# Patient Record
Sex: MALE | Race: WHITE | Employment: OTHER
[De-identification: names, ages, dates, MRNs, and addresses within clinical notes are randomized per-mention and may not be internally consistent; named-entity substitution may affect disease eponyms.]

---

## 2017-06-05 DIAGNOSIS — K21.9 GASTROESOPHAGEAL REFLUX DISEASE WITHOUT ESOPHAGITIS: ICD-10-CM

## 2017-06-05 NOTE — TELEPHONE ENCOUNTER
Omeprazole      Last Written Prescription Date: 06/07/16  Last Fill Quantity: 90,  # refills: 3   Last Office Visit with [facility], [facility] or [facility] prescribing provider: 06/07/16

## 2017-06-06 NOTE — TELEPHONE ENCOUNTER
Prescription approved per [facility] Refill Protocol.    Claudine PADILLA RN

## 2017-07-06 DIAGNOSIS — K21.9 GASTROESOPHAGEAL REFLUX DISEASE WITHOUT ESOPHAGITIS: ICD-10-CM

## 2017-07-06 NOTE — TELEPHONE ENCOUNTER
Omeprazole     Last Written Prescription Date: 06/06/17  Last Fill Quantity: 30,  # refills: 0   Last Office Visit with G, [facility] or [facility] prescribing provider: 06/07/16

## 2017-07-07 NOTE — TELEPHONE ENCOUNTER
Patient has enough medication until his appointment.  Appointment scheduled on the 7/11/17.    Claudine PADILLA RN

## 2017-07-08 ENCOUNTER — HEALTH MAINTENANCE LETTER (OUTPATIENT)
Age: 82
End: 2017-07-08

## 2017-07-11 ENCOUNTER — OFFICE VISIT (OUTPATIENT)
Dept: FAMILY MEDICINE | Facility: CLINIC | Age: 82
End: 2017-07-11
Payer: COMMERCIAL

## 2017-07-11 ENCOUNTER — TELEPHONE (OUTPATIENT)
Dept: FAMILY MEDICINE | Facility: CLINIC | Age: 82
End: 2017-07-11

## 2017-07-11 VITALS
SYSTOLIC BLOOD PRESSURE: 139 MMHG | DIASTOLIC BLOOD PRESSURE: 66 MMHG | BODY MASS INDEX: 27.31 KG/M2 | OXYGEN SATURATION: 96 % | TEMPERATURE: 98.4 F | HEART RATE: 60 BPM | HEIGHT: 67 IN | WEIGHT: 174 LBS

## 2017-07-11 DIAGNOSIS — G40.909 SEIZURE DISORDER (H): ICD-10-CM

## 2017-07-11 DIAGNOSIS — M75.101 ROTATOR CUFF SYNDROME, RIGHT: Primary | ICD-10-CM

## 2017-07-11 DIAGNOSIS — J45.909 UNCOMPLICATED ASTHMA, UNSPECIFIED ASTHMA SEVERITY: ICD-10-CM

## 2017-07-11 DIAGNOSIS — K21.9 GASTROESOPHAGEAL REFLUX DISEASE WITHOUT ESOPHAGITIS: ICD-10-CM

## 2017-07-11 PROCEDURE — 99214 OFFICE O/P EST MOD 30 MIN: CPT | Performed by: FAMILY MEDICINE

## 2017-07-11 RX ORDER — FLUTICASONE PROPIONATE 110 UG/1
2 AEROSOL, METERED RESPIRATORY (INHALATION) 2 TIMES DAILY
Qty: 1 INHALER | Refills: 12 | Status: SHIPPED | OUTPATIENT
Start: 2017-07-11 | End: 2021-01-25

## 2017-07-11 RX ORDER — ALBUTEROL SULFATE 90 UG/1
2 AEROSOL, METERED RESPIRATORY (INHALATION) EVERY 4 HOURS PRN
Qty: 1 INHALER | Refills: 11 | Status: SHIPPED | OUTPATIENT
Start: 2017-07-11 | End: 2020-03-18

## 2017-07-11 RX ORDER — GABAPENTIN 600 MG/1
TABLET ORAL
Qty: 225 TABLET | Refills: 3 | Status: SHIPPED | OUTPATIENT
Start: 2017-07-11 | End: 2018-10-01

## 2017-07-11 ASSESSMENT — PAIN SCALES - GENERAL: PAINLEVEL: MILD PAIN (2)

## 2017-07-11 NOTE — PROGRESS NOTES
SUBJECTIVE:                                                    Henrry Fonseca is a 82 year old male who presents to clinic today for the following health issues:      Concern -   Chief Complaint   Patient presents with     Asthma     med check and refill     Refill Request     omeprazole, Neurontin     Shoulder right     pain and pain goes down arm. had an MRI done 11/2010 on right shoulder and left.             Problem list and histories reviewed & adjusted, as indicated.  Additional history:         Reviewed and updated as needed this visit by clinical staff  Tobacco       Reviewed and updated as needed this visit by Provider      Further history obtained, clarified or corrected by physician:  He needs refills of his medications. His asthma is managed fairly well. He needs his omeprazole or he gets reflux. He has right shoulder pain and has known rotator cuff syndrome and is wondering if more can be done about that. He does not take any medication for it.    OBJECTIVE:  LUNGS: clear to auscultation, normal breath sounds  CV: RRR without murmur  ABD: BS+, soft, nontender, no masses, no hepatosplenomegaly  EXTREMITIES: without joint tenderness, swelling or erythema.  No muscle tenderness or abnormality.  SKIN: No rashes or abnormalities  NEURO:non focal exam    ASSESSMENT:     Gastroesophageal reflux disease without esophagitis  Seizure disorder (H)  Uncomplicated asthma, unspecified asthma severity  Rotator cuff syndrome, right    PLAN:  Refill medications as listed  I told him he can try ibuprofen but he needs to take care of that he doesn't get further reflux and if he does he should stop taking it. If it works well on an intermittent basis he continues that for his shoulder. If it does not work well then he will recheck here for consideration of cortisone injection and following that possibly orthopedic evaluation.    Orders Placed This Encounter     Asthma Action Plan (AAP)     omeprazole (PRILOSEC) 20 MG CR  capsule     gabapentin (NEURONTIN) 600 MG tablet     fluticasone (FLOVENT HFA) 110 MCG/ACT Inhaler     albuterol (PROAIR HFA/PROVENTIL HFA/VENTOLIN HFA) 108 (90 BASE) MCG/ACT Inhaler

## 2017-07-11 NOTE — TELEPHONE ENCOUNTER
Per fax received from [employer] - Flovent is not covered by patient's Insurance Company  Dr. Orourke - Please choose:  1.  Change medication that is not covered to a different medication and send new prescription to patient's pharmacy?  2.  Patient will need to pay for the non-covered medication out-of-pocket?   3.  Try for Prior Authorization with Insurance Company to get medication covered?        P.A. Phone #:  No listed       P.A.  ID#:  [id number]

## 2017-07-11 NOTE — MR AVS SNAPSHOT
After Visit Summary   7/11/2017    Henrry Fonseca    MRN: [id number]           Patient Information     Date Of Birth          1935        Visit Information        Provider Department      7/11/2017 2:00 PM Keenan Orourke MD [facility]        Today's Diagnoses     Rotator cuff syndrome, right    -  1    Gastroesophageal reflux disease without esophagitis        Seizure disorder (H)        Uncomplicated asthma, unspecified asthma severity          Care Instructions          Thank you for choosing [facility].  You may be receiving a survey in the mail from Frannie Perez regarding your visit today.  Please take a few minutes to complete and return the survey to let us know how we are doing.      Our Clinic hours are:  Mondays    7:20 am - 7 pm  Tues -  Fri  7:20 am - 5 pm    Clinic Phone: [phone]    The clinic lab opens at 7:30 am Mon - Fri and appointments are required.    [facility]  Ph. [phone]  Monday-Thursday 8 am - 7pm  Tues/Wed/Fri 8 am - 5:30 pm                 Follow-ups after your visit        Who to contact     If you have questions or need follow up information about today's clinic visit or your schedule please contact [facility] directly at [phone].  Normal or non-critical lab and imaging results will be communicated to you by [employer], letter or phone within 4 business days after the clinic has received the results. If you do not hear from us within 7 days, please contact the clinic through [employer] or phone. If you have a critical or abnormal lab result, we will notify you by phone as soon as possible.  Submit refill requests through [employer] or call your pharmacy and they will forward the refill request to us. Please allow 3 business days for your refill to be completed.          Additional Information About Your Visit        [employer] Information     [employer] lets you send messages to your doctor, view your test  "results, renew your prescriptions, schedule appointments and more. To sign up, go to [email or web address] . Click on \"Log in\" on the left side of the screen, which will take you to the Welcome page. Then click on \"Sign up Now\" on the right side of the page.     You will be asked to enter the access code listed below, as well as some personal information. Please follow the directions to create your username and password.     Your access code is: [email or web address]  Expires: 10/9/2017  2:09 PM     Your access code will  in 90 days. If you need help or a new code, please call your [facility] clinic or [phone].        Care EveryWhere ID     This is your Care EveryWhere ID. This could be used by other organizations to access your [facility] medical records  [id number]        Your Vitals Were     Pulse Temperature Height Pulse Oximetry BMI (Body Mass Index)       60 98.4  F (36.9  C) (Tympanic) 5' 6.5\" (1.689 m) 96% 27.66 kg/m2        Blood Pressure from Last 3 Encounters:   17 139/66   16 139/89   03/09/15 162/78    Weight from Last 3 Encounters:   17 174 lb (78.9 kg)   16 174 lb (78.9 kg)   03/09/15 182 lb (82.6 kg)              We Performed the Following     Asthma Action Plan (AAP)          Today's Medication Changes          These changes are accurate as of: 17  2:16 PM.  If you have any questions, ask your nurse or doctor.               These medicines have changed or have updated prescriptions.        Dose/Directions    omeprazole 20 MG CR capsule   Commonly known as:  priLOSEC   This may have changed:  See the new instructions.   Used for:  Gastroesophageal reflux disease without esophagitis   Changed by:  Keenan Orourke MD        TAKE ONE CAPSULE BY MOUTH ONCE DAILY   Quantity:  90 capsule   Refills:  3            Where to get your medicines      These medications were sent to [facility] [address] [place] [address] - [address]  [facility] [address]    "  Phone:  [phone]     albuterol 108 (90 BASE) MCG/ACT Inhaler    fluticasone 110 MCG/ACT Inhaler    gabapentin 600 MG tablet    omeprazole 20 MG CR capsule                Primary Care Provider Office Phone # Fax #    Keenan Orourke [phone]       [facility] [address] [address] [facility] [address]        Equal Access to Services     MY [facility] Hospital[facility]OVANA : Hadii aad ku hadasho Soomaali, waaxda luqadaha, qaybta kaalmada adeegyada, waxay idiin hayaan adeeg khkim laTinoaan ah. So [facility] [phone].    ATENCIÓN: Si habla español, tiene a warren disposición servicios gratuitos de asistencia lingüística. Llame al [phone].    We comply with applicable federal civil rights laws and [place] laws. We do not discriminate on the basis of race, color, national origin, age, disability sex, sexual orientation or gender identity.            Thank you!     Thank you for choosing [facility]  for your care. Our goal is always to provide you with excellent care. Hearing back from our patients is one way we can continue to improve our services. Please take a few minutes to complete the written survey that you may receive in the mail after your visit with us. Thank you!             Your Updated Medication List - Protect others around you: Learn how to safely use, store and throw away your medicines at www.disposemymeds.org.          This list is accurate as of: 7/11/17  2:16 PM.  Always use your most recent med list.                   Brand Name Dispense Instructions for use Diagnosis    albuterol 108 (90 BASE) MCG/ACT Inhaler    PROAIR HFA/PROVENTIL HFA/VENTOLIN HFA    1 Inhaler    Inhale 2 puffs into the lungs every 4 hours as needed for shortness of breath / dyspnea    Uncomplicated asthma, unspecified asthma severity       aspirin 325 MG EC tablet      1 TABLET EVERY Day as needed        fluticasone 110 MCG/ACT Inhaler    FLOVENT HFA    1 Inhaler    Inhale 2 puffs into the lungs 2 times  daily As needed    Uncomplicated asthma, unspecified asthma severity       gabapentin 600 MG tablet    NEURONTIN    225 tablet    Take  by mouth. 1 tablet in the AM and PM and 1/2 tablet at noon.    Seizure disorder (H)       omeprazole 20 MG CR capsule    priLOSEC    90 capsule    TAKE ONE CAPSULE BY MOUTH ONCE DAILY    Gastroesophageal reflux disease without esophagitis

## 2017-07-11 NOTE — TELEPHONE ENCOUNTER
My preferred strategy for dealing with this situation is this:  1. Call this pharmacy and ask them to tell us which medication in this category is covered. We will change to that most likely  2. If this pharmacy will not provide that information then thank them and tell them we will recommend the patient change pharmacies.  3. Call the patient and explain that this pharmacy will not provide the necessary information so we recommend a change to the [facility] pharmacy which has cooperated in these situations in the past. Or the patient can research this independently by contacting the insurance company.    Sharad

## 2017-07-11 NOTE — NURSING NOTE
"Chief Complaint   Patient presents with     Asthma     med check and refill     Refill Request     omeprazole, Neurontin     Shoulder right     pain and pain goes down arm. had an MRI done 11/2010 on right shoulder and left.       Initial /66 (BP Location: Right arm, Cuff Size: Adult Regular)  Pulse 60  Temp 98.4  F (36.9  C) (Tympanic)  Ht 5' 6.5\" (1.689 m)  Wt 174 lb (78.9 kg)  SpO2 96%  BMI 27.66 kg/m2 Estimated body mass index is 27.66 kg/(m^2) as calculated from the following:    Height as of this encounter: 5' 6.5\" (1.689 m).    Weight as of this encounter: 174 lb (78.9 kg).  Medication Reconciliation: complete    "

## 2017-07-11 NOTE — PATIENT INSTRUCTIONS
Thank you for choosing [facility].  You may be receiving a survey in the mail from [facility] regarding your visit today.  Please take a few minutes to complete and return the survey to let us know how we are doing.      Our Clinic hours are:  Mondays    7:20 am - 7 pm  Tues -  Fri  7:20 am - 5 pm    Clinic Phone: [phone]    The clinic lab opens at 7:30 am Mon - Fri and appointments are required.    [facility]. [phone]  Monday-Thursday 8 am - 7pm  Tues/Wed/Fri 8 am - 5:30 pm

## 2017-07-12 ASSESSMENT — ASTHMA QUESTIONNAIRES: ACT_TOTALSCORE: 25

## 2017-10-23 ENCOUNTER — ALLIED HEALTH/NURSE VISIT (OUTPATIENT)
Dept: FAMILY MEDICINE | Facility: CLINIC | Age: 82
End: 2017-10-23
Payer: COMMERCIAL

## 2017-10-23 DIAGNOSIS — Z23 NEED FOR PROPHYLACTIC VACCINATION AND INOCULATION AGAINST INFLUENZA: Primary | ICD-10-CM

## 2017-10-23 PROCEDURE — 99207 ZZC NO CHARGE NURSE ONLY: CPT

## 2017-10-23 PROCEDURE — G0008 ADMIN INFLUENZA VIRUS VAC: HCPCS

## 2017-10-23 PROCEDURE — 90662 IIV NO PRSV INCREASED AG IM: CPT

## 2017-10-23 NOTE — PROGRESS NOTES

## 2017-10-23 NOTE — MR AVS SNAPSHOT
"              After Visit Summary   10/23/2017    Henrry Fonseca    MRN: [id number]           Patient Information     Date Of Birth          1935        Visit Information        Provider Department      10/23/2017 10:30 AM [facility]        Today's Diagnoses     Need for prophylactic vaccination and inoculation against influenza    -  1       Follow-ups after your visit        Who to contact     If you have questions or need follow up information about today's clinic visit or your schedule please contact [facility] directly at [phone].  Normal or non-critical lab and imaging results will be communicated to you by [employer], letter or phone within 4 business days after the clinic has received the results. If you do not hear from us within 7 days, please contact the clinic through [employer] or phone. If you have a critical or abnormal lab result, we will notify you by phone as soon as possible.  Submit refill requests through [employer] or call your pharmacy and they will forward the refill request to us. Please allow 3 business days for your refill to be completed.          Additional Information About Your Visit        MyChart Information     [employer] lets you send messages to your doctor, view your test results, renew your prescriptions, schedule appointments and more. To sign up, go to [email or web address] . Click on \"Log in\" on the left side of the screen, which will take you to the Welcome page. Then click on \"Sign up Now\" on the right side of the page.     You will be asked to enter the access code listed below, as well as some personal information. Please follow the directions to create your username and password.     Your access code is: [email or web address]  Expires: 2018 11:34 AM     Your access code will  in 90 days. If you need help or a new code, please call your [facility] or [phone].        Care EveryWhere ID     This is your Care " EveryWhere ID. This could be used by other organizations to access your [facility] medical records  [id number]         Blood Pressure from Last 3 Encounters:   07/11/17 139/66   06/07/16 139/89   03/09/15 162/78    Weight from Last 3 Encounters:   07/11/17 174 lb (78.9 kg)   06/07/16 174 lb (78.9 kg)   03/09/15 182 lb (82.6 kg)              We Performed the Following     ADMIN INFLUENZA (For MEDICARE Patients ONLY) []     FLU VACCINE, INCREASED ANTIGEN, PRESV FREE, AGE 65+ [69447]        Primary Care Provider Office Phone # Fax #    Keenan Orourke [phone] [address] [facility] [address]        Equal Access to Services     MY MORAES : Hadii shaquille serrano hadasho Soomaali, waaxda luqadaha, qaybta kaalmada adeegyada, esteban glover . So [facility] [phone].    ATENCIÓN: Si habla español, tiene a warren disposición servicios gratuitos de asistencia lingüística. Llame al [phone].    We comply with applicable federal civil rights laws and [place] laws. We do not discriminate on the basis of race, color, national origin, age, disability, sex, sexual orientation, or gender identity.            Thank you!     Thank you for choosing [facility]  for your care. Our goal is always to provide you with excellent care. Hearing back from our patients is one way we can continue to improve our services. Please take a few minutes to complete the written survey that you may receive in the mail after your visit with us. Thank you!             Your Updated Medication List - Protect others around you: Learn how to safely use, store and throw away your medicines at www.disposemymeds.org.          This list is accurate as of: 10/23/17 11:34 AM.  Always use your most recent med list.                   Brand Name Dispense Instructions for use Diagnosis    albuterol 108 (90 BASE) MCG/ACT Inhaler    PROAIR HFA/PROVENTIL HFA/VENTOLIN HFA    1 Inhaler    Inhale 2  puffs into the lungs every 4 hours as needed for shortness of breath / dyspnea    Uncomplicated asthma, unspecified asthma severity       aspirin 325 MG EC tablet      1 TABLET EVERY Day as needed        fluticasone 110 MCG/ACT Inhaler    FLOVENT HFA    1 Inhaler    Inhale 2 puffs into the lungs 2 times daily As needed    Uncomplicated asthma, unspecified asthma severity       gabapentin 600 MG tablet    NEURONTIN    225 tablet    Take  by mouth. 1 tablet in the AM and PM and 1/2 tablet at noon.    Seizure disorder (H)       omeprazole 20 MG CR capsule    priLOSEC    90 capsule    TAKE ONE CAPSULE BY MOUTH ONCE DAILY    Gastroesophageal reflux disease without esophagitis

## 2018-07-01 DIAGNOSIS — K21.9 GASTROESOPHAGEAL REFLUX DISEASE WITHOUT ESOPHAGITIS: ICD-10-CM

## 2018-07-03 NOTE — TELEPHONE ENCOUNTER
Prescription approved per [facility] Refill Protocol.  [email or web address]

## 2018-10-01 ENCOUNTER — OFFICE VISIT (OUTPATIENT)
Dept: FAMILY MEDICINE | Facility: CLINIC | Age: 83
End: 2018-10-01
Payer: COMMERCIAL

## 2018-10-01 VITALS
HEART RATE: 59 BPM | OXYGEN SATURATION: 98 % | TEMPERATURE: 98.3 F | WEIGHT: 167 LBS | RESPIRATION RATE: 18 BRPM | DIASTOLIC BLOOD PRESSURE: 60 MMHG | SYSTOLIC BLOOD PRESSURE: 146 MMHG | BODY MASS INDEX: 27.82 KG/M2 | HEIGHT: 65 IN

## 2018-10-01 DIAGNOSIS — R97.20 ELEVATED PSA: ICD-10-CM

## 2018-10-01 DIAGNOSIS — G40.909 SEIZURE DISORDER (H): ICD-10-CM

## 2018-10-01 DIAGNOSIS — R35.1 NOCTURIA: ICD-10-CM

## 2018-10-01 DIAGNOSIS — K21.9 GASTROESOPHAGEAL REFLUX DISEASE WITHOUT ESOPHAGITIS: ICD-10-CM

## 2018-10-01 DIAGNOSIS — Z23 NEED FOR PROPHYLACTIC VACCINATION AND INOCULATION AGAINST INFLUENZA: Primary | ICD-10-CM

## 2018-10-01 LAB
ANION GAP SERPL CALCULATED.3IONS-SCNC: 6 MMOL/L (ref 3–14)
BUN SERPL-MCNC: 20 MG/DL (ref 7–30)
CALCIUM SERPL-MCNC: 8.2 MG/DL (ref 8.5–10.1)
CHLORIDE SERPL-SCNC: 106 MMOL/L (ref 94–109)
CO2 SERPL-SCNC: 26 MMOL/L (ref 20–32)
CREAT SERPL-MCNC: 1.18 MG/DL (ref 0.66–1.25)
GFR SERPL CREATININE-BSD FRML MDRD: 59 ML/MIN/1.7M2
GLUCOSE SERPL-MCNC: 97 MG/DL (ref 70–99)
POTASSIUM SERPL-SCNC: 4.4 MMOL/L (ref 3.4–5.3)
PSA SERPL-ACNC: 5.24 UG/L (ref 0–4)
SODIUM SERPL-SCNC: 138 MMOL/L (ref 133–144)

## 2018-10-01 PROCEDURE — 90662 IIV NO PRSV INCREASED AG IM: CPT | Performed by: FAMILY MEDICINE

## 2018-10-01 PROCEDURE — 84153 ASSAY OF PSA TOTAL: CPT | Performed by: FAMILY MEDICINE

## 2018-10-01 PROCEDURE — 99213 OFFICE O/P EST LOW 20 MIN: CPT | Mod: 25 | Performed by: FAMILY MEDICINE

## 2018-10-01 PROCEDURE — G0008 ADMIN INFLUENZA VIRUS VAC: HCPCS | Performed by: FAMILY MEDICINE

## 2018-10-01 PROCEDURE — 36415 COLL VENOUS BLD VENIPUNCTURE: CPT | Performed by: FAMILY MEDICINE

## 2018-10-01 PROCEDURE — 80048 BASIC METABOLIC PNL TOTAL CA: CPT | Performed by: FAMILY MEDICINE

## 2018-10-01 RX ORDER — GABAPENTIN 600 MG/1
TABLET ORAL
Qty: 225 TABLET | Refills: 3 | Status: SHIPPED | OUTPATIENT
Start: 2018-10-01 | End: 2019-10-15

## 2018-10-01 NOTE — LETTER
My Asthma Action Plan  Name: Henrry Fonseca   YOB: 1935  Date: 10/1/2018   My doctor: Keenan Orourke MD   My clinic: [facility]        My Control Medicine: Fluticasone propionate (Flovent) -   mcg daily  My Rescue Medicine: Albuterol (Proair/Ventolin/Proventil) inhaler 2 puffs as needed   My Oral Steroid Medicine:  My Asthma Severity: mild persistent  Avoid your asthma triggers: smoke, upper respiratory infections, animal dander, mold, humidity and cold air               GREEN ZONE   Good Control    I feel good    No cough or wheeze    Can work, sleep and play without asthma symptoms       Take your asthma control medicine every day.     1. If exercise triggers your asthma, take your rescue medication    15 minutes before exercise or sports, and    During exercise if you have asthma symptoms  2. Spacer to use with inhaler: If you have a spacer, make sure to use it with your inhaler             YELLOW ZONE Getting Worse  I have ANY of these:    I do not feel good    Cough or wheeze    Chest feels tight    Wake up at night   1. Keep taking your Green Zone medications  2. Start taking your rescue medicine:    every 20 minutes for up to 1 hour. Then every 4 hours for 24-48 hours.  3. If you stay in the Yellow Zone for more than 12-24 hours, contact your doctor.  4. If you do not return to the Green Zone in 12-24 hours or you get worse, start taking your oral steroid medicine if prescribed by your provider.           RED ZONE Medical Alert - Get Help  I have ANY of these:    I feel awful    Medicine is not helping    Breathing getting harder    Trouble walking or talking    Nose opens wide to breathe       1. Take your rescue medicine NOW  2. If your provider has prescribed an oral steroid medicine, start taking it NOW  3. Call your doctor NOW  4. If you are still in the Red Zone after 20 minutes and you have not reached your doctor:    Take your rescue medicine again and    Call  911 or go to the emergency room right away    See your regular doctor within 2 weeks of an Emergency Room or Urgent Care visit for follow-up treatment.          Annual Reminders:  Meet with Asthma Educator,  Flu Shot in the Fall, consider Pneumonia Vaccination for patients with asthma (aged 19 and older).    Pharmacy: [facility] - [place], [place] - [address]                      Asthma Triggers  How To Control Things That Make Your Asthma Worse    Triggers are things that make your asthma worse.  Look at the list below to help you find your triggers and what you can do about them.  You can help prevent asthma flare-ups by staying away from your triggers.      Trigger                                                          What you can do   Cigarette Smoke  Tobacco smoke can make asthma worse. Do not allow smoking in your home, car or around you.  Be sure no one smokes at a child s day care or school.  If you smoke, ask your health care provider for ways to help you quit.  Ask family members to quit too.  Ask your health care provider for a referral to Quit Plan to help you quit smoking, or call 4-234-022-PLAN.     Colds, Flu, Bronchitis  These are common triggers of asthma. Wash your hands often.  Don t touch your eyes, nose or mouth.  Get a flu shot every year.     Dust Mites  These are tiny bugs that live in cloth or carpet. They are too small to see. Wash sheets and blankets in hot water every week.   Encase pillows and mattress in dust mite proof covers.  Avoid having carpet if you can. If you have carpet, vacuum weekly.   Use a dust mask and HEPA vacuum.   Pollen and Outdoor Mold  Some people are allergic to trees, grass, or weed pollen, or molds. Try to keep your windows closed.  Limit time out doors when pollen count is high.   Ask you health care provider about taking medicine during allergy season.     Animal Dander  Some people are allergic to skin flakes, urine or saliva from pets with  fur or feathers. Keep pets with fur or feathers out of your home.    If you can t keep the pet outdoors, then keep the pet out of your bedroom.  Keep the bedroom door closed.  Keep pets off cloth furniture and away from stuffed toys.     Mice, Rats, and Cockroaches  Some people are allergic to the waste from these pests.   Cover food and garbage.  Clean up spills and food crumbs.  Store grease in the refrigerator.   Keep food out of the bedroom.   Indoor Mold  This can be a trigger if your home has high moisture. Fix leaking faucets, pipes, or other sources of water.   Clean moldy surfaces.  Dehumidify basement if it is damp and smelly.   Smoke, Strong Odors, and Sprays  These can reduce air quality. Stay away from strong odors and sprays, such as perfume, powder, hair spray, paints, smoke incense, paint, cleaning products, candles and new carpet.   Exercise or Sports  Some people with asthma have this trigger. Be active!  Ask your doctor about taking medicine before sports or exercise to prevent symptoms.    Warm up for 5-10 minutes before and after sports or exercise.     Other Triggers of Asthma  Cold air:  Cover your nose and mouth with a scarf.  Sometimes laughing or crying can be a trigger.  Some medicines and food can trigger asthma.

## 2018-10-01 NOTE — PATIENT INSTRUCTIONS
Thank you for choosing [facility].  You may be receiving a survey in the mail from [facility] regarding your visit today.  Please take a few minutes to complete and return the survey to let us know how we are doing.      Our Clinic hours are:  Mondays    7:20 am - 7 pm  Tues - Fri  7:20 am - 5 pm    Clinic Phone: [phone]    The clinic lab opens at 7:30 am Mon - Fri and appointments are required.    [facility]. [phone]  Monday  8 am - 7pm  Tues - Fri 8 am - 5:30 pm

## 2018-10-01 NOTE — PROGRESS NOTES
SUBJECTIVE:   Henrry Fonseca is a 83 year old male who presents to clinic today for the following health issues:      Asthma Follow-Up    Was ACT completed today?    Yes    ACT Total Scores 10/1/2018   ACT TOTAL SCORE -   ASTHMA ER VISITS -   ASTHMA HOSPITALIZATIONS -   ACT TOTAL SCORE (Goal Greater than or Equal to 20) 24   In the past 12 months, how many times did you visit the emergency room for your asthma without being admitted to the hospital? 0   In the past 12 months, how many times were you hospitalized overnight because of your asthma? 0       Recent asthma triggers that patient is dealing with: None        Amount of exercise or physical activity: 4-5 days/week for an average of 30-45 minutes    Problems taking medications regularly: No    Medication side effects: none    Diet: regular (no restrictions)      Genitourinary - Male  Onset: 2 weeks     Description:   Dysuria (painful urination): no   Hematuria (blood in urine): no   Frequency: YES-   Are you urinating at night : YES-   Hesitancy (delay in urine): no   Retention (unable to empty): no   Decrease in urinary flow: no   Incontinence: no     Progression of Symptoms:  same    Accompanying Signs & Symptoms:  Fever: no   Back/Flank pain: YES  Urethral discharge: no   Testicle lumps/masses/pain: no   Nausea and/or vomiting: no   Abdominal pain: no     History:   History of frequent UTI's: no   History of kidney stones: no   History of hernias: no   Personal or Family history of Prostate problems: no  Sexually active: no     Precipitating factors:   none    Alleviating factors:  none      Medication Followup of  Gabapentin and Prilosec     Taking Medication as prescribed: yes    Side Effects:  None    Medication Helping Symptoms:  yes       Problem list and histories reviewed & adjusted, as indicated.  Additional history: as documented        Reviewed and updated as needed this visit by clinical staff  Tobacco  Allergies  Meds  Med Hx  Surg Hx   "Fam Hx  Soc Hx      Reviewed and updated as needed this visit by Provider       Further history obtained, clarified or corrected by physician:  He comes in with no real complaints other than recently he has been urinating more at night which is bothersome to him.  He has a past history of elevated PSA.  He is otherwise feeling well.    OBJECTIVE:  /60  Pulse 59  Temp 98.3  F (36.8  C)  Resp 18  Ht 5' 5\" (1.651 m)  Wt 167 lb (75.8 kg)  SpO2 98%  BMI 27.79 kg/m2  LUNGS: clear to auscultation, normal breath sounds  CV: RRR without murmur  ABD: BS+, soft, nontender, no masses, no hepatosplenomegaly  EXTREMITIES: without joint tenderness, swelling or erythema.  No muscle tenderness or abnormality.  SKIN: No rashes or abnormalities  NEURO:non focal exam    ASSESSMENT:     Gastroesophageal reflux disease without esophagitis  Seizure disorder (H)  Need for prophylactic vaccination and inoculation against influenza  Nocturia  Elevated PSA    PLAN:  Orders Placed This Encounter     Asthma Action Plan (AAP)     FLU VACCINE, INCREASED ANTIGEN, PRESV FREE, AGE 65+ [83212]     ADMIN INFLUENZA (For MEDICARE Patients ONLY) []     Prostate spec antigen screen     Basic metabolic panel     omeprazole (PRILOSEC) 20 MG CR capsule     gabapentin (NEURONTIN) 600 MG tablet           Injectable Influenza Immunization Documentation    1.  Is the person to be vaccinated sick today?   No    2. Does the person to be vaccinated have an allergy to a component   of the vaccine?   No  Egg Allergy Algorithm Link    3. Has the person to be vaccinated ever had a serious reaction   to influenza vaccine in the past?   No    4. Has the person to be vaccinated ever had Guillain-Barré syndrome?   No    Form completed by Galilea Underwood CMA           "

## 2018-10-01 NOTE — MR AVS SNAPSHOT
After Visit Summary   10/1/2018    Henrry Fonseca    MRN: [id number]           Patient Information     Date Of Birth          1935        Visit Information        Provider Department      10/1/2018 2:20 PM Keenan Orourke MD [facility]        Today's Diagnoses     Need for prophylactic vaccination and inoculation against influenza    -  1    Gastroesophageal reflux disease without esophagitis        Seizure disorder (H)        Nocturia        Elevated PSA          Care Instructions          Thank you for choosing [facility].  You may be receiving a survey in the mail from [facility] regarding your visit today.  Please take a few minutes to complete and return the survey to let us know how we are doing.      Our Clinic hours are:  Mondays    7:20 am - 7 pm  Tues -  Fri  7:20 am - 5 pm    Clinic Phone: [phone]    The clinic lab opens at 7:30 am Mon - Fri and appointments are required.    [facility]  Ph. [phone]  Monday  8 am - 7pm  Tues - Fri 8 am - 5:30 pm                 Follow-ups after your visit        Who to contact     If you have questions or need follow up information about today's clinic visit or your schedule please contact [facility] directly at [phone].  Normal or non-critical lab and imaging results will be communicated to you by MyChart, letter or phone within 4 business days after the clinic has received the results. If you do not hear from us within 7 days, please contact the clinic through MyChart or phone. If you have a critical or abnormal lab result, we will notify you by phone as soon as possible.  Submit refill requests through [employer] or call your pharmacy and they will forward the refill request to us. Please allow 3 business days for your refill to be completed.          Additional Information About Your Visit        Care EveryWhere ID     This is your Care EveryWhere ID. This could be used by  "other organizations to access your [place] medical records  [id number]        Your Vitals Were     Pulse Temperature Respirations Height Pulse Oximetry BMI (Body Mass Index)    59 98.3  F (36.8  C) 18 5' 5\" (1.651 m) 98% 27.79 kg/m2       Blood Pressure from Last 3 Encounters:   10/01/18 146/60   07/11/17 139/66   06/07/16 139/89    Weight from Last 3 Encounters:   10/01/18 167 lb (75.8 kg)   07/11/17 174 lb (78.9 kg)   06/07/16 174 lb (78.9 kg)              We Performed the Following     ADMIN INFLUENZA (For MEDICARE Patients ONLY) []     Asthma Action Plan (AAP)     Basic metabolic panel     FLU VACCINE, INCREASED ANTIGEN, PRESV FREE, AGE 65+ [55428]     Prostate spec antigen screen          Where to get your medicines      These medications were sent to [facility] [address] [place] [address] [address]   [address] [facility] [address]     Phone:  [phone]     gabapentin 600 MG tablet    omeprazole 20 MG CR capsule          Primary Care Provider Office Phone # Fax #    Keenan Orourke [phone] [address] [facility] [address]        Equal Access to Services     MY MORAES AH: Hadii shaquille serrano hadasho Soomaali, waaxda luqadaha, qaybta kaalmada adeegyada, waxlucy valladaresin haymariam glover . So [facility] [phone].    ATENCIÓN: Si habla español, tiene a warren disposición servicios gratuitos de asistencia lingüística. Llame al [phone].    We comply with applicable federal civil rights laws and [place] laws. We do not discriminate on the basis of race, color, national origin, age, disability, sex, sexual orientation, or gender identity.            Thank you!     Thank you for choosing [facility]  for your care. Our goal is always to provide you with excellent care. Hearing back from our patients is one way we can continue to improve our services. Please take a few minutes to complete the written survey that you may receive in the mail " after your visit with us. Thank you!             Your Updated Medication List - Protect others around you: Learn how to safely use, store and throw away your medicines at www.disposemymeds.org.          This list is accurate as of 10/1/18  2:41 PM.  Always use your most recent med list.                   Brand Name Dispense Instructions for use Diagnosis    albuterol 108 (90 Base) MCG/ACT inhaler    PROAIR HFA/PROVENTIL HFA/VENTOLIN HFA    1 Inhaler    Inhale 2 puffs into the lungs every 4 hours as needed for shortness of breath / dyspnea    Uncomplicated asthma, unspecified asthma severity       aspirin 325 MG EC tablet      1 TABLET EVERY Day as needed        fluticasone 110 MCG/ACT Inhaler    FLOVENT HFA    1 Inhaler    Inhale 2 puffs into the lungs 2 times daily As needed    Uncomplicated asthma, unspecified asthma severity       gabapentin 600 MG tablet    NEURONTIN    225 tablet    Take  by mouth. 1 tablet in the AM and PM and 1/2 tablet at noon.    Seizure disorder (H)       omeprazole 20 MG CR capsule    priLOSEC    90 capsule    Take 1 capsule (20 mg) by mouth daily Appointment DUE July 2018 further refills.    Gastroesophageal reflux disease without esophagitis

## 2018-10-02 ASSESSMENT — ASTHMA QUESTIONNAIRES: ACT_TOTALSCORE: 24

## 2018-11-06 ENCOUNTER — OFFICE VISIT (OUTPATIENT)
Dept: UROLOGY | Facility: CLINIC | Age: 83
End: 2018-11-06
Payer: COMMERCIAL

## 2018-11-06 VITALS
SYSTOLIC BLOOD PRESSURE: 157 MMHG | BODY MASS INDEX: 27.82 KG/M2 | HEART RATE: 57 BPM | WEIGHT: 167 LBS | TEMPERATURE: 97.5 F | DIASTOLIC BLOOD PRESSURE: 61 MMHG | HEIGHT: 65 IN

## 2018-11-06 DIAGNOSIS — N30.00 ACUTE CYSTITIS WITHOUT HEMATURIA: ICD-10-CM

## 2018-11-06 DIAGNOSIS — R97.20 ELEVATED PROSTATE SPECIFIC ANTIGEN (PSA): Primary | ICD-10-CM

## 2018-11-06 LAB
ALBUMIN UR-MCNC: NEGATIVE MG/DL
APPEARANCE UR: CLEAR
BILIRUB UR QL STRIP: NEGATIVE
COLOR UR AUTO: YELLOW
GLUCOSE UR STRIP-MCNC: NEGATIVE MG/DL
HGB UR QL STRIP: NEGATIVE
KETONES UR STRIP-MCNC: NEGATIVE MG/DL
LEUKOCYTE ESTERASE UR QL STRIP: NEGATIVE
NITRATE UR QL: NEGATIVE
PH UR STRIP: 6.5 PH (ref 5–7)
RBC #/AREA URNS AUTO: NORMAL /HPF
SOURCE: NORMAL
SP GR UR STRIP: <=1.005 (ref 1–1.03)
UROBILINOGEN UR STRIP-ACNC: 0.2 EU/DL (ref 0.2–1)
WBC #/AREA URNS AUTO: NORMAL /HPF

## 2018-11-06 PROCEDURE — 99203 OFFICE O/P NEW LOW 30 MIN: CPT | Mod: 25 | Performed by: UROLOGY

## 2018-11-06 PROCEDURE — 81001 URINALYSIS AUTO W/SCOPE: CPT | Performed by: UROLOGY

## 2018-11-06 PROCEDURE — 51798 US URINE CAPACITY MEASURE: CPT | Performed by: UROLOGY

## 2018-11-06 PROCEDURE — 87086 URINE CULTURE/COLONY COUNT: CPT | Performed by: UROLOGY

## 2018-11-06 NOTE — NURSING NOTE
"Initial /61 (BP Location: Right arm, Patient Position: Sitting, Cuff Size: Adult Regular)  Pulse 57  Temp 97.5  F (36.4  C) (Tympanic)  Ht 1.651 m (5' 5\")  Wt 75.8 kg (167 lb)  BMI 27.79 kg/m2 Estimated body mass index is 27.79 kg/(m^2) as calculated from the following:    Height as of this encounter: 1.651 m (5' 5\").    Weight as of this encounter: 75.8 kg (167 lb). .    Soniya Peres MA    "

## 2018-11-06 NOTE — NURSING NOTE
Active order to obtain bladder scan? Yes:    Name of ordering provider:  Dr. Navarro  Bladder scan preformed post void Yes: .  Bladder scan reveled 0ML.  Provider notified?  Yes:     Soniya Peres MA

## 2018-11-06 NOTE — PROGRESS NOTES
Appointment source: New Patient  Patient name: Henrry Fonseca  Urology Staff: Inocente Navarro MD    Subjective: This is a 83 year old year old male noted to have an elevated PSA.    Objective:  PSA has fluctuated over recent years:    Component      Latest Ref Rng & Units 5/16/2001 9/13/2005 3/20/2008 11/4/2008   PSA Free          0.7   Prostate Specific Antigen          2.3   PSAPCT          30   PSA      0 - 4 ug/L 2.19 2.74 4.01 (H)      Component      Latest Ref Rng & Units 10/7/2010 4/3/2012 10/1/2018   PSA Free            Prostate Specific Antigen            PSAPCT            PSA      0 - 4 ug/L 2.90 3.06 5.24 (H)     No voiding symptoms of concern.    Prostate benign to palpation.    Assessment:  Mildly elevated PSA in an 83 year old man.    Plan:  Repeat PSA in 6 months. Not likely to become concerned until PSA exceeds 10 ng/ml    Total time 30 minutes, counseling 20 minutes discussing PSA

## 2018-11-06 NOTE — MR AVS SNAPSHOT
"              After Visit Summary   11/6/2018    Henrry Fonseca    MRN: [id number]           Patient Information     Date Of Birth          1935        Visit Information        Provider Department      11/6/2018 10:30 AM ZIYAD Navarro MD [facility]        Today's Diagnoses     Elevated prostate specific antigen (PSA)    -  1    Acute cystitis without hematuria          Care Instructions    Per Dr. Navarro's instructions          Follow-ups after your visit        Who to contact     If you have questions or need follow up information about today's clinic visit or your schedule please contact [facility] directly at [phone].  Normal or non-critical lab and imaging results will be communicated to you by MyChart, letter or phone within 4 business days after the clinic has received the results. If you do not hear from us within 7 days, please contact the clinic through MyChart or phone. If you have a critical or abnormal lab result, we will notify you by phone as soon as possible.  Submit refill requests through [employer] or call your pharmacy and they will forward the refill request to us. Please allow 3 business days for your refill to be completed.          Additional Information About Your Visit        Care EveryWhere ID     This is your Care EveryWhere ID. This could be used by other organizations to access your [place] medical records  [id number]        Your Vitals Were     Pulse Temperature Height BMI (Body Mass Index)          57 97.5  F (36.4  C) (Tympanic) 1.651 m (5' 5\") 27.79 kg/m2         Blood Pressure from Last 3 Encounters:   11/06/18 157/61   10/01/18 146/60   07/11/17 139/66    Weight from Last 3 Encounters:   11/06/18 75.8 kg (167 lb)   10/01/18 75.8 kg (167 lb)   07/11/17 78.9 kg (174 lb)              We Performed the Following     MEASURE POST-VOID RESIDUAL URINE/BLADDER CAPACITY, US NON-IMAGING     UA with Microscopic     Urine Culture Aerobic Bacterial        " Primary Care Provider Office Phone # Fax #    Keenan Orourke [phone] [address] [address] [facility] [address]        Equal Access to Services     CORYSYD ALEISHA : Hadii aad ku hadsaskiao Solibraali, waaxda luqadaha, qaybta kaalmada adekaren, esteban velasquezneris hu. So [facility] [phone].    ATENCIÓN: Si habla español, tiene a warren disposición servicios gratuitos de asistencia lingüística. Llame al [phone].    We comply with applicable federal civil rights laws and [place] laws. We do not discriminate on the basis of race, color, national origin, age, disability, sex, sexual orientation, or gender identity.            Thank you!     Thank you for choosing [facility]  for your care. Our goal is always to provide you with excellent care. Hearing back from our patients is one way we can continue to improve our services. Please take a few minutes to complete the written survey that you may receive in the mail after your visit with us. Thank you!             Your Updated Medication List - Protect others around you: Learn how to safely use, store and throw away your medicines at www.disposemymeds.org.          This list is accurate as of 11/6/18  1:17 PM.  Always use your most recent med list.                   Brand Name Dispense Instructions for use Diagnosis    albuterol 108 (90 Base) MCG/ACT inhaler    PROAIR HFA/PROVENTIL HFA/VENTOLIN HFA    1 Inhaler    Inhale 2 puffs into the lungs every 4 hours as needed for shortness of breath / dyspnea    Uncomplicated asthma, unspecified asthma severity       aspirin 325 MG EC tablet      1 TABLET EVERY Day as needed        fluticasone 110 MCG/ACT Inhaler    FLOVENT HFA    1 Inhaler    Inhale 2 puffs into the lungs 2 times daily As needed    Uncomplicated asthma, unspecified asthma severity       gabapentin 600 MG tablet    NEURONTIN    225 tablet    Take  by mouth. 1 tablet in the AM and PM and 1/2 tablet at noon.     Seizure disorder (H)       omeprazole 20 MG CR capsule    priLOSEC    90 capsule    Take 1 capsule (20 mg) by mouth daily Appointment DUE July 2018 further refills.    Gastroesophageal reflux disease without esophagitis

## 2018-11-07 LAB
BACTERIA SPEC CULT: NO GROWTH
Lab: NORMAL
SPECIMEN SOURCE: NORMAL

## 2019-09-23 ENCOUNTER — TELEPHONE (OUTPATIENT)
Dept: FAMILY MEDICINE | Facility: CLINIC | Age: 84
End: 2019-09-23

## 2019-09-23 DIAGNOSIS — K21.9 GASTROESOPHAGEAL REFLUX DISEASE WITHOUT ESOPHAGITIS: ICD-10-CM

## 2019-09-23 NOTE — TELEPHONE ENCOUNTER
Reason for Call:  Medication or medication refill:    Do you use a [facility]?  Name of the pharmacy and phone number for the current request:    [facility] [place]    Name of the medication requested: Omeprazole - Pt calling for refill to get him through until his upcoming appt - No need to call patient back, unless there are questions or problems.      Omeprazole  Last Written Prescription Date:  10/1/18  Last Fill Quantity: 90,  # refills: 3   Last office visit: 10/1/2018 with prescribing provider:     Future Office Visit:      Other request:     Can we leave a detailed message on this number? YES    Phone number patient can be reached at: Home number on file [phone] (home)    Best Time: any    Call taken on 9/23/2019 at 8:37 AM by Jaelyn Ortiz

## 2019-10-15 ENCOUNTER — OFFICE VISIT (OUTPATIENT)
Dept: FAMILY MEDICINE | Facility: CLINIC | Age: 84
End: 2019-10-15
Payer: COMMERCIAL

## 2019-10-15 VITALS
BODY MASS INDEX: 28.16 KG/M2 | OXYGEN SATURATION: 96 % | WEIGHT: 169 LBS | DIASTOLIC BLOOD PRESSURE: 58 MMHG | RESPIRATION RATE: 16 BRPM | TEMPERATURE: 96.5 F | HEART RATE: 80 BPM | SYSTOLIC BLOOD PRESSURE: 133 MMHG | HEIGHT: 65 IN

## 2019-10-15 DIAGNOSIS — K21.9 GASTROESOPHAGEAL REFLUX DISEASE WITHOUT ESOPHAGITIS: ICD-10-CM

## 2019-10-15 DIAGNOSIS — R01.1 HEART MURMUR: ICD-10-CM

## 2019-10-15 DIAGNOSIS — G40.909 SEIZURE DISORDER (H): ICD-10-CM

## 2019-10-15 DIAGNOSIS — Z12.5 ENCOUNTER FOR SCREENING FOR MALIGNANT NEOPLASM OF PROSTATE: ICD-10-CM

## 2019-10-15 DIAGNOSIS — R97.20 ELEVATED PSA: ICD-10-CM

## 2019-10-15 DIAGNOSIS — Z00.00 ENCOUNTER FOR MEDICARE ANNUAL WELLNESS EXAM: Primary | ICD-10-CM

## 2019-10-15 LAB
ANION GAP SERPL CALCULATED.3IONS-SCNC: 7 MMOL/L (ref 3–14)
BUN SERPL-MCNC: 23 MG/DL (ref 7–30)
CALCIUM SERPL-MCNC: 8.6 MG/DL (ref 8.5–10.1)
CHLORIDE SERPL-SCNC: 106 MMOL/L (ref 94–109)
CO2 SERPL-SCNC: 25 MMOL/L (ref 20–32)
CREAT SERPL-MCNC: 1.14 MG/DL (ref 0.66–1.25)
GFR SERPL CREATININE-BSD FRML MDRD: 58 ML/MIN/{1.73_M2}
GLUCOSE SERPL-MCNC: 124 MG/DL (ref 70–99)
POTASSIUM SERPL-SCNC: 4.3 MMOL/L (ref 3.4–5.3)
SODIUM SERPL-SCNC: 138 MMOL/L (ref 133–144)

## 2019-10-15 PROCEDURE — 99207 C PAF COMPLETED  NO CHARGE: CPT | Mod: 25 | Performed by: FAMILY MEDICINE

## 2019-10-15 PROCEDURE — 36415 COLL VENOUS BLD VENIPUNCTURE: CPT | Performed by: FAMILY MEDICINE

## 2019-10-15 PROCEDURE — 90662 IIV NO PRSV INCREASED AG IM: CPT | Performed by: FAMILY MEDICINE

## 2019-10-15 PROCEDURE — 80048 BASIC METABOLIC PNL TOTAL CA: CPT | Performed by: FAMILY MEDICINE

## 2019-10-15 PROCEDURE — G0008 ADMIN INFLUENZA VIRUS VAC: HCPCS | Performed by: FAMILY MEDICINE

## 2019-10-15 PROCEDURE — G0103 PSA SCREENING: HCPCS | Performed by: FAMILY MEDICINE

## 2019-10-15 PROCEDURE — 99397 PER PM REEVAL EST PAT 65+ YR: CPT | Mod: 25 | Performed by: FAMILY MEDICINE

## 2019-10-15 RX ORDER — GABAPENTIN 600 MG/1
600 TABLET ORAL 2 TIMES DAILY
Qty: 180 TABLET | Refills: 3 | Status: SHIPPED | OUTPATIENT
Start: 2019-10-15 | End: 2020-12-07

## 2019-10-15 ASSESSMENT — ASTHMA QUESTIONNAIRES
QUESTION_2 LAST FOUR WEEKS HOW OFTEN HAVE YOU HAD SHORTNESS OF BREATH: ONCE OR TWICE A WEEK
QUESTION_4 LAST FOUR WEEKS HOW OFTEN HAVE YOU USED YOUR RESCUE INHALER OR NEBULIZER MEDICATION (SUCH AS ALBUTEROL): NOT AT ALL
ACT_TOTALSCORE: 23
QUESTION_3 LAST FOUR WEEKS HOW OFTEN DID YOUR ASTHMA SYMPTOMS (WHEEZING, COUGHING, SHORTNESS OF BREATH, CHEST TIGHTNESS OR PAIN) WAKE YOU UP AT NIGHT OR EARLIER THAN USUAL IN THE MORNING: NOT AT ALL
QUESTION_5 LAST FOUR WEEKS HOW WOULD YOU RATE YOUR ASTHMA CONTROL: WELL CONTROLLED
QUESTION_1 LAST FOUR WEEKS HOW MUCH OF THE TIME DID YOUR ASTHMA KEEP YOU FROM GETTING AS MUCH DONE AT WORK, SCHOOL OR AT HOME: NONE OF THE TIME
ACUTE_EXACERBATION_TODAY: NO

## 2019-10-15 ASSESSMENT — MIFFLIN-ST. JEOR: SCORE: 1383.46

## 2019-10-15 NOTE — PROGRESS NOTES
"  SUBJECTIVE:   Henrry LEON White is a 84 year old male who presents for Preventive Visit.      Are you in the first 12 months of your Medicare Part B coverage?  No    Physical Health:    In general, how would you rate your overall physical health? excellent    Outside of work, how many days during the week do you exercise? 6-7 days/week    Outside of work, approximately how many minutes a day do you exercise?45-60 minutes    If you drink alcohol do you typically have >3 drinks per day or >7 drinks per week? No    Do you usually eat at least 4 servings of fruit and vegetables a day, include whole grains & fiber and avoid regularly eating high fat or \"junk\" foods? Yes    Do you have any problems taking medications regularly?  No    Do you have any side effects from medications? none    Needs assistance for the following daily activities: no assistance needed    Which of the following safety concerns are present in your home?  none identified     Hearing impairment: No    In the past 6 months, have you been bothered by leaking of urine? no    Mental Health:    In general, how would you rate your overall mental or emotional health? good  PHQ-2 Score:      Do you feel safe in your environment? Yes    Do you have a Health Care Directive? No: Advance care planning was reviewed with patient; patient declined at this time.    Additional concerns to address?  No    Fall risk:  Fallen 2 or more times in the past year?: No  Any fall with injury in the past year?: No    Cognitive Screenin) Repeat 3 items (Leader, Season, Table)    2) Clock draw: NORMAL  3) 3 item recall: Recalls 3 objects  Results: 3 items recalled: COGNITIVE IMPAIRMENT LESS LIKELY    Mini-CogTM Copyright STANISLAV Mccullough. Licensed by the author for use in [facility]; reprinted with permission (paolo@.[facility]). All rights reserved.      Do you have sleep apnea, excessive snoring or daytime drowsiness?: no        Hypertension Follow-up      Do you check " "your blood pressure regularly outside of the clinic? Yes     Are you following a low salt diet? No    Are your blood pressures ever more than 140 on the top number (systolic) OR more   than 90 on the bottom number (diastolic), for example 140/90? No    Reviewed and updated as needed this visit by clinical staff         Reviewed and updated as needed this visit by Provider        Social History     Tobacco Use     Smoking status: Never Smoker     Smokeless tobacco: Never Used   Substance Use Topics     Alcohol use: Yes     Comment: rarely                           Current providers sharing in care for this patient include:   Patient Care Team:  Keenan Orourke MD as PCP - General (Family Practice)  Keenan Orourke MD as Assigned PCP    The following health maintenance items are reviewed in Epic and correct as of today:  Health Maintenance   Topic Date Due     ZOSTER IMMUNIZATION (2 of 3) 01/29/2013     MEDICARE ANNUAL WELLNESS VISIT  04/03/2013     ADVANCE CARE PLANNING  03/29/2016     PHQ-2  01/01/2019     ASTHMA CONTROL TEST  04/01/2019     INFLUENZA VACCINE (1) 09/01/2019     FALL RISK ASSESSMENT  10/01/2019     ASTHMA ACTION PLAN  10/01/2019     DTAP/TDAP/TD IMMUNIZATION (4 - Td) 12/04/2022     PNEUMOCOCCAL IMMUNIZATION 65+ LOW/MEDIUM RISK  Completed     IPV IMMUNIZATION  Aged Out     MENINGITIS IMMUNIZATION  Aged Out     Labs reviewed in EPIC      ROS:  Constitutional, HEENT, cardiovascular, pulmonary, GI, , musculoskeletal, neuro, skin, endocrine and psych systems are negative, except as otherwise noted.    OBJECTIVE:   There were no vitals taken for this visit. Estimated body mass index is 27.79 kg/m  as calculated from the following:    Height as of 11/6/18: 1.651 m (5' 5\").    Weight as of 11/6/18: 75.8 kg (167 lb).  EXAM:   GENERAL: healthy, alert and no distress  EYES: Eyes grossly normal to inspection, PERRL and conjunctivae and sclerae normal  HENT: ear canals and TM's normal, nose and mouth without " "ulcers or lesions  NECK: no adenopathy, no asymmetry, masses, or scars and thyroid normal to palpation  RESP: lungs clear to auscultation - no rales, rhonchi or wheezes  CV: regular rates and rhythm, normal S1 S2, no S3 or S4, grade three/6 systolic murmur heard best over the  left sternal border, peripheral pulses strong and no peripheral edema  ABDOMEN: soft, nontender, no hepatosplenomegaly, no masses and bowel sounds normal  MS: no gross musculoskeletal defects noted, no edema  SKIN: no suspicious lesions or rashes  NEURO: Normal strength and tone, mentation intact and speech normal  PSYCH: mentation appears normal, affect normal/bright    Diagnostic Test Results:  Labs reviewed in Epic    ASSESSMENT / PLAN:   Henrry was seen today for physical.    Diagnoses and all orders for this visit:    Encounter for Medicare annual wellness exam  -     HC FLU VACCINE, INCREASED ANTIGEN, PRESV FREE [92833]  -     Basic metabolic panel    Elevated PSA  -     PSA, screen    Encounter for screening for malignant neoplasm of prostate   -     PSA, screen    Gastroesophageal reflux disease without esophagitis  -     omeprazole (PRILOSEC) 20 MG DR capsule; Take 1 capsule (20 mg) by mouth daily    Seizure disorder (H)  -     gabapentin (NEURONTIN) 600 MG tablet; Take 1 tablet (600 mg) by mouth 2 times daily Take  by mouth. 1 tablet in the AM and PM and 1/2 tablet at noon.    Heart murmur  -     Echocardiogram Complete; Future        End of Life Planning:  Patient currently has an advanced directive:     COUNSELING:  Reviewed preventive health counseling, as reflected in patient instructions       Regular exercise       Healthy diet/nutrition    Estimated body mass index is 27.79 kg/m  as calculated from the following:    Height as of 11/6/18: 1.651 m (5' 5\").    Weight as of 11/6/18: 75.8 kg (167 lb).         reports that he has never smoked. He has never used smokeless tobacco.      Appropriate preventive services were discussed " with this patient, including applicable screening as appropriate for cardiovascular disease, diabetes, osteopenia/osteoporosis, and glaucoma.  As appropriate for age/gender, discussed screening for colorectal cancer, prostate cancer, breast cancer, and cervical cancer. Checklist reviewing preventive services available has been given to the patient.    Reviewed patients plan of care and provided an AVS. The Basic Care Plan (routine screening as documented in Health Maintenance) for Henrry meets the Care Plan requirement. This Care Plan has been established and reviewed with the Patient.    He has a significant cardiac murmur which seems to be different than previous and so he will have echocardiogram  He needs follow-up PSA for an elevated reading from last year  Recommend routine healthcare maintenance.    Counseling Resources:  ATP IV Guidelines  Pooled Cohorts Equation Calculator  Breast Cancer Risk Calculator  FRAX Risk Assessment  ICSI Preventive Guidelines  Dietary Guidelines for Americans, 2010  USDA's MyPlate  ASA Prophylaxis  Lung CA Screening    Keenan Orourke MD  [facility]

## 2019-10-15 NOTE — LETTER
[facility]  [address]  [facility] [address]  Phone: [phone]      10/16/2019     Henrry Fonseca  [address] [place] [address]      Dear Henrry:    Thank you for allowing me to participate in your care. Your recent test results were reviewed and listed below.  tests are acceptable     Your results are provided below for your review  Results for orders placed or performed in visit on 10/15/19   PSA, screen   Result Value Ref Range    PSA 4.01 (H) 0 - 4 ug/L   Basic metabolic panel   Result Value Ref Range    Sodium 138 133 - 144 mmol/L    Potassium 4.3 3.4 - 5.3 mmol/L    Chloride 106 94 - 109 mmol/L    Carbon Dioxide 25 20 - 32 mmol/L    Anion Gap 7 3 - 14 mmol/L    Glucose 124 (H) 70 - 99 mg/dL    Urea Nitrogen 23 7 - 30 mg/dL    Creatinine 1.14 0.66 - 1.25 mg/dL    GFR Estimate 58 (L) >60 mL/min/[1.73_m2]    GFR Estimate If Black 68 >60 mL/min/[1.73_m2]    Calcium 8.6 8.5 - 10.1 mg/dL      Thank you for choosing [facility]. As a result, please continue with the treatment plan discussed in the office. Return as discussed or sooner if symptoms worsen or fail to improve.     If you have any further questions or concerns, please do not hesitate to contact us.      Sincerely,        Dr. Keenan Orourke

## 2019-10-16 LAB — PSA SERPL-ACNC: 4.01 UG/L (ref 0–4)

## 2019-10-16 ASSESSMENT — ASTHMA QUESTIONNAIRES: ACT_TOTALSCORE: 23

## 2019-10-21 ENCOUNTER — HOSPITAL ENCOUNTER (OUTPATIENT)
Dept: CARDIOLOGY | Facility: CLINIC | Age: 84
Discharge: HOME | End: 2019-10-21
Attending: FAMILY MEDICINE | Admitting: FAMILY MEDICINE
Payer: COMMERCIAL

## 2019-10-21 ENCOUNTER — TELEPHONE (OUTPATIENT)
Dept: FAMILY MEDICINE | Facility: CLINIC | Age: 84
End: 2019-10-21

## 2019-10-21 DIAGNOSIS — I35.0 AORTIC VALVE STENOSIS, ETIOLOGY OF CARDIAC VALVE DISEASE UNSPECIFIED: Primary | ICD-10-CM

## 2019-10-21 DIAGNOSIS — R01.1 HEART MURMUR: ICD-10-CM

## 2019-10-21 PROCEDURE — 93306 TTE W/DOPPLER COMPLETE: CPT | Mod: 26 | Performed by: INTERNAL MEDICINE

## 2019-10-21 PROCEDURE — 93306 TTE W/DOPPLER COMPLETE: CPT

## 2019-10-21 NOTE — TELEPHONE ENCOUNTER
----- Message from Florentino Mullins MD sent at 10/21/2019  3:04 PM CDT -----  Regarding: abnormal echo  Dr. Orourke,    Echo on this patient shows severe aortic stenosis with normal EF.  The valve appears severe enough to consider replacement, likely TAVR.  I would recommend a cardiology referral and we can get him to the TAVR clinic.    Thanks,  Florentino Mullins MD  Cardiology - [facility]  Pager: [phone]  Text Page  October 21, 2019

## 2019-10-22 ENCOUNTER — CARE COORDINATION (OUTPATIENT)
Dept: CARDIOLOGY | Facility: CLINIC | Age: 84
End: 2019-10-22

## 2019-10-22 NOTE — PROGRESS NOTES
Received an in box message to have Mr. Fonseca come see us in the TAVR clinic. Called the patient and spoke to him about aortic stenosis and asked him if anyone had talked to him regarding his valve. Patient stated that he really doesn't have any interest in pursuing anything at this time and he feels like he enjoys his day to day life and at 85 he feels like things are good. He said that if things change he will let his Dr know.Jennifer Garcia RN

## 2020-03-18 ENCOUNTER — TELEPHONE (OUTPATIENT)
Dept: FAMILY MEDICINE | Facility: CLINIC | Age: 85
End: 2020-03-18

## 2020-03-18 DIAGNOSIS — J45.909 ASTHMA: Primary | ICD-10-CM

## 2020-03-18 RX ORDER — ALBUTEROL SULFATE 90 UG/1
2 AEROSOL, METERED RESPIRATORY (INHALATION) EVERY 4 HOURS PRN
Qty: 1 INHALER | Refills: 11 | Status: SHIPPED | OUTPATIENT
Start: 2020-03-18 | End: 2021-02-25

## 2020-03-18 NOTE — TELEPHONE ENCOUNTER
Albuterol refill.  Last seen 10/15/19.  Pt has diagnosis of asthma.  Pt states he has been good but SOA with stairs.  Refill given.  KPavelRN

## 2020-06-29 ENCOUNTER — TRANSFERRED RECORDS (OUTPATIENT)
Dept: HEALTH INFORMATION MANAGEMENT | Facility: CLINIC | Age: 85
End: 2020-06-29

## 2020-07-16 ENCOUNTER — PATIENT OUTREACH (OUTPATIENT)
Dept: CARE COORDINATION | Facility: CLINIC | Age: 85
End: 2020-07-16

## 2020-07-16 DIAGNOSIS — I63.9 CEREBROVASCULAR ACCIDENT (H): Primary | ICD-10-CM

## 2020-07-16 NOTE — PROGRESS NOTES
Patient indicated he is at [facility] and suffered from a stroke.  CHW informed patient we would try to reach back out to him after he is home.      Reason for Referral: Care Transition: Inpatient to outpatient  Additional pertinent details:     Kareen CAI Community Health Worker  [facility] Care Coordination  [place]  Phone: [phone]

## 2020-07-16 NOTE — LETTER
[facility]  [facility]  [address]  [facility] [address]  Phone: [phone]    July 16, 2020    Henr[place] oFnseca  [place] [address]      Dear Henrry,    I am a clinic care coordinator who works with Keenan Orourke MD at [facility]. I wanted to introduce myself and provide you with my contact information for you to be able to call me with any questions or concerns. Below is a description of clinic care coordination and how I can further assist you.      The clinic care coordination team is made up of a registered nurse,  and community health worker who understand the health care system. The goal of clinic care coordination is to help you manage your health and improve access to the health care system in the most efficient manner. The team can assist you in meeting your health care goals by providing education, coordinating services, strengthening the communication among your providers and supporting you with any resource needs.    Please feel free to contact me at [phone] with any questions or concerns. We are focused on providing you with the highest-quality healthcare experience possible and that all starts with you.     Sincerely,     Juma Aparicio RN  Clinic Care Coordinator

## 2020-07-16 NOTE — PROGRESS NOTES
Clinic Care Coordination Contact  Care Coordination Transition Communication    Referral Source: [facility]    Clinical Data: Patient was hospitalized at [facility]  from 6-29 to 7-15 with diagnosis of Asthma, unspecified asthma severity, unspecified whether complicated, unspecified whether persistent (Primary Dx);   Uncontrolled hypertension;   Gastroesophageal reflux disease without esophagitis;   Hemorrhagic stroke (HC).     Transition to Facility:             [facility]      Plan: RN/SW Care Coordinator will await notification from facility staff informing RN/SW Care Coordinator of patient's discharge plans/needs. RN/SW Care Coordinator will review chart and outreach to facility staff every 4 weeks and as needed.     Juma Aparicio RN  Primary Care Clinic RN Care Coordinator  [facility]   [phone]

## 2020-07-16 NOTE — LETTER
Health Care Home - Access Care Plan    About Me:    Patient Name:  Henrry Fonseca    YOB: 1935  Age:                      85 year old   [place] MRN:     [id number] Telephone Information:   Home Phone [phone]   Mobile Not on file.       Address[place]  [address]   [place] [address] Email address:  No e-mail address on record      Emergency Contact(s)   Name Relationship Lgl Grd Work Phone Home Phone Mobile Phone   1. TREVER VALENTIN Spouse   [phone]    2. DECLINE, PER P* Declined                 Health Maintenance:      My Access Plan  Medical Emergency 911   Questions or concerns during clinic hours Primary Clinic Line, I will call the clinic directly: [facility] - [phone]   24 Hour Appointment Line [phone] or  [phone]) (toll free)   24 Hour Nurse Line [phone] (toll free)   Questions or concerns outside clinic hours 24 Hour Appointment Line, I will call the after-hours on-call line:   {Clinics - Healthcare Home:753313}   Preferred Urgent Care [facility], [phone]   Preferred Hospital [facility]  [phone]   Preferred Pharmacy [facility] - [place], [place] - [address]      Behavioral Health Crisis Line The National Suicide Prevention Lifeline at 1-570.845.5811 or 430                     My Care Team Members  Patient Care Team       Relationship Specialty Notifications Start End    Keenan Orourke MD PCP - General Family Practice  7/22/14     Phone: [phone] Fax: [phone]         [address] [facility] [address]    Keenan Orourke MD Assigned PCP   3/13/16     Phone: [phone] Fax: [phone] [address] [facility] [address]    Shady Aparicio, RN Clinic Care Coordinator Primary Care - CC  7/16/20     Phone: [phone] Fax: [phone]         [address] [place] [address]           My Medical and Care Information  Problem List    Patient Active Problem List   Diagnosis     Generalized osteoarthrosis, unspecified site     Mild intermittent asthma     Asbestosis (H)     Essential hypertension, benign     Syncope and collapse     Back pain     Heel pain     Seizure disorder (H)     Rotator cuff syndrome     Elevated PSA     CARDIOVASCULAR SCREENING; LDL GOAL LESS THAN 160     Advanced directives, counseling/discussion     Senile nuclear sclerosis     Health Care Home     Esophageal reflux     Eczema      Current Medications and Allergies:  See printed Medication Report

## 2020-07-31 ENCOUNTER — DOCUMENTATION ONLY (OUTPATIENT)
Dept: CARDIOLOGY | Facility: CLINIC | Age: 85
End: 2020-07-31

## 2020-07-31 NOTE — PROGRESS NOTES
Called to reach out to Henrry as we had received a note from Dr. Guo that he wanted Henrry to establish cardiac care with us for his aortic stenosis. I spoke with Henrry and at this time his feels like he has a lot on his plate with recovering from the stroke and he really isn't interested in seeing anyone right now. I told that I will try to follow along in his chart and perhaps in a couple months we can connect. He was agreeable to that plan.Jennifer Garcia RN

## 2020-08-17 ENCOUNTER — PATIENT OUTREACH (OUTPATIENT)
Dept: CARE COORDINATION | Facility: CLINIC | Age: 85
End: 2020-08-17

## 2020-08-17 NOTE — PROGRESS NOTES
Clinic Care Coordination Contact  Care Team Conversations    Patient still in TCU. Care Coordination will continue to monitor for discharge.       Juma Aparicio RN  Primary Care Clinic RN Care Coordinator  [facility]   [phone]

## 2020-08-28 ENCOUNTER — DOCUMENTATION ONLY (OUTPATIENT)
Dept: LAB | Facility: CLINIC | Age: 85
End: 2020-08-28

## 2020-08-28 NOTE — PROGRESS NOTES
I have made several attempts to call Henrry to make sure he is aware that his primary has set him up to come to TAVR clinic to us on the 10th of September. I have left him messages with my number, but I have not heard back from him. I will continue to try to reach out to him. I am not sure where I should send a new patient packet since he is still at [facility] at this time.Jennifer Garcia RN

## 2020-09-09 ENCOUNTER — TELEPHONE (OUTPATIENT)
Dept: CARDIOLOGY | Facility: CLINIC | Age: 85
End: 2020-09-09

## 2020-09-10 ENCOUNTER — TELEPHONE (OUTPATIENT)
Dept: CARDIOLOGY | Facility: CLINIC | Age: 85
End: 2020-09-10

## 2020-09-10 NOTE — TELEPHONE ENCOUNTER
Called and spoke with Henrry and he is going to cancel his TAVR apt. He feels like he has enough going on with recovering from his stroke. He feels that he has had the aortic stenosis for a while and he feels that he is dealing with it just fine and does not want to go through with the visit. I told him to call if anything changes.Jennifer Garcia RN

## 2020-09-11 ENCOUNTER — PATIENT OUTREACH (OUTPATIENT)
Dept: CARE COORDINATION | Facility: CLINIC | Age: 85
End: 2020-09-11

## 2020-09-11 NOTE — PROGRESS NOTES
Clinic Care Coordination Contact  Care Team Conversations    Patient still in TCU.  Care Coordination will continue to follow for discharge.     Juma Aparicio RN  Primary Care Clinic RN Care Coordinator  [facility]   [phone]

## 2020-10-07 ENCOUNTER — PATIENT OUTREACH (OUTPATIENT)
Dept: CARE COORDINATION | Facility: CLINIC | Age: 85
End: 2020-10-07

## 2020-10-07 NOTE — PROGRESS NOTES
Clinic Care Coordination Contact  Care Team Conversations    Patient discharged from TCU today. Clinic Care Coordination will call to follow up tomorrow.     Juma Aparicio RN  Primary Care Clinic RN Care Coordinator  [facility]   [phone]

## 2020-10-08 ENCOUNTER — MEDICAL CORRESPONDENCE (OUTPATIENT)
Dept: HEALTH INFORMATION MANAGEMENT | Facility: CLINIC | Age: 85
End: 2020-10-08

## 2020-10-08 ENCOUNTER — PATIENT OUTREACH (OUTPATIENT)
Dept: NURSING | Facility: CLINIC | Age: 85
End: 2020-10-08
Payer: COMMERCIAL

## 2020-10-08 NOTE — PROGRESS NOTES
Clinic Care Coordination Contact  Care Team Conversations    Patient was on his way home at time of call. Patient expressed no concerns with going home. Patient would like a follow up call next week to check then.     Plan:  1) Patient will continue to follow treatment plan as directed and follow up with PCP with concerns ongoing.   2) Care Coordination to remain available for future needs. Follow up planned for next week unless otherwise notified.     Juma Aparicio RN  Clinic Care Coordinator  [phone]

## 2020-10-10 DIAGNOSIS — I10 ESSENTIAL HYPERTENSION, BENIGN: Primary | ICD-10-CM

## 2020-10-10 RX ORDER — METOPROLOL TARTRATE 25 MG/1
12.5 TABLET, FILM COATED ORAL 2 TIMES DAILY
COMMUNITY
Start: 2020-10-07 | End: 2020-10-10

## 2020-10-10 RX ORDER — METOPROLOL TARTRATE 25 MG/1
12.5 TABLET, FILM COATED ORAL 2 TIMES DAILY
Qty: 30 TABLET | Refills: 0 | Status: SHIPPED | OUTPATIENT
Start: 2020-10-10 | End: 2020-11-30

## 2020-10-15 ENCOUNTER — PATIENT OUTREACH (OUTPATIENT)
Dept: NURSING | Facility: CLINIC | Age: 85
End: 2020-10-15
Payer: COMMERCIAL

## 2020-10-15 ASSESSMENT — ACTIVITIES OF DAILY LIVING (ADL): DEPENDENT_IADLS:: INDEPENDENT

## 2020-10-15 NOTE — LETTER
[place] CARE COORDINATION  [facility]  [address]  [facility] [address]  Phone: [phone]    October 15, 2020    Hen[place]onsec[place] [address]      Dear Henrry,    I am a clinic care coordinator who works with Keenan Oorurke MD at [facility]. I wanted to thank you for spending the time to talk with me.  Below is a description of clinic care coordination and how I can further assist you.      The clinic care coordination team is made up of a registered nurse,  and community health worker who understand the health care system. The goal of clinic care coordination is to help you manage your health and improve access to the health care system in the most efficient manner. The team can assist you in meeting your health care goals by providing education, coordinating services, strengthening the communication among your providers and supporting you with any resource needs.    Please feel free to contact me at [phone] with any questions or concerns. We are focused on providing you with the highest-quality healthcare experience possible and that all starts with you.     Sincerely,     Juma Aparicio RN  Clinic Care Coordinator

## 2020-10-15 NOTE — LETTER
Health Care Home - Access Care Plan    About Me:    Patient Name:  Henrry Fonseca    YOB: 1935  Age:                      85 year old   [place] MRN:     [id number] Telephone Information:   Home Phone [phone]   Mobile Not on file.       Address:  [place] [address] Email address:  No e-mail address on record      Emergency Contact(s)   Name Relationship Lgl Grd Work Phone Home Phone Mobile Phone   1. TREVER VALENTIN Spouse   [phone]              Health Maintenance: Routine Health maintenance Reviewed: Due/Overdue ***    My Access Plan  Medical Emergency 911   Questions or concerns during clinic hours Primary Clinic Line, I will call the clinic directly: [facility] - [phone]   24 Hour Appointment Line [phone] or  [phone] [place] [phone]) (toll free)   24 Hour Nurse Line [phone] (toll free)   Questions or concerns outside clinic hours 24 Hour Appointment Line, I will call the after-hours on-call line:   {Clinics - Healthcare Home:733509}   Preferred Urgent Care [facility], [phone]   Preferred Hospital [facility]  [phone]   Preferred Pharmacy [facility] - [place], [place] - [address] Behavioral Health Crisis Line The National Suicide Prevention Lifeline at 1-201.424.7913 or 911                     My Care Team Members  Patient Care Team       Relationship Specialty Notifications Start End    Keenan Orourke MD PCP - General Family Practice  7/22/14     Phone: [phone] Fax: [phone] [address] [facility] [address]    Keenan Orourke MD Assigned PCP   3/13/16     Phone: [phone] Fax: [phone] [address] [facility] [address]           My Medical and Care Information  Problem List   Patient Active Problem List   Diagnosis     Generalized osteoarthrosis, unspecified site     Mild intermittent asthma     Asbestosis (H)      Essential hypertension, benign     Syncope and collapse     Back pain     Heel pain     Seizure disorder (H)     Rotator cuff syndrome     Elevated PSA     CARDIOVASCULAR SCREENING; LDL GOAL LESS THAN 160     Advanced directives, counseling/discussion     Senile nuclear sclerosis     Health Care Home     Esophageal reflux     Eczema      Current Medications and Allergies:  See printed Medication Report

## 2020-10-15 NOTE — PROGRESS NOTES
Clinic Care Coordination Contact    Clinic Care Coordination Contact  OUTREACH    Referral Information:  Referral Source: IP Report    Primary Diagnosis: Respiratory Disorders - other    Chief Complaint   Patient presents with     Clinic Care Coordination - Follow-up     Care Team        [place] Utilization: Patient identified for Care Coordination follow up due to:  Hospitalization:   NO  Emergency Department utilization risk:  NO  Fall risk:   NO  Pain:  NO  Medication concerns:  NO  Social Determinants of Health risks:   NO    Clinic Utilization  Difficulty keeping appointments:: No  Compliance Concerns: No  No-Show Concerns: No  No PCP office visit in Past Year: No  Utilization    Last refreshed: 10/15/2020  7:40 AM: Hospital Admissions 0           Last refreshed: 10/15/2020  7:40 AM: ED Visits 0           Last refreshed: 10/15/2020  7:40 AM: No Show Count (past year) 0              Current as of: 10/15/2020  7:40 AM              Clinical Concerns:  Current Medical Concerns:  Follow up call to Patient recently discharged from TCU. Patient reports he is able to get ADL/iADL needs met at home. No concerns reported with home safety.  No other concerns reported at this time.    Current Behavioral Concerns: none    Education Provided to patient: Encouraged follow up appointment with PCP.     Pain  Pain (GOAL):: No  Health Maintenance Reviewed: Due/Overdue   Health Maintenance Due   Topic Date Due     ZOSTER IMMUNIZATION (2 of 3) 01/29/2013     ADVANCE CARE PLANNING  03/29/2016     ASTHMA ACTION PLAN  10/01/2019     PHQ-2  01/01/2020     ASTHMA CONTROL TEST  04/15/2020     INFLUENZA VACCINE (1) 09/01/2020     MEDICARE ANNUAL WELLNESS VISIT  10/15/2020     FALL RISK ASSESSMENT  10/15/2020       Medication Management:  Medication reconciliation status: Medications reviewed and reconciled.  Continue medications without change.       Functional Status:  Dependent ADLs:: Independent  Dependent IADLs::  Independent  Mobility Status: Independent    Living Situation:  Current living arrangement:: I live in a private home with family  Type of residence:: Private home - no stairs    Lifestyle & Psychosocial Needs:        Diet:: Regular  Inadequate nutrition (GOAL):: No  Tube Feeding: No  Inadequate activity/exercise (GOAL):: No  Significant changes in sleep pattern (GOAL): No  Transportation means:: Regular car     Shinto or spiritual beliefs that impact treatment:: No  Mental health DX:: No  Mental health management concern (GOAL):: No  Informal Support system:: Family, Friends   Socioeconomic History     Marital status:      Spouse name: Not on file     Number of children: Not on file     Years of education: Not on file     Highest education level: Not on file     Tobacco Use     Smoking status: Never Smoker     Smokeless tobacco: Never Used   Substance and Sexual Activity     Alcohol use: Yes     Comment: rarely     Drug use: No     Sexual activity: Yes     Partners: Female               Resources and Interventions:  Current Resources:      Community Resources: None  Supplies used at home:: None  Equipment Currently Used at Home: none   )   )         Referrals Placed: None          Future Appointments              In 5 days Keenan Orourke MD [facility]          Plan: 1) Patient will continue to follow treatment plan as directed and follow up with PCP with concerns ongoing.   2) Care Coordination to remain available for future needs.      Juma Aapricio RN  Clinic Care Coordinator  [phone]

## 2020-10-19 ENCOUNTER — MEDICAL CORRESPONDENCE (OUTPATIENT)
Dept: HEALTH INFORMATION MANAGEMENT | Facility: CLINIC | Age: 85
End: 2020-10-19

## 2020-10-20 ENCOUNTER — OFFICE VISIT (OUTPATIENT)
Dept: FAMILY MEDICINE | Facility: CLINIC | Age: 85
End: 2020-10-20
Payer: COMMERCIAL

## 2020-10-20 VITALS
RESPIRATION RATE: 16 BRPM | OXYGEN SATURATION: 98 % | HEART RATE: 62 BPM | TEMPERATURE: 97.6 F | DIASTOLIC BLOOD PRESSURE: 70 MMHG | SYSTOLIC BLOOD PRESSURE: 130 MMHG

## 2020-10-20 DIAGNOSIS — I35.0 AORTIC VALVE STENOSIS, ETIOLOGY OF CARDIAC VALVE DISEASE UNSPECIFIED: ICD-10-CM

## 2020-10-20 DIAGNOSIS — I61.9 HEMORRHAGIC STROKE (H): Primary | ICD-10-CM

## 2020-10-20 PROCEDURE — 99214 OFFICE O/P EST MOD 30 MIN: CPT | Performed by: FAMILY MEDICINE

## 2020-10-20 RX ORDER — AMLODIPINE BESYLATE 5 MG/1
5 TABLET ORAL DAILY
COMMUNITY
Start: 2020-10-07 | End: 2020-10-27

## 2020-10-20 RX ORDER — ATORVASTATIN CALCIUM 20 MG/1
20 TABLET, FILM COATED ORAL AT BEDTIME
COMMUNITY
Start: 2020-10-07 | End: 2020-10-27

## 2020-10-20 RX ORDER — CALCIUM CARBONATE 500 MG/1
1 TABLET, CHEWABLE ORAL DAILY PRN
COMMUNITY
End: 2022-01-27

## 2020-10-20 RX ORDER — SENNOSIDES 8.6 MG
650 CAPSULE ORAL EVERY 8 HOURS PRN
COMMUNITY
End: 2021-01-25

## 2020-10-20 ASSESSMENT — ASTHMA QUESTIONNAIRES
QUESTION_4 LAST FOUR WEEKS HOW OFTEN HAVE YOU USED YOUR RESCUE INHALER OR NEBULIZER MEDICATION (SUCH AS ALBUTEROL): NOT AT ALL
ACT_TOTALSCORE: 25
QUESTION_2 LAST FOUR WEEKS HOW OFTEN HAVE YOU HAD SHORTNESS OF BREATH: NOT AT ALL
QUESTION_3 LAST FOUR WEEKS HOW OFTEN DID YOUR ASTHMA SYMPTOMS (WHEEZING, COUGHING, SHORTNESS OF BREATH, CHEST TIGHTNESS OR PAIN) WAKE YOU UP AT NIGHT OR EARLIER THAN USUAL IN THE MORNING: NOT AT ALL
QUESTION_1 LAST FOUR WEEKS HOW MUCH OF THE TIME DID YOUR ASTHMA KEEP YOU FROM GETTING AS MUCH DONE AT WORK, SCHOOL OR AT HOME: NONE OF THE TIME
QUESTION_5 LAST FOUR WEEKS HOW WOULD YOU RATE YOUR ASTHMA CONTROL: COMPLETELY CONTROLLED

## 2020-10-20 ASSESSMENT — PAIN SCALES - GENERAL: PAINLEVEL: NO PAIN (0)

## 2020-10-20 NOTE — PROGRESS NOTES
Subjective     Henrry LEON White is a 85 year old male who presents to clinic today for the following health issues:    HPI           Hospital Follow-up Visit:    Hospital/Nursing Home/IP Rehab Facility: [facility]  Date of Admission: June 2020  Date of Discharge: 10/8/20  Reason(s) for Admission: Stroke      Was your hospitalization related to COVID-19?   Problems taking medications regularly:    Medication changes since discharge:   Problems adhering to non-medication therapy:      Summary of hospitalization:  See outside records, reviewed and scanned  Diagnostic Tests/Treatments reviewed.  Follow up needed: none  Other Healthcare Providers Involved in Patient s Care:         None  Update since discharge: improved. Post Discharge Medication Reconciliation: .  Plan of care communicated with               Review of Systems         Objective    /70   Pulse 62   Temp 97.6  F (36.4  C) (Tympanic)   Resp 16   SpO2 98%   There is no height or weight on file to calculate BMI.  Physical Exam               Further history obtained, clarified or corrected by physician:    He has been home for almost 2 weeks after discharge from rehab hospital.  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  He is doing well and progressing slowly.  His wife is his primary caretaker at home.  He is able to walk with assistance for balance.  He has little to no use of the left upper extremity.  He has a orthotic on the left foot to prevent foot drop.  He does have some issues with left facial droop and a little bit of voice and speech problems.  He does not have any swallowing problems.  He is sleeping well.  He denies pain.    OBJECTIVE:  /70   Pulse 62   Temp 97.6  F (36.4  C) (Tympanic)   Resp 16   SpO2 98%   LUNGS: clear to auscultation, normal breath sounds  CV: RRR without murmur  ABD: BS+, soft, nontender, no masses, no hepatosplenomegaly  EXTREMITIES: without joint  tenderness, swelling or erythema.  No muscle tenderness or abnormality.  SKIN: No rashes or abnormalities  NEURO: Left hemiparesis    ASSESSMENT:     Hemorrhagic stroke (H)  Aortic valve stenosis, etiology of cardiac valve disease unspecified    PLAN:    Prior to the stroke he was considering work-up and treatment for aortic stenosis.  He is wondering if he needs to recheck at the [facility] regarding that and I recommended that he follow-up in the next couple months for reevaluation.    Recheck here in 3 months.    Orders Placed This Encounter     amLODIPine (NORVASC) 5 MG tablet     atorvastatin (LIPITOR) 20 MG tablet     calcium carbonate (TUMS) 500 MG chewable tablet     acetaminophen (TYLENOL) 650 MG CR tablet

## 2020-10-20 NOTE — PATIENT INSTRUCTIONS
Our Clinic hours are:  Mondays    7:20 am - 7 pm  Tues -  Fri  7:20 am - 5 pm    Clinic Phone: [phone]    The clinic lab opens at 7:30 am Mon - Fri and appointments are required.    [facility]. [phone]  Monday  8 am - 7pm  Tues - Fri 8 am - 5:30 pm

## 2020-10-21 ASSESSMENT — ASTHMA QUESTIONNAIRES: ACT_TOTALSCORE: 25

## 2020-10-27 DIAGNOSIS — I10 ESSENTIAL HYPERTENSION, BENIGN: Primary | ICD-10-CM

## 2020-10-27 NOTE — TELEPHONE ENCOUNTER
"Requested Prescriptions   Pending Prescriptions Disp Refills     atorvastatin (LIPITOR) 20 MG tablet       Sig: Take 1 tablet (20 mg) by mouth At Bedtime       Statins Protocol Failed - 10/27/2020  2:12 PM        Failed - LDL on file in past 12 months     No lab results found.          Passed - No abnormal creatine kinase in past 12 months     No lab results found.             Passed - Recent (12 mo) or future (30 days) visit within the authorizing provider's specialty     Patient has had an office visit with the authorizing provider or a provider within the authorizing providers department within the previous 12 mos or has a future within next 30 days. See \"Patient Info\" tab in inbasket, or \"Choose Columns\" in Meds & Orders section of the refill encounter.              Passed - Medication is active on med list        Passed - Patient is age 18 or older           amLODIPine (NORVASC) 5 MG tablet       Sig: Take 1 tablet (5 mg) by mouth daily       Calcium Channel Blockers Protocol  Failed - 10/27/2020  2:12 PM        Failed - Normal serum creatinine on file in past 12 months     Recent Labs   Lab Test 10/15/19  1425   CR 1.14       Ok to refill medication if creatinine is low          Passed - Blood pressure under 140/90 in past 12 months     BP Readings from Last 3 Encounters:   10/20/20 130/70   10/15/19 133/58   11/06/18 157/61                 Passed - Recent (12 mo) or future (30 days) visit within the authorizing provider's specialty     Patient has had an office visit with the authorizing provider or a provider within the authorizing providers department within the previous 12 mos or has a future within next 30 days. See \"Patient Info\" tab in inbasket, or \"Choose Columns\" in Meds & Orders section of the refill encounter.              Passed - Medication is active on med list        Passed - Patient is age 18 or older             "

## 2020-10-28 NOTE — TELEPHONE ENCOUNTER
Routing refill request to provider for review/approval because:  Labs not current:  JENNY CHAUDHARI RN BSN

## 2020-10-30 RX ORDER — AMLODIPINE BESYLATE 5 MG/1
5 TABLET ORAL DAILY
Qty: 90 TABLET | Refills: 3 | Status: SHIPPED | OUTPATIENT
Start: 2020-10-30 | End: 2022-11-08

## 2020-10-30 RX ORDER — ATORVASTATIN CALCIUM 20 MG/1
20 TABLET, FILM COATED ORAL AT BEDTIME
Qty: 90 TABLET | Refills: 3 | Status: SHIPPED | OUTPATIENT
Start: 2020-10-30 | End: 2021-12-13

## 2020-11-06 ENCOUNTER — MEDICAL CORRESPONDENCE (OUTPATIENT)
Dept: HEALTH INFORMATION MANAGEMENT | Facility: CLINIC | Age: 85
End: 2020-11-06

## 2020-11-19 ENCOUNTER — TELEPHONE (OUTPATIENT)
Dept: CARDIOLOGY | Facility: CLINIC | Age: 85
End: 2020-11-19

## 2020-11-19 DIAGNOSIS — I35.0 AORTIC VALVE STENOSIS, ETIOLOGY OF CARDIAC VALVE DISEASE UNSPECIFIED: Primary | ICD-10-CM

## 2020-11-30 DIAGNOSIS — K21.9 GASTROESOPHAGEAL REFLUX DISEASE WITHOUT ESOPHAGITIS: ICD-10-CM

## 2020-11-30 DIAGNOSIS — I10 ESSENTIAL HYPERTENSION, BENIGN: ICD-10-CM

## 2020-11-30 RX ORDER — METOPROLOL TARTRATE 25 MG/1
TABLET, FILM COATED ORAL
Qty: 90 TABLET | Refills: 1 | Status: SHIPPED | OUTPATIENT
Start: 2020-11-30 | End: 2021-02-18

## 2020-11-30 NOTE — TELEPHONE ENCOUNTER
"Requested Prescriptions   Pending Prescriptions Disp Refills     omeprazole (PRILOSEC) 20 MG DR capsule [Pharmacy Med Name: OMEPRAZOLE 20MG CPDR] 90 capsule 3     Sig: TAKE ONE CAPSULE BY MOUTH ONCE DAILY       PPI Protocol Passed - 11/30/2020  9:31 AM        Passed - Not on Clopidogrel (unless Pantoprazole ordered)        Passed - No diagnosis of osteoporosis on record        Passed - Recent (12 mo) or future (30 days) visit within the authorizing provider's specialty     Patient has had an office visit with the authorizing provider or a provider within the authorizing providers department within the previous 12 mos or has a future within next 30 days. See \"Patient Info\" tab in inbasket, or \"Choose Columns\" in Meds & Orders section of the refill encounter.              Passed - Medication is active on med list        Passed - Patient is age 18 or older             "

## 2020-12-02 ENCOUNTER — HOSPITAL ENCOUNTER (OUTPATIENT)
Dept: CARDIOLOGY | Facility: CLINIC | Age: 85
Discharge: HOME | End: 2020-12-02
Attending: INTERNAL MEDICINE | Admitting: INTERNAL MEDICINE
Payer: COMMERCIAL

## 2020-12-02 DIAGNOSIS — I35.0 AORTIC VALVE STENOSIS, ETIOLOGY OF CARDIAC VALVE DISEASE UNSPECIFIED: ICD-10-CM

## 2020-12-02 PROCEDURE — 93306 TTE W/DOPPLER COMPLETE: CPT | Mod: 26 | Performed by: INTERNAL MEDICINE

## 2020-12-02 PROCEDURE — 93306 TTE W/DOPPLER COMPLETE: CPT

## 2020-12-03 NOTE — RESULT ENCOUNTER NOTE
Severe aortic stenosis with mean gradient of 39 mmHg and HALEIGH of 1.1 cm2; mildly dilated aortic root at 4.0 cm; EF 60-65%; no significant changes from previous per reader. Follow up with Dr Colbert on 12/7/20

## 2020-12-04 ENCOUNTER — MEDICAL CORRESPONDENCE (OUTPATIENT)
Dept: HEALTH INFORMATION MANAGEMENT | Facility: CLINIC | Age: 85
End: 2020-12-04

## 2020-12-07 ENCOUNTER — TELEPHONE (OUTPATIENT)
Dept: FAMILY MEDICINE | Facility: CLINIC | Age: 85
End: 2020-12-07

## 2020-12-07 ENCOUNTER — VIRTUAL VISIT (OUTPATIENT)
Dept: CARDIOLOGY | Facility: CLINIC | Age: 85
End: 2020-12-07
Payer: COMMERCIAL

## 2020-12-07 DIAGNOSIS — I35.0 AORTIC VALVE STENOSIS, ETIOLOGY OF CARDIAC VALVE DISEASE UNSPECIFIED: Primary | ICD-10-CM

## 2020-12-07 DIAGNOSIS — G40.909 SEIZURE DISORDER (H): ICD-10-CM

## 2020-12-07 PROCEDURE — 99215 OFFICE O/P EST HI 40 MIN: CPT | Mod: 95 | Performed by: INTERNAL MEDICINE

## 2020-12-07 RX ORDER — GABAPENTIN 600 MG/1
600 TABLET ORAL 2 TIMES DAILY
Qty: 180 TABLET | Refills: 3 | Status: SHIPPED | OUTPATIENT
Start: 2020-12-07 | End: 2022-01-18

## 2020-12-07 RX ORDER — GABAPENTIN 600 MG/1
600 TABLET ORAL 2 TIMES DAILY
Qty: 180 TABLET | Refills: 3 | Status: SHIPPED | OUTPATIENT
Start: 2020-12-07 | End: 2020-12-07

## 2020-12-07 NOTE — PROGRESS NOTES
"Henrry Fonseca is a 85 year old male who is being evaluated via a billable telephone visit.      The patient has been notified of following:     \"This telephone visit will be conducted via a call between you and your physician/provider. We have found that certain health care needs can be provided without the need for a physical exam.  This service lets us provide the care you need with a short phone conversation.  If a prescription is necessary we can send it directly to your pharmacy.  If lab work is needed we can place an order for that and you can then stop by our lab to have the test done at a later time.    Telephone visits are billed at different rates depending on your insurance coverage. During this emergency period, for some insurers they may be billed the same as an in-person visit.  Please reach out to your insurance provider with any questions.    If during the course of the call the physician/provider feels a telephone visit is not appropriate, you will not be charged for this service.\"    Patient has given verbal consent for Telephone visit?  Yes    What phone number would you like to be contacted at? [phone]    How would you like to obtain your AVS? Mail a copy    Phone call duration: 14 minutes    Peggy Colbert MD              Cardiology Consultation       Assessment & Plan      1.  Severe aortic stenosis, as noted below  2.  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  Seizure disorder  5.  Hyperlipidemia  6.  Hypertension    Recommendations    1.  Severe aortic stenosis: Patient was in the process of getting this worked up however he unfortunately had a CVA as noted below.  He has significant deficit as a result and has symptoms of shortness of breath which are also confounded by his lung disease.  We will send him a TAVR packet and have a further discussion with " patient whether this would be appropriate to pursue given his comorbidities.  We also discussed that TAVR may help with his shortness of breath however given his comorbidities may not extend his life span.  This would be a discussion we would like to have had a face-to-face manner.  In addition of this the TAVR directed CT will determine whether a transfemoral approach would be feasible and this will certainly impact in our decision making regarding recommendation whether to pursue this or just conservative management at present.    2.  Difficult situation giving his comorbidities, we will send him a TAVR packet by mail.  We will be in touch with him and a CT can be ordered if he wishes to at least have a screening study to assess the feasibility.  Given his comorbidities I feel that a face-to-face discussion or at a minimum video would be appropriate if we are to make a final decision regarding our recommendations from his aortic stenosis standpoint.    Thank you kindly for consult we will be in touch with him shortly      Peggy Colbert MD        HPI:    Patient is an 85-year-old male with a notable history of severe aortic stenosis, hypertension, asbestosis with reactive airway disease, seizure disorder who presented to [facility] in [place] via EMS in June 2020 for sudden onset left-sided weakness and dysarthria using the bathroom.  He was significantly hypertensive on presentation and due to his neurologic exam code stroke was subsequently called.  Blood pressure was significantly elevated on exam.  CT imaging demonstrated an acute intraparenchymal hemorrhage in the right thalamus consistent with hypertensive hemorrhage with small vessel volume loss and extension.  Midline shift was also noted.  He was directed to the ICU for further care.  Due to his presentation additional comorbidities conservative therapy was followed.  Patient had an extensive hospital stay and ultimately underwent transfer  and physical therapy at a local facility near his home in the [facility].  Patient per review of chart was in the process of getting evaluated for his severe aortic stenosis however he had a stroke in the interval timeframe.  Symptom wise patient does not report of any chest pain or syncope however he has had chronic shortness of breath which appears to be worsened on patient's assessment.  He states that minimal activities seem to provoke his symptoms.  However in the background he does have as best dose exposure and restrictive lung disease as a result.  Given his findings an updated echocardiogram was performed which is as noted below.  A telephone visit is performed as patient is quite fearful of exposure to Covid.    From a functional standpoint patient unfortunately has been left with significant left-sided weakness and mainly is nonambulatory.  He does sit in a wheelchair or with assistance can use a walker to ambulate.  Social support is provided by his wife who is also a caregiver for him as well.  Cardiology service is consulted to make recommendations given his findings.            Echo:    1. Severe bicuspid aortic valve stenosis with trace regurgitation. Peak  transaortic velocity 4.1 m/s, mean gradient 39 mmHg, valve area 1.1 cmÂ .  Normal stroke-volume.  2. Mildly dilated aortic root (4.0 cm). Normal ascending aorta dimension.  3. Mild concentric left ventricular hypertrophy with normal systolic function.  Estimated LVEF 60-65%.  4. Normal right ventricular size and systolic function.     Compared to the study dated 10/21/2019, no significant changes.            Primary Care Physician   Keenan Orourke      Patient Active Problem List   Diagnosis     Generalized osteoarthrosis, unspecified site     Mild intermittent asthma     Asbestosis (H)     Essential hypertension, benign     Syncope and collapse     Back pain     Heel pain     Seizure disorder (H)     Rotator cuff syndrome     Elevated PSA      CARDIOVASCULAR SCREENING; LDL GOAL LESS THAN 160     Advanced directives, counseling/discussion     Senile nuclear sclerosis     Health Care Home     Esophageal reflux     Eczema     Hemorrhagic stroke (H)     Aortic valve stenosis, etiology of cardiac valve disease unspecified       Past Medical History   I have reviewed this patient's medical history and updated it with pertinent information if needed.   Past Medical History:   Diagnosis Date     Asbestosis(501)     ct chest8/04     Cardiomegaly     echo8/04  stress echo 2000 normal     Closed fracture of unspecified part of humerus      Diverticulosis of colon (without mention of hemorrhage)      Esophageal reflux      Other specified anemias      Other specified iron deficiency anemias        Past Surgical History   I have reviewed this patient's surgical history and updated it with pertinent information if needed.  Past Surgical History:   Procedure Laterality Date     COLONOSCOPY  8/18/04     Repeat in 10 years.     PHACOEMULSIFICATION WITH STANDARD INTRAOCULAR LENS IMPLANT  4/7/2011    Procedure:PHACOEMULSIFICATION WITH STANDARD INTRAOCULAR LENS IMPLANT; Surgeon:MJ CHOI; Location:[facility]     PHACOEMULSIFICATION WITH STANDARD INTRAOCULAR LENS IMPLANT  5/9/2011    Procedure:PHACOEMULSIFICATION WITH STANDARD INTRAOCULAR LENS IMPLANT; Surgeon:MJ CHOI; Location:[facility]     SURGICAL HISTORY OF -       vasectomy     SURGICAL HISTORY OF -       appy     Current Outpatient Medications   Medication Instructions     acetaminophen (TYLENOL) 650 mg, Oral, EVERY 8 HOURS PRN     albuterol (PROAIR HFA/PROVENTIL HFA/VENTOLIN HFA) 108 (90 Base) MCG/ACT inhaler 2 puffs, Inhalation, EVERY 4 HOURS PRN     amLODIPine (NORVASC) 5 mg, Oral, DAILY     ASPIRIN 325 MG OR TBEC 1 TABLET EVERY Day as needed     atorvastatin (LIPITOR) 20 mg, Oral, AT BEDTIME     calcium carbonate (TUMS) 500 MG chewable tablet 1 chew tab, Oral, DAILY PRN     fluticasone (FLOVENT HFA) 110  MCG/ACT Inhaler 2 puffs, Inhalation, 2 TIMES DAILY, As needed     gabapentin (NEURONTIN) 600 mg, Oral, 2 TIMES DAILY, Take  by mouth. 1 tablet in the AM and PM and 1/2 tablet at noon.     metoprolol tartrate (LOPRESSOR) 25 MG tablet TAKE ONE-HALF TABLET BY MOUTH TWICE A DAY     omeprazole (PRILOSEC) 20 MG DR capsule TAKE ONE CAPSULE BY MOUTH ONCE DAILY         Social History    reports that he has never smoked. He has never used smokeless tobacco. He reports current alcohol use. He reports that he does not use drugs.    Family History   Family History   Problem Relation Age of Onset     Cancer Father         lung     Cancer Mother         pancreatic     Alcohol/Drug Son      Alcohol/Drug Son      Cancer Sister         1/2 sister lung cancer       Review of Systems   The comprehensive 10 point Review of Systems is negative other than noted in the HPI or here.     Physical Exam   Vital Signs with Ranges     Wt Readings from Last 4 Encounters:   10/15/19 76.7 kg (169 lb)   11/06/18 75.8 kg (167 lb)   10/01/18 75.8 kg (167 lb)   07/11/17 78.9 kg (174 lb)

## 2020-12-07 NOTE — TELEPHONE ENCOUNTER
Gabapentin Rx still has 2 sets of directions.  Please remove 1 set of dose directions and resend Rx to pharmacy.

## 2020-12-07 NOTE — PROGRESS NOTES
Called to reach back our to Henrry after his visit with Dr. Colbert today. Henrry has had a long road to recovery after his stroke, but he is finally home and ready to move ahead with his TAVR workup. I placed orders for CT TAVR and he is aware that the schedulers will be reaching out to get that set up. He still has the packet that I sent him this summer regarding TAVR. We will follow up with him once he has his CT. I left him my number incase any questions should arise.Jennifer Garcia RN

## 2020-12-07 NOTE — LETTER
"12/7/2020    Keenan Orourke MD  [id number]  [facility] [address]    RE: Henrry Fonseca       Dear Colleague,    I had the pleasure of seeing Henrry Fonseca in the [facility].    Henrry Fonseca is a 85 year old male who is being evaluated via a billable telephone visit.      The patient has been notified of following:     \"This telephone visit will be conducted via a call between you and your physician/provider. We have found that certain health care needs can be provided without the need for a physical exam.  This service lets us provide the care you need with a short phone conversation.  If a prescription is necessary we can send it directly to your pharmacy.  If lab work is needed we can place an order for that and you can then stop by our lab to have the test done at a later time.    Telephone visits are billed at different rates depending on your insurance coverage. During this emergency period, for some insurers they may be billed the same as an in-person visit.  Please reach out to your insurance provider with any questions.    If during the course of the call the physician/provider feels a telephone visit is not appropriate, you will not be charged for this service.\"    Patient has given verbal consent for Telephone visit?  Yes    What phone number would you like to be contacted at? [phone]    How would you like to obtain your AVS? Mail a copy    Phone call duration: 14 minutes    Peggy Colbert MD              Cardiology Consultation       Assessment & Plan      1.  Severe aortic stenosis, as noted below  2.  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  Seizure disorder  5.  Hyperlipidemia  6.  Hypertension    Recommendations    1.  Severe aortic stenosis: Patient was in the process of getting this worked up however he unfortunately had a CVA " as noted below.  He has significant deficit as a result and has symptoms of shortness of breath which are also confounded by his lung disease.  We will send him a TAVR packet and have a further discussion with patient whether this would be appropriate to pursue given his comorbidities.  We also discussed that TAVR may help with his shortness of breath however given his comorbidities may not extend his life span.  This would be a discussion we would like to have had a face-to-face manner.  In addition of this the TAVR directed CT will determine whether a transfemoral approach would be feasible and this will certainly impact in our decision making regarding recommendation whether to pursue this or just conservative management at present.    2.  Difficult situation giving his comorbidities, we will send him a TAVR packet by mail.  We will be in touch with him and a CT can be ordered if he wishes to at least have a screening study to assess the feasibility.  Given his comorbidities I feel that a face-to-face discussion or at a minimum video would be appropriate if we are to make a final decision regarding our recommendations from his aortic stenosis standpoint.    Thank you kindly for consult we will be in touch with him shortly      Peggy Colbert MD        HPI:    Patient is an 85-year-old male with a notable history of severe aortic stenosis, hypertension, asbestosis with reactive airway disease, seizure disorder who presented to [facility] in [place] via EMS in June 2020 for sudden onset left-sided weakness and dysarthria using the bathroom.  He was significantly hypertensive on presentation and due to his neurologic exam code stroke was subsequently called.  Blood pressure was significantly elevated on exam.  CT imaging demonstrated an acute intraparenchymal hemorrhage in the right thalamus consistent with hypertensive hemorrhage with small vessel volume loss and extension.  Midline shift was also  noted.  He was directed to the ICU for further care.  Due to his presentation additional comorbidities conservative therapy was followed.  Patient had an extensive hospital stay and ultimately underwent transfer and physical therapy at a local facility near his home in the [facility].  Patient per review of chart was in the process of getting evaluated for his severe aortic stenosis however he had a stroke in the interval timeframe.  Symptom wise patient does not report of any chest pain or syncope however he has had chronic shortness of breath which appears to be worsened on patient's assessment.  He states that minimal activities seem to provoke his symptoms.  However in the background he does have as best dose exposure and restrictive lung disease as a result.  Given his findings an updated echocardiogram was performed which is as noted below.  A telephone visit is performed as patient is quite fearful of exposure to Covid.    From a functional standpoint patient unfortunately has been left with significant left-sided weakness and mainly is nonambulatory.  He does sit in a wheelchair or with assistance can use a walker to ambulate.  Social support is provided by his wife who is also a caregiver for him as well.  Cardiology service is consulted to make recommendations given his findings.            Echo:    1. Severe bicuspid aortic valve stenosis with trace regurgitation. Peak  transaortic velocity 4.1 m/s, mean gradient 39 mmHg, valve area 1.1 cmÂ .  Normal stroke-volume.  2. Mildly dilated aortic root (4.0 cm). Normal ascending aorta dimension.  3. Mild concentric left ventricular hypertrophy with normal systolic function.  Estimated LVEF 60-65%.  4. Normal right ventricular size and systolic function.     Compared to the study dated 10/21/2019, no significant changes.            Primary Care Physician   Keenan Orourke      Patient Active Problem List   Diagnosis     Generalized osteoarthrosis, unspecified  site     Mild intermittent asthma     Asbestosis (H)     Essential hypertension, benign     Syncope and collapse     Back pain     Heel pain     Seizure disorder (H)     Rotator cuff syndrome     Elevated PSA     CARDIOVASCULAR SCREENING; LDL GOAL LESS THAN 160     Advanced directives, counseling/discussion     Senile nuclear sclerosis     Health Care Home     Esophageal reflux     Eczema     Hemorrhagic stroke (H)     Aortic valve stenosis, etiology of cardiac valve disease unspecified       Past Medical History   I have reviewed this patient's medical history and updated it with pertinent information if needed.   Past Medical History:   Diagnosis Date     Asbestosis(501)     ct chest8/04     Cardiomegaly     echo8/04  stress echo 2000 normal     Closed fracture of unspecified part of humerus      Diverticulosis of colon (without mention of hemorrhage)      Esophageal reflux      Other specified anemias      Other specified iron deficiency anemias        Past Surgical History   I have reviewed this patient's surgical history and updated it with pertinent information if needed.  Past Surgical History:   Procedure Laterality Date     COLONOSCOPY  8/18/04     Repeat in 10 years.     PHACOEMULSIFICATION WITH STANDARD INTRAOCULAR LENS IMPLANT  4/7/2011    Procedure:PHACOEMULSIFICATION WITH STANDARD INTRAOCULAR LENS IMPLANT; Surgeon:MJ CHOI; Location:[facility]     PHACOEMULSIFICATION WITH STANDARD INTRAOCULAR LENS IMPLANT  5/9/2011    Procedure:PHACOEMULSIFICATION WITH STANDARD INTRAOCULAR LENS IMPLANT; Surgeon:MJ CHOI; Location:[facility]     SURGICAL HISTORY OF -       vasectomy     SURGICAL HISTORY OF -       appy     Current Outpatient Medications   Medication Instructions     acetaminophen (TYLENOL) 650 mg, Oral, EVERY 8 HOURS PRN     albuterol (PROAIR HFA/PROVENTIL HFA/VENTOLIN HFA) 108 (90 Base) MCG/ACT inhaler 2 puffs, Inhalation, EVERY 4 HOURS PRN     amLODIPine (NORVASC) 5 mg, Oral, DAILY      ASPIRIN 325 MG OR TBEC 1 TABLET EVERY Day as needed     atorvastatin (LIPITOR) 20 mg, Oral, AT BEDTIME     calcium carbonate (TUMS) 500 MG chewable tablet 1 chew tab, Oral, DAILY PRN     fluticasone (FLOVENT HFA) 110 MCG/ACT Inhaler 2 puffs, Inhalation, 2 TIMES DAILY, As needed     gabapentin (NEURONTIN) 600 mg, Oral, 2 TIMES DAILY, Take  by mouth. 1 tablet in the AM and PM and 1/2 tablet at noon.     metoprolol tartrate (LOPRESSOR) 25 MG tablet TAKE ONE-HALF TABLET BY MOUTH TWICE A DAY     omeprazole (PRILOSEC) 20 MG DR capsule TAKE ONE CAPSULE BY MOUTH ONCE DAILY         Social History    reports that he has never smoked. He has never used smokeless tobacco. He reports current alcohol use. He reports that he does not use drugs.    Family History   Family History   Problem Relation Age of Onset     Cancer Father         lung     Cancer Mother         pancreatic     Alcohol/Drug Son      Alcohol/Drug Son      Cancer Sister         1/2 sister lung cancer       Review of Systems   The comprehensive 10 point Review of Systems is negative other than noted in the HPI or here.     Physical Exam   Vital Signs with Ranges     Wt Readings from Last 4 Encounters:   10/15/19 76.7 kg (169 lb)   11/06/18 75.8 kg (167 lb)   10/01/18 75.8 kg (167 lb)   07/11/17 78.9 kg (174 lb)       Thank you for allowing me to participate in the care of your patient.    Sincerely,     Peggy Colbert MD     [facility]

## 2020-12-07 NOTE — TELEPHONE ENCOUNTER
Requested Prescriptions   Pending Prescriptions Disp Refills     gabapentin (NEURONTIN) 600 MG tablet 180 tablet 3     Sig: Take 1 tablet (600 mg) by mouth 2 times daily Take  by mouth. 1 tablet in the AM and PM and 1/2 tablet at noon.       There is no refill protocol information for this order

## 2020-12-07 NOTE — TELEPHONE ENCOUNTER
Routing refill request to provider for review/approval because:  Drug not on the [facility] refill protocol     Janet Strickland RN

## 2020-12-07 NOTE — TELEPHONE ENCOUNTER
Reason for Call:  Other prescription Issue    Detailed comments: Per fax from [facility].  There are 2 sets of directions for Gabapentin Rx.  Please resend Rx to pharmacy with correct sig.      Phone Number Pharmacy can be reached at: Other phone number:  [phone]    Best Time: any    Can we leave a detailed message on this number? YES    Call taken on 12/7/2020 at 2:15 PM by Jaelyn Latif

## 2020-12-17 ENCOUNTER — HOSPITAL ENCOUNTER (OUTPATIENT)
Dept: CARDIOLOGY | Facility: CLINIC | Age: 85
Discharge: HOME | End: 2020-12-17
Attending: INTERNAL MEDICINE | Admitting: INTERNAL MEDICINE
Payer: COMMERCIAL

## 2020-12-17 DIAGNOSIS — I35.0 AORTIC VALVE STENOSIS, ETIOLOGY OF CARDIAC VALVE DISEASE UNSPECIFIED: ICD-10-CM

## 2020-12-17 LAB
CREAT BLD-MCNC: 1.1 MG/DL (ref 0.66–1.25)
GFR SERPL CREATININE-BSD FRML MDRD: 64 ML/MIN/{1.73_M2}

## 2020-12-17 PROCEDURE — 250N000011 HC RX IP 250 OP 636: Performed by: INTERNAL MEDICINE

## 2020-12-17 PROCEDURE — 71275 CT ANGIOGRAPHY CHEST: CPT | Mod: 26 | Performed by: INTERNAL MEDICINE

## 2020-12-17 PROCEDURE — 71275 CT ANGIOGRAPHY CHEST: CPT

## 2020-12-17 PROCEDURE — 82565 ASSAY OF CREATININE: CPT

## 2020-12-17 PROCEDURE — 74174 CTA ABD&PLVS W/CONTRAST: CPT | Mod: 26 | Performed by: INTERNAL MEDICINE

## 2020-12-17 PROCEDURE — 258N000003 HC RX IP 258 OP 636: Performed by: INTERNAL MEDICINE

## 2020-12-17 RX ORDER — ACYCLOVIR 200 MG/1
0-1 CAPSULE ORAL
Status: DISCONTINUED | OUTPATIENT
Start: 2020-12-17 | End: 2020-12-18 | Stop reason: HOSPADM

## 2020-12-17 RX ORDER — ONDANSETRON 2 MG/ML
4 INJECTION INTRAMUSCULAR; INTRAVENOUS
Status: DISCONTINUED | OUTPATIENT
Start: 2020-12-17 | End: 2020-12-18 | Stop reason: HOSPADM

## 2020-12-17 RX ORDER — DIPHENHYDRAMINE HYDROCHLORIDE 50 MG/ML
25-50 INJECTION INTRAMUSCULAR; INTRAVENOUS
Status: DISCONTINUED | OUTPATIENT
Start: 2020-12-17 | End: 2020-12-18 | Stop reason: HOSPADM

## 2020-12-17 RX ORDER — IOPAMIDOL 755 MG/ML
500 INJECTION, SOLUTION INTRAVASCULAR ONCE
Status: COMPLETED | OUTPATIENT
Start: 2020-12-17 | End: 2020-12-17

## 2020-12-17 RX ORDER — DIPHENHYDRAMINE HCL 25 MG
25 CAPSULE ORAL
Status: DISCONTINUED | OUTPATIENT
Start: 2020-12-17 | End: 2020-12-18 | Stop reason: HOSPADM

## 2020-12-17 RX ORDER — METHYLPREDNISOLONE SODIUM SUCCINATE 125 MG/2ML
125 INJECTION, POWDER, LYOPHILIZED, FOR SOLUTION INTRAMUSCULAR; INTRAVENOUS
Status: DISCONTINUED | OUTPATIENT
Start: 2020-12-17 | End: 2020-12-18 | Stop reason: HOSPADM

## 2020-12-17 RX ADMIN — SODIUM CHLORIDE 100 ML: 9 INJECTION, SOLUTION INTRAVENOUS at 14:03

## 2020-12-17 RX ADMIN — IOPAMIDOL 115 ML: 755 INJECTION, SOLUTION INTRAVENOUS at 14:03

## 2020-12-18 DIAGNOSIS — I35.0 AORTIC VALVE STENOSIS, ETIOLOGY OF CARDIAC VALVE DISEASE UNSPECIFIED: ICD-10-CM

## 2020-12-18 DIAGNOSIS — R09.89 CHEST SOUNDS ABNORMAL ON PERCUSSION OR AUSCULTATION: Primary | ICD-10-CM

## 2020-12-29 DIAGNOSIS — Z11.59 ENCOUNTER FOR SCREENING FOR OTHER VIRAL DISEASES: ICD-10-CM

## 2021-01-07 VITALS — DIASTOLIC BLOOD PRESSURE: 70 MMHG | SYSTOLIC BLOOD PRESSURE: 120 MMHG

## 2021-01-07 NOTE — PROGRESS NOTES
"Henrry LEON White is a 85 year old male who is being evaluated via a billable telephone visit.      What phone number would you like to be contacted at? [phone]  How would you like to obtain your AVS? Mail a copy    CC:  Pre-op evaluation prior to planned angiogram for TAVR work up    VITALS:  117/47    BRIEF HPI:  Henrry is an 85 year old yo M who sees Dr. Colbert for h/o:    1. Severe AoS - Undergoing TAVR w/u   2. 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 Started on atorvastatin, amlodipine and metoprolol tartrate. Uses a walker to ambulate.  3. 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 Dyslipidemia, HTN  5. 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 Colbert had a telephone visit with Henrry 12/2020 at which time they discussed his c/o SOB, confounded by underlying lung dz, in setting of severe AoS.  They discussed possible TAVR, noting it may improve SOB but was unlikely to prolong life expectancy.     CT TAVR 12/17/2020 showed AV calcium score 3629, with minimal calcification of ascending, arch and descending aorta. He's now been set up for diagnostic angiogram.     INTERVAL HISTORY:  Overall no changes noted. Still feels breathing is \"rough\" but no significant decline since he spoke with Dr. Colbert 12/2020.    No c/o CP, edema, orthopnea or PND.      DIAGNOSTICS:  Echocardiogram 12/2020 - EF 60-65%.Mild LVH.  Severe AoS with mean gradient 39 mmHg, HALEIGH 1.1 cm2. Mildly dilated root 4.0 cm  CT TAVR - full details with measurements noted below    REVIEW OF SYSTEMS:  CVA deficits as above    PHYSICAL EXAM:  117/47    Physical Exam  Deferred, telephone    PLAN:  1. Angiogram for TAVR work up      ASSESSMENT/PLAN:    1. Severe AoS    Echo as above; c/o BECKFORD which is compounded by his known lung dz     PLAN:    Angiogram set for 1/13/2021.  Risks, benefits and indications of heart " "catheterization were discussed with the patient, including but not limited to: peripheral vessel injury (described radial and femoral approaches), heart attack, stroke, death, dye nephropathy, dye allergy or need for emergent bypass surgery.  We discussed the risk of conscious sedation and the need for a  following the procedure, and that he'd likely be discharged home the same day. The patient voiced understanding and agrees to proceed. A consent form will be signed by the procedural physician.  o COVID testing 1/11/2021. Reminded to quarantine between testing and procedure - states he \"never goes anywhere so it won't be hard\"  o Arrival 0800 for carotid Dopplers, then will be sent to Pt Registration 0900 for 1100 procedure on 1/13/2021 (will confirm with Cristina in TAVR team)  o No meds need to be held prior to procedure. May take all meds wth small sip of water. Avoid supplements/vitamins  o Cr 12/17/2020 wnl 1.1      Hgb checked 9/2020 low but stable 11.4 g/dL. He had a hemorrhagic stroke in 6/2020 and antiplatelet therapy was not addressed. Will defer to primary team      Noted he might be a good candidate for L radial access given his L arm was most affected by stroke so he relies heavily on R arm.    2. HTN    Had hemorrhagic stroke 6/2020 (R thalamic bleed), treated at [facility]. He had developed new onset L sided weakness and slurred speech. Mgmt of HTN rec'd and started on metoprolol tartrate and amlodipine    Severe AoS limits mgmt somewhat    BPs at home have been fine, in 110s     PLAN:    Continue metoprolol tartrate and amlodipine     CURRENT MEDICATIONS:  Current Outpatient Medications   Medication Sig Dispense Refill     acetaminophen (TYLENOL) 650 MG CR tablet Take 650 mg by mouth every 8 hours as needed for mild pain or fever       albuterol (PROAIR HFA/PROVENTIL HFA/VENTOLIN HFA) 108 (90 Base) MCG/ACT inhaler Inhale 2 puffs into the lungs every 4 hours as needed for shortness of breath / " dyspnea 1 Inhaler 11     amLODIPine (NORVASC) 5 MG tablet Take 1 tablet (5 mg) by mouth daily 90 tablet 3     ASPIRIN 325 MG OR TBEC 1 TABLET EVERY Day as needed  0     atorvastatin (LIPITOR) 20 MG tablet Take 1 tablet (20 mg) by mouth At Bedtime 90 tablet 3     calcium carbonate (TUMS) 500 MG chewable tablet Take 1 chew tab by mouth daily as needed for heartburn       fluticasone (FLOVENT HFA) 110 MCG/ACT Inhaler Inhale 2 puffs into the lungs 2 times daily As needed 1 Inhaler 12     gabapentin (NEURONTIN) 600 MG tablet Take 1 tablet (600 mg) by mouth 2 times daily 180 tablet 3     metoprolol tartrate (LOPRESSOR) 25 MG tablet TAKE ONE-HALF TABLET BY MOUTH TWICE A DAY 90 tablet 1     omeprazole (PRILOSEC) 20 MG DR capsule TAKE ONE CAPSULE BY MOUTH ONCE DAILY 90 capsule 1         ORDERS PLACED:  No orders of the defined types were placed in this encounter.    No orders of the defined types were placed in this encounter.    There are no discontinued medications.      Encounter Diagnoses   Name Primary?     Aortic valve stenosis, etiology of cardiac valve disease unspecified Yes     Essential hypertension, benign      Hemorrhagic stroke (H)          ALLERGIES     Allergies   Allergen Reactions     Keppra Fatigue     Crying very depressed       PAST MEDICAL HISTORY:  Past Medical History:   Diagnosis Date     Asbestosis(501)     ct chest8/04     Cardiomegaly     echo8/04  stress echo 2000 normal     Closed fracture of unspecified part of humerus      Diverticulosis of colon (without mention of hemorrhage)      Esophageal reflux      Other specified anemias      Other specified iron deficiency anemias        PAST SURGICAL HISTORY:  Past Surgical History:   Procedure Laterality Date     COLONOSCOPY  8/18/04     Repeat in 10 years.     PHACOEMULSIFICATION WITH STANDARD INTRAOCULAR LENS IMPLANT  4/7/2011    Procedure:PHACOEMULSIFICATION WITH STANDARD INTRAOCULAR LENS IMPLANT; Surgeon:MJ CHOI; Location:[place]      PHACOEMULSIFICATION WITH STANDARD INTRAOCULAR LENS IMPLANT  5/9/2011    Procedure:PHACOEMULSIFICATION WITH STANDARD INTRAOCULAR LENS IMPLANT; Surgeon:MJ CHOI; Location:[place]     SURGICAL HISTORY OF -       vasectomy     SURGICAL HISTORY OF -       appy       FAMILY HISTORY:  Family History   Problem Relation Age of Onset     Cancer Father         lung     Cancer Mother         pancreatic     Alcohol/Drug Son      Alcohol/Drug Son      Cancer Sister         1/2 sister lung cancer       SOCIAL HISTORY:  Social History     Socioeconomic History     Marital status:      Spouse name: None     Number of children: None     Years of education: None     Highest education level: None   Occupational History     None   Social Needs     Financial resource strain: None     Food insecurity     Worry: None     Inability: None     Transportation needs     Medical: None     Non-medical: None   Tobacco Use     Smoking status: Never Smoker     Smokeless tobacco: Never Used   Substance and Sexual Activity     Alcohol use: Yes     Comment: rarely     Drug use: No     Sexual activity: Yes     Partners: Female   Lifestyle     Physical activity     Days per week: None     Minutes per session: None     Stress: None   Relationships     Social connections     Talks on phone: None     Gets together: None     Attends Mandaen service: None     Active member of club or organization: None     Attends meetings of clubs or organizations: None     Relationship status: None     Intimate partner violence     Fear of current or ex partner: None     Emotionally abused: None     Physically abused: None     Forced sexual activity: None   Other Topics Concern     Parent/sibling w/ CABG, MI or angioplasty before 65F 55M? No   Social History Narrative     None     FULL TAVR CT 12/17/2020    IMPRESSION:  1.  No acute aortic pathology like dissection, intramural hematoma or contained rupture noted.  2.  Please review detailed radiology  report, to follow separately, for incidental non-cardiovascular findings.     FINDINGS:     1.  Tricuspid aortic valve. Aortic valve calcium score is 3629. The ascending aorta is minimal calcified, aortic arch is  minimal calcified, the descending thoracic aorta is mild calcified.   2.  The arch vessel branching pattern is normal.   3.  The suprarenal abdominal aorta is minimal calcified; infrarenal abdominal aorta is mild calcified.   4.  There is no acute aortic pathology, such as dissection, intramural hematoma, or contained rupture.      MEASUREMENTS:   Representative dimensions of the thoracoabdominal aorta are as follows:     1. AORTIC ANNULUS MEASUREMENTS:     *  The average aortic annulus diameter is 28.1 mm,   *  Aortic annulus area is 6.21 cm2  *  Aortic annulus perimeter is 90.1 mm  *  The 3 cusp coaxial angle for aortic valve is (Wallisian 16 , CAU 32) and the AP coaxial angle is (Wallisian 0 , CAU 49 ), these angles will vary depending upon the patient's body position  *  Left main - Annulus distance: 17.5 mm, RCA - Annulus distance: 15.6 mm     2. LVOT MEASUREMENTS:     *  The average LVOT diameter is 28.1 mm  *  LVOT area is 6.21 cm2  *  LVOT perimeter is 90 mm  *  LVOT calcification trivial mm     3. AORTA MEASUREMENTS     1.  The aortic root at the sinuses of Valsalva (L/R/N) 36.5 mm x  37.5 mm 36.9 mm  2.  The sinotubular junction:  33 mm x 29 mm  3.  Sinotubular junction height: 22.2 mm x 28.8 mm  4.  Proximal ascending aorta: 35.4 mm x 34.5 mm  5.  Distal abdominal aorta proximal to the bifurcation: 18.9 mm x 16.2 mm     4. ILIOFEMORAL MEASUREMENTS:     RIGHT:  1.  Right common iliac artery: 10.5 mm x 11.2 mm   2.  Right external iliac artery: 6.21 mm x 7.99 mm   3.  Right common femoral artery: 8.12 mm x 8.57 mm   4.  Tortuosity: mild   5.  Calcification: mild      LEFT:  1.  Left common iliac artery: 10.7 mm x  11.3 mm  2.  Left external iliac artery: 7.68 mm x 8.37 mm  3.  Left common femoral artery: 7.50  mm x 8.54 mm  4.  Tortuosity: mild   5.  Calcification: mild      7. Aortic Root Angle 72.1 degrees   8. Innominate Artery :  15.3 mm X 13.6mm  9. Left Carotid Artery:  7.83mm X 7.39 mm       I have reviewed the note as documented above.  This accurately captures the substance of my conversation with the patient.     Phone call contact time  Call Started at 0947  Call Ended at 0959  Duration 12 minutes    RIMA CamaraAS

## 2021-01-08 ENCOUNTER — VIRTUAL VISIT (OUTPATIENT)
Dept: CARDIOLOGY | Facility: CLINIC | Age: 86
End: 2021-01-08
Payer: COMMERCIAL

## 2021-01-08 ENCOUNTER — TELEPHONE (OUTPATIENT)
Dept: CARDIOLOGY | Facility: CLINIC | Age: 86
End: 2021-01-08

## 2021-01-08 DIAGNOSIS — I35.0 AORTIC VALVE STENOSIS, ETIOLOGY OF CARDIAC VALVE DISEASE UNSPECIFIED: Primary | ICD-10-CM

## 2021-01-08 DIAGNOSIS — I61.9 HEMORRHAGIC STROKE (H): ICD-10-CM

## 2021-01-08 DIAGNOSIS — I10 ESSENTIAL HYPERTENSION, BENIGN: ICD-10-CM

## 2021-01-08 PROCEDURE — 99214 OFFICE O/P EST MOD 30 MIN: CPT | Mod: 95 | Performed by: PHYSICIAN ASSISTANT

## 2021-01-08 RX ORDER — POTASSIUM CHLORIDE 1500 MG/1
20 TABLET, EXTENDED RELEASE ORAL
Status: CANCELLED | OUTPATIENT
Start: 2021-01-08

## 2021-01-08 RX ORDER — SODIUM CHLORIDE 9 MG/ML
INJECTION, SOLUTION INTRAVENOUS CONTINUOUS
Status: CANCELLED | OUTPATIENT
Start: 2021-01-08

## 2021-01-08 RX ORDER — LIDOCAINE 40 MG/G
CREAM TOPICAL
Status: CANCELLED | OUTPATIENT
Start: 2021-01-08

## 2021-01-08 NOTE — PATIENT INSTRUCTIONS
"Henrry - it was nice to speak with you today!  At your visit today we reviewed your upcoming testing for evaluation of the best way to fix your aortic valve, which is tight.  We reviewed that your shortness of breath is likely also due to your significant lung disease, so even if Dr. Colbert opts to proceed with fixing the valve, it may not make your breathing \"normal.\"     Medication Changes:    No medication changes today    Recommendations:    I asked Cristina to call you to make sure you your procedure was set up for  and not Friday! If she has not called you to confirm this, please call us! [phone] and ask for Dr. Colbert's TAVR nurse Cristina CHAHAL testing 2021. Remember to quarantine between testing and procedure    o On 2021, arrival 0800 to the clinic area for carotid Dopplers, then will be sent to Pt Registration in the hospital area @ 900 for 1100 procedure  o You can take all your medications the morning of the procedure with a small sip of water. Skip any vitamins/supplements  o Plan is to send you home same day (WITH A ) 2021.    Follow-up:    See us for cardiology follow up based on findings of angiogram and conversations with Dr. Colbert's team but CALL Cardiology nurses Ani & Brittney @ [phone] for any issues, questions or concerns in the interim.      To schedule a future appointment, we kindly ask that you call cardiology scheduling at [phone] three months prior to requested visit date.    Important Phone Numbers for [facility]):    [place] [facility] Brittney & Ani: [phone]  Cardiology [phone]  [place] [facility] [phone]  [place] [facility] [phone]  [place] [facility]: [phone]        "

## 2021-01-08 NOTE — TELEPHONE ENCOUNTER
Spoke with Henrry in regards to their up coming procedure scheduled for 1/13. [facility] 1000.He does have a carotid US pre angio. He will remain NPO after midnight the night before the procedure and will take morning meds with small sip of water.  He knows that the Covid team will be in touch with the details about setting up their testing per procedure. I also informed them of the change in visitor policy and no one is allowed to come with them. I asked that they wear a mask to the hospital and if they do not have one, one will be given to them at the front door. They are also aware that they  will be screened for Covid upon arrival at the hospital. He/she will be given conscious sedation for the procedure and therefore will need to have a . No further questions at this time.Jennifer Garcia RN

## 2021-01-08 NOTE — LETTER
"1/8/2021    Keenan Orourke MD  [id number]  [facility] [address]    RE: Henrry Fonseca       Dear Colleague,    I had the pleasure of seeing Henrry Fonseca in the [facility].    Henrry Fonseca is a 85 year old male who is being evaluated via a billable telephone visit.      What phone number would you like to be contacted at? [phone]  How would you like to obtain your AVS? Mail a copy    CC:  Pre-op evaluation prior to planned angiogram for TAVR work up    VITALS:  117/47    BRIEF HPI:  Henrry is an 85 year old yo M who sees Dr. Colbert for h/o:    1. Severe AoS - Undergoing TAVR w/u   2. 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 Started on atorvastatin, amlodipine and metoprolol tartrate. Uses a walker to ambulate.  3. 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 Dyslipidemia, HTN  5. 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 Colbert had a telephone visit with Henrry 12/2020 at which time they discussed his c/o SOB, confounded by underlying lung dz, in setting of severe AoS.  They discussed possible TAVR, noting it may improve SOB but was unlikely to prolong life expectancy.     CT TAVR 12/17/2020 showed AV calcium score 3629, with minimal calcification of ascending, arch and descending aorta. He's now been set up for diagnostic angiogram.     INTERVAL HISTORY:  Overall no changes noted. Still feels breathing is \"rough\" but no significant decline since he spoke with Dr. Colbert 12/2020.    No c/o CP, edema, orthopnea or PND.      DIAGNOSTICS:  Echocardiogram 12/2020 - EF 60-65%.Mild LVH.  Severe AoS with mean gradient 39 mmHg, HALEIGH 1.1 cm2. Mildly dilated root 4.0 cm  CT TAVR - full details with measurements noted below    REVIEW OF SYSTEMS:  CVA deficits as above    PHYSICAL EXAM:  117/47    Physical Exam  Deferred, telephone    PLAN:  1. Angiogram " "for TAVR work up      ASSESSMENT/PLAN:    1. Severe AoS    Echo as above; c/o BECKFORD which is compounded by his known lung dz     PLAN:    Angiogram set for 1/13/2021.  Risks, benefits and indications of heart catheterization were discussed with the patient, including but not limited to: peripheral vessel injury (described radial and femoral approaches), heart attack, stroke, death, dye nephropathy, dye allergy or need for emergent bypass surgery.  We discussed the risk of conscious sedation and the need for a  following the procedure, and that he'd likely be discharged home the same day. The patient voiced understanding and agrees to proceed. A consent form will be signed by the procedural physician.  o COVID testing 1/11/2021. Reminded to quarantine between testing and procedure - states he \"never goes anywhere so it won't be hard\"  o Arrival 0800 for carotid Dopplers, then will be sent to Pt Registration 0900 for 1100 procedure on 1/13/2021 (will confirm with Cristina in TAVR team)  o No meds need to be held prior to procedure. May take all meds wth small sip of water. Avoid supplements/vitamins  o Cr 12/17/2020 wnl 1.1      Hgb checked 9/2020 low but stable 11.4 g/dL. He had a hemorrhagic stroke in 6/2020 and antiplatelet therapy was not addressed. Will defer to primary team      Noted he might be a good candidate for L radial access given his L arm was most affected by stroke so he relies heavily on R arm.    2. HTN    Had hemorrhagic stroke 6/2020 (R thalamic bleed), treated at [facility]. He had developed new onset L sided weakness and slurred speech. Mgmt of HTN rec'd and started on metoprolol tartrate and amlodipine    Severe AoS limits mgmt somewhat    BPs at home have been fine, in 110s     PLAN:    Continue metoprolol tartrate and amlodipine     CURRENT MEDICATIONS:  Current Outpatient Medications   Medication Sig Dispense Refill     acetaminophen (TYLENOL) 650 MG CR tablet Take 650 mg by mouth every " 8 hours as needed for mild pain or fever       albuterol (PROAIR HFA/PROVENTIL HFA/VENTOLIN HFA) 108 (90 Base) MCG/ACT inhaler Inhale 2 puffs into the lungs every 4 hours as needed for shortness of breath / dyspnea 1 Inhaler 11     amLODIPine (NORVASC) 5 MG tablet Take 1 tablet (5 mg) by mouth daily 90 tablet 3     ASPIRIN 325 MG OR TBEC 1 TABLET EVERY Day as needed  0     atorvastatin (LIPITOR) 20 MG tablet Take 1 tablet (20 mg) by mouth At Bedtime 90 tablet 3     calcium carbonate (TUMS) 500 MG chewable tablet Take 1 chew tab by mouth daily as needed for heartburn       fluticasone (FLOVENT HFA) 110 MCG/ACT Inhaler Inhale 2 puffs into the lungs 2 times daily As needed 1 Inhaler 12     gabapentin (NEURONTIN) 600 MG tablet Take 1 tablet (600 mg) by mouth 2 times daily 180 tablet 3     metoprolol tartrate (LOPRESSOR) 25 MG tablet TAKE ONE-HALF TABLET BY MOUTH TWICE A DAY 90 tablet 1     omeprazole (PRILOSEC) 20 MG DR capsule TAKE ONE CAPSULE BY MOUTH ONCE DAILY 90 capsule 1         ORDERS PLACED:  No orders of the defined types were placed in this encounter.    No orders of the defined types were placed in this encounter.    There are no discontinued medications.      Encounter Diagnoses   Name Primary?     Aortic valve stenosis, etiology of cardiac valve disease unspecified Yes     Essential hypertension, benign      Hemorrhagic stroke (H)          ALLERGIES     Allergies   Allergen Reactions     Keppra Fatigue     Crying very depressed       PAST MEDICAL HISTORY:  Past Medical History:   Diagnosis Date     Asbestosis(501)     ct chest8/04     Cardiomegaly     echo8/04  stress echo 2000 normal     Closed fracture of unspecified part of humerus      Diverticulosis of colon (without mention of hemorrhage)      Esophageal reflux      Other specified anemias      Other specified iron deficiency anemias        PAST SURGICAL HISTORY:  Past Surgical History:   Procedure Laterality Date     COLONOSCOPY  8/18/04     Repeat  in 10 years.     PHACOEMULSIFICATION WITH STANDARD INTRAOCULAR LENS IMPLANT  4/7/2011    Procedure:PHACOEMULSIFICATION WITH STANDARD INTRAOCULAR LENS IMPLANT; Surgeon:MJ CHOI; Location:[facility]     PHACOEMULSIFICATION WITH STANDARD INTRAOCULAR LENS IMPLANT  5/9/2011    Procedure:PHACOEMULSIFICATION WITH STANDARD INTRAOCULAR LENS IMPLANT; Surgeon:MJ CHOI; Location:[place]     SURGICAL HISTORY OF -       vasectomy     SURGICAL HISTORY OF -       appy       FAMILY HISTORY:  Family History   Problem Relation Age of Onset     Cancer Father         lung     Cancer Mother         pancreatic     Alcohol/Drug Son      Alcohol/Drug Son      Cancer Sister         1/2 sister lung cancer       SOCIAL HISTORY:  Social History     Socioeconomic History     Marital status:      Spouse name: None     Number of children: None     Years of education: None     Highest education level: None   Occupational History     None   Social Needs     Financial resource strain: None     Food insecurity     Worry: None     Inability: None     Transportation needs     Medical: None     Non-medical: None   Tobacco Use     Smoking status: Never Smoker     Smokeless tobacco: Never Used   Substance and Sexual Activity     Alcohol use: Yes     Comment: rarely     Drug use: No     Sexual activity: Yes     Partners: Female   Lifestyle     Physical activity     Days per week: None     Minutes per session: None     Stress: None   Relationships     Social connections     Talks on phone: None     Gets together: None     Attends Congregation service: None     Active member of club or organization: None     Attends meetings of clubs or organizations: None     Relationship status: None     Intimate partner violence     Fear of current or ex partner: None     Emotionally abused: None     Physically abused: None     Forced sexual activity: None   Other Topics Concern     Parent/sibling w/ CABG, MI or angioplasty before 65F 55M? No   Social  History Narrative     None     FULL TAVR CT 12/17/2020    IMPRESSION:  1.  No acute aortic pathology like dissection, intramural hematoma or contained rupture noted.  2.  Please review detailed radiology report, to follow separately, for incidental non-cardiovascular findings.     FINDINGS:     1.  Tricuspid aortic valve. Aortic valve calcium score is 3629. The ascending aorta is minimal calcified, aortic arch is  minimal calcified, the descending thoracic aorta is mild calcified.   2.  The arch vessel branching pattern is normal.   3.  The suprarenal abdominal aorta is minimal calcified; infrarenal abdominal aorta is mild calcified.   4.  There is no acute aortic pathology, such as dissection, intramural hematoma, or contained rupture.      MEASUREMENTS:   Representative dimensions of the thoracoabdominal aorta are as follows:     1. AORTIC ANNULUS MEASUREMENTS:     *  The average aortic annulus diameter is 28.1 mm,   *  Aortic annulus area is 6.21 cm2  *  Aortic annulus perimeter is 90.1 mm  *  The 3 cusp coaxial angle for aortic valve is (BRANDI 16 , CAU 32) and the AP coaxial angle is (BRANDI 0 , CAU 49 ), these angles will vary depending upon the patient's body position  *  Left main - Annulus distance: 17.5 mm, RCA - Annulus distance: 15.6 mm     2. LVOT MEASUREMENTS:     *  The average LVOT diameter is 28.1 mm  *  LVOT area is 6.21 cm2  *  LVOT perimeter is 90 mm  *  LVOT calcification trivial mm     3. AORTA MEASUREMENTS     1.  The aortic root at the sinuses of Valsalva (L/R/N) 36.5 mm x  37.5 mm 36.9 mm  2.  The sinotubular junction:  33 mm x 29 mm  3.  Sinotubular junction height: 22.2 mm x 28.8 mm  4.  Proximal ascending aorta: 35.4 mm x 34.5 mm  5.  Distal abdominal aorta proximal to the bifurcation: 18.9 mm x 16.2 mm     4. ILIOFEMORAL MEASUREMENTS:     RIGHT:  1.  Right common iliac artery: 10.5 mm x 11.2 mm   2.  Right external iliac artery: 6.21 mm x 7.99 mm   3.  Right common femoral artery: 8.12 mm x  8.57 mm   4.  Tortuosity: mild   5.  Calcification: mild      LEFT:  1.  Left common iliac artery: 10.7 mm x  11.3 mm  2.  Left external iliac artery: 7.68 mm x 8.37 mm  3.  Left common femoral artery: 7.50 mm x 8.54 mm  4.  Tortuosity: mild   5.  Calcification: mild      7. Aortic Root Angle 72.1 degrees   8. Innominate Artery :  15.3 mm X 13.6mm  9. Left Carotid Artery:  7.83mm X 7.39 mm       I have reviewed the note as documented above.  This accurately captures the substance of my conversation with the patient.     Phone call contact time  Call Started at 0947  Call Ended at 0959  Duration 12 minutes        Thank you for allowing me to participate in the care of your patient.    Sincerely,     Amaya Arenas PA-C     [facility]

## 2021-01-11 DIAGNOSIS — Z11.59 ENCOUNTER FOR SCREENING FOR OTHER VIRAL DISEASES: ICD-10-CM

## 2021-01-11 LAB
SARS-COV-2 RNA RESP QL NAA+PROBE: NORMAL
SPECIMEN SOURCE: NORMAL

## 2021-01-11 PROCEDURE — U0003 INFECTIOUS AGENT DETECTION BY NUCLEIC ACID (DNA OR RNA); SEVERE ACUTE RESPIRATORY SYNDROME CORONAVIRUS 2 (SARS-COV-2) (CORONAVIRUS DISEASE [COVID-19]), AMPLIFIED PROBE TECHNIQUE, MAKING USE OF HIGH THROUGHPUT TECHNOLOGIES AS DESCRIBED BY CMS-2020-01-R: HCPCS | Performed by: INTERNAL MEDICINE

## 2021-01-11 PROCEDURE — U0005 INFEC AGEN DETEC AMPLI PROBE: HCPCS | Performed by: INTERNAL MEDICINE

## 2021-01-12 LAB
LABORATORY COMMENT REPORT: NORMAL
SARS-COV-2 RNA RESP QL NAA+PROBE: NEGATIVE
SPECIMEN SOURCE: NORMAL

## 2021-01-13 ENCOUNTER — HOSPITAL ENCOUNTER (OUTPATIENT)
Facility: CLINIC | Age: 86
Discharge: HOME | End: 2021-01-13
Admitting: INTERNAL MEDICINE
Payer: COMMERCIAL

## 2021-01-13 ENCOUNTER — HOSPITAL ENCOUNTER (OUTPATIENT)
Dept: ULTRASOUND IMAGING | Facility: CLINIC | Age: 86
End: 2021-01-13
Attending: INTERNAL MEDICINE
Payer: COMMERCIAL

## 2021-01-13 VITALS
HEIGHT: 66 IN | DIASTOLIC BLOOD PRESSURE: 87 MMHG | WEIGHT: 170 LBS | HEART RATE: 66 BPM | RESPIRATION RATE: 16 BRPM | OXYGEN SATURATION: 95 % | TEMPERATURE: 98.1 F | BODY MASS INDEX: 27.32 KG/M2 | SYSTOLIC BLOOD PRESSURE: 174 MMHG

## 2021-01-13 DIAGNOSIS — R09.89 CHEST SOUNDS ABNORMAL ON PERCUSSION OR AUSCULTATION: ICD-10-CM

## 2021-01-13 DIAGNOSIS — I35.0 AORTIC VALVE STENOSIS, ETIOLOGY OF CARDIAC VALVE DISEASE UNSPECIFIED: ICD-10-CM

## 2021-01-13 PROBLEM — Z98.890 STATUS POST CORONARY ANGIOGRAM: Status: ACTIVE | Noted: 2021-01-13

## 2021-01-13 LAB
ANION GAP SERPL CALCULATED.3IONS-SCNC: 6 MMOL/L (ref 3–14)
APTT PPP: 34 SEC (ref 22–37)
BUN SERPL-MCNC: 20 MG/DL (ref 7–30)
CALCIUM SERPL-MCNC: 8.7 MG/DL (ref 8.5–10.1)
CHLORIDE SERPL-SCNC: 109 MMOL/L (ref 94–109)
CO2 SERPL-SCNC: 26 MMOL/L (ref 20–32)
CREAT SERPL-MCNC: 0.96 MG/DL (ref 0.66–1.25)
ERYTHROCYTE [DISTWIDTH] IN BLOOD BY AUTOMATED COUNT: 13.5 % (ref 10–15)
GFR SERPL CREATININE-BSD FRML MDRD: 71 ML/MIN/{1.73_M2}
GLUCOSE SERPL-MCNC: 93 MG/DL (ref 70–99)
HCT VFR BLD AUTO: 34.9 % (ref 40–53)
HGB BLD-MCNC: 11.7 G/DL (ref 13.3–17.7)
INR PPP: 1.06 (ref 0.86–1.14)
MCH RBC QN AUTO: 29.1 PG (ref 26.5–33)
MCHC RBC AUTO-ENTMCNC: 33.5 G/DL (ref 31.5–36.5)
MCV RBC AUTO: 87 FL (ref 78–100)
PLATELET # BLD AUTO: 239 10E9/L (ref 150–450)
POTASSIUM SERPL-SCNC: 3.9 MMOL/L (ref 3.4–5.3)
RBC # BLD AUTO: 4.02 10E12/L (ref 4.4–5.9)
SODIUM SERPL-SCNC: 141 MMOL/L (ref 133–144)
WBC # BLD AUTO: 4.8 10E9/L (ref 4–11)

## 2021-01-13 PROCEDURE — C1894 INTRO/SHEATH, NON-LASER: HCPCS | Performed by: INTERNAL MEDICINE

## 2021-01-13 PROCEDURE — 272N000001 HC OR GENERAL SUPPLY STERILE: Performed by: INTERNAL MEDICINE

## 2021-01-13 PROCEDURE — 93456 R HRT CORONARY ARTERY ANGIO: CPT | Performed by: INTERNAL MEDICINE

## 2021-01-13 PROCEDURE — 82803 BLOOD GASES ANY COMBINATION: CPT

## 2021-01-13 PROCEDURE — 80048 BASIC METABOLIC PNL TOTAL CA: CPT | Performed by: INTERNAL MEDICINE

## 2021-01-13 PROCEDURE — 85610 PROTHROMBIN TIME: CPT | Performed by: INTERNAL MEDICINE

## 2021-01-13 PROCEDURE — 999N000054 HC STATISTIC EKG NON-CHARGEABLE

## 2021-01-13 PROCEDURE — 93010 ELECTROCARDIOGRAM REPORT: CPT | Performed by: INTERNAL MEDICINE

## 2021-01-13 PROCEDURE — 258N000003 HC RX IP 258 OP 636: Performed by: INTERNAL MEDICINE

## 2021-01-13 PROCEDURE — 99152 MOD SED SAME PHYS/QHP 5/>YRS: CPT | Performed by: INTERNAL MEDICINE

## 2021-01-13 PROCEDURE — 999N000071 HC STATISTIC HEART CATH LAB OR EP LAB

## 2021-01-13 PROCEDURE — 93880 EXTRACRANIAL BILAT STUDY: CPT | Mod: 26 | Performed by: INTERNAL MEDICINE

## 2021-01-13 PROCEDURE — C1769 GUIDE WIRE: HCPCS | Performed by: INTERNAL MEDICINE

## 2021-01-13 PROCEDURE — 250N000011 HC RX IP 250 OP 636: Performed by: INTERNAL MEDICINE

## 2021-01-13 PROCEDURE — 999N000184 HC STATISTIC TELEMETRY

## 2021-01-13 PROCEDURE — 93457 R HRT ART/GRFT ANGIO: CPT | Performed by: INTERNAL MEDICINE

## 2021-01-13 PROCEDURE — 93005 ELECTROCARDIOGRAM TRACING: CPT

## 2021-01-13 PROCEDURE — 99153 MOD SED SAME PHYS/QHP EA: CPT | Performed by: INTERNAL MEDICINE

## 2021-01-13 PROCEDURE — 250N000009 HC RX 250: Performed by: INTERNAL MEDICINE

## 2021-01-13 PROCEDURE — 250N000013 HC RX MED GY IP 250 OP 250 PS 637: Performed by: INTERNAL MEDICINE

## 2021-01-13 PROCEDURE — 36415 COLL VENOUS BLD VENIPUNCTURE: CPT

## 2021-01-13 PROCEDURE — 85730 THROMBOPLASTIN TIME PARTIAL: CPT | Performed by: INTERNAL MEDICINE

## 2021-01-13 PROCEDURE — 93880 EXTRACRANIAL BILAT STUDY: CPT

## 2021-01-13 PROCEDURE — 85027 COMPLETE CBC AUTOMATED: CPT | Performed by: INTERNAL MEDICINE

## 2021-01-13 RX ORDER — NALOXONE HYDROCHLORIDE 0.4 MG/ML
0.2 INJECTION, SOLUTION INTRAMUSCULAR; INTRAVENOUS; SUBCUTANEOUS
Status: DISCONTINUED | OUTPATIENT
Start: 2021-01-13 | End: 2021-01-13 | Stop reason: HOSPADM

## 2021-01-13 RX ORDER — LIDOCAINE 40 MG/G
CREAM TOPICAL
Status: DISCONTINUED | OUTPATIENT
Start: 2021-01-13 | End: 2021-01-13 | Stop reason: HOSPADM

## 2021-01-13 RX ORDER — SODIUM CHLORIDE 9 MG/ML
INJECTION, SOLUTION INTRAVENOUS CONTINUOUS
Status: DISCONTINUED | OUTPATIENT
Start: 2021-01-13 | End: 2021-01-13 | Stop reason: HOSPADM

## 2021-01-13 RX ORDER — IOPAMIDOL 755 MG/ML
INJECTION, SOLUTION INTRAVASCULAR
Status: DISCONTINUED | OUTPATIENT
Start: 2021-01-13 | End: 2021-01-13 | Stop reason: HOSPADM

## 2021-01-13 RX ORDER — ARGATROBAN 1 MG/ML
350 INJECTION, SOLUTION INTRAVENOUS
Status: DISCONTINUED | OUTPATIENT
Start: 2021-01-13 | End: 2021-01-13 | Stop reason: HOSPADM

## 2021-01-13 RX ORDER — FLUMAZENIL 0.1 MG/ML
0.2 INJECTION, SOLUTION INTRAVENOUS
Status: DISCONTINUED | OUTPATIENT
Start: 2021-01-13 | End: 2021-01-13 | Stop reason: HOSPADM

## 2021-01-13 RX ORDER — POTASSIUM CHLORIDE 1500 MG/1
20 TABLET, EXTENDED RELEASE ORAL
Status: COMPLETED | OUTPATIENT
Start: 2021-01-13 | End: 2021-01-13

## 2021-01-13 RX ORDER — HEPARIN SODIUM 10000 [USP'U]/100ML
100-1000 INJECTION, SOLUTION INTRAVENOUS CONTINUOUS PRN
Status: DISCONTINUED | OUTPATIENT
Start: 2021-01-13 | End: 2021-01-13 | Stop reason: HOSPADM

## 2021-01-13 RX ORDER — EPTIFIBATIDE 2 MG/ML
2 INJECTION, SOLUTION INTRAVENOUS CONTINUOUS PRN
Status: DISCONTINUED | OUTPATIENT
Start: 2021-01-13 | End: 2021-01-13 | Stop reason: HOSPADM

## 2021-01-13 RX ORDER — ATROPINE SULFATE 0.1 MG/ML
0.5 INJECTION INTRAVENOUS
Status: DISCONTINUED | OUTPATIENT
Start: 2021-01-13 | End: 2021-01-13 | Stop reason: HOSPADM

## 2021-01-13 RX ORDER — ARGATROBAN 1 MG/ML
150 INJECTION, SOLUTION INTRAVENOUS
Status: DISCONTINUED | OUTPATIENT
Start: 2021-01-13 | End: 2021-01-13 | Stop reason: HOSPADM

## 2021-01-13 RX ORDER — ACETAMINOPHEN 325 MG/1
650 TABLET ORAL EVERY 4 HOURS PRN
Status: DISCONTINUED | OUTPATIENT
Start: 2021-01-13 | End: 2021-01-13 | Stop reason: HOSPADM

## 2021-01-13 RX ORDER — DOPAMINE HYDROCHLORIDE 160 MG/100ML
2-20 INJECTION, SOLUTION INTRAVENOUS CONTINUOUS PRN
Status: DISCONTINUED | OUTPATIENT
Start: 2021-01-13 | End: 2021-01-13 | Stop reason: HOSPADM

## 2021-01-13 RX ORDER — NALOXONE HYDROCHLORIDE 0.4 MG/ML
0.4 INJECTION, SOLUTION INTRAMUSCULAR; INTRAVENOUS; SUBCUTANEOUS
Status: DISCONTINUED | OUTPATIENT
Start: 2021-01-13 | End: 2021-01-13 | Stop reason: HOSPADM

## 2021-01-13 RX ORDER — FENTANYL CITRATE 50 UG/ML
25-50 INJECTION, SOLUTION INTRAMUSCULAR; INTRAVENOUS
Status: ACTIVE | OUTPATIENT
Start: 2021-01-13 | End: 2021-01-13

## 2021-01-13 RX ORDER — FENTANYL CITRATE 50 UG/ML
INJECTION, SOLUTION INTRAMUSCULAR; INTRAVENOUS
Status: DISCONTINUED | OUTPATIENT
Start: 2021-01-13 | End: 2021-01-13 | Stop reason: HOSPADM

## 2021-01-13 RX ORDER — EPTIFIBATIDE 2 MG/ML
180 INJECTION, SOLUTION INTRAVENOUS EVERY 10 MIN PRN
Status: DISCONTINUED | OUTPATIENT
Start: 2021-01-13 | End: 2021-01-13 | Stop reason: HOSPADM

## 2021-01-13 RX ORDER — DOBUTAMINE HYDROCHLORIDE 200 MG/100ML
2-20 INJECTION INTRAVENOUS CONTINUOUS PRN
Status: DISCONTINUED | OUTPATIENT
Start: 2021-01-13 | End: 2021-01-13 | Stop reason: HOSPADM

## 2021-01-13 RX ADMIN — SODIUM CHLORIDE: 9 INJECTION, SOLUTION INTRAVENOUS at 09:28

## 2021-01-13 RX ADMIN — ASPIRIN 325 MG ORAL TABLET 325 MG: 325 PILL ORAL at 09:53

## 2021-01-13 RX ADMIN — POTASSIUM CHLORIDE 20 MEQ: 1500 TABLET, EXTENDED RELEASE ORAL at 10:15

## 2021-01-13 ASSESSMENT — MIFFLIN-ST. JEOR: SCORE: 1398.86

## 2021-01-13 NOTE — DISCHARGE INSTRUCTIONS
Cardiac Angiogram Discharge Instructions - Femoral    After you go home:      Have an adult stay with you until tomorrow.    Drink extra fluids for 2 days.    You may resume your normal diet.    No smoking       For 24 hours - due to the sedation you received:    Relax and take it easy.    Do NOT make any important or legal decisions.    Do NOT drive or operate machines at home or at work.    Do NOT drink alcohol.    Care of Groin Puncture Site:      For the first 24 hrs - check the puncture site every 1-2 hours while awake.    For 2 days, when you cough, sneeze, laugh or move your bowels, hold your hand over the puncture site and press firmly.    Remove the bandaid after 24 hours. If there is minor oozing, apply another bandaid and remove it after 12 hours.    It is normal to have a small bruise or pea size lump at the site.    You may shower tomorrow. Do NOT take a bath, or use a hot tub or pool for at least 3 days. Do NOT scrub the site. Do not use lotion or powder near the puncture site.    Activity:            For 2 days:    No stooping or squatting    Do NOT do any heavy activity such as exercise, lifting, or straining.     No housework, yard work or any activity that make you sweat    Do NOT lift more than 10 pounds    Bleeding:      If you start bleeding from the site in your groin, lie down flat and press firmly on/above the site for 10 minutes.     Once bleeding stops, lay flat for 2 hours.     Call [facility] as soon as you can.       Call 911 right away if you have heavy bleeding or bleeding that does not stop.      Medicines:      If you are taking an antiplatelet medication such as Plavix, Brilinta or Effient, do not stop taking it until you talk to your cardiologist.        Take your medications, including blood thinners, unless your provider tells you not to.        If you have stopped any medicines, check with your provider about when to restart them.    Follow Up Appointments:      Follow up with  [facility] Nurse Practitioner at [facility] of patient preference in 7-10 days.    Call the clinic if:      You have increased pain or a large or growing hard lump around the site.    The site is red, swollen, hot or tender.    Blood or fluid is draining from the site.    You have chills or a fever greater than 101 F (38 C).    Your leg feels numb, cool or changes color.    You have hives, a rash or unusual itching.    New pain in the back or belly that you cannot control with Tylenol.    Any questions or concerns.          [facility] [place]:    [phone] [facility] (7 days a week)

## 2021-01-13 NOTE — PROGRESS NOTES
Care Suites Post Procedure Note    Patient Information  Name: Henrry Fonseca  Age: 85 year old    Post Procedure  Time patient returned to Care Suites: 1240  Concerns/abnormal assessment: (R) groin site without hematoma or drainage present.  If abnormal assessment, provider notified: N/A  Plan/Other: 2 hour bedrest per orders then discharge if stable.    Kathya Braden RN

## 2021-01-13 NOTE — PROGRESS NOTES
Care Suites Post Procedure Note    Patient Information  Name: Henrry LEON White  Age: 85 year old    Post Procedure  Time patient returned to Care Suites: 1245  Concerns/abnormal assessment: NA  If abnormal assessment, provider notified: N/A  Plan/Other: Continue post procedure plan of care.  Report received from Jacqueline to continue monitoring pt.  Right groin site tegaderm CDI.  VS stable, denies any pain or discomfort.  Voiding per urinal. Pt refused to eat at this time.  Anticipate 2 hours bedrest until 1445  Anticipate discharge later this afternoon.  Discharge instruction is reviewed with pt, wife.  All questions are answered.    Pt has a small hematoma, manual pressure applied, soften now.  Delayed bedrest time until 1600  Anticipate discharge after 1630 if stable.  SBP up to 160-170's pt dues to take his PTA BP meds at home.  Encourage pt to take his meds as soon as he gets home.  Pt verbalized his understanding about instruction.  Pt is ready to be discharged by 1630    Miranda Lau RN     Care Suites Discharge Nursing Note    Patient Information  Name: Henrry oFnseca  Age: 85 year old    Discharge Education:  Discharge instructions reviewed: Yes  Additional education/resources provided: NA  Patient/patient representative verbalizes understanding: Yes  Patient discharging on new medications: No  Medication education completed: N/A    Discharge Plans:   Discharge location: home  Discharge ride contacted: Yes  Approximate discharge time: 1630    Discharge Criteria:  Discharge criteria met and vital signs stable: Yes    Patient Belongs:  Patient belongings returned to patient: Yes    Miranda Lau RN

## 2021-01-13 NOTE — PROGRESS NOTES
PATIENT/VISITOR WELLNESS SCREENING    Step 1 Patient Screening    1. In the last month, have you been in contact with someone who was confirmed or suspected to have Coronavirus/COVID-19? No    2. Do you have the following symptoms?  Fever/Chills? No   Cough? No   Shortness of breath? No   New loss of taste or smell? No  Sore throat? No  Muscle or body aches? No  Headaches? No  Fatigue? No  Vomiting or diarrhea? No    Step 2 Visitor Screening    1. Name of Visitor (1 visitor per patient): NA       Step 3 Refer to logic grid below for actions    NO SYMPTOM(S)    ACTIONS:  1. Standard rooming process  2. Provider to assess per normal protocol  3. Implement precautions as needed and per guidelines     POSITIVE SYMPTOM(S)  If positive for ANY of the following symptoms: fever, cough, shortness of breath, rash    ACTION:  1. Continue to have the patient wear a mask   2. Room patient as soon as possible  3. Don appropriate PPE when entering room  4. Provider evaluation

## 2021-01-13 NOTE — CONSULTS
Consult Date:  01/13/2021      REFERRING CARDIOLOGIST:  Que Colbert MD      REASON FOR CONSULTATION:  Evaluation for severe symptomatic aortic stenosis.      HISTORY OF PRESENT ILLNESS:  Mr. Fonseca is a very pleasant 85-year-old gentleman with a prior history of stroke and progressively worsening aortic stenosis, now to the severe range.  He was recently seen by Dr. Que Colbert, virtually, for consideration for possible TAVR.  Preoperative TAVR workup was initiated.  He underwent the outpatient coronary angiogram today as part of the workup.  I was asked to evaluate the patient from a surgical standpoint as well.      PAST MEDICAL HISTORY:  Includes aortic stenosis, asbestosis, diverticulosis of the colon, gastroesophageal reflux disease, prior stroke.      PAST SURGICAL HISTORY:  Includes a vasectomy, appendectomy, cataract surgery.      ALLERGIES:  KEPPRA.      CURRENT OUTPATIENT MEDICATIONS:  Reviewed in Epic chart.      FAMILY HISTORY:  Noncontributory.      SOCIAL HISTORY:  He has never smoked.  No excessive alcohol use history.      REVIEW OF SYSTEMS:  As per HPI.  All other 10-point review of systems are completed and were otherwise negative unless stated above.      PHYSICAL EXAMINATION:   VITAL SIGNS:  All vital signs are stable.   GENERAL:  He appears well, in no acute distress.   HEENT:  Within normal limits.   NECK:  Supple, no lymphadenopathy.   CARDIOVASCULAR:  Regular rate and rhythm, normal S1, S2.  Grade 3/6 systolic murmur at the right upper sternal border.   LUNGS:  Clear bilaterally.   ABDOMEN:  Soft, nontender, nondistended.   EXTREMITIES:  Negative for edema, cyanosis or clubbing.   NEUROLOGIC:  A and O x 3 with no focal deficits.      LABORATORY DATA:  Coronary angiogram performed today demonstrates no significant coronary artery disease.  Most recent transthoracic echo from December 12/02/2020 demonstrated severe bicuspid aortic valve stenosis with a mean gradient of 39 mmHg, mildly dilated  aortic root measuring 4 cm, preserved LV systolic function and normal size, normal mitral and tricuspid valves.  TAVR protocol CT scan was also done on 2020 that demonstrated iliofemoral anatomy that appears to be conducive for a transfemoral percutaneous approach.      IMPRESSION AND PLAN:  Mr. Fonseca is an 85-year-old gentleman with multiple medical comorbidities with severe aortic stenosis.  His LV function is preserved.  He has no significant coronary artery disease.  I believe the patient is an appropriate TAVR candidate over surgical AVR given his advanced age and medical comorbidities.  Pros and cons of both types were discussed.  I also explained to him that we will discuss his case at our upcoming multidisciplinary conference for consensus opinion and hopefully proceed with a TAVR in the not too distant future.      It was a pleasure to meet Mr. Fonseca today and thank you very much for this referral.         SHAYAN CASTANEDA MD             D: 2021   T: 2021   MT: CECE      Name:     RYAN FONSECA   MRN:      [id number]        Account:       [id number]   :      1935           Consult Date:  2021      Document: [id number]       cc: Peggy Colbert MD

## 2021-01-13 NOTE — PROGRESS NOTES
Care Suites Admission Nursing Note    Patient Information  Name: Henrry LEON White  Age: 85 year old  Reason for admission: Left and right heart cath   Care Suites arrival time: 0837    Visitor Information  Name: None    Patient Admission/Assessment   Pre-procedure assessment complete: Yes  If abnormal assessment/labs, provider notified: N/A  NPO: Yes  Medications held per instructions/orders: N/A  Consent: to be obtained  Patient oriented to room: Yes  Education/questions answered: Yes  Plan/other: Proceed as planned    Discharge Planning  Discharge name/phone number: Ayah (wife) [phone]  Ayah's son Santana will pick pt up: [phone]  Overnight post sedation caregiver: yes  Discharge location: home    Miranda Lau RN

## 2021-01-14 LAB
CO2 BLD-SCNC: 24 MMOL/L (ref 21–28)
CO2 BLDCOV-SCNC: 26 MMOL/L (ref 21–28)
PCO2 BLD: 43 MM HG (ref 35–45)
PCO2 BLDV: 47 MM HG (ref 40–50)
PH BLD: 7.36 PH (ref 7.35–7.45)
PH BLDV: 7.34 PH (ref 7.32–7.43)
PO2 BLD: 69 MM HG (ref 80–105)
PO2 BLDV: 36 MM HG (ref 25–47)
SAO2 % BLDA FROM PO2: 93 % (ref 92–100)
SAO2 % BLDV FROM PO2: 65 %

## 2021-01-18 DIAGNOSIS — I35.0 AORTIC VALVE STENOSIS, ETIOLOGY OF CARDIAC VALVE DISEASE UNSPECIFIED: Primary | ICD-10-CM

## 2021-01-18 LAB — INTERPRETATION ECG - MUSE: NORMAL

## 2021-01-20 DIAGNOSIS — I35.0 AORTIC VALVE STENOSIS, ETIOLOGY OF CARDIAC VALVE DISEASE UNSPECIFIED: Primary | ICD-10-CM

## 2021-01-21 DIAGNOSIS — I61.9 HEMORRHAGIC STROKE (H): Primary | ICD-10-CM

## 2021-01-21 NOTE — PROGRESS NOTES
Henrry is a 85 year old who is being evaluated via a billable video visit.      How would you like to obtain your AVS? MyChart  If the video visit is dropped, the invitation should be resent by: Text to cell phone: [phone]  Will anyone else be joining your video visit? No      Video Start Time: 8:29 AM    Cardiology Clinic (TAVR)      Assessment & Plan      1.  Severe aortic stenosis, as noted below  2.  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  Seizure disorder  5.  Hyperlipidemia  6.  Hypertension    Recommendations    1.  Severe aortic stenosis: Patient was in the process of getting this worked up however he unfortunately had a CVA as noted below.  He has significant deficit as a result and has symptoms of shortness of breath which are also confounded by his lung disease.  We reviewed his TAVR directed CT as well as recommendation from neurology that a follow-up head CT should be performed.  This will be arranged accordingly.  Recommendation is that provided there is no significant change above his baseline regarding his prior hemorrhagic bleed that he can safely proceed with heparin for the transfemoral TAVR.  We will follow-up on this study which will be ordered today.    2.  Discussed risk benefits and potential complications of TAVR procedure and the expected benefit and recovery.  After our discussion patient is willing to proceed.  We will tentatively schedule him for next week with [facility] herb valve implantation given his aortic valve complex and anatomy.    Thank you kindly for consult    Peggy Colbert MD        HPI:    Patient is an 85-year-old male with a notable history of severe aortic stenosis, hypertension, asbestosis with reactive airway disease, seizure disorder who presented to [facility] in [place] via EMS in June 2020 for sudden onset left-sided  weakness and dysarthria using the bathroom.  He was significantly hypertensive on presentation and due to his neurologic exam code stroke was subsequently called.  Blood pressure was significantly elevated on exam.  CT imaging demonstrated an acute intraparenchymal hemorrhage in the right thalamus consistent with hypertensive hemorrhage with small vessel volume loss and extension.  Midline shift was also noted.  He was directed to the ICU for further care.  Due to his presentation additional comorbidities conservative therapy was followed.  Patient had an extensive hospital stay and ultimately underwent transfer and physical therapy at a local facility near his home in the [facility].  Patient per review of chart was in the process of getting evaluated for his severe aortic stenosis however he had a stroke in the interval timeframe.  Symptom wise patient does not report of any chest pain or syncope however he has had chronic shortness of breath which appears to be worsened on patient's assessment.  He states that minimal activities seem to provoke his symptoms.  However in the background he does have as best dose exposure and restrictive lung disease as a result.  Given his findings an updated echocardiogram was performed which is as noted below.      From a functional standpoint patient unfortunately has been left with significant left-sided weakness and mainly is nonambulatory.  He does sit in a wheelchair or with assistance can use a walker to ambulate.  Social support is provided by his wife who is also a caregiver for him as well.      Patient underwent diagnostic testing in preparation for TAVR.  His CT is as noted below and he underwent coronary angiography.  It appears that the iliofemoral arteries are suitable for transfemoral approach.  There is some angulation in the aorta at 72 degrees.  Given these findings and the size we will upload to [facility] for confirmation.  We discussed with patient and he is  willing to proceed.  Given his prior hemorrhagic CVA we talked with neurology and they recommended getting a follow-up CT scan of his head for resolution and stability of his previously described bleed.  Provided this is the case that it is stable and no significant changes above his baseline they are okay with him getting heparin for case.      Coronary Angiogram  1. Mild coronary artery disease  2. Normal filling pressures    TAVR CT    IMPRESSION:  1.  No acute aortic pathology like dissection, intramural hematoma or  contained rupture noted.  2.  Please review detailed radiology report, to follow separately, for  incidental non-cardiovascular findings.     FINDINGS:     1.  Tricuspid aortic valve. Aortic valve calcium score is 3629. The  ascending aorta is minimal calcified, aortic arch is  minimal  calcified, the descending thoracic aorta is mild calcified.   2.  The arch vessel branching pattern is normal.   3.  The suprarenal abdominal aorta is minimal calcified; infrarenal  abdominal aorta is mild calcified.   4.  There is no acute aortic pathology, such as dissection, intramural  hematoma, or contained rupture.      MEASUREMENTS:   Representative dimensions of the thoracoabdominal aorta are as  follows:     1. AORTIC ANNULUS MEASUREMENTS:     *  The average aortic annulus diameter is 28.1 mm,   *  Aortic annulus area is 6.21 cm2  *  Aortic annulus perimeter is 90.1 mm  *  The 3 cusp coaxial angle for aortic valve is (Palestinian 16 , CAU 32) and  the AP coaxial angle is (Palestinian 0 , CAU 49 ), these  angles will vary  depending upon the patient's body position  *  Left main - Annulus distance: 17.5 mm, RCA - Annulus distance: 15.6  mm     2. LVOT MEASUREMENTS:     *  The average LVOT diameter is 28.1 mm  *  LVOT area is 6.21 cm2  *  LVOT perimeter is 90 mm  *  LVOT calcification trivial mm     3. AORTA MEASUREMENTS     1.  The aortic root at the sinuses of Valsalva (L/R/N) 36.5 mm x  37.5  mm 36.9 mm  2.  The  sinotubular junction:  33 mm x 29 mm  3.  Sinotubular junction height: 22.2 mm x 28.8 mm  4.  Proximal ascending aorta: 35.4 mm x 34.5 mm  5.  Distal abdominal aorta proximal to the bifurcation: 18.9 mm x 16.2  mm     4. ILIOFEMORAL MEASUREMENTS:     RIGHT:  1.  Right common iliac artery: 10.5 mm x 11.2 mm   2.  Right external iliac artery: 6.21 mm x 7.99 mm   3.  Right common femoral artery: 8.12 mm x 8.57 mm   4.  Tortuosity: mild   5.  Calcification: mild      LEFT:  1.  Left common iliac artery: 10.7 mm x  11.3 mm  2.  Left external iliac artery: 7.68 mm x 8.37 mm  3.  Left common femoral artery: 7.50 mm x 8.54 mm  4.  Tortuosity: mild   5.  Calcification: mild      7. Aortic Root Angle 72.1 degrees     8. Innominate Artery :  15.3 mm X 13.6mm     9. Left Carotid Artery:  7.83mm X 7.39 mm       Head CT 2020  INDICATION: Left leg weakness.  COMPARISON: CT brain 06/22/2020 at 2237 hours.  TECHNIQUE: Routine without IV contrast. Multiplanar reformats. Dose reduction  techniques were used.    FINDINGS:  INTRACRANIAL CONTENTS: There is redemonstration of a right thalamic hemorrhage  with medial rupture into the right lateral ventricle. This measures 3.1 x 2.2 cm  transverse x 3.2 cm cephalocaudad. Minimal surrounding vasogenic edema. No  significant change in volume. Increased amounts of layering hemorrhage noted in  the occipital horns. No CT evidence of acute infarct. Mild presumed chronic  small vessel ischemic changes. Mild generalized volume loss. No hydrocephalus.  Skull base vascular calcifications. Basal cisterns intact. Brainstem and  cerebellum unremarkable. 2 mm right to left midline shift. No change.    VISUALIZED ORBITS/SINUSES/MASTOIDS: Prior bilateral cataract surgery. Visualized  portions of the orbits are otherwise unremarkable. Moderate mucosal thickening  scattered about the paranasal sinuses. No middle ear or mastoid effusion.    BONES/SOFT TISSUES: No acute abnormality.    IMPRESSION:  1.  Stable  size of right thalamic parenchymal hematoma with intraventricular  rupture. Likely hypertensive etiology. Negative for hydrocephalus. Stable,  minimal leftward subfalcial shift.        Echo:    1. Severe bicuspid aortic valve stenosis with trace regurgitation. Peak  transaortic velocity 4.1 m/s, mean gradient 39 mmHg, valve area 1.1 cmÂ .  Normal stroke-volume.  2. Mildly dilated aortic root (4.0 cm). Normal ascending aorta dimension.  3. Mild concentric left ventricular hypertrophy with normal systolic function.  Estimated LVEF 60-65%.  4. Normal right ventricular size and systolic function.     Compared to the study dated 10/21/2019, no significant changes.      Primary Care Physician   Keenan Orourke      Patient Active Problem List   Diagnosis     Generalized osteoarthrosis, unspecified site     Mild intermittent asthma     Asbestosis (H)     Essential hypertension, benign     Syncope and collapse     Back pain     Heel pain     Seizure disorder (H)     Rotator cuff syndrome     Elevated PSA     CARDIOVASCULAR SCREENING; LDL GOAL LESS THAN 160     Advanced directives, counseling/discussion     Senile nuclear sclerosis     Health Care Home     Esophageal reflux     Eczema     Hemorrhagic stroke (H)     Aortic valve stenosis, etiology of cardiac valve disease unspecified       Past Medical History   I have reviewed this patient's medical history and updated it with pertinent information if needed.   Past Medical History:   Diagnosis Date     Asbestosis(501)     ct chest8/04     Cardiomegaly     echo8/04  stress echo 2000 normal     Closed fracture of unspecified part of humerus      Diverticulosis of colon (without mention of hemorrhage)      Esophageal reflux      Other specified anemias      Other specified iron deficiency anemias        Past Surgical History   I have reviewed this patient's surgical history and updated it with pertinent information if needed.  Past Surgical History:   Procedure Laterality Date      COLONOSCOPY  8/18/04     Repeat in 10 years.     CV HEART CATHETERIZATION WITH POSSIBLE INTERVENTION N/A 1/13/2021    Procedure: Heart Catheterization with Possible Intervention;  Surgeon: Dmitriy Frost MD;  Location:  HEART CARDIAC CATH LAB     CV RIGHT HEART CATH MEASUREMENTS RECORDED N/A 1/13/2021    Procedure: Right Heart Cath;  Surgeon: Dmitriy Frost MD;  Location:  HEART CARDIAC CATH LAB     PHACOEMULSIFICATION WITH STANDARD INTRAOCULAR LENS IMPLANT  4/7/2011    Procedure:PHACOEMULSIFICATION WITH STANDARD INTRAOCULAR LENS IMPLANT; Surgeon:MJ CHOI; Location:[facility]     PHACOEMULSIFICATION WITH STANDARD INTRAOCULAR LENS IMPLANT  5/9/2011    Procedure:PHACOEMULSIFICATION WITH STANDARD INTRAOCULAR LENS IMPLANT; Surgeon:MJ CHOI; Location:[facility] OR     SURGICAL HISTORY OF -       vasectomy     SURGICAL HISTORY OF -       appy     Current Outpatient Medications   Medication Instructions     acetaminophen (TYLENOL) 650 mg, Oral, EVERY 8 HOURS PRN     albuterol (PROAIR HFA/PROVENTIL HFA/VENTOLIN HFA) 108 (90 Base) MCG/ACT inhaler 2 puffs, Inhalation, EVERY 4 HOURS PRN     amLODIPine (NORVASC) 5 mg, Oral, DAILY     ASPIRIN 325 MG OR TBEC 1 TABLET EVERY Day as needed     atorvastatin (LIPITOR) 20 mg, Oral, AT BEDTIME     calcium carbonate (TUMS) 500 MG chewable tablet 1 chew tab, Oral, DAILY PRN     fluticasone (FLOVENT HFA) 110 MCG/ACT Inhaler 2 puffs, Inhalation, 2 TIMES DAILY, As needed     gabapentin (NEURONTIN) 600 mg, Oral, 2 TIMES DAILY, Take  by mouth. 1 tablet in the AM and PM and 1/2 tablet at noon.     metoprolol tartrate (LOPRESSOR) 25 MG tablet TAKE ONE-HALF TABLET BY MOUTH TWICE A DAY     omeprazole (PRILOSEC) 20 MG DR capsule TAKE ONE CAPSULE BY MOUTH ONCE DAILY         Social History    reports that he has never smoked. He has never used smokeless tobacco. He reports current alcohol use. He reports that he does not use drugs.    Family History   Family History   Problem  Relation Age of Onset     Cancer Father         lung     Cancer Mother         pancreatic     Alcohol/Drug Son      Alcohol/Drug Son      Cancer Sister         1/2 sister lung cancer       Review of Systems   The comprehensive 10 point Review of Systems is negative other than noted in the HPI or here.     Physical Exam   Vital Signs with Ranges     Wt Readings from Last 4 Encounters:   01/13/21 77.1 kg (170 lb)   10/15/19 76.7 kg (169 lb)   11/06/18 75.8 kg (167 lb)   10/01/18 75.8 kg (167 lb)       GENERAL: Healthy, alert and no distress  EYES: Eyes grossly normal to inspection.  No discharge or erythema, or obvious scleral/conjunctival abnormalities.  RESP: No audible wheeze, cough, or visible cyanosis.  No visible retractions or increased work of breathing.    SKIN: Visible skin clear. No significant rash, abnormal pigmentation or lesions.  NEURO: Cranial nerves grossly intact.  Mentation and speech appropriate for age.  PSYCH: Mentation appears normal, affect normal/bright, judgement and insight intact, normal speech and appearance well-groomed.       Video-Visit Details    Type of service:  Video Visit    Video End Time:8:46 AM    Originating Location (pt. Location): Home    Distant Location (provider location):  [facility]     Platform used for Video Visit: FonJax

## 2021-01-21 NOTE — PROGRESS NOTES
Spoke with Neurology and they would like Henrry to have a repeat head CT without contrast to check on the status of his head bleed from June. If the CT shows no further bleed they are ok with us preceding with TAVR and heparin in procedure. They are also ok with antiplatelet post TAVR for 6 months.

## 2021-01-22 ENCOUNTER — TELEPHONE (OUTPATIENT)
Dept: CARDIOLOGY | Facility: CLINIC | Age: 86
End: 2021-01-22

## 2021-01-22 ENCOUNTER — TELEPHONE (OUTPATIENT)
Dept: FAMILY MEDICINE | Facility: CLINIC | Age: 86
End: 2021-01-22

## 2021-01-22 ENCOUNTER — TELEPHONE (OUTPATIENT)
Dept: NEUROSURGERY | Facility: CLINIC | Age: 86
End: 2021-01-22

## 2021-01-22 ENCOUNTER — HOSPITAL ENCOUNTER (OUTPATIENT)
Dept: CT IMAGING | Facility: CLINIC | Age: 86
Discharge: HOME | End: 2021-01-22
Attending: INTERNAL MEDICINE | Admitting: INTERNAL MEDICINE
Payer: COMMERCIAL

## 2021-01-22 ENCOUNTER — VIRTUAL VISIT (OUTPATIENT)
Dept: CARDIOLOGY | Facility: CLINIC | Age: 86
End: 2021-01-22
Payer: COMMERCIAL

## 2021-01-22 DIAGNOSIS — I61.9 HEMORRHAGIC STROKE (H): Primary | ICD-10-CM

## 2021-01-22 DIAGNOSIS — R01.1 HEART MURMUR: Primary | ICD-10-CM

## 2021-01-22 DIAGNOSIS — I61.9 HEMORRHAGIC STROKE (H): ICD-10-CM

## 2021-01-22 LAB — RADIOLOGIST FLAGS: ABNORMAL

## 2021-01-22 PROCEDURE — 99214 OFFICE O/P EST MOD 30 MIN: CPT | Mod: 95 | Performed by: INTERNAL MEDICINE

## 2021-01-22 PROCEDURE — 70450 CT HEAD/BRAIN W/O DYE: CPT

## 2021-01-22 NOTE — TELEPHONE ENCOUNTER
Received Henrry's CT results and no bleed noted but there are some age related changes noted. I placed a call to Dr. Katz the neurologist that saw him in the hospital to see if can look at the results and let us know if we are safe to proceed with TAVR, heparin and post antiplatelets. The results have been faxed to his office for review.    [facility]y from [facility] and they have received the faxed results and are going to request the images be pushed to them. I will await to hear Dr. Katz's thoughts on moving ahead.Jennifer Garcia, ARI

## 2021-01-22 NOTE — TELEPHONE ENCOUNTER
[facility] Call Center    Phone Message    May a detailed message be left on voicemail: yes     Reason for Call: Appointment Intake    Referring Provider Name: Keenan Orourke MD in [facility]  Diagnosis and/or Symptoms: Hemorrhagic stroke     Susan calling from [facility] - states she sent over to Neurology [facility] and said to go to stroke Neurology or Neurosurgery. Patient is needing to be seen prior to TAVR surgery on Tuesday 1/26/21.    Please advise and call Susan back at your earliest convenience to discuss further     Call at either number and ask for Susan   - [phone]  - [phone]    Writer routed to both Neurology and Neurosurgery to see if either can get in on Monday 1/25/21.    Action Taken: Other: [facility] [place]     Travel Screening: Not Applicable

## 2021-01-22 NOTE — TELEPHONE ENCOUNTER
requesting pt be seen by Neurology prior to his TAVR surgery.  Spoke with pt and surgery is scheduled for 1/26/21.  Very short notice.  Is this possible?  Advise.  Lizy

## 2021-01-22 NOTE — TELEPHONE ENCOUNTER
Spoke with Neurosurgery scheduling concerning appt for pt.   will call back with appt date/time.  See  and  note below.    [email or web address]

## 2021-01-22 NOTE — TELEPHONE ENCOUNTER
Chantel Quiros MD [facility] [place]   Caller: Unspecified (Today,  7:00 AM)             Given that he had a bleed, I think this patient would be best pre-oped by either Stroke Neuro or Neurosurgery. Virtual visit with me would not be appropriate.     Thanks,   Dr. Payton

## 2021-01-22 NOTE — TELEPHONE ENCOUNTER
Got a call back Chery CHAPMAN that works with Dr. Chris and his associates. After they reviewed the CT results, they stated that the changes we see are residual from his bleed and that there are no signs of a current bleed. The Dr's feel that if he has not had any further extension of his stroke in terms of symptoms, then they are ok if we move forward with TAVR. They approved the use of heparin during the TAVR and antiplatelets post valve placement. This information was passed to Dr Colbert and we will move ahead with TAVR and only use ASA post valve placement.Jennifer Garcia RN

## 2021-01-22 NOTE — TELEPHONE ENCOUNTER
Writer returned call to ARI Davis.   Sent message to Julio Cesar Gabriel RNCC for   Stroke/Neurovascular to contact   Susan regarding patient TAVR survey.       Janet Esparza LPN  Neurosurgery

## 2021-01-22 NOTE — TELEPHONE ENCOUNTER
Called Henrry to let him know that we got the results of his CT and that I have sent it over to Dr. Katz's office to take a look at it. I let him know that there is a chance that we have to cancel the TAVR on Tuesday depending on what neuro has to say. Henrry understands and will go ahead with his covid test on Saturday in hopes of being able to have the TAVR on Tuesday. I let him know that I will call him Monday with the final plan.Jennifer Garcia RN

## 2021-01-22 NOTE — TELEPHONE ENCOUNTER
Will send to Dr Payton to see if a Virtual appointment on Monday with Dr Payton would be appropriate for this patient. Dr Payton, how do you advise? Lina JO Rn

## 2021-01-22 NOTE — TELEPHONE ENCOUNTER
Spoke with Neurosugery staff and she will pass this request on to Julio Cesar CHAPMAN, Neuro Stroke Coordinator.  Waiting call back.  KPavelRneris

## 2021-01-23 ENCOUNTER — OFFICE VISIT (OUTPATIENT)
Dept: LAB | Facility: CLINIC | Age: 86
End: 2021-01-23
Payer: COMMERCIAL

## 2021-01-23 DIAGNOSIS — I35.0 AORTIC VALVE STENOSIS, ETIOLOGY OF CARDIAC VALVE DISEASE UNSPECIFIED: ICD-10-CM

## 2021-01-23 LAB
SARS-COV-2 RNA RESP QL NAA+PROBE: NORMAL
SPECIMEN SOURCE: NORMAL

## 2021-01-23 PROCEDURE — U0005 INFEC AGEN DETEC AMPLI PROBE: HCPCS | Performed by: INTERNAL MEDICINE

## 2021-01-23 PROCEDURE — U0003 INFECTIOUS AGENT DETECTION BY NUCLEIC ACID (DNA OR RNA); SEVERE ACUTE RESPIRATORY SYNDROME CORONAVIRUS 2 (SARS-COV-2) (CORONAVIRUS DISEASE [COVID-19]), AMPLIFIED PROBE TECHNIQUE, MAKING USE OF HIGH THROUGHPUT TECHNOLOGIES AS DESCRIBED BY CMS-2020-01-R: HCPCS | Performed by: INTERNAL MEDICINE

## 2021-01-25 ENCOUNTER — ANESTHESIA EVENT (OUTPATIENT)
Dept: CARDIOLOGY | Facility: CLINIC | Age: 86
DRG: 267 | End: 2021-01-25
Payer: MEDICARE

## 2021-01-25 ENCOUNTER — TELEPHONE (OUTPATIENT)
Dept: CARDIOLOGY | Facility: CLINIC | Age: 86
End: 2021-01-25

## 2021-01-25 DIAGNOSIS — I35.0 AORTIC VALVE STENOSIS, ETIOLOGY OF CARDIAC VALVE DISEASE UNSPECIFIED: Primary | ICD-10-CM

## 2021-01-25 RX ORDER — ASPIRIN 81 MG/1
81 TABLET ORAL DAILY
Status: CANCELLED | OUTPATIENT
Start: 2021-01-25

## 2021-01-25 RX ORDER — SODIUM CHLORIDE 9 MG/ML
INJECTION, SOLUTION INTRAVENOUS CONTINUOUS
Status: CANCELLED | OUTPATIENT
Start: 2021-01-25

## 2021-01-25 RX ORDER — LIDOCAINE 40 MG/G
CREAM TOPICAL
Status: CANCELLED | OUTPATIENT
Start: 2021-01-25

## 2021-01-25 ASSESSMENT — ENCOUNTER SYMPTOMS: SEIZURES: 1

## 2021-01-25 NOTE — TELEPHONE ENCOUNTER
Called Henrry and let him know that Friday we got the ok to move ahead with the TAVR for Tuesday. There is no further bleed and the CT and the changes noted are residual from his bleed in June. I told him the he will need to be here at 0730 to check in at the [place] desk and her will have a procedural time of 1030. We will draw labs the day of as he lives in [place] and has a long drive. He had an appointment with Dr Colbert on Friday and he went over the risk and benefits of TAVR. I also spoke with Henrry in regards to a bail out plan. At this time he does wish to be a full bailout. I did let him know that Dr. Castro will also talk to him the day of TAVR to go over his wishes again.  I went over his NPO order and that he can take his meds in the am with a small sip of water. He also knows that he can have one visitor with him during his stay. He states his wife will be joining him. He had his covid test this weekend which was negative so he is good to go. He has number and will call if he has any questions before the procedure.Jennifer Garcia RN

## 2021-01-25 NOTE — PROGRESS NOTES
PTA medications updated by Medication Scribe prior to surgery via phone call with patient      -LAST DOSES ENTERED BY NURSE-    Comments:    Medication history sources: Patient, Surescripts and H&P  Medication history source reliability: Moderate  Adherence assessment: N/A Not Observed    Significant changes made to the medication list:  Patient reports no longer taking the following meds (med scribe removed from PTA med list): Tylenol 650mg, Flovent Hfa      Additional medication history information:   Pt bringing own Albuterol inhaler from home    Prior to Admission medications    Medication Sig Last Dose Taking? Auth Provider   albuterol (PROAIR HFA/PROVENTIL HFA/VENTOLIN HFA) 108 (90 Base) MCG/ACT inhaler Inhale 2 puffs into the lungs every 4 hours as needed for shortness of breath / dyspnea  at PRN Yes Keenan Orourke MD   amLODIPine (NORVASC) 5 MG tablet Take 1 tablet (5 mg) by mouth daily  at AM Yes Keenan Orourke MD   atorvastatin (LIPITOR) 20 MG tablet Take 1 tablet (20 mg) by mouth At Bedtime  at PM Yes Keenan Orourke MD   calcium carbonate (TUMS) 500 MG chewable tablet Take 1 chew tab by mouth daily as needed for heartburn MORE THAN 1 WEEK at PRN Yes Reported, Patient   gabapentin (NEURONTIN) 600 MG tablet Take 1 tablet (600 mg) by mouth 2 times daily  Yes Keenan Orourke MD   metoprolol tartrate (LOPRESSOR) 25 MG tablet TAKE ONE-HALF TABLET BY MOUTH TWICE A DAY  Patient taking differently: Take 12.5 mg by mouth 2 times daily   Yes Griffin Mendoza MD   omeprazole (PRILOSEC) 20 MG DR capsule TAKE ONE CAPSULE BY MOUTH ONCE DAILY  Patient taking differently: Take 20 mg by mouth every morning   at AM Yes Keenan Orourke MD

## 2021-01-26 ENCOUNTER — ANESTHESIA (OUTPATIENT)
Dept: CARDIOLOGY | Facility: CLINIC | Age: 86
DRG: 267 | End: 2021-01-26
Payer: MEDICARE

## 2021-01-26 ENCOUNTER — HOSPITAL ENCOUNTER (INPATIENT)
Facility: CLINIC | Age: 86
LOS: 1 days | Discharge: HOME HEALTH | DRG: 267 | End: 2021-01-27
Attending: INTERNAL MEDICINE | Admitting: INTERNAL MEDICINE
Payer: MEDICARE

## 2021-01-26 ENCOUNTER — HOSPITAL ENCOUNTER (INPATIENT)
Dept: CARDIOLOGY | Facility: CLINIC | Age: 86
Setting detail: SURGERY ADMIT
DRG: 267 | End: 2021-01-26
Attending: INTERNAL MEDICINE | Admitting: INTERNAL MEDICINE
Payer: MEDICARE

## 2021-01-26 DIAGNOSIS — I35.0 AORTIC VALVE STENOSIS, ETIOLOGY OF CARDIAC VALVE DISEASE UNSPECIFIED: Primary | ICD-10-CM

## 2021-01-26 DIAGNOSIS — I35.0 AORTIC VALVE STENOSIS, ETIOLOGY OF CARDIAC VALVE DISEASE UNSPECIFIED: ICD-10-CM

## 2021-01-26 LAB
ABO + RH BLD: NORMAL
ABO + RH BLD: NORMAL
ANION GAP SERPL CALCULATED.3IONS-SCNC: 5 MMOL/L (ref 3–14)
BLD GP AB SCN SERPL QL: NORMAL
BLD PROD TYP BPU: NORMAL
BLD UNIT ID BPU: 0
BLD UNIT ID BPU: 0
BLOOD BANK CMNT PATIENT-IMP: NORMAL
BLOOD PRODUCT CODE: NORMAL
BLOOD PRODUCT CODE: NORMAL
BPU ID: NORMAL
BPU ID: NORMAL
BUN SERPL-MCNC: 19 MG/DL (ref 7–30)
CALCIUM SERPL-MCNC: 8.7 MG/DL (ref 8.5–10.1)
CHLORIDE SERPL-SCNC: 109 MMOL/L (ref 94–109)
CO2 SERPL-SCNC: 25 MMOL/L (ref 20–32)
CREAT SERPL-MCNC: 0.89 MG/DL (ref 0.66–1.25)
ERYTHROCYTE [DISTWIDTH] IN BLOOD BY AUTOMATED COUNT: 13.4 % (ref 10–15)
GFR SERPL CREATININE-BSD FRML MDRD: 77 ML/MIN/{1.73_M2}
GLUCOSE SERPL-MCNC: 102 MG/DL (ref 70–99)
HCT VFR BLD AUTO: 35.4 % (ref 40–53)
HGB BLD-MCNC: 11.7 G/DL (ref 13.3–17.7)
INR PPP: 0.97 (ref 0.86–1.14)
KCT BLD-ACNC: 119 SEC (ref 75–150)
KCT BLD-ACNC: 392 SEC (ref 75–150)
MCH RBC QN AUTO: 28.7 PG (ref 26.5–33)
MCHC RBC AUTO-ENTMCNC: 33.1 G/DL (ref 31.5–36.5)
MCV RBC AUTO: 87 FL (ref 78–100)
NUM BPU REQUESTED: 2
PLATELET # BLD AUTO: 227 10E9/L (ref 150–450)
POTASSIUM SERPL-SCNC: 4.1 MMOL/L (ref 3.4–5.3)
RBC # BLD AUTO: 4.07 10E12/L (ref 4.4–5.9)
SODIUM SERPL-SCNC: 139 MMOL/L (ref 133–144)
SPECIMEN EXP DATE BLD: NORMAL
TRANSFUSION STATUS PATIENT QL: NORMAL
WBC # BLD AUTO: 5.4 10E9/L (ref 4–11)

## 2021-01-26 PROCEDURE — 258N000003 HC RX IP 258 OP 636: Performed by: NURSE ANESTHETIST, CERTIFIED REGISTERED

## 2021-01-26 PROCEDURE — 36415 COLL VENOUS BLD VENIPUNCTURE: CPT | Performed by: INTERNAL MEDICINE

## 2021-01-26 PROCEDURE — 370N000017 HC ANESTHESIA TECHNICAL FEE, PER MIN: Performed by: INTERNAL MEDICINE

## 2021-01-26 PROCEDURE — 33361 REPLACE AORTIC VALVE PERQ: CPT | Mod: 62 | Performed by: INTERNAL MEDICINE

## 2021-01-26 PROCEDURE — 258N000003 HC RX IP 258 OP 636: Performed by: INTERNAL MEDICINE

## 2021-01-26 PROCEDURE — 250N000011 HC RX IP 250 OP 636: Performed by: INTERNAL MEDICINE

## 2021-01-26 PROCEDURE — 85347 COAGULATION TIME ACTIVATED: CPT

## 2021-01-26 PROCEDURE — 85610 PROTHROMBIN TIME: CPT | Performed by: INTERNAL MEDICINE

## 2021-01-26 PROCEDURE — 93005 ELECTROCARDIOGRAM TRACING: CPT

## 2021-01-26 PROCEDURE — 93308 TTE F-UP OR LMTD: CPT | Mod: 26 | Performed by: INTERNAL MEDICINE

## 2021-01-26 PROCEDURE — C1769 GUIDE WIRE: HCPCS | Performed by: INTERNAL MEDICINE

## 2021-01-26 PROCEDURE — 86923 COMPATIBILITY TEST ELECTRIC: CPT | Performed by: INTERNAL MEDICINE

## 2021-01-26 PROCEDURE — 33361 REPLACE AORTIC VALVE PERQ: CPT | Mod: Q0 | Performed by: INTERNAL MEDICINE

## 2021-01-26 PROCEDURE — 93325 DOPPLER ECHO COLOR FLOW MAPG: CPT

## 2021-01-26 PROCEDURE — 85027 COMPLETE CBC AUTOMATED: CPT | Performed by: INTERNAL MEDICINE

## 2021-01-26 PROCEDURE — C1760 CLOSURE DEV, VASC: HCPCS | Performed by: INTERNAL MEDICINE

## 2021-01-26 PROCEDURE — P9016 RBC LEUKOCYTES REDUCED: HCPCS | Performed by: INTERNAL MEDICINE

## 2021-01-26 PROCEDURE — 93325 DOPPLER ECHO COLOR FLOW MAPG: CPT | Mod: 26 | Performed by: INTERNAL MEDICINE

## 2021-01-26 PROCEDURE — 86901 BLOOD TYPING SEROLOGIC RH(D): CPT | Performed by: INTERNAL MEDICINE

## 2021-01-26 PROCEDURE — 99223 1ST HOSP IP/OBS HIGH 75: CPT | Mod: AI | Performed by: HOSPITALIST

## 2021-01-26 PROCEDURE — 93010 ELECTROCARDIOGRAM REPORT: CPT | Performed by: INTERNAL MEDICINE

## 2021-01-26 PROCEDURE — 250N000011 HC RX IP 250 OP 636: Performed by: NURSE ANESTHETIST, CERTIFIED REGISTERED

## 2021-01-26 PROCEDURE — 250N000009 HC RX 250: Performed by: NURSE ANESTHETIST, CERTIFIED REGISTERED

## 2021-01-26 PROCEDURE — 86850 RBC ANTIBODY SCREEN: CPT | Performed by: INTERNAL MEDICINE

## 2021-01-26 PROCEDURE — 210N000001 HC R&B IMCU HEART CARE

## 2021-01-26 PROCEDURE — 93321 DOPPLER ECHO F-UP/LMTD STD: CPT | Mod: 26 | Performed by: INTERNAL MEDICINE

## 2021-01-26 PROCEDURE — 250N000009 HC RX 250: Performed by: INTERNAL MEDICINE

## 2021-01-26 PROCEDURE — 250N000011 HC RX IP 250 OP 636: Performed by: STUDENT IN AN ORGANIZED HEALTH CARE EDUCATION/TRAINING PROGRAM

## 2021-01-26 PROCEDURE — 02RF38Z REPLACEMENT OF AORTIC VALVE WITH ZOOPLASTIC TISSUE, PERCUTANEOUS APPROACH: ICD-10-PCS | Performed by: INTERNAL MEDICINE

## 2021-01-26 PROCEDURE — 86900 BLOOD TYPING SEROLOGIC ABO: CPT | Performed by: INTERNAL MEDICINE

## 2021-01-26 PROCEDURE — 272N000001 HC OR GENERAL SUPPLY STERILE: Performed by: INTERNAL MEDICINE

## 2021-01-26 PROCEDURE — 250N000013 HC RX MED GY IP 250 OP 250 PS 637: Performed by: PHYSICIAN ASSISTANT

## 2021-01-26 PROCEDURE — 999N001017 HC STATISTIC GLUCOSE BY METER IP

## 2021-01-26 PROCEDURE — C1894 INTRO/SHEATH, NON-LASER: HCPCS | Performed by: INTERNAL MEDICINE

## 2021-01-26 PROCEDURE — 33361 REPLACE AORTIC VALVE PERQ: CPT | Mod: 62 | Performed by: THORACIC SURGERY (CARDIOTHORACIC VASCULAR SURGERY)

## 2021-01-26 PROCEDURE — 80048 BASIC METABOLIC PNL TOTAL CA: CPT | Performed by: INTERNAL MEDICINE

## 2021-01-26 PROCEDURE — C1730 CATH, EP, 19 OR FEW ELECT: HCPCS | Performed by: INTERNAL MEDICINE

## 2021-01-26 PROCEDURE — 250N000013 HC RX MED GY IP 250 OP 250 PS 637: Performed by: INTERNAL MEDICINE

## 2021-01-26 DEVICE — VALVE HEART SAPIEN 3 29MM 9600TFX29A: Type: IMPLANTABLE DEVICE | Status: FUNCTIONAL

## 2021-01-26 RX ORDER — MEPERIDINE HYDROCHLORIDE 25 MG/ML
12.5 INJECTION INTRAMUSCULAR; INTRAVENOUS; SUBCUTANEOUS EVERY 5 MIN PRN
Status: DISCONTINUED | OUTPATIENT
Start: 2021-01-26 | End: 2021-01-26

## 2021-01-26 RX ORDER — LIDOCAINE 40 MG/G
CREAM TOPICAL
Status: DISCONTINUED | OUTPATIENT
Start: 2021-01-26 | End: 2021-01-26 | Stop reason: HOSPADM

## 2021-01-26 RX ORDER — NALOXONE HYDROCHLORIDE 0.4 MG/ML
0.2 INJECTION, SOLUTION INTRAMUSCULAR; INTRAVENOUS; SUBCUTANEOUS
Status: ACTIVE | OUTPATIENT
Start: 2021-01-26 | End: 2021-01-27

## 2021-01-26 RX ORDER — LABETALOL 20 MG/4 ML (5 MG/ML) INTRAVENOUS SYRINGE
10
Status: DISCONTINUED | OUTPATIENT
Start: 2021-01-26 | End: 2021-01-27 | Stop reason: HOSPADM

## 2021-01-26 RX ORDER — HYDRALAZINE HYDROCHLORIDE 20 MG/ML
10 INJECTION INTRAMUSCULAR; INTRAVENOUS
Status: DISCONTINUED | OUTPATIENT
Start: 2021-01-26 | End: 2021-01-27 | Stop reason: HOSPADM

## 2021-01-26 RX ORDER — AMLODIPINE BESYLATE 5 MG/1
5 TABLET ORAL DAILY
Status: DISCONTINUED | OUTPATIENT
Start: 2021-01-27 | End: 2021-01-27 | Stop reason: HOSPADM

## 2021-01-26 RX ORDER — PROTAMINE SULFATE 10 MG/ML
INJECTION, SOLUTION INTRAVENOUS PRN
Status: DISCONTINUED | OUTPATIENT
Start: 2021-01-26 | End: 2021-01-26

## 2021-01-26 RX ORDER — SODIUM CHLORIDE, SODIUM LACTATE, POTASSIUM CHLORIDE, CALCIUM CHLORIDE 600; 310; 30; 20 MG/100ML; MG/100ML; MG/100ML; MG/100ML
INJECTION, SOLUTION INTRAVENOUS CONTINUOUS
Status: DISCONTINUED | OUTPATIENT
Start: 2021-01-26 | End: 2021-01-27 | Stop reason: HOSPADM

## 2021-01-26 RX ORDER — ASPIRIN 81 MG/1
81 TABLET ORAL DAILY
Status: DISCONTINUED | OUTPATIENT
Start: 2021-01-26 | End: 2021-01-26 | Stop reason: HOSPADM

## 2021-01-26 RX ORDER — SODIUM CHLORIDE 9 MG/ML
INJECTION, SOLUTION INTRAVENOUS CONTINUOUS
Status: DISCONTINUED | OUTPATIENT
Start: 2021-01-26 | End: 2021-01-26 | Stop reason: HOSPADM

## 2021-01-26 RX ORDER — CEFAZOLIN SODIUM 2 G/100ML
2 INJECTION, SOLUTION INTRAVENOUS
Status: COMPLETED | OUTPATIENT
Start: 2021-01-26 | End: 2021-01-26

## 2021-01-26 RX ORDER — SODIUM CHLORIDE, SODIUM LACTATE, POTASSIUM CHLORIDE, CALCIUM CHLORIDE 600; 310; 30; 20 MG/100ML; MG/100ML; MG/100ML; MG/100ML
INJECTION, SOLUTION INTRAVENOUS CONTINUOUS PRN
Status: DISCONTINUED | OUTPATIENT
Start: 2021-01-26 | End: 2021-01-26

## 2021-01-26 RX ORDER — NALOXONE HYDROCHLORIDE 0.4 MG/ML
0.4 INJECTION, SOLUTION INTRAMUSCULAR; INTRAVENOUS; SUBCUTANEOUS
Status: ACTIVE | OUTPATIENT
Start: 2021-01-26 | End: 2021-01-27

## 2021-01-26 RX ORDER — HYDROMORPHONE HYDROCHLORIDE 1 MG/ML
.3-.5 INJECTION, SOLUTION INTRAMUSCULAR; INTRAVENOUS; SUBCUTANEOUS EVERY 5 MIN PRN
Status: DISCONTINUED | OUTPATIENT
Start: 2021-01-26 | End: 2021-01-27 | Stop reason: HOSPADM

## 2021-01-26 RX ORDER — HEPARIN SODIUM 1000 [USP'U]/ML
INJECTION, SOLUTION INTRAVENOUS; SUBCUTANEOUS PRN
Status: DISCONTINUED | OUTPATIENT
Start: 2021-01-26 | End: 2021-01-26

## 2021-01-26 RX ORDER — GABAPENTIN 600 MG/1
600 TABLET ORAL 2 TIMES DAILY
Status: DISCONTINUED | OUTPATIENT
Start: 2021-01-26 | End: 2021-01-27 | Stop reason: HOSPADM

## 2021-01-26 RX ORDER — ALBUTEROL SULFATE 90 UG/1
2 AEROSOL, METERED RESPIRATORY (INHALATION) EVERY 4 HOURS PRN
Status: DISCONTINUED | OUTPATIENT
Start: 2021-01-26 | End: 2021-01-26

## 2021-01-26 RX ORDER — ACETAMINOPHEN 325 MG/1
325-650 TABLET ORAL EVERY 4 HOURS PRN
Status: DISCONTINUED | OUTPATIENT
Start: 2021-01-26 | End: 2021-01-27 | Stop reason: HOSPADM

## 2021-01-26 RX ORDER — ONDANSETRON 2 MG/ML
4 INJECTION INTRAMUSCULAR; INTRAVENOUS EVERY 30 MIN PRN
Status: DISCONTINUED | OUTPATIENT
Start: 2021-01-26 | End: 2021-01-27 | Stop reason: HOSPADM

## 2021-01-26 RX ORDER — EPHEDRINE SULFATE 50 MG/ML
INJECTION, SOLUTION INTRAMUSCULAR; INTRAVENOUS; SUBCUTANEOUS PRN
Status: DISCONTINUED | OUTPATIENT
Start: 2021-01-26 | End: 2021-01-26

## 2021-01-26 RX ORDER — ATORVASTATIN CALCIUM 20 MG/1
20 TABLET, FILM COATED ORAL AT BEDTIME
Status: DISCONTINUED | OUTPATIENT
Start: 2021-01-26 | End: 2021-01-27 | Stop reason: HOSPADM

## 2021-01-26 RX ORDER — NITROGLYCERIN 0.4 MG/1
0.4 TABLET SUBLINGUAL EVERY 5 MIN PRN
Status: DISCONTINUED | OUTPATIENT
Start: 2021-01-26 | End: 2021-01-27 | Stop reason: HOSPADM

## 2021-01-26 RX ORDER — CALCIUM CARBONATE 500 MG/1
500 TABLET, CHEWABLE ORAL DAILY PRN
Status: DISCONTINUED | OUTPATIENT
Start: 2021-01-26 | End: 2021-01-27 | Stop reason: HOSPADM

## 2021-01-26 RX ORDER — ALBUTEROL SULFATE 90 UG/1
2 AEROSOL, METERED RESPIRATORY (INHALATION) EVERY 4 HOURS PRN
Status: DISCONTINUED | OUTPATIENT
Start: 2021-01-26 | End: 2021-01-27 | Stop reason: HOSPADM

## 2021-01-26 RX ORDER — ASPIRIN 81 MG/1
81 TABLET ORAL DAILY
Status: DISCONTINUED | OUTPATIENT
Start: 2021-01-27 | End: 2021-01-27 | Stop reason: HOSPADM

## 2021-01-26 RX ORDER — SODIUM CHLORIDE 9 MG/ML
INJECTION, SOLUTION INTRAVENOUS CONTINUOUS
Status: ACTIVE | OUTPATIENT
Start: 2021-01-26 | End: 2021-01-26

## 2021-01-26 RX ORDER — ONDANSETRON 4 MG/1
4 TABLET, ORALLY DISINTEGRATING ORAL EVERY 30 MIN PRN
Status: DISCONTINUED | OUTPATIENT
Start: 2021-01-26 | End: 2021-01-27 | Stop reason: HOSPADM

## 2021-01-26 RX ORDER — FENTANYL CITRATE 50 UG/ML
25-50 INJECTION, SOLUTION INTRAMUSCULAR; INTRAVENOUS
Status: DISCONTINUED | OUTPATIENT
Start: 2021-01-26 | End: 2021-01-27 | Stop reason: HOSPADM

## 2021-01-26 RX ADMIN — PROTAMINE SULFATE 100 MG: 10 INJECTION, SOLUTION INTRAVENOUS at 11:41

## 2021-01-26 RX ADMIN — ATORVASTATIN CALCIUM 20 MG: 20 TABLET, FILM COATED ORAL at 21:05

## 2021-01-26 RX ADMIN — GABAPENTIN 600 MG: 600 TABLET, FILM COATED ORAL at 21:05

## 2021-01-26 RX ADMIN — SODIUM CHLORIDE: 9 INJECTION, SOLUTION INTRAVENOUS at 08:52

## 2021-01-26 RX ADMIN — PHENYLEPHRINE HYDROCHLORIDE 0.25 MCG/KG/MIN: 10 INJECTION INTRAVENOUS at 11:19

## 2021-01-26 RX ADMIN — Medication 5 MG: at 11:20

## 2021-01-26 RX ADMIN — ASPIRIN 81 MG: 81 TABLET, DELAYED RELEASE ORAL at 08:31

## 2021-01-26 RX ADMIN — DEXMEDETOMIDINE HYDROCHLORIDE 0.7 MCG/KG/HR: 100 INJECTION, SOLUTION INTRAVENOUS at 10:35

## 2021-01-26 RX ADMIN — SODIUM CHLORIDE, SODIUM LACTATE, POTASSIUM CHLORIDE, CALCIUM CHLORIDE: 600; 310; 30; 20 INJECTION, SOLUTION INTRAVENOUS at 10:45

## 2021-01-26 RX ADMIN — HYDRALAZINE HYDROCHLORIDE 10 MG: 20 INJECTION INTRAMUSCULAR; INTRAVENOUS at 17:39

## 2021-01-26 RX ADMIN — HEPARIN SODIUM 12000 UNITS: 1000 INJECTION INTRAVENOUS; SUBCUTANEOUS at 11:14

## 2021-01-26 RX ADMIN — CEFAZOLIN SODIUM 2 G: 2 INJECTION, SOLUTION INTRAVENOUS at 10:34

## 2021-01-26 RX ADMIN — HYDRALAZINE HYDROCHLORIDE 10 MG: 20 INJECTION INTRAMUSCULAR; INTRAVENOUS at 17:08

## 2021-01-26 ASSESSMENT — MIFFLIN-ST. JEOR: SCORE: 1398.86

## 2021-01-26 NOTE — PLAN OF CARE
Pt here with TAVR. A&Ox4. Neuros - baseline garbled speech, L hemiparesis, & mild L facial droop. R radial & bilateral groin sites CDI, CMS intact, no hematoma or bruit. VSS/HTN>140 - PRN IV hydralazine given x2. Tele SB, 1st deg AVB. Heart healthy diet. Bedrest this shift, discontinued at 1700. Denies pain. Pt scoring green on the Aggression Stop Light Tool. Plan for cardiac rehab tomorrow. Discharge home pending.

## 2021-01-26 NOTE — OR NURSING
1310hr - [facility] updated on Pts stable status.  Okay for Pt to transfer out of PACU per [facility].

## 2021-01-26 NOTE — ANESTHESIA POSTPROCEDURE EVALUATION
Patient: Henrry Fonseca    Procedure(s):  Transcatheter Aortic Valve Replacement    Diagnosis:Severe symptomatic aortic stenosis  Diagnosis Additional Information: No value filed.    Anesthesia Type:  MAC    Note:  Disposition: Inpatient   Postop Pain Control: Uneventful            Sign Out: Well controlled pain   PONV: No   Neuro/Psych: Uneventful            Sign Out: Acceptable/Baseline neuro status   Airway/Respiratory: Uneventful            Sign Out: Acceptable/Baseline resp. status   CV/Hemodynamics: Uneventful            Sign Out: Acceptable CV status   Other NRE: NONE   DID A NON-ROUTINE EVENT OCCUR? No         Last vitals:  Vitals:    01/26/21 1430 01/26/21 1500 01/26/21 1600   BP: 134/77 135/73 (!) 144/78   Pulse: 53 (!) 49 (!) 46   Resp: 12 13 11   Temp:   36.3  C (97.3  F)   SpO2: 97% 96% 98%       Electronically Signed By: Zach Esparza DO  January 26, 2021  4:39 PM

## 2021-01-26 NOTE — ANESTHESIA PREPROCEDURE EVALUATION
Anesthesia Pre-Procedure Evaluation    Patient: Henrry Fonseca   MRN: [id number] : 1935        Preoperative Diagnosis: valuvlar disease   Procedure : Procedure(s):  Transcatheter Aortic Valve Replacement     Past Medical History:   Diagnosis Date     Asbestosis(501)     ct chest     Cardiomegaly     echo  stress echo  normal     Closed fracture of unspecified part of humerus      Diverticulosis of colon (without mention of hemorrhage)      Esophageal reflux      Other specified anemias      Other specified iron deficiency anemias       Past Surgical History:   Procedure Laterality Date     COLONOSCOPY  04     Repeat in 10 years.     CV HEART CATHETERIZATION WITH POSSIBLE INTERVENTION N/A 2021    Procedure: Heart Catheterization with Possible Intervention;  Surgeon: Dmitriy Frost MD;  Location:  HEART CARDIAC CATH LAB     CV RIGHT HEART CATH MEASUREMENTS RECORDED N/A 2021    Procedure: Right Heart Cath;  Surgeon: Dmitriy Frost MD;  Location:  HEART CARDIAC CATH LAB     PHACOEMULSIFICATION WITH STANDARD INTRAOCULAR LENS IMPLANT  2011    Procedure:PHACOEMULSIFICATION WITH STANDARD INTRAOCULAR LENS IMPLANT; Surgeon:MJ CHOI; Location:[facility] OR     PHACOEMULSIFICATION WITH STANDARD INTRAOCULAR LENS IMPLANT  2011    Procedure:PHACOEMULSIFICATION WITH STANDARD INTRAOCULAR LENS IMPLANT; Surgeon:MJ CHOI; Location:[facility] OR     SURGICAL HISTORY OF -       vasectomy     SURGICAL HISTORY OF -       appy      Allergies   Allergen Reactions     Keppra Fatigue     Crying very depressed      Social History     Tobacco Use     Smoking status: Never Smoker     Smokeless tobacco: Never Used   Substance Use Topics     Alcohol use: Yes     Comment: rarely      Wt Readings from Last 1 Encounters:   21 77.1 kg (170 lb)        Anesthesia Evaluation   Pt has had prior anesthetic.         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     Cardiovascular:     (+) Dyslipidemia hypertension-----valvular problems/murmurs type: AS     METS/Exercise Tolerance:     Hematologic:       Musculoskeletal:   (+) arthritis,     GI/Hepatic:     (+) GERD,     Renal/Genitourinary:     (+) BPH,     Endo:       Psychiatric/Substance Use:       Infectious Disease:       Malignancy:       Other:      (+) , H/O Chronic Pain,other significant disability Wheelchair bound,          Physical Exam    Airway             Respiratory Devices and Support         Dental           Cardiovascular           (+) murmur       Pulmonary                   OUTSIDE LABS:  CBC:   Lab Results   Component Value Date    WBC 4.8 01/13/2021    WBC 5.6 08/28/2014    HGB 11.7 (L) 01/13/2021    HGB 9.9 (L) 08/28/2014    HCT 34.9 (L) 01/13/2021    HCT 30.0 (L) 08/28/2014     01/13/2021     08/28/2014     BMP:   Lab Results   Component Value Date     01/13/2021     10/15/2019    POTASSIUM 3.9 01/13/2021    POTASSIUM 4.3 10/15/2019    CHLORIDE 109 01/13/2021    CHLORIDE 106 10/15/2019    CO2 26 01/13/2021    CO2 25 10/15/2019    BUN 20 01/13/2021    BUN 23 10/15/2019    CR 0.96 01/13/2021    CR 1.14 10/15/2019    GLC 93 01/13/2021     (H) 10/15/2019     COAGS:   Lab Results   Component Value Date    PTT 34 01/13/2021    INR 1.06 01/13/2021     POC: No results found for: BGM, HCG, HCGS  HEPATIC:   Lab Results   Component Value Date    ALBUMIN 4.6 (H) 11/09/2007    PROTTOTAL 7.8 11/09/2007    ALT 12 11/09/2007    AST 31 11/09/2007    ALKPHOS 68 11/09/2007    BILITOTAL 0.2 11/09/2007     OTHER:   Lab Results   Component Value Date    PH 7.36 01/13/2021    A1C 5.9 04/27/2010    SUNDAY 8.7 01/13/2021    TSH 1.85 10/07/2010    CRP <2.9 08/28/2014    SED 35 (H) 08/28/2014       Anesthesia Plan     History & Physical Review      ASA Status:  4. NPO Status:  NPO  Appropriate. .  Plan for MAC Reason for MAC:  Chronic cardiopulmonary disease (G9) and Deep or markedly invasive procedure (G8).         Drips/Meds.  Drips/Meds: Dexmedetomidine gtt.       Consents  Anesthesia Plan(s) and associated risks, benefits, and realistic alternatives discussed.    Questions answered and patient/representative(s) expressed understanding.    Discussed with:  Patient.           Use of blood products discussed: Yes.   Discussed with: Patient.  Consented to blood products.      Postoperative Care  Postoperative pain management: Multi-modal analgesia.           Zach Esparza DO

## 2021-01-26 NOTE — ANESTHESIA CARE TRANSFER NOTE
Patient: Henrry Fonseca    Procedure(s):  Transcatheter Aortic Valve Replacement    Diagnosis: Severe symptomatic aortic stenosis  Diagnosis Additional Information: No value filed.    Anesthesia Type:   MAC     Note:    Oropharynx: oropharynx clear of all foreign objects  Level of Consciousness: awake  Oxygen Supplementation: face mask  Level of Supplemental Oxygen: 4L  Independent Airway: airway patency satisfactory and stable  Dentition: dentition unchanged  Vital Signs Stable: post-procedure vital signs reviewed and stable  Report to RN Given: handoff report given  Patient transferred to: PACU    Handoff Report: Identifed the Patient, Identified the Reponsible Provider, Reviewed the pertinent medical history, Discussed the surgical course, Reviewed Intra-OP anesthesia mangement and issues during anesthesia, Set expectations for post-procedure period and Allowed opportunity for questions and acknowledgement of understanding      Vitals: (Last set prior to Anesthesia Care Transfer)  CRNA VITALS  1/26/2021 1133 - 1/26/2021 1210      1/26/2021             Resp Rate (set):  10        Electronically Signed By: PIPO Olivares CRNA  January 26, 2021  12:10 PM

## 2021-01-26 NOTE — OR NURSING
1215hr - MD Esparza rounding on Pt, Pt is stable; VS & groin puncture sites remain stable. Close monitoring continues.  1230hr - MD Colbert & CV Fellow rounded on pt; pt sleeping on/off; rouses easily to voice and was interactive w/MD.  VS & bilat groin sites remain stable.

## 2021-01-26 NOTE — Clinical Note
Pt with baseline deficit of weakness to left arm and left leg.   No ability to use left arm.  Weak grasp of fingers.  Pt uses walker to ambulate and also uses a wheel chair at times.

## 2021-01-26 NOTE — Clinical Note
Potential access sites were evaluated for patency using ultrasound.   The left femoral artery was selected. Access was obtained under with Sonosite and Fluoroscopic guidance using a micropuncture 21 guage needle with direct visualization of needle entry.

## 2021-01-26 NOTE — OP NOTE
Procedure Date: 01/26/2021      PREOPERATIVE DIAGNOSES:   1.  Symptomatic severe aortic valve stenosis.   2.  Hypertension.   3.  Hyperlipidemia.   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   5.  Seizure disorder.   6.  Recent hemorrhagic cerebrovascular accident with residual deficit.       POSTOPERATIVE DIAGNOSES:  1.  Symptomatic severe aortic valve stenosis.   2.  Hypertension.   3.  Hyperlipidemia.   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   5.  Seizure disorder.   6.  Recent hemorrhagic cerebrovascular accident with residual deficit.        OPERATION:  Right transfemoral implantation of a 29 mm Goldstein Vamshi 3 bioprosthesis.      INTERVENTIONAL CARDIOLOGIST:  Que Colbert MD, Reji Reilly MD      STRUCTURAL HEART FELLOW:  Chris Malloy MD       CARDIAC SURGEON:  Benton Castro MD     ANESTHESIA:  Monitored anesthesia care with local.        INDICATIONS FOR PROCEDURE:  The patient is an 85-year-old gentleman with known aortic stenosis that has been followed.  He was initially being evaluated for TAVR last year; however, he presented with hypertensive emergency and cerebrovascular accident which was managed last summer.  This delayed his intervention, somewhat; however, he has re-presented as he is somewhat recovered, although he does have residual deficits and otherwise still meets criteria.  Given his age and comorbid conditions and recent stroke, he was deemed to be at increased risk for surgical valve replacement and has decided to proceed with TAVR.      DESCRIPTION OF PROCEDURE:  The patient was brought to the Cath Lab and placed supine on the table.  Appropriate monitoring lines were placed and monitored anesthesia care was delivered.  The patient was then sterilely prepped and draped in the usual fashion and timeout was performed to confirm patient identity, procedure to be performed and administration of preoperative antibiotics.      Combination of ultrasound and fluoroscopy was  utilized to obtain bilateral common femoral arterial access and left common femoral venous access.  This was exchanged for sheath access.  Temporary pacemaker was advanced in the right ventricle.  The pigtail catheter was advanced from left-sided arterial access into the aortic root.  The large bore sheath was then placed on the right over a stiff wire.  The patient was systemically heparinized.      Right radial access was obtained and exchanged for a sheath.  The sentinel device was advanced from this position with deployment of the proximal filter in the innominate artery and the distal filter in the left common carotid.      The native valve was then crossed with an AL1 catheter and a straight wire.  This was exchanged for a J wire followed by a pigtail followed by a Safari.  The valve delivery system was advanced over the wire into position within the aortic root.  Under rapid pacing, the valve was deployed with excellent fluoroscopic result.  Upon cessation of pacing, the patient's native rhythm and hemodynamics recovered promptly.      The valve system was recaptured and withdrawn.  Root aortography and echocardiography confirmed excellent valve position without any perivalvular insufficiency.      The sentinel device was recaptured and removed.  Protamine was administered to reverse the patient's heparinization.  The left-sided pigtail was withdrawn to the level of the iliac bifurcation.  The temporary pacemaker was removed.  The large bore sheath was removed over an access wire and the existing Perclose were tightened.  There appeared to be hemostasis and the wire was removed.  The Perclose were fully deployed and cut and manual pressure maintained at that site.      Completion iliofemoral angiography revealed no extravasation or stenosis.  The left-sided arterial access was managed with Angio-Seal.  The left-sided venous access was managed with manual pressure.  A TR band was placed at the right radial  access site.      At the end of the procedure, all counts were correct.  The patient was taken to recovery in stable condition.         WILDA SILVA MD             D: 2021   T: 2021   MT: LANE      Name:     RYAN CHU   MRN:      [id number]        Account:        [id number]   :      1935           Procedure Date: 2021      Document: [id number]

## 2021-01-26 NOTE — Clinical Note
Potential access sites were evaluated for patency using ultrasound.   The left femoral vein was selected. Access was obtained under with Sonosite and Fluoroscopic guidance using a micropuncture 21 guage needle with direct visualization of needle entry.

## 2021-01-26 NOTE — PROGRESS NOTES
CVTS Staff Note    85 year old male here today for TAVR.    Seen by Dr Eller on 1/13 after his heart cath, who felt TAVR should be the preferred approach.    His history is notable for CVA last summer with residual hemiparesis. He also has reported restrictive lung disease. His ABG from 1/13 reveals room air hypoxia with pO2 in the 60s. His pre op frailty assessment was positive on 4/5 measures.    I had a lengthy discussion with the patient regarding the very low incidence of need for surgical bailout, and the attendant mortality of at least 60-70% in the setting of emergency surgery. I explained the morbidity among survivors to include, but not be limited to, bleeding, prolonged recovery, dialysis, stroke, organ failure, prolonged ventilation, need for long term airway and/or enteral access, and loss of independent living situation.    He expressed understanding of our discussion, and states that he DOES NOT desire emergency open heart surgery in the event of procedural complication.    I did discuss potential percutaneous intervention if needed, or pericardial drainage, both of which I think are reasonable, and he agrees.    All of his questions were answered. Will proceed with TAVR today.      Benton Castro MD

## 2021-01-26 NOTE — H&P
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     Asymptomatic COVID-19 screen: Preop swab Negative 1/23/21    Severe aortic stenosis status post TAVR 1/26/2021  Known severe aortic stenosis and was undergoing TAVR work-up when he suffered a hemorrhagic stroke last summer therefore TAVR placed on hold.   No acute postoperative complications s/p TAVR. EKG SB with 1' AVB, TWI lead III, aVF biphasic new since prior EKG. Last ECHO without significant changes, LVEF 60-65%, and severe bicuspid aortic valve stenosis with trace regurgitation. Mild dilated root 4cm. Mild cLVH.  -Admit to heart center  -Telemetry  -Cardiology consultation  -Repeat EKGs per cardiology  -TTE on POD #1  -Aspirin as monotherapy given history of hemorrhagic stroke    Mild coronary artery disease  Hypertension  Preoperative coronary angiogram 1/13/21 showed mild coronary artery disease. Following with cardiology.   -Continue PTA amlodipine 5mg/d, metoprolol tartrate 12.5mg BID with hold parameters  -Continue PTA atorvastatin 20mg Q HS    History of hemorrhagic stroke with left-sided hemiparesis 6/2020  At that time presented with sudden onset of left-sided weakness and dysarthria, significantly hypertensive.  CT demonstrated acute intraparenchymal hemorrhage on the right thalamus consistent with hypertensive hemorrhage with small vessel volume loss and extension.  Midline shift.  With plan for TAVR neurology was contacted who recommended to preoperative head CT which was completed on/22/21.  This is reviewed by neurology  who felt the patient could proceed with TAVR with heparin and antiplatelets, this changes on imaging show residual from head bleed but no signs of current bleed.  - Baseline Lt sided weakness and full/garbled speech, no swallowing difficulties, has worked with SLP in the past for speech only  - No DAPT as above  - F/u with stroke neurology as recommended    Chronic stable diagnoses: Appropriate medications to be reordered  History of seizure disorder  Hyperlipidemia  Diverticulosis  Gastroesophageal reflux disease  Iron deficiency anemia  Uncomplicated asthma  Restrictive lung disease: Thought to be due to asbestosis.    Diet: NPO for Medical/Clinical Reasons Except for: Other; Specify: NPO for 6 hours then release Advance Diet as Tolerated AFTER patient passes Dysphagia Screen performed by the nurse.  Advance Diet as Tolerated: Regular Diet Adult    DVT Prophylaxis: Defer to primary service  Panda Catheter: not present  Code Status: No CPR- Do NOT Intubate  - confirmed with patient on admission, RN at bedside     Disposition Plan   Expected discharge: Defer to primary service, recommended to prior living arrangement once cleared by cardiology.  Entered: Michelle Tran PA-C 01/26/2021, 3:36 PM     The patient's care was discussed with the Attending Physician, Dr. Joyce, Bedside Nurse and Patient.    Michelle Zuleta)RIMA  Hospitalist CHERIE  [facility]    ______________________________________________________________________    Chief Complaint   Aortic stenosis    History is obtained from the patient, electronic health record and Cardiologist.    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  No postoperative complications.  Preoperative TAVR work-up noted.  Patient started on monotherapy with aspirin due to that recent hemorrhagic stroke.  He did have a preop head CT which is reviewed by neurology and cleared to proceed with surgery, heparin, and antiplatelets.  TAVR team arranging repeat EKGs and TTE for a.m.    During my visit the patient is feeling well. Baseline neuro deficits are stable. No CP, palpitations, SOB, lightheadedness or dizziness. No BLE edema.    Review of Systems    The 10 point Review of Systems is negative other than noted in the HPI or here.     Past Medical History    I have reviewed this patient's medical history and updated it with pertinent information if needed.   Past Medical History:   Diagnosis Date     Asbestosis(501)     ct chest8/04     Cardiomegaly     echo8/04  stress echo 2000 normal     Cerebral infarction (H)     june 2020     Closed fracture of unspecified part of humerus      Diverticulosis of colon (without mention of hemorrhage)      Esophageal reflux      Other specified anemias      Other specified iron deficiency anemias      Uncomplicated asthma        Past Surgical History   I have reviewed this patient's surgical history and updated it with pertinent information if needed.  Past Surgical History:   Procedure Laterality Date     COLONOSCOPY  8/18/04     Repeat in 10 years.     CV HEART CATHETERIZATION WITH POSSIBLE INTERVENTION N/A 1/13/2021    Procedure: Heart Catheterization with Possible Intervention;  Surgeon: Dmitriy Frost MD;  Location:  HEART CARDIAC CATH LAB     CV RIGHT HEART CATH MEASUREMENTS RECORDED N/A 1/13/2021    Procedure: Right Heart Cath;  Surgeon: Dmitriy Frost MD;  Location:  HEART CARDIAC CATH LAB     ORTHOPEDIC SURGERY      right Fibula repair     PHACOEMULSIFICATION WITH STANDARD INTRAOCULAR LENS IMPLANT  4/7/2011    Procedure:PHACOEMULSIFICATION WITH STANDARD INTRAOCULAR LENS IMPLANT;  Surgeon:MJ CHOI; Location:[place] OR     PHACOEMULSIFICATION WITH STANDARD INTRAOCULAR LENS IMPLANT  5/9/2011    Procedure:PHACOEMULSIFICATION WITH STANDARD INTRAOCULAR LENS IMPLANT; Surgeon:MJ CHOI; Location:[place] OR     SURGICAL HISTORY OF -       vasectomy     SURGICAL HISTORY OF -       appy       Social History   I have reviewed this patient's social history and updated it with pertinent information if needed.  Social History     Tobacco Use     Smoking status: Never Smoker     Smokeless tobacco: Never Used   Substance Use Topics     Alcohol use: Yes     Comment: very rare     Drug use: No       Family History   I have reviewed this patient's family history and updated it with pertinent information if needed.   Family History   Problem Relation Age of Onset     Cancer Father         lung     Cancer Mother         pancreatic     Alcohol/Drug Son      Alcohol/Drug Son      Cancer Sister         1/2 sister lung cancer       Prior to Admission Medications   Prior to Admission Medications   Prescriptions Last Dose Informant Patient Reported? Taking?   albuterol (PROAIR HFA/PROVENTIL HFA/VENTOLIN HFA) 108 (90 Base) MCG/ACT inhaler More than a month at PRN Self No No   Sig: Inhale 2 puffs into the lungs every 4 hours as needed for shortness of breath / dyspnea   amLODIPine (NORVASC) 5 MG tablet 1/26/2021 at 0530 Self No Yes   Sig: Take 1 tablet (5 mg) by mouth daily   atorvastatin (LIPITOR) 20 MG tablet 1/25/2021 at 2000 Self No Yes   Sig: Take 1 tablet (20 mg) by mouth At Bedtime   calcium carbonate (TUMS) 500 MG chewable tablet More than a month at PRN Self Yes No   Sig: Take 1 chew tab by mouth daily as needed for heartburn   gabapentin (NEURONTIN) 600 MG tablet 1/26/2021 at 0530 Self No Yes   Sig: Take 1 tablet (600 mg) by mouth 2 times daily   metoprolol tartrate (LOPRESSOR) 25 MG tablet 1/26/2021 at 0530 Self No Yes   Sig: TAKE ONE-HALF TABLET BY MOUTH TWICE A DAY   Patient taking differently: Take  12.5 mg by mouth 2 times daily    omeprazole (PRILOSEC) 20 MG DR capsule 1/26/2021 at 0530 Self No Yes   Sig: TAKE ONE CAPSULE BY MOUTH ONCE DAILY   Patient taking differently: Take 20 mg by mouth every morning       Facility-Administered Medications: None     Allergies   Allergies   Allergen Reactions     Keppra Fatigue     Crying very depressed       Physical Exam   Vital Signs: Temp: 97.3  F (36.3  C) Temp src: Temporal BP: 135/73 Pulse: (!) 49   Resp: 13 SpO2: 96 % O2 Device: None (Room air) Oxygen Delivery: 2 LPM  Weight: 170 lbs 0 oz    Henrry Fonseca was evaluated during a global COVID-19 pandemic, which necessitated consideration that the patient might be at risk for infection with the SARS-CoV-2 virus that causes COVID-19. Applicable protocols for evaluation were followed during the patient's care. COVID-19 was considered as part of the patient's evaluation.     Physical Exam  Vitals signs reviewed.   Constitutional:       Appearance: Normal appearance. He is well-developed.   Eyes:      Extraocular Movements: Extraocular movements intact.   Cardiovascular:      Rate and Rhythm: Normal rate and regular rhythm.      Pulses: Normal pulses.      Heart sounds: Normal heart sounds. No murmur.   Pulmonary:      Effort: Pulmonary effort is normal. No respiratory distress.      Breath sounds: Normal breath sounds. No wheezing, rhonchi or rales.   Abdominal:      General: Bowel sounds are normal. There is no distension.      Palpations: Abdomen is soft.      Tenderness: There is no abdominal tenderness. There is no guarding.      Hernia: No hernia is present.   Musculoskeletal:      Comments: Lt sided weakness 3/5, RUE/RLE 5/5   Skin:     General: Skin is warm and dry.   Neurological:      Mental Status: He is alert. Mental status is at baseline.      Cranial Nerves: Dysarthria present.      Motor: Weakness present.      Comments: Left sided weakness.  Baseline garbled/full speech.   Psychiatric:         Mood and  Affect: Mood normal.         Behavior: Behavior normal. Behavior is cooperative.       Data   Data reviewed today: I reviewed all medications, new labs and imaging results over the last 24 hours. I personally reviewed the EKG tracing showing see HPI.    Recent Labs   Lab 01/26/21  0825   WBC 5.4   HGB 11.7*   MCV 87      INR 0.97      POTASSIUM 4.1   CHLORIDE 109   CO2 25   BUN 19   CR 0.89   ANIONGAP 5   SUNDAY 8.7   *     Recent Results (from the past 24 hour(s))   Cardiac Catheterization    Narrative    Successful transfemoral transcatheter aortic valve replacement with a 29mm   Goldstein Vamshi 3 valve.  Left heart catheterization with LVEDP of 19 mmHg.

## 2021-01-26 NOTE — PLAN OF CARE
Pt arrived to CCU post op TAVR. Pt VSS on RA. Tele SB w/ first degree with AVB. A&Ox4, neurological deficits include left sided weakness in both upper and lower extremity and mild left facial droop, all of which are baseline for pt. Voice is garbled, but speech clear. Pt on bedrest until 5pm, voiding using urinal. Bilat groin sites CDI, no bruit, CMS intact. TR band removed from right wrist, CDI, CMS intact. Denies pain. Plan to continue CMS checks, site checks and neuro checks. To have cardiac rehab and repeat ECHO tomorrow. Continue to monitor.

## 2021-01-27 ENCOUNTER — APPOINTMENT (OUTPATIENT)
Dept: OCCUPATIONAL THERAPY | Facility: CLINIC | Age: 86
DRG: 267 | End: 2021-01-27
Attending: STUDENT IN AN ORGANIZED HEALTH CARE EDUCATION/TRAINING PROGRAM
Payer: MEDICARE

## 2021-01-27 ENCOUNTER — APPOINTMENT (OUTPATIENT)
Dept: CARDIOLOGY | Facility: CLINIC | Age: 86
DRG: 267 | End: 2021-01-27
Attending: PHYSICIAN ASSISTANT
Payer: MEDICARE

## 2021-01-27 ENCOUNTER — APPOINTMENT (OUTPATIENT)
Dept: CARDIOLOGY | Facility: CLINIC | Age: 86
DRG: 267 | End: 2021-01-27
Attending: STUDENT IN AN ORGANIZED HEALTH CARE EDUCATION/TRAINING PROGRAM
Payer: MEDICARE

## 2021-01-27 VITALS
BODY MASS INDEX: 25.71 KG/M2 | WEIGHT: 160 LBS | SYSTOLIC BLOOD PRESSURE: 104 MMHG | HEART RATE: 72 BPM | DIASTOLIC BLOOD PRESSURE: 60 MMHG | OXYGEN SATURATION: 95 % | TEMPERATURE: 98.5 F | RESPIRATION RATE: 16 BRPM | HEIGHT: 66 IN

## 2021-01-27 LAB
ANION GAP SERPL CALCULATED.3IONS-SCNC: 7 MMOL/L (ref 3–14)
BUN SERPL-MCNC: 22 MG/DL (ref 7–30)
CALCIUM SERPL-MCNC: 8.2 MG/DL (ref 8.5–10.1)
CHLORIDE SERPL-SCNC: 110 MMOL/L (ref 94–109)
CO2 SERPL-SCNC: 22 MMOL/L (ref 20–32)
CREAT SERPL-MCNC: 0.91 MG/DL (ref 0.66–1.25)
ERYTHROCYTE [DISTWIDTH] IN BLOOD BY AUTOMATED COUNT: 13.6 % (ref 10–15)
GFR SERPL CREATININE-BSD FRML MDRD: 76 ML/MIN/{1.73_M2}
GLUCOSE BLDC GLUCOMTR-MCNC: 83 MG/DL (ref 70–99)
GLUCOSE SERPL-MCNC: 98 MG/DL (ref 70–99)
HCT VFR BLD AUTO: 34.7 % (ref 40–53)
HGB BLD-MCNC: 11.5 G/DL (ref 13.3–17.7)
MAGNESIUM SERPL-MCNC: 2.3 MG/DL (ref 1.6–2.3)
MCH RBC QN AUTO: 28.5 PG (ref 26.5–33)
MCHC RBC AUTO-ENTMCNC: 33.1 G/DL (ref 31.5–36.5)
MCV RBC AUTO: 86 FL (ref 78–100)
PHOSPHATE SERPL-MCNC: 3.3 MG/DL (ref 2.5–4.5)
PLATELET # BLD AUTO: 194 10E9/L (ref 150–450)
POTASSIUM SERPL-SCNC: 4.1 MMOL/L (ref 3.4–5.3)
RBC # BLD AUTO: 4.03 10E12/L (ref 4.4–5.9)
SODIUM SERPL-SCNC: 139 MMOL/L (ref 133–144)
WBC # BLD AUTO: 7.3 10E9/L (ref 4–11)

## 2021-01-27 PROCEDURE — 99239 HOSP IP/OBS DSCHRG MGMT >30: CPT | Performed by: INTERNAL MEDICINE

## 2021-01-27 PROCEDURE — 250N000013 HC RX MED GY IP 250 OP 250 PS 637: Performed by: STUDENT IN AN ORGANIZED HEALTH CARE EDUCATION/TRAINING PROGRAM

## 2021-01-27 PROCEDURE — 93306 TTE W/DOPPLER COMPLETE: CPT

## 2021-01-27 PROCEDURE — 93005 ELECTROCARDIOGRAM TRACING: CPT

## 2021-01-27 PROCEDURE — 250N000013 HC RX MED GY IP 250 OP 250 PS 637: Performed by: PHYSICIAN ASSISTANT

## 2021-01-27 PROCEDURE — 97165 OT EVAL LOW COMPLEX 30 MIN: CPT | Mod: GO

## 2021-01-27 PROCEDURE — 97530 THERAPEUTIC ACTIVITIES: CPT | Mod: GO

## 2021-01-27 PROCEDURE — 93010 ELECTROCARDIOGRAM REPORT: CPT | Performed by: INTERNAL MEDICINE

## 2021-01-27 PROCEDURE — 80048 BASIC METABOLIC PNL TOTAL CA: CPT | Performed by: STUDENT IN AN ORGANIZED HEALTH CARE EDUCATION/TRAINING PROGRAM

## 2021-01-27 PROCEDURE — 93242 EXT ECG>48HR<7D RECORDING: CPT

## 2021-01-27 PROCEDURE — 85027 COMPLETE CBC AUTOMATED: CPT | Performed by: STUDENT IN AN ORGANIZED HEALTH CARE EDUCATION/TRAINING PROGRAM

## 2021-01-27 PROCEDURE — 97535 SELF CARE MNGMENT TRAINING: CPT | Mod: GO

## 2021-01-27 PROCEDURE — 93306 TTE W/DOPPLER COMPLETE: CPT | Mod: 26 | Performed by: INTERNAL MEDICINE

## 2021-01-27 PROCEDURE — 83735 ASSAY OF MAGNESIUM: CPT | Performed by: STUDENT IN AN ORGANIZED HEALTH CARE EDUCATION/TRAINING PROGRAM

## 2021-01-27 PROCEDURE — 84100 ASSAY OF PHOSPHORUS: CPT | Performed by: STUDENT IN AN ORGANIZED HEALTH CARE EDUCATION/TRAINING PROGRAM

## 2021-01-27 PROCEDURE — 36415 COLL VENOUS BLD VENIPUNCTURE: CPT | Performed by: STUDENT IN AN ORGANIZED HEALTH CARE EDUCATION/TRAINING PROGRAM

## 2021-01-27 PROCEDURE — 93244 EXT ECG>48HR<7D REV&INTERPJ: CPT | Performed by: INTERNAL MEDICINE

## 2021-01-27 RX ADMIN — AMLODIPINE BESYLATE 5 MG: 5 TABLET ORAL at 09:33

## 2021-01-27 RX ADMIN — ASPIRIN 81 MG: 81 TABLET, DELAYED RELEASE ORAL at 09:34

## 2021-01-27 RX ADMIN — OMEPRAZOLE 20 MG: 20 CAPSULE, DELAYED RELEASE ORAL at 09:33

## 2021-01-27 RX ADMIN — GABAPENTIN 600 MG: 600 TABLET, FILM COATED ORAL at 09:33

## 2021-01-27 RX ADMIN — METOPROLOL TARTRATE 12.5 MG: 25 TABLET, FILM COATED ORAL at 11:14

## 2021-01-27 ASSESSMENT — MIFFLIN-ST. JEOR: SCORE: 1353.51

## 2021-01-27 NOTE — PLAN OF CARE
No events overnight. Starting POD 1 after TAVR. Groin sites and R radial sites WDL. CMS intact. Neuros at baseline (left hemiparesis, facial droop, slightly garbled speech).    Tele: looks like accelerated junctional rhythm. When p waves are present, has 1st degree AVB. Denies chest pain/pressure, nausea, dyspnea. All VSS on room air. Metoprolol held at HS.     One loose bowel movement. Up to restroom with with SBA and pivot transfer to chair. Bed alarm for safety.

## 2021-01-27 NOTE — DISCHARGE SUMMARY
[facility]    Discharge Summary  Hospitalist    Date of Admission:  1/26/2021  Date of Discharge:  1/27/2021  Discharging Provider: Skye Caruso MD    Discharge Diagnoses   TAVR secondary to severe aortic stenosis    History of Present Illness   Please review admission history and physical.    Hospital Course   Henrry Fonseca was admitted on 1/26/2021.  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 On 1/26/2021 he underwent successful transfemoral transcatheter aortic valve replacement with a 29mm Goldstein Vamshi 3 valve. Left heart catheterization with LVEDP of 19 mmHg. Echo POD 0 showed a mean gradient of 7 mmHg across the AV. No paravalvular leak noted. EF 60-65%.  Medicine service had assumed care following his surgery, patient had an episode of visual disturbance following a dose of IV hydralazine, symptoms lasted for a short period of time and then improved, he was also noted to have a junctional rhythm when he received the medication, his beta-blockers were on hold at the time, his weight was restarted the next day since he had an EKG which showed sinus rhythm with first-degree AV block and LAFB and incomplete RBBB , this EKG is similar to his prior EKGs, plan is to discharge him on 3-day Zio patch.  Patient was seen by cardiology team on the day of discharge and they recommended continuing home PT for cardiac rehab.  Patient did not have any vision issues on the day of discharge.  Plan is to  follow-up with cardiology service upon discharge      Skye Caruso MD    Significant Results and Procedures       Pending Results     Unresulted Labs Ordered in the Past 30 Days of this Admission     No orders found for last 31 day(s).          Code Status   DNR / DNI       Primary Care Physician   Kenean Orourke    Physical Exam   Temp: 98.5  F (36.9  C) Temp src: Oral BP: 104/60 Pulse: 82   Resp: 17 SpO2: 92 % O2 Device: None (Room air) Oxygen Delivery: 2 LPM  Vitals:    01/26/21 0820 01/27/21 0530   Weight: 77.1 kg (170 lb) 72.6 kg (160 lb)     Vital Signs with Ranges  Temp:  [97.3  F (36.3  C)-98.5  F (36.9  C)] 98.5  F (36.9  C)  Pulse:  [] 82  Resp:  [9-20] 17  BP: (104-163)/(60-84) 104/60  SpO2:  [92 %-99 %] 92 %  I/O last 3 completed shifts:  In: 1165.42 [I.V.:1165.42]  Out: 900 [Urine:900]    The patient was examined on the day of discharge.    Discharge Disposition   Discharged to home with home health care  Condition at discharge: Stable    Consultations This Hospital Stay   CARDIAC REHAB IP CONSULT  SOCIAL WORK IP CONSULT  HOSPITALIST IP CONSULT  CARDIOLOGY IP CONSULT    Time Spent on this Encounter   I, Skye Caruso MD, personally saw the patient today and spent greater than 30 minutes discharging this patient.    Discharge Orders      Home Care PT Referral for Hospital Discharge      Reason for your hospital stay    Status post TAVR     Follow-up and recommended labs and tests     Follow up with primary care provider, Keenan Orourke, within 7 days to evaluate medication change and for hospital follow- up.  No follow up labs or test are needed.FOLLOW UP WITH CARDIOLOGY as recommended.     Activity    Your activity upon discharge: activity as tolerated     MD face to face encounter    Documentation of Face to Face and Certification for Home Health Services    I certify that patient: Henrry Fonseca is under my care and that I, or a nurse practitioner or physician's assistant working with me, had  a face-to-face encounter that meets the physician face-to-face encounter requirements with this patient on: 1/27/2021.    This encounter with the patient was in whole, or in part, for the following medical condition, which is the primary reason for home health care: .    I certify that, based on my findings, the following services are medically necessary home health services: Physical Therapy.    My clinical findings support the need for the above services because: Physical Therapy Services are needed to assess and treat the following functional impairments: recent TAVR .    Further, I certify that my clinical findings support that this patient is homebound (i.e. absences from home require considerable and taxing effort and are for medical reasons or [facility] services or infrequently or of short duration when for other reasons) because: Leaving home is medically contraindicated for the following reason(s): he is status post surgery and has shortness of breath  With exertion ..    Based on the above findings. I certify that this patient is confined to the home and needs intermittent skilled nursing care, physical therapy and/or speech therapy.  The patient is under my care, and I have initiated the establishment of the plan of care.  This patient will be followed by a physician who will periodically review the plan of care.  Physician/Provider to provide follow up care: Keenan Orourke    Attending hospital physician (the Medicare certified [place] provider): Skye Caruso MD  Physician Signature: See electronic signature associated with these discharge orders.  Date: 1/27/2021     No CPR- Pre-arrest intubation OK     Diet    Follow this diet upon discharge: Orders Placed This Encounter      Low Saturated Fat Na <2400 mg     Discharge Medications   Current Discharge Medication List      START taking these medications    Details   aspirin (ASA) 81 MG EC tablet Take 1 tablet (81 mg) by mouth daily  Qty: 30 tablet,  Refills: 1    Associated Diagnoses: Aortic valve stenosis, etiology of cardiac valve disease unspecified         CONTINUE these medications which have NOT CHANGED    Details   amLODIPine (NORVASC) 5 MG tablet Take 1 tablet (5 mg) by mouth daily  Qty: 90 tablet, Refills: 3    Associated Diagnoses: Essential hypertension, benign      atorvastatin (LIPITOR) 20 MG tablet Take 1 tablet (20 mg) by mouth At Bedtime  Qty: 90 tablet, Refills: 3    Associated Diagnoses: Essential hypertension, benign      gabapentin (NEURONTIN) 600 MG tablet Take 1 tablet (600 mg) by mouth 2 times daily  Qty: 180 tablet, Refills: 3    Associated Diagnoses: Seizure disorder (H)      metoprolol tartrate (LOPRESSOR) 25 MG tablet TAKE ONE-HALF TABLET BY MOUTH TWICE A DAY  Qty: 90 tablet, Refills: 1    Associated Diagnoses: Essential hypertension, benign      omeprazole (PRILOSEC) 20 MG DR capsule TAKE ONE CAPSULE BY MOUTH ONCE DAILY  Qty: 90 capsule, Refills: 1    Associated Diagnoses: Gastroesophageal reflux disease without esophagitis      albuterol (PROAIR HFA/PROVENTIL HFA/VENTOLIN HFA) 108 (90 Base) MCG/ACT inhaler Inhale 2 puffs into the lungs every 4 hours as needed for shortness of breath / dyspnea  Qty: 1 Inhaler, Refills: 11    Comments: Pharmacy may dispense brand covered by insurance (Proair, or proventil or ventolin or generic albuterol inhaler)  Associated Diagnoses: Asthma      calcium carbonate (TUMS) 500 MG chewable tablet Take 1 chew tab by mouth daily as needed for heartburn           Allergies   Allergies   Allergen Reactions     Keppra Fatigue     Crying very depressed     Data   Most Recent 3 CBC's:  Recent Labs   Lab Test 01/27/21  0539 01/26/21  0825 01/13/21  0920   WBC 7.3 5.4 4.8   HGB 11.5* 11.7* 11.7*   MCV 86 87 87    227 239      Most Recent 3 BMP's:  Recent Labs   Lab Test 01/27/21  0539 01/26/21  0825 01/13/21  0920    139 141   POTASSIUM 4.1 4.1 3.9   CHLORIDE 110* 109 109   CO2 22 25 26   BUN 22 19  20   CR 0.91 0.89 0.96   ANIONGAP 7 5 6   SUNDAY 8.2* 8.7 8.7   GLC 98 102* 93     Most Recent 2 LFT's:No lab results found.  Most Recent INR's and Anticoagulation Dosing History:  Anticoagulation Dose History     Recent Dosing and Labs Latest Ref Rng & Units 2021    INR 0.86 - 1.14 1.06 0.97        Most Recent 3 Troponin's:No lab results found.  Most Recent Cholesterol Panel:No lab results found.  Most Recent 6 Bacteria Isolates From Any Culture (See EPIC Reports for Culture Details):  Recent Labs   Lab Test 18  1030   CULT No growth     Most Recent TSH, T4 and A1c Labs:No lab results found.  Results for orders placed or performed during the hospital encounter of 21   Echocardiogram Complete    Narrative    [id number]  [id number]  [id number]  [id number]^DOC^ELEONORA           [facility]  Echocardiography Laboratory  [address] [place] [address]        Name: RYAN CHU  MRN: [id number]  : 1935  Study Date: 2021 09:11 AM  Age: 85 yrs  Gender: Male  Patient Location: [facility]  Reason For Study: Aortic Valve Replacement  History: S/P TAVR  Ordering Physician: ELEONORA ROACH  Referring Physician: RINA GARCIA  Performed By: Cristofer Pablo RDCS     BSA: 1.9 m2  Height: 66 in  Weight: 170 lb  HR: 91  BP: 131/75 mmHg  _____________________________________________________________________________  __        Procedure  Complete Portable Echo Adult.  _____________________________________________________________________________  __        Interpretation Summary     The left ventricle is normal in size.  The visual ejection fraction is estimated at 65-70%.  No regional wall motion abnormalities noted.  There is a bioprosthetic aortic valve (29 mm Goldstein Vamshi 3).  The prosthetic aortic valve is well-seated.  The prosthetic aortic valve appears to open well.  The gradient is normal for this prosthetic aortic valve (8mmHg).  No paravalvular  leak.  _____________________________________________________________________________  __        Left Ventricle  The left ventricle is normal in size. There is normal left ventricular wall  thickness. The visual ejection fraction is estimated at 65-70%. Diastolic  function not assessed due to arrhythmia. No regional wall motion abnormalities  noted.     Right Ventricle  The right ventricle is normal in size and function.     Atria  The left atrium is borderline dilated. Right atrial size is normal. There is  no color Doppler evidence of an atrial shunt.     Mitral Valve  The mitral valve leaflets are mildly thickened. There is trace mitral  regurgitation.        Tricuspid Valve  There is trace tricuspid regurgitation.     Aortic Valve  No aortic regurgitation is present. The mean AoV pressure gradient is 8.2  mmHg. There is a bioprosthetic aortic valve. The prosthetic aortic valve is  well-seated. The prosthetic aortic valve appears to open well. The gradient is  normal for this prosthetic aortic valve.     Pulmonic Valve  There is trace pulmonic valvular regurgitation.     Vessels  Normal size aorta. The aortic root is normal size.     Pericardium  There is no pericardial effusion.        Rhythm  The rhythm was undetermined.  _____________________________________________________________________________  __  MMode/2D Measurements & Calculations  LVOT diam: 2.4 cm  LVOT area: 4.7 cm2           Doppler Measurements & Calculations  MV E max berlin: 116.0 cm/sec  MV P1/2t max berlin: 118.0 cm/sec  MV P1/2t: 42.3 msec  MVA(P1/2t): 5.2 cm2  MV dec slope: 815.9 cm/sec2  MV dec time: 0.14 sec  Ao V2 max: 209.4 cm/sec  Ao max P.0 mmHg  Ao V2 mean: 124.4 cm/sec  Ao mean P.2 mmHg  Ao V2 VTI: 28.5 cm  HALEIGH(I,D): 3.4 cm2  HALEIGH(V,D): 3.0 cm2  LV V1 max P.0 mmHg  LV V1 max: 132.7 cm/sec  LV V1 VTI: 20.6 cm  SV(LVOT): 96.3 ml  SI(LVOT): 51.6 ml/m2  PA V2 max: 126.2 cm/sec  PA max P.4 mmHg  PA mean PG: 3.1 mmHg  PA V2 VTI:  20.4 cm  PI end-d juan: 139.6 cm/sec  AV Juan Ratio (DI): 0.63  HALEIGH Index (cm2/m2): 1.8              _____________________________________________________________________________  __        Report approved by: Liliana Krueger 01/27/2021 11:39 AM      Cardiac Catheterization    Narrative    Successful transfemoral transcatheter aortic valve replacement with a 29mm   Goldstein Vamshi 3 valve.  Left heart catheterization with LVEDP of 19 mmHg.

## 2021-01-27 NOTE — CODE/RAPID RESPONSE
" House NP note    Called re: new visual changes, jags of silver in peripheral vision, intermittent, onset at 1830 after hydralazine admin    S/p tavr today, PMH HTN, HLD prior stroke    [employer] Stroke Scale  Exam Interval: Baseline   Score    Level of consciousness: (0)   Alert, keenly responsive    LOC questions: (0)   Answers both questions correctly    LOC commands: (0)   Performs both tasks correctly    Best gaze: (0)   Normal    Visual: (0)   No visual loss intermittent \"silver jags\"    Facial palsy: (1)   Minor paralysis (flat nasolabial fold, smile asymmetry), preexisting    Motor arm (left): (0)   No drift    Motor arm (right): (2)   Some effort against gravity preexisting    Motor leg (left): (0)   No drift    Motor leg (right): (0)   No drift    Limb ataxia: (0)   Absent, only tested in RUE    Sensory: (0)   Normal- no sensory loss    Best language: (0)   Normal- no aphasia    Dysarthria: (1)   Mild to moderate dysarthria as per pt    Extinction and inattention: (0)   No abnormality        Total Score:  4 but several residual deficits     Defer further mgmt to [facility] [employer] [facility] NP [place]   No charge note    Herminia Olivares CNP  [facility] [place] [facility] [place] [facility]  [phone]     Text Page         "

## 2021-01-27 NOTE — CONSULTS
Care Management Initial Consult    General Information  Assessment completed with: Patient, Patient  Type of CM/SW Visit: Initial Assessment    Primary Care Provider verified and updated as needed:     Readmission within the last 30 days:           Advance Care Planning: Advance Care Planning Reviewed: no concerns identified          Communication Assessment  Patient's communication style: spoken language (English or Bilingual)    Hearing Difficulty or Deaf: no   Wear Glasses or Blind: yes    Cognitive  Cognitive/Neuro/Behavioral: WDL  Level of Consciousness: alert  Arousal Level: opens eyes spontaneously  Orientation: oriented x 4  Mood/Behavior: calm, cooperative  Best Language: 0 - No aphasia  Speech: garbled, logical(per pt baseline )    Living Environment:   People in home: spouse  (Ayah)  Current living Arrangements: house      Able to return to prior arrangements:         Family/Social Support:  Care provided by: self, spouse/significant other  Provides care for: no one  Marital Status:   Wife  (Norwood)       Description of Support System: Supportive, Involved    Support Assessment: Adequate family and caregiver support    Current Resources:   Skilled Home Care Services:    Community Resources: None  Equipment currently used at home: walker, rolling, wheelchair, manual  Supplies currently used at home: None    Employment/Financial:  Employment Status: disabled        Financial Concerns: No concerns identified           Lifestyle & Psychosocial Needs:        Socioeconomic History     Marital status:      Spouse name: Not on file     Number of children: Not on file     Years of education: Not on file     Highest education level: Not on file     Tobacco Use     Smoking status: Never Smoker     Smokeless tobacco: Never Used   Substance and Sexual Activity     Alcohol use: Yes     Comment: very rare     Drug use: No     Sexual activity: Yes     Partners: Female       Functional Status:  Prior to  admission patient needed assistance:              Mental Health Status:          Chemical Dependency Status:                Values/Beliefs:  Spiritual, Cultural Beliefs, Anabaptist Practices, Values that affect care: no               Additional Information:  Per social work consult for discharge planning.  Patient was admitted on 1-26-21 for an elective TAVR.  The tentative date of discharge is 1-27-21.  Reviewed chart.  Patient lives with his wife in a house.  Once he has entered he can have main floor living.  Patient has a standard walker and a manual wheelchair at baseline depending on the distance.  Patient has grab bars, a raised toilet seat, and a shower chair in the bathroom.  Patient's wife assists with self cares, dressing, toileting, and bathing.  Reviewed the therapy discharge recommendations of homecare on discharge and patient is in agreement.  Offered a choice of agencies.  Chose [facility].  Email referral sent and process explained.  Patient informed of the plan and in agreement to the plan.            Care Management Discharge Note    Discharge Date: 01/27/21       Discharge Disposition: Home Care    Discharge Services:  Home RN/PT    Discharge DME:  N/A    Discharge Transportation: family or friend will provide    Private pay costs discussed: Not applicable    PAS Confirmation Code:  N/A  Patient/family educated on Medicare website which has current facility and service quality ratings:  no    Education Provided on the Discharge Plan:  yes  Persons Notified of Discharge Plans: patient  Patient/Family in Agreement with the Plan: yes    Handoff Referral Completed: Yes    Additional Information:  Received discharge orders for patient.  Patient will discharge to home with [facility] for RN/PT.  Patient informed of the pan and in agreement to the plan.      MELANY Haider, [facility]  Lead   [phone]  [facility]

## 2021-01-27 NOTE — PLAN OF CARE
Post TAVR. A&O x4. VSS on RA. Cardiac diet. BM during shift. CMS intact. Neuros intact ex. Baseline L sided deficits from stroke (6/2020) including facial droop, slight garbled speech, and Upper and lower extremity weakness. L/R groin sites & R Radial site WDL. Tele: NS w/ 1st degree AV block. Cardiac rehab saw pt today.     Pt discharging home today w/ wife via private transportation. Discharge teaching complete w/ wife at bedside- verbalized understanding of instructions. Scheduled for Home PT after discharge. Heart monitor in place.

## 2021-01-27 NOTE — PROVIDER NOTIFICATION
SBP elevated to 160's, PRN hydralazine given 2 times and BP now 120/70. Neuro changes with bright silver jagged lines. RRT called. BG 83.  Questions if patient was in an accelerated junctional rhythm. EKG completed. Dr Lovelace notified of rhythm change- SR with 2 degree AVB type one.( Pt was SB at 1500 when we took over cares with him) He will review the EKGs and call the unit back. Don't give evening metoprolol until Dr. Lovelace calls back. Will continue to monitor.     Addendum: 1925 Dr. Lovelace called back, he believes the pt is in an accelerated junctional rhythm. Hold HS metoprolol and avoid giving labetalol PRN for BP ( pt has Hydralazine available). Call with any additional concerns

## 2021-01-27 NOTE — PROGRESS NOTES
MD covering note    I was called by the nurse to report that patient had a change in his rhythm after receiving some hydralazine for elevated blood pressure 160 systolic.  RRT was called for some vision complaints.  Patient has prior history of left hemiparesis.  His visual complaints resolved.  However, EKG revealed intermittent sinus beats with what seems like accelerated junctional rhythm.  The computer called EKG showing Mobitz type I AV block but I am not convinced.  His blood pressure is stable in this rhythm.  He is now asymptomatic.  I have asked the nurse to hold his metoprolol tonight and if this rhythm continues, he can have EP service evaluate him tomorrow.  I have asked him to call me if there is worsening bradycardia or tachyarrhythmia or any other hemodynamic instability.

## 2021-01-27 NOTE — PROGRESS NOTES
"   01/27/21 1010   Quick Adds   Type of Visit Initial Occupational Therapy Evaluation   Living Environment   People in home spouse   Current Living Arrangements house   Home Accessibility wheelchair accessible;no concerns   Transportation Anticipated family or friend will provide   Living Environment Comments All needs one floor, very slight elevation at threshhold to enter which wc can manage    Self-Care   Usual Activity Tolerance fair   Current Activity Tolerance fair   Regular Exercise Other (see comments)   Equipment Currently Used at Home grab bar, toilet;grab bar, tub/shower;raised toilet seat;shower chair;walker, standard;wheelchair, manual   Activity/Exercise/Self-Care Comment Pt reports he does SROM to LUE and BLE ex's from wc level. Spouse provides assist with self-cares including dressing, toileting, bathing. He frequently is able to ambulate from wc into small bathroom to toilet and he stands for grooming tasks but spouse places wc behind him should he need to sit as fatigues quickly especially if shaving.    Disability/Function   Hearing Difficulty or Deaf no   Wear Glasses or Blind yes   Vision Management glasses   Walking or Climbing Stairs Difficulty yes   Walking or Climbing Stairs ambulation difficulty, requires equipment;transferring difficulty, requires equipment  (mostly wc dependent)   Dressing/Bathing Difficulty yes   Dressing/Bathing bathing difficulty, assistance 1 person   Dressing/Bathing Management Spouse A, sometimes \"bed bath\" but other times sits on chair in wheel-in shower   Toileting issues yes   Toileting Management spouse A when needed with standing/clothing mgmt and/or hygiene   Toileting Assistance toileting difficulty, assistance 1 person   Doing Errands Independently Difficulty (such as shopping) yes   Errands Management family assist (6 sons)   Fall history within last six months no   Change in Functional Status Since Onset of Current Illness/Injury no   General Information " "  Onset of Illness/Injury or Date of Surgery 01/26/21   Referring Physician Dr. Malloy   Patient/Family Therapy Goal Statement (OT) return home w/family A   Additional Occupational Profile Info/Pertinent History of Current Problem 84yo male s/p elective TAVE due to severe sympotomatic aortic stensosis (POD#1). PMH includes R CVA June 2020 with residual L hemiparesis, dysarthria..Also seizure do, HLP, GERT, restrictive lung ds d/t asbestos   Existing Precautions/Restrictions cardiac;fall   Cognitive Status Examination   Orientation Status orientation to person, place and time   Affect/Mental Status (Cognitive) WNL   Follows Commands WNL   Visual Perception   Visual Impairment/Limitations WNL;corrective lenses full-time   Sensory   Sensory Comments Pt reports baseline RLE numbness and tingling but \"has learned to adapt\" to it   Pain Assessment   Patient Currently in Pain No   Integumentary/Edema   Integumentary/Edema Comments L hand edema   Range of Motion Comprehensive   Comment, General Range of Motion RUE WNL; LUE impaired throughout, uses compensatory scapular strength in attempt to lift L arm achieving approx 10% flex only. L elb 20o.   Muscle Tone Assessment   Muscle Tone Comments Lelb, forearm tightness, minimal extension in L hand - mild-mod spasticity   Coordination   Coordination Comments dominant unaffected R hand function WFL. L hand unable to use as assist   Bed Mobility   Comment (Bed Mobility) SBA bed mob   Transfers   Transfer Comments WC transfers including bed<>wc, wc<>toilet SBA (pivot transfers on RLE)   Instrumental Activities of Daily Living (IADL)   IADL Comments Spouse does all HH tasks, sons help with heavier tasks, do driving/transport   Clinical Impression   Criteria for Skilled Therapeutic Interventions Met (OT) yes   OT Diagnosis decreased ADL performance   OT Problem List-Impairments impacting ADL activity tolerance impaired   Assessment of Occupational Performance 1-3 Performance " Deficits   Identified Performance Deficits grooming/hygiene, toileting, dressing   Planned Therapy Interventions (OT) ADL retraining;strengthening;home program guidelines;progressive activity/exercise;risk factor education   Clinical Decision Making Complexity (OT) low complexity   Therapy Frequency (OT) 2x/day   Predicted Duration of Therapy 2 days   Risks and Benefits of Treatment have been explained. Yes   Patient, Family & other staff in agreement with plan of care Yes   OT Discharge Planning    OT Discharge Recommendation (DC Rec) Home with assist  (Home with  OT and PT )   OT Rationale for DC Rec Spouse able to assist with basic self-cares. Pt from  therapies for 1)modified cardiac rehab activity for monitored progressive exercise, 2) general strengthening and management of developing spasticity in LUE affecting functional performance of basic self-cares and exercise activities. Neither patient nor spouse drive and there is a significant taxing effort to leave house requiring A of 1 with wc mgmt among other challenges.    OT Brief overview of current status  Today performed bed mob SBA, bed<>wc SBA, wc<>toilet SBA. No significant change in cardiac function during activity. See VS flowsheet.    Total Evaluation Time (Minutes)   Total Evaluation Time (Minutes) 15

## 2021-01-27 NOTE — PLAN OF CARE
OT/cardiac rehab: attempted to work with patient in pm and he was sleeping and wanting to rest up in prep for discharge. OT/CR to sign off in lieu of planned hospital discharge.

## 2021-01-27 NOTE — CODE/RAPID RESPONSE
"BRIEF HOUSE OFFICER NOTE:    I resumed care of patient from my colleague, Herminia Olivares CNP. Please see her documentation of NIHSS. Upon further review, the patient did have some dysarthria following his previous stroke. The patient reports his speech is relatively at his baseline but his voice is deeper. The patient received MAC for the TAVR and was intubated. The patient reports his visual changes of see \"jagged lines\" has completely resolved. He denies any blurry or double vision. He is able to read without difficulty.   Continue to monitor, defer further evaluation at this time. If any new neurologic symptoms arise please call an RRT.    PIPO Sher CNP  Text Page   "

## 2021-01-27 NOTE — PROGRESS NOTES
"[facility]- TAVR Progress Note   Primary Cardiologist: Dr Colbert  Date of Service: 01/27/21       Interval History:   Feels well, no complaints/concerns. No further neuro concerns overnight or this AM.      Last evening pt's BP was elevated so hydralazine given. Shortly after he developed some EKG changes on tele, an EKG was done and was reviewed with Dr Lovelace felt to be \"intermittent sinus beats with what seems like accelerated junctional rhythm\". Metoprolol was put on hold. He also had some new visual changes, \"jags of silver\" in peripheral vision. Symptoms resolved, the hospitalist house officer the patient evaluated no further work ordered.     Tele: SR/ST with first degree AVB and borderline RBBB. ? intermittent episodes SR/ST with p wave buried due to prolonged first degree AVB vs accelerated junctional.  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 On 1/26/2021 he underwent successful transfemoral transcatheter aortic valve replacement with a 29mm Goldstein Vamshi 3 valve. Left heart catheterization with LVEDP of 19 mmHg. Echo POD 0 showed a mean gradient of 7 mmHg across the AV. No paravalvular leak noted. EF 60-65%.    1) S/p TAVR (POD 1) secondary to severe aortic stenosis  - ECG this AM shows SR with first degree AVB and LAFB and incomplete RBBB (these are not new findings). Will resume BB and he should go home with a 3 day ZioPatch monitor.   - Echocardiogram this AM pending  - Access Sites: CDI, no concerns, no bruits or hematomas.   - " Neuro check baseline left sided deficits, no new   - Antiplatelet therapy: ASA 81mg alone due to recent hemorrhagic CVA  - CBC and BMP stable  - Cardiac rehab/PT/OT-pt does not have transportation for outpt cardiac rehab. Please consider ordering home PT for cardiac therapy.  - Lifelong antibiotic SBE prophylaxis prior to all dental procedures     2.  Hemorrhagic R thalamic CVA in 6/2020 secondary to uncontrolled hypertension with residual deficits  3.  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  Hx of seizure disorder  5.  Hyperlipidemia  6.  Hypertension  7.  Mild non-occlusive CAD    Claudine Reilly PA-C   --------------------------------------------------------------------------------------------------------    Physical Exam   Temp: 97.3  F (36.3  C) Temp src: Oral BP: 108/68 Pulse: 85   Resp: 16 SpO2: 96 % O2 Device: None (Room air) Oxygen Delivery: 2 LPM  Vitals:    01/26/21 0820 01/27/21 0530   Weight: 77.1 kg (170 lb) 72.6 kg (160 lb)     Vital Signs with Ranges  Temp:  [97.2  F (36.2  C)-98.1  F (36.7  C)] 97.3  F (36.3  C)  Pulse:  [] 85  Resp:  [9-20] 16  BP: (100-163)/(64-84) 108/68  SpO2:  [94 %-99 %] 96 %  I/O last 3 completed shifts:  In: 1165.42 [I.V.:1165.42]  Out: 900 [Urine:900]    Constitutional: awake, alert, cooperative, no apparent distress, and appears stated age  Respiratory: No increased work of breathing, good air exchange, clear to auscultation bilaterally, no crackles or wheezing  Cardiovascular: Regular rate and rhythm, and no murmur noted  Extremities: L sided weakness. No femoral bruits or hematomas. Good pedal pulses bilaterally.     Medications     lactated ringers Stopped (01/26/21 2230)       amLODIPine  5 mg Oral Daily     aspirin  81 mg Oral Daily     atorvastatin  20 mg Oral At Bedtime     gabapentin  600 mg Oral BID     metoprolol tartrate  12.5 mg Oral BID     omeprazole  20 mg Oral QAM       Data   Results for orders placed or performed  during the hospital encounter of 01/26/21 (from the past 24 hour(s))   ABO/Rh type and screen   Result Value Ref Range    Units Ordered 2     ABO A     RH(D) Pos     Antibody Screen Neg     Test Valid Only At [facility]        Specimen Expires 01/29/2021     Crossmatch Red Blood Cells    Basic metabolic panel   Result Value Ref Range    Sodium 139 133 - 144 mmol/L    Potassium 4.1 3.4 - 5.3 mmol/L    Chloride 109 94 - 109 mmol/L    Carbon Dioxide 25 20 - 32 mmol/L    Anion Gap 5 3 - 14 mmol/L    Glucose 102 (H) 70 - 99 mg/dL    Urea Nitrogen 19 7 - 30 mg/dL    Creatinine 0.89 0.66 - 1.25 mg/dL    GFR Estimate 77 >60 mL/min/[1.73_m2]    GFR Estimate If Black 90 >60 mL/min/[1.73_m2]    Calcium 8.7 8.5 - 10.1 mg/dL   CBC with platelets   Result Value Ref Range    WBC 5.4 4.0 - 11.0 10e9/L    RBC Count 4.07 (L) 4.4 - 5.9 10e12/L    Hemoglobin 11.7 (L) 13.3 - 17.7 g/dL    Hematocrit 35.4 (L) 40.0 - 53.0 %    MCV 87 78 - 100 fl    MCH 28.7 26.5 - 33.0 pg    MCHC 33.1 31.5 - 36.5 g/dL    RDW 13.4 10.0 - 15.0 %    Platelet Count 227 150 - 450 10e9/L   INR   Result Value Ref Range    INR 0.97 0.86 - 1.14   Blood component   Result Value Ref Range    Unit Number [id number]     Blood Component Type Red Blood Cells Leukocyte Reduced     Division Number 00     Status of Unit No longer available 01/26/2021 1222     Blood Product Code H5416G44     Unit Status RET    Blood component   Result Value Ref Range    Unit Number [id number]     Blood Component Type Red Blood Cells LeukoReduced (Part 2)     Division Number 00     Status of Unit No longer available 01/26/2021 1221     Blood Product Code I3314T67     Unit Status RET    Cardiac Catheterization    Narrative    Successful transfemoral transcatheter aortic valve replacement with a 29mm   Goldstein Vamshi 3 valve.  Left heart catheterization with LVEDP of 19 mmHg.   EKG 12-lead, tracing only   Result Value Ref Range    Interpretation ECG Click View Image link  to view waveform and result    EKG 12-lead, tracing only   Result Value Ref Range    Interpretation ECG Click View Image link to view waveform and result    CBC with platelets   Result Value Ref Range    WBC 7.3 4.0 - 11.0 10e9/L    RBC Count 4.03 (L) 4.4 - 5.9 10e12/L    Hemoglobin 11.5 (L) 13.3 - 17.7 g/dL    Hematocrit 34.7 (L) 40.0 - 53.0 %    MCV 86 78 - 100 fl    MCH 28.5 26.5 - 33.0 pg    MCHC 33.1 31.5 - 36.5 g/dL    RDW 13.6 10.0 - 15.0 %    Platelet Count 194 150 - 450 10e9/L   Basic metabolic panel   Result Value Ref Range    Sodium 139 133 - 144 mmol/L    Potassium 4.1 3.4 - 5.3 mmol/L    Chloride 110 (H) 94 - 109 mmol/L    Carbon Dioxide 22 20 - 32 mmol/L    Anion Gap 7 3 - 14 mmol/L    Glucose 98 70 - 99 mg/dL    Urea Nitrogen 22 7 - 30 mg/dL    Creatinine 0.91 0.66 - 1.25 mg/dL    GFR Estimate 76 >60 mL/min/[1.73_m2]    GFR Estimate If Black 88 >60 mL/min/[1.73_m2]    Calcium 8.2 (L) 8.5 - 10.1 mg/dL   Magnesium   Result Value Ref Range    Magnesium 2.3 1.6 - 2.3 mg/dL   Phosphorus   Result Value Ref Range    Phosphorus 3.3 2.5 - 4.5 mg/dL   EKG 12-lead, tracing only   Result Value Ref Range    Interpretation ECG Click View Image link to view waveform and result

## 2021-01-27 NOTE — DISCHARGE INSTRUCTIONS
TAVR Discharge Instructions  You have just had your aortic valve replaced with a new biological tissue valve. You should feel better after your surgery, but complete recovery may take several weeks. Please follow these instructions carefully and please call the [facility] ([phone]) with any questions or concerns.      AFTER YOU GO HOME:    Drink extra fluids for 2 days.    You may resume your normal diet.    Do not smoke.    Do not drink alcohol.    Relax and take it easy.    Do not make any important or legal decisions.    FOR 1 WEEK AFTER TAVR:    Do not drive or operate machines at home or at work. No driving until you return for your 1 week follow-up appointment. You and the provider will discuss this at the appointment.     Refrain from sexual intercourse for 1 week.    You may shower the day after your procedure. Do NOT take a bath, or use a hot tub or pool for at least 1 week. Do NOT scrub the site. Do not use lotion or powder near the puncture site.    Do not lift more than 10 pounds.     Do not do any heavy activity such as exercise, lifting, or straining.  No housework, yard work, or any activities that make you sweat.    CARE OF WRIST AND GROIN SITES:    For 2 days, when you cough, sneeze, laugh or move your bowels, hold your hand over the puncture site and press firmly on/above the site.    Remove the bandage after 24 hours. If there is minor oozing, apply another bandage and remove it after 12 hours.    It is normal to have bruising or pea size lump at the site.    If you have a wrist site puncture: Do not use your hand or arm to support your weight (such as rising from a chair)or bend your wrist (such as lifting a garage door) for 1 week.    No stooping or squatting for 1 week.     BLEEDING:  If you start bleeding from the site in your wrist:    Sit down and press firmly on/above the site with your fingers for 10 minutes.     Once bleeding stops, keep your arm still  for 2 hours.     Call [facility] ([phone]) as soon as you can.  If you start bleeding from the site in your groin:    Lie down flat and press firmly on/above the site for 10 minutes.    Once bleeding stops lay flat for 2 hours.     Call [facility] ([phone]) as soon as you can.  Call 911 right away if you have heavy bleeding or bleeding that does not stop.    MEDICINES:    You will likely be started on an anti-platelet medication, such as Plavix. Please call the [facility] with any questions regarding this medication.    If you have stopped any medicines, check with your provider about when to restart them.    You will require lifetime prophylactic antibiotics for dental cleanings and dental work after your TAVR, please inquire with your provider prior to your scheduled cleanings.     FOLLOW-UP APPOINTMENTS:    Follow-up with a Structural Heart Nurse Practitioner at [facility] in 7-10 days after your procedure.    You will also follow-up at [facility] 1 month after your procedure which will include a visit with a nurse practitioner, an echocardiogram (Echo), and electrocardiogram (ECG), and lab work. We will also want to see you back in the clinic 1 year after your procedure.    Cardiac Rehab will contact you for follow up care. You can enroll at a site convenient to you.     CALL THE CLINIC IF:     You have increased pain or a large or growing hard lump around the site.    The site is red, swollen, hot, or tender.    Blood or fluid is draining from the site.    You have chills or a fever greater than 101 F (38 C).    Your arm or leg feels numb, cool, or changes color.    You have hives, a rash, or unusual itching.    New pain in the back or belly that you cannot control with Tylenol.    Any questions or concerns.    OTHER INSTRUCTIONS:    You will receive a card with your new valve information in the  mail, directly from the .     CONTACT INFORMATION:   [facility]:  [phone] (7 days a week)  Valve Coordinators (Virgen RN, Cristina RN, and Jennifer RN) can be reached at:  [phone]            Patient will discharge to home with [facility] for PT.  [facility] will contact patient directly to arrange for the first visit.  [facility]'s phone number is [phone].

## 2021-01-28 ENCOUNTER — TELEPHONE (OUTPATIENT)
Dept: FAMILY MEDICINE | Facility: CLINIC | Age: 86
End: 2021-01-28

## 2021-01-28 LAB
INTERPRETATION ECG - MUSE: NORMAL
INTERPRETATION ECG - MUSE: NORMAL

## 2021-01-28 NOTE — TELEPHONE ENCOUNTER
Pt had Cardiac Surgery,1/25/21 - TAVR.  Discharged from the Hospital on 1/27/21.  Has Cardiology and Lab appt on 2/16/21.  Pt states he is doing well and no questions @ this time.  KpavelARI

## 2021-01-29 LAB
INTERPRETATION ECG - MUSE: NORMAL
INTERPRETATION ECG - MUSE: NORMAL

## 2021-02-02 ENCOUNTER — TELEPHONE (OUTPATIENT)
Dept: FAMILY MEDICINE | Facility: CLINIC | Age: 86
End: 2021-02-02

## 2021-02-02 NOTE — TELEPHONE ENCOUNTER
[facility] now requests orders and shares plan of care/discharge summaries for some patients through [employer].  Please REPLY TO THIS MESSAGE OR ROUTE BACK TO THE AUTHOR in order to give authorization for orders when needed.  This is considered a verbal order, you will still receive a faxed copy of orders for signature.  Thank you for your assistance in improving collaboration for our patients.    ORDER  Requesting home care PT orders 1x1, 2x3 for LE strengthening, transfer and gait trng.    Sumla Holder, PT  [phone]

## 2021-02-03 NOTE — TELEPHONE ENCOUNTER
Home care asking that we send to On Call pool to address in Dr. Orourke's absence - He will not be back in clinic until 2/12

## 2021-02-08 ENCOUNTER — TELEPHONE (OUTPATIENT)
Dept: CARDIOLOGY | Facility: CLINIC | Age: 86
End: 2021-02-08

## 2021-02-08 DIAGNOSIS — I44.1 HEART BLOCK AV SECOND DEGREE: Primary | ICD-10-CM

## 2021-02-10 NOTE — RESULT ENCOUNTER NOTE
SR with 1st degree AVB  with avg HR 70; intermittent 2nd and 3rd degree AVB; single 5 beat run VT, no symptoms reported. See TE 2/8/21. Follow up with Claudine WILSON on 2/18/21.

## 2021-02-15 ENCOUNTER — OFFICE VISIT (OUTPATIENT)
Dept: FAMILY MEDICINE | Facility: CLINIC | Age: 86
End: 2021-02-15
Payer: COMMERCIAL

## 2021-02-15 VITALS
TEMPERATURE: 97.3 F | HEART RATE: 89 BPM | HEIGHT: 66 IN | WEIGHT: 162 LBS | DIASTOLIC BLOOD PRESSURE: 54 MMHG | SYSTOLIC BLOOD PRESSURE: 96 MMHG | RESPIRATION RATE: 18 BRPM | BODY MASS INDEX: 26.03 KG/M2 | OXYGEN SATURATION: 99 %

## 2021-02-15 DIAGNOSIS — I35.0 AORTIC VALVE STENOSIS, ETIOLOGY OF CARDIAC VALVE DISEASE UNSPECIFIED: Primary | ICD-10-CM

## 2021-02-15 PROCEDURE — 99214 OFFICE O/P EST MOD 30 MIN: CPT | Performed by: FAMILY MEDICINE

## 2021-02-15 ASSESSMENT — MIFFLIN-ST. JEOR: SCORE: 1362.58

## 2021-02-15 NOTE — PROGRESS NOTES
Tarun Sales is a 85 year old who presents for the following health issues  accompanied by his self:    HPI         Hospital Follow-up Visit:    Hospital/Nursing Home/IP Rehab Facility: [facility]  Date of Admission: 1-26-21  Date of Discharge: 1-27-21  Reason(s) for Admission: aortic valve stenosis      Was your hospitalization related to COVID-19? No   Problems taking medications regularly:  None  Medication changes since discharge: started  ASA 81 mg daily,   discontinue metoprolol,    Problems adhering to non-medication therapy:  Patient has PT and OT coming to the house     Summary of hospitalization:  [facility] discharge summary reviewed  Diagnostic Tests/Treatments reviewed.  Follow up needed: none  Other Healthcare Providers Involved in Patient s Care:         None  Update since discharge: improved.       Post Discharge Medication Reconciliation: discharge medications reconciled, continue medications without change.  Plan of care communicated with patient                  Review of Systems   Constitutional, HEENT, cardiovascular, pulmonary, GI, , musculoskeletal, neuro, skin, endocrine and psych systems are negative, except as otherwise noted.      Objective    There were no vitals taken for this visit.  There is no height or weight on file to calculate BMI.  Physical Exam   GENERAL: healthy, alert and no distress  EYES: Eyes grossly normal to inspection, PERRL and conjunctivae and sclerae normal  HENT: ear canals and TM's normal, nose and mouth without ulcers or lesions  NECK: no adenopathy, no asymmetry, masses, or scars and thyroid normal to palpation  RESP: lungs clear to auscultation - no rales, rhonchi or wheezes  CV: regular rate and rhythm, normal S1 S2, no S3 or S4, no murmur, click or rub, no peripheral edema and peripheral pulses strong  ABDOMEN: soft, nontender, no hepatosplenomegaly, no masses and bowel sounds normal  MS: no gross musculoskeletal defects  noted, no edema  SKIN: no suspicious lesions or rashes  NEURO: Normal strength and tone, mentation intact and speech normal  PSYCH: mentation appears normal, affect normal/bright        ASSESSMENT:  Aortic valve stenosis, etiology of cardiac valve disease unspecified      PLAN:  He feels he is not quite as improved as he wanted to be after the surgery.  He still having trouble with shortness of breath though he thinks he is better than prior to the valve replacement.  He is following up with cardiology over the next week.

## 2021-02-16 ENCOUNTER — HOSPITAL ENCOUNTER (OUTPATIENT)
Dept: CARDIOLOGY | Facility: CLINIC | Age: 86
Discharge: HOME | End: 2021-02-16
Attending: INTERNAL MEDICINE | Admitting: INTERNAL MEDICINE
Payer: COMMERCIAL

## 2021-02-16 DIAGNOSIS — I35.0 AORTIC VALVE STENOSIS, ETIOLOGY OF CARDIAC VALVE DISEASE UNSPECIFIED: ICD-10-CM

## 2021-02-16 LAB
ANION GAP SERPL CALCULATED.3IONS-SCNC: 6 MMOL/L (ref 3–14)
BUN SERPL-MCNC: 29 MG/DL (ref 7–30)
CALCIUM SERPL-MCNC: 8.3 MG/DL (ref 8.5–10.1)
CHLORIDE SERPL-SCNC: 108 MMOL/L (ref 94–109)
CO2 SERPL-SCNC: 25 MMOL/L (ref 20–32)
CREAT SERPL-MCNC: 1.07 MG/DL (ref 0.66–1.25)
ERYTHROCYTE [DISTWIDTH] IN BLOOD BY AUTOMATED COUNT: 13.5 % (ref 10–15)
GFR SERPL CREATININE-BSD FRML MDRD: 63 ML/MIN/{1.73_M2}
GLUCOSE SERPL-MCNC: 105 MG/DL (ref 70–99)
HCT VFR BLD AUTO: 29.3 % (ref 40–53)
HGB BLD-MCNC: 9.5 G/DL (ref 13.3–17.7)
MCH RBC QN AUTO: 28.9 PG (ref 26.5–33)
MCHC RBC AUTO-ENTMCNC: 32.4 G/DL (ref 31.5–36.5)
MCV RBC AUTO: 89 FL (ref 78–100)
PLATELET # BLD AUTO: 226 10E9/L (ref 150–450)
POTASSIUM SERPL-SCNC: 4 MMOL/L (ref 3.4–5.3)
RBC # BLD AUTO: 3.29 10E12/L (ref 4.4–5.9)
SODIUM SERPL-SCNC: 139 MMOL/L (ref 133–144)
WBC # BLD AUTO: 8.1 10E9/L (ref 4–11)

## 2021-02-16 PROCEDURE — 93308 TTE F-UP OR LMTD: CPT | Mod: 26 | Performed by: INTERNAL MEDICINE

## 2021-02-16 PROCEDURE — 93321 DOPPLER ECHO F-UP/LMTD STD: CPT | Mod: 26 | Performed by: INTERNAL MEDICINE

## 2021-02-16 PROCEDURE — 36415 COLL VENOUS BLD VENIPUNCTURE: CPT | Performed by: INTERNAL MEDICINE

## 2021-02-16 PROCEDURE — 93325 DOPPLER ECHO COLOR FLOW MAPG: CPT

## 2021-02-16 PROCEDURE — 85027 COMPLETE CBC AUTOMATED: CPT | Performed by: INTERNAL MEDICINE

## 2021-02-16 PROCEDURE — 80048 BASIC METABOLIC PNL TOTAL CA: CPT | Performed by: INTERNAL MEDICINE

## 2021-02-16 PROCEDURE — 93325 DOPPLER ECHO COLOR FLOW MAPG: CPT | Mod: 26 | Performed by: INTERNAL MEDICINE

## 2021-02-18 ENCOUNTER — OFFICE VISIT (OUTPATIENT)
Dept: CARDIOLOGY | Facility: CLINIC | Age: 86
End: 2021-02-18
Attending: INTERNAL MEDICINE
Payer: COMMERCIAL

## 2021-02-18 ENCOUNTER — HOSPITAL ENCOUNTER (OUTPATIENT)
Dept: CARDIOLOGY | Facility: CLINIC | Age: 86
Discharge: HOME | End: 2021-02-18
Attending: INTERNAL MEDICINE | Admitting: INTERNAL MEDICINE
Payer: COMMERCIAL

## 2021-02-18 VITALS
WEIGHT: 160 LBS | SYSTOLIC BLOOD PRESSURE: 110 MMHG | OXYGEN SATURATION: 96 % | TEMPERATURE: 97.9 F | DIASTOLIC BLOOD PRESSURE: 60 MMHG | BODY MASS INDEX: 25.82 KG/M2 | HEART RATE: 64 BPM

## 2021-02-18 DIAGNOSIS — I35.0 AORTIC VALVE STENOSIS, ETIOLOGY OF CARDIAC VALVE DISEASE UNSPECIFIED: ICD-10-CM

## 2021-02-18 DIAGNOSIS — I44.1 HEART BLOCK AV SECOND DEGREE: ICD-10-CM

## 2021-02-18 DIAGNOSIS — Z95.2 S/P TAVR (TRANSCATHETER AORTIC VALVE REPLACEMENT): Primary | ICD-10-CM

## 2021-02-18 DIAGNOSIS — D64.9 ACUTE ON CHRONIC ANEMIA: ICD-10-CM

## 2021-02-18 PROCEDURE — 93010 ELECTROCARDIOGRAM REPORT: CPT | Performed by: INTERNAL MEDICINE

## 2021-02-18 PROCEDURE — 93005 ELECTROCARDIOGRAM TRACING: CPT

## 2021-02-18 PROCEDURE — 99215 OFFICE O/P EST HI 40 MIN: CPT | Performed by: PHYSICIAN ASSISTANT

## 2021-02-18 RX ORDER — ACETAMINOPHEN 325 MG/1
325-650 TABLET ORAL EVERY 6 HOURS PRN
COMMUNITY
End: 2023-03-17

## 2021-02-18 ASSESSMENT — PAIN SCALES - GENERAL: PAINLEVEL: MODERATE PAIN (4)

## 2021-02-18 NOTE — PROGRESS NOTES
Service Date: 02/18/2021      HISTORY OF PRESENT ILLNESS:  Mr. Fonseca is a pleasant 85-year-old gentleman who presents to the office today for a 1-month post-TAVR followup.      His pertinent medical history includes hypertension, hyperlipidemia, mild coronary artery disease, restrictive lung disease secondary to asbestos exposure, hemorrhagic right thalamic CVA in 06/2020 secondary to uncontrolled hypertension with residual left-sided deficits and history of severe aortic stenosis.        He underwent elective successful transfemoral transcatheter aortic valve replacement with a 29 mm Goldstein Vamshi 3 valve on 01/26/2021.  LVEDP at the time of the procedure was 19.  His immediate post echo showed a mean gradient of 7 across the valve and no paravalvular leak.  Postop day #1 echo showed an EF of 65%-70%.  Mean gradient was 8 across the aortic valve.  No paravalvular leak.        Postoperatively, he continued to have a first-degree AV block, left anterior fascicular block and an incomplete right bundle branch block.  He did have a few episodes of sinus tachycardia with P wave buried due to first-degree AV block versus an accelerated junctional rhythm.  He was discharged with a 3-day Zio patch monitor.  Aspirin 81 mg daily was used as the lone antiplatelet therapy due to his somewhat recent hemorrhagic CVA.        There is a telephone encounter from 02/08 after the patient's Zio Patch results came in.  Dr. Perez from [facility] reviewed these.  He felt that the patient had second-degree AV block, Mobitz type I.  Dr. Perez recommended Henrry discontinue his metoprolol, then recheck an event monitor in 2 weeks.  This is scheduled to be placed next week.      Henrry thinks overall, he has been doing well since his valve procedure.  He has had a couple of mechanical falls, but denies any lightheadedness, presyncope or syncope.  He denies any new neurologic changes, hematuria or melena.      CURRENT CARDIAC MEDICATIONS:   1.   Amlodipine 5 mg daily.   2.  Aspirin 81 mg daily.   3.  Atorvastatin 20 mg nightly.     Again, his metoprolol was recently discontinued.      PHYSICAL EXAMINATION:   GENERAL:  The patient is a pleasant 85-year-old gentleman.  He is in his wheelchair.   VITAL SIGNS:  Blood pressure is 110/60, pulse 64, weight is reported at 160 pounds.   PULMONARY:  Breathing is nonlabored.  Lungs are clear to auscultation.   CARDIAC:  Regular rate and rhythm.  I do not appreciate any murmur.   EXTREMITIES:  Lower extremities show no evidence of edema.   NEUROLOGIC:  Left-sided weakness, alert and oriented.      DATA REVIEWED:  EKG today shows sinus rhythm.  He again is noted to have a first-degree AV block with a OK interval of approximately 300 milliseconds.  He has a left anterior fascicular block.        His followup echocardiogram from 02/16 shows preserved LV systolic function with an EF of 60%-65%.  He is noted to have Goldstein Vamshi 29 mm valve, which is well seated.  The mean gradient across the valve was 7 mmHg.  No aortic regurgitation was noted.        Basic metabolic panel from the 16th is within normal limits aside from mildly reduced calcium at 8.3.  CBC from the same date shows normal white blood cell count and platelet count.  His hemoglobin is 9.5 (more recently it had been in the mid 11s).      ASSESSMENT AND PLAN:  Again, Henrry is a pleasant 85-year-old gentleman with hypertension, hyperlipidemia, mild coronary artery disease, restrictive lung disease secondary to asbestos exposure, history of hemorrhagic CVA in 06/2020 and severe aortic stenosis, status post TAVR with a 29 mm Goldstein Vamshi 3 valve on 01/26/2021.      Again, overall he has been stable from a cardiac standpoint.  His recent Zio Patch monitor did show type I second-degree AV block and Dr. Perez recommended discontinuing metoprolol and having a followup event monitor, which is planned for next week.  I do note that his hemoglobin has dropped 2 grams  since his procedure.  He is not having any signs or symptoms concerning for bleeding.  I did talk with Dr. Colbert after our office visit and he recommended that we discontinue the aspirin and recheck the patient's hemoglobin in about a month.  I will have one of our TAVR RNs call and delay this message to the patient.      His followup echocardiogram shows normal function of the prosthetic valve.  From a functional status, he is mainly limited by his residual CVA defects, although he does state that he does feel some dyspnea on exertion which he is not sure if improved after the valve was put in.  Overall, I would state that he has New York Heart Association class II symptoms.      We will plan to followup in the Cardiology Clinic in 2021, unless of course, his event monitor results necessitate an earlier followup.      Forty-five minutes spent the date of the encounter with chart review, test review, test interpretation, patient history and examination and documentation.         PATRICK BAUER PA-C             D: 2021   T: 2021   MT: JAYCEE      Name:     RYAN CHU   MRN:      [id number]        Account:      [id number]   :      1935           Service Date: 2021      Document: [id number]

## 2021-02-18 NOTE — LETTER
2/18/2021    Keenan Orourke MD  [address]  [facility] [address]    RE: Henrry LEON Raymundo       Dear Colleague,    I had the pleasure of seeing Henrry Fonseca in the [facility].    Service Date: 02/18/2021      HISTORY OF PRESENT ILLNESS:  Mr. Fonseca is a pleasant 85-year-old gentleman who presents to the office today for a 1-month post-TAVR followup.      His pertinent medical history includes hypertension, hyperlipidemia, mild coronary artery disease, restrictive lung disease secondary to asbestos exposure, hemorrhagic right thalamic CVA in 06/2020 secondary to uncontrolled hypertension with residual left-sided deficits and history of severe aortic stenosis.        He underwent elective successful transfemoral transcatheter aortic valve replacement with a 29 mm Goldstein Vamshi 3 valve on 01/26/2021.  LVEDP at the time of the procedure was 19.  His immediate post echo showed a mean gradient of 7 across the valve and no paravalvular leak.  Postop day #1 echo showed an EF of 65%-70%.  Mean gradient was 8 across the aortic valve.  No paravalvular leak.        Postoperatively, he continued to have a first-degree AV block, left anterior fascicular block and an incomplete right bundle branch block.  He did have a few episodes of sinus tachycardia with P wave buried due to first-degree AV block versus an accelerated junctional rhythm.  He was discharged with a 3-day Zio patch monitor.  Aspirin 81 mg daily was used as the lone antiplatelet therapy due to his somewhat recent hemorrhagic CVA.        There is a telephone encounter from 02/08 after the patient's Zio Patch results came in.  Dr. Perez from [facility] reviewed these.  He felt that the patient had second-degree AV block, Mobitz type I.  Dr. Perez recommended Henrry discontinue his metoprolol, then recheck an event monitor in 2 weeks.  This is scheduled to be placed next week.      Henrry thinks overall, he  has been doing well since his valve procedure.  He has had a couple of mechanical falls, but denies any lightheadedness, presyncope or syncope.  He denies any new neurologic changes, hematuria or melena.      CURRENT CARDIAC MEDICATIONS:   1.  Amlodipine 5 mg daily.   2.  Aspirin 81 mg daily.   3.  Atorvastatin 20 mg nightly.     Again, his metoprolol was recently discontinued.      PHYSICAL EXAMINATION:   GENERAL:  The patient is a pleasant 85-year-old gentleman.  He is in his wheelchair.   VITAL SIGNS:  Blood pressure is 110/60, pulse 64, weight is reported at 160 pounds.   PULMONARY:  Breathing is nonlabored.  Lungs are clear to auscultation.   CARDIAC:  Regular rate and rhythm.  I do not appreciate any murmur.   EXTREMITIES:  Lower extremities show no evidence of edema.   NEUROLOGIC:  Left-sided weakness, alert and oriented.      DATA REVIEWED:  EKG today shows sinus rhythm.  He again is noted to have a first-degree AV block with a AZ interval of approximately 300 milliseconds.  He has a left anterior fascicular block.        His followup echocardiogram from 02/16 shows preserved LV systolic function with an EF of 60%-65%.  He is noted to have Goldstein Vamshi 29 mm valve, which is well seated.  The mean gradient across the valve was 7 mmHg.  No aortic regurgitation was noted.        Basic metabolic panel from the 16th is within normal limits aside from mildly reduced calcium at 8.3.  CBC from the same date shows normal white blood cell count and platelet count.  His hemoglobin is 9.5 (more recently it had been in the mid 11s).      ASSESSMENT AND PLAN:  Again, Henrry is a pleasant 85-year-old gentleman with hypertension, hyperlipidemia, mild coronary artery disease, restrictive lung disease secondary to asbestos exposure, history of hemorrhagic CVA in 06/2020 and severe aortic stenosis, status post TAVR with a 29 mm Goldstein Vamshi 3 valve on 01/26/2021.      Again, overall he has been stable from a cardiac  standpoint.  His recent Zio Patch monitor did show type I second-degree AV block and Dr. Perez recommended discontinuing metoprolol and having a followup event monitor, which is planned for next week.  I do note that his hemoglobin has dropped 2 grams since his procedure.  He is not having any signs or symptoms concerning for bleeding.  I did talk with Dr. Colbert after our office visit and he recommended that we discontinue the aspirin and recheck the patient's hemoglobin in about a month.  I will have one of our TAVR RNs call and delay this message to the patient.      His followup echocardiogram shows normal function of the prosthetic valve.  From a functional status, he is mainly limited by his residual CVA defects, although he does state that he does feel some dyspnea on exertion which he is not sure if improved after the valve was put in.  Overall, I would state that he has New York Heart Association class II symptoms.      We will plan to followup in the Cardiology Clinic in 2021, unless of course, his event monitor results necessitate an earlier followup.      Forty-five minutes spent the date of the encounter with chart review, test review, test interpretation, patient history and examination and documentation.         CLAUDINE BAUER PA-C             D: 2021   T: 2021   MT: JAYCEE      Name:     RYAN CHU   MRN:      [id number]        Account:      [id number]   :      1935           Service Date: 2021      Document: [id number]          Thank you for allowing me to participate in the care of your patient.    Sincerely,     Claudine Bauer PA-C     [facility]

## 2021-02-18 NOTE — PATIENT INSTRUCTIONS
"Thank you for your visit today. If you have questions regarding your visit please contact your cardiology RNs at [phone]. Your provider has recommended the following:  Medication Changes:  No changes right now  Recommendations:  I will talk with Dr Colbert about your hemoglobin and get back to you  Follow-up:  Cardiology follow up in 5 months with Dr Colbert    To schedule a future appointment, we kindly ask that you call cardiology scheduling at [phone] three months prior to requested revisit date.    Reminder:  For your safety, we ask that you bring in your current medication(s) or an updated list of your medications with you to EACH office visit. Include the medication name, dose of pill on bottle and how you are taking it. Include over-the-counter medications or supplements. Your provider will review this at each visit and plan your care based on your current information.   ~~~~~~~~~~~~~~~~~~~~~~~~~~~~~~~~~~~~~~~  \"[facility]\" [place] telephone numbers for reference:  Cardiology Scheduling~[phone]  Cardiology Clinic RN's~[phone]  Diagnostic Imaging Scheduling~[phone]  Lab Scheduling~[phone]  Anticoagulation Clinic~[phone]  Cardiac Rehabilitation~[phone]  CORE Clinic RN's~[phone] (at [facility])  ~~~~~~~~~~~~~~~~~~~~~~~~~~~~~~~~~~~~~~~~      "

## 2021-02-22 ENCOUNTER — HOSPITAL ENCOUNTER (OUTPATIENT)
Dept: CARDIOLOGY | Facility: CLINIC | Age: 86
Discharge: HOME | End: 2021-02-22
Attending: INTERNAL MEDICINE | Admitting: INTERNAL MEDICINE
Payer: COMMERCIAL

## 2021-02-22 DIAGNOSIS — I44.1 HEART BLOCK AV SECOND DEGREE: ICD-10-CM

## 2021-02-22 DIAGNOSIS — J45.909 ASTHMA: ICD-10-CM

## 2021-02-22 PROCEDURE — 93272 ECG/REVIEW INTERPRET ONLY: CPT | Performed by: INTERNAL MEDICINE

## 2021-02-22 PROCEDURE — 93270 REMOTE 30 DAY ECG REV/REPORT: CPT

## 2021-02-22 NOTE — TELEPHONE ENCOUNTER
"Requested Prescriptions   Pending Prescriptions Disp Refills     albuterol (PROAIR HFA/PROVENTIL HFA/VENTOLIN HFA) 108 (90 Base) MCG/ACT inhaler 1 Inhaler 11     Sig: Inhale 2 puffs into the lungs every 4 hours as needed for shortness of breath / dyspnea       Asthma Maintenance Inhalers - Anticholinergics Passed - 2/22/2021  3:05 PM        Passed - Patient is age 12 years or older        Passed - Asthma control assessment score within normal limits in last 6 months     Please review ACT score.           Passed - Medication is active on med list        Passed - Recent (6 mo) or future (30 days) visit within the authorizing provider's specialty     Patient had office visit in the last 6 months or has a visit in the next 30 days with authorizing provider or within the authorizing provider's specialty.  See \"Patient Info\" tab in inbasket, or \"Choose Columns\" in Meds & Orders section of the refill encounter.           Short-Acting Beta Agonist Inhalers Protocol  Passed - 2/22/2021  3:05 PM        Passed - Patient is age 12 or older        Passed - Asthma control assessment score within normal limits in last 6 months     Please review ACT score.           Passed - Medication is active on med list        Passed - Recent (6 mo) or future (30 days) visit within the authorizing provider's specialty     Patient had office visit in the last 6 months or has a visit in the next 30 days with authorizing provider or within the authorizing provider's specialty.  See \"Patient Info\" tab in inbasket, or \"Choose Columns\" in Meds & Orders section of the refill encounter.                 "

## 2021-02-25 ENCOUNTER — DOCUMENTATION ONLY (OUTPATIENT)
Dept: CARDIOLOGY | Facility: CLINIC | Age: 86
End: 2021-02-25

## 2021-02-25 RX ORDER — ALBUTEROL SULFATE 90 UG/1
2 AEROSOL, METERED RESPIRATORY (INHALATION) EVERY 4 HOURS PRN
Qty: 1 INHALER | Refills: 11 | Status: SHIPPED | OUTPATIENT
Start: 2021-02-25 | End: 2022-04-04

## 2021-02-25 NOTE — PROGRESS NOTES
Received an event monitor strip on Henrry, he remains in second degree heart block. I called him and checked to make sure that is still holding his betablocker and yes he is still holding it. He states he feels ok and still does get tired easily. I also sent a message to the schedulers in [place] to set Henrry up for his repeat hemoglobin.Jennifer Garcia RN

## 2021-03-02 ENCOUNTER — DOCUMENTATION ONLY (OUTPATIENT)
Dept: CARDIOLOGY | Facility: CLINIC | Age: 86
End: 2021-03-02

## 2021-03-02 NOTE — PROGRESS NOTES
Received a Biotel showing sinus janice with first degree AV block. I did have Dr. Colbert look over the strip and we will continue to watch. I also placed a call to Henrry and he says he is doing well no lightheadedness or dizziness noted.Jennifer Garcia RN

## 2021-03-04 ENCOUNTER — MEDICAL CORRESPONDENCE (OUTPATIENT)
Dept: HEALTH INFORMATION MANAGEMENT | Facility: CLINIC | Age: 86
End: 2021-03-04

## 2021-03-09 DIAGNOSIS — D64.9 ACUTE ON CHRONIC ANEMIA: ICD-10-CM

## 2021-03-09 LAB — HGB BLD-MCNC: 9.1 G/DL (ref 13.3–17.7)

## 2021-03-09 PROCEDURE — 36415 COLL VENOUS BLD VENIPUNCTURE: CPT | Performed by: PHYSICIAN ASSISTANT

## 2021-03-09 PROCEDURE — 85018 HEMOGLOBIN: CPT | Performed by: PHYSICIAN ASSISTANT

## 2021-03-11 NOTE — RESULT ENCOUNTER NOTE
Disc with patient and asked that he make an appt with Dr Orourke soon. Also routed to Dr Orourke for his information

## 2021-03-12 ENCOUNTER — IMMUNIZATION (OUTPATIENT)
Dept: FAMILY MEDICINE | Facility: CLINIC | Age: 86
End: 2021-03-12
Payer: COMMERCIAL

## 2021-03-12 PROCEDURE — 0011A PR COVID VAC MODERNA 100 MCG/0.5 ML IM: CPT

## 2021-03-12 PROCEDURE — 91301 PR COVID VAC MODERNA 100 MCG/0.5 ML IM: CPT

## 2021-03-15 ENCOUNTER — DOCUMENTATION ONLY (OUTPATIENT)
Dept: CARDIOLOGY | Facility: CLINIC | Age: 86
End: 2021-03-15

## 2021-03-15 NOTE — PROGRESS NOTES
Event monitor strips received.    3/12/21 0931  2*AVB, Type 1 Wenkebach  HR 50    3/13/21  0908 2*AVB, Type 1 Wenkebach HR 60    Will continue to monitor.    Cristina Reynolds RN  [facility]

## 2021-03-22 ENCOUNTER — DOCUMENTATION ONLY (OUTPATIENT)
Dept: CARDIOLOGY | Facility: CLINIC | Age: 86
End: 2021-03-22

## 2021-03-22 NOTE — PROGRESS NOTES
Biotel strip received from 3/18/21 Henrry remains in 2nd degree heart block with out symptoms. Will continue to monitor.Jennifer Garcia RN

## 2021-04-09 ENCOUNTER — IMMUNIZATION (OUTPATIENT)
Dept: FAMILY MEDICINE | Facility: CLINIC | Age: 86
End: 2021-04-09
Attending: FAMILY MEDICINE
Payer: COMMERCIAL

## 2021-04-09 PROCEDURE — 91301 PR COVID VAC MODERNA 100 MCG/0.5 ML IM: CPT

## 2021-04-09 PROCEDURE — 0012A PR COVID VAC MODERNA 100 MCG/0.5 ML IM: CPT

## 2021-04-20 ENCOUNTER — OFFICE VISIT (OUTPATIENT)
Dept: FAMILY MEDICINE | Facility: CLINIC | Age: 86
End: 2021-04-20
Payer: COMMERCIAL

## 2021-04-20 VITALS
HEIGHT: 66 IN | DIASTOLIC BLOOD PRESSURE: 52 MMHG | SYSTOLIC BLOOD PRESSURE: 110 MMHG | HEART RATE: 54 BPM | TEMPERATURE: 96.7 F | OXYGEN SATURATION: 96 % | RESPIRATION RATE: 18 BRPM | BODY MASS INDEX: 25.82 KG/M2

## 2021-04-20 DIAGNOSIS — D64.9 ANEMIA, UNSPECIFIED TYPE: Primary | ICD-10-CM

## 2021-04-20 LAB
ERYTHROCYTE [DISTWIDTH] IN BLOOD BY AUTOMATED COUNT: 15.4 % (ref 10–15)
FERRITIN SERPL-MCNC: 43 NG/ML (ref 26–388)
HCT VFR BLD AUTO: 34.7 % (ref 40–53)
HGB BLD-MCNC: 11.2 G/DL (ref 13.3–17.7)
IRON SATN MFR SERPL: 9 % (ref 15–46)
IRON SERPL-MCNC: 27 UG/DL (ref 35–180)
MCH RBC QN AUTO: 26.9 PG (ref 26.5–33)
MCHC RBC AUTO-ENTMCNC: 32.3 G/DL (ref 31.5–36.5)
MCV RBC AUTO: 83 FL (ref 78–100)
PLATELET # BLD AUTO: 203 10E9/L (ref 150–450)
RBC # BLD AUTO: 4.16 10E12/L (ref 4.4–5.9)
RETICS # AUTO: 34.4 10E9/L (ref 25–95)
RETICS/RBC NFR AUTO: 0.8 % (ref 0.5–2)
TIBC SERPL-MCNC: 305 UG/DL (ref 240–430)
VIT B12 SERPL-MCNC: 354 PG/ML (ref 193–986)
WBC # BLD AUTO: 5.6 10E9/L (ref 4–11)

## 2021-04-20 PROCEDURE — 36415 COLL VENOUS BLD VENIPUNCTURE: CPT | Performed by: FAMILY MEDICINE

## 2021-04-20 PROCEDURE — 82728 ASSAY OF FERRITIN: CPT | Performed by: FAMILY MEDICINE

## 2021-04-20 PROCEDURE — 82747 ASSAY OF FOLIC ACID RBC: CPT | Mod: 90 | Performed by: FAMILY MEDICINE

## 2021-04-20 PROCEDURE — 83540 ASSAY OF IRON: CPT | Performed by: FAMILY MEDICINE

## 2021-04-20 PROCEDURE — 99213 OFFICE O/P EST LOW 20 MIN: CPT | Performed by: FAMILY MEDICINE

## 2021-04-20 PROCEDURE — 85045 AUTOMATED RETICULOCYTE COUNT: CPT | Performed by: FAMILY MEDICINE

## 2021-04-20 PROCEDURE — 82607 VITAMIN B-12: CPT | Performed by: FAMILY MEDICINE

## 2021-04-20 PROCEDURE — 85027 COMPLETE CBC AUTOMATED: CPT | Performed by: FAMILY MEDICINE

## 2021-04-20 PROCEDURE — 99000 SPECIMEN HANDLING OFFICE-LAB: CPT | Performed by: FAMILY MEDICINE

## 2021-04-20 PROCEDURE — 83550 IRON BINDING TEST: CPT | Performed by: FAMILY MEDICINE

## 2021-04-20 RX ORDER — FERROUS GLUCONATE 324(38)MG
324 TABLET ORAL
Qty: 90 TABLET | Refills: 3 | Status: SHIPPED | OUTPATIENT
Start: 2021-04-20 | End: 2021-07-20

## 2021-04-20 NOTE — PROGRESS NOTES
"        Tarun Sales is a 86 year old who presents for the following health issues  accompanied by his self:    HPI     HGB continues to decrease  And results of heart monitor     Component      Latest Ref Rng & Units 2/16/2021 3/9/2021   WBC      4.0 - 11.0 10e9/L 8.1    RBC Count      4.4 - 5.9 10e12/L 3.29 (L)    Hemoglobin      13.3 - 17.7 g/dL 9.5 (L) 9.1 (L)   Hematocrit      40.0 - 53.0 % 29.3 (L)    MCV      78 - 100 fl 89    MCH      26.5 - 33.0 pg 28.9    MCHC      31.5 - 36.5 g/dL 32.4    RDW      10.0 - 15.0 % 13.5    Platelet Count      150 - 450 10e9/L 226            Review of Systems         Objective    There were no vitals taken for this visit.  There is no height or weight on file to calculate BMI.  Physical Exam             Further history obtained, clarified or corrected by physician:    He is here for follow-up of anemia.  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  He has a past history of having had some issues with anemia.  He has no new complaints today.    OBJECTIVE:  /52   Pulse 54   Temp 96.7  F (35.9  C) (Tympanic)   Resp 18   Ht 1.676 m (5' 6\")   SpO2 96%   BMI 25.82 kg/m    LUNGS: clear to auscultation, normal breath sounds  CV: RRR without murmur  ABD: BS+, soft, nontender, no masses, no hepatosplenomegaly  EXTREMITIES: without joint tenderness, swelling or erythema.  No muscle tenderness or abnormality.  SKIN: No rashes or abnormalities  NEURO:non focal exam    ASSESSMENT:  Anemia, unspecified type    PLAN:    We will have him start supplemental iron  Labs to be drawn today for further evaluation.    Orders Placed This Encounter     CBC with platelets     Ferritin     Iron and iron binding capacity     Reticulocyte count     Vitamin B12     Folate RBC     ferrous gluconate (FERGON) 324 (38 Fe) MG tablet         "

## 2021-04-21 ASSESSMENT — ASTHMA QUESTIONNAIRES: ACT_TOTALSCORE: 22

## 2021-04-23 LAB
FOLATE RBC-MCNC: 700 NG/ML
HCT VFR BLD CALC: 34.7 %

## 2021-04-23 NOTE — RESULT ENCOUNTER NOTE
Please call: hemoglobin improved significantly from last month. Continue iron supplement as iron levels are still low.  Follow-up with Dr. Orourke to discuss further evaluation of iron deficiency cause.

## 2021-05-18 DIAGNOSIS — Z95.2 S/P TAVR (TRANSCATHETER AORTIC VALVE REPLACEMENT): Primary | ICD-10-CM

## 2021-05-18 RX ORDER — AMOXICILLIN 500 MG/1
500 CAPSULE ORAL 2 TIMES DAILY
Qty: 4 CAPSULE | Refills: 4 | Status: SHIPPED | OUTPATIENT
Start: 2021-05-18 | End: 2022-01-27

## 2021-06-19 DIAGNOSIS — K21.9 GASTROESOPHAGEAL REFLUX DISEASE WITHOUT ESOPHAGITIS: ICD-10-CM

## 2021-06-22 NOTE — TELEPHONE ENCOUNTER
Prescription approved per [facility] Refill Protocol.    Kassidy DUGGAN RN, BSN

## 2021-07-20 ENCOUNTER — OFFICE VISIT (OUTPATIENT)
Dept: FAMILY MEDICINE | Facility: CLINIC | Age: 86
End: 2021-07-20
Payer: COMMERCIAL

## 2021-07-20 ENCOUNTER — TELEPHONE (OUTPATIENT)
Dept: FAMILY MEDICINE | Facility: CLINIC | Age: 86
End: 2021-07-20

## 2021-07-20 VITALS
HEIGHT: 66 IN | BODY MASS INDEX: 26.2 KG/M2 | DIASTOLIC BLOOD PRESSURE: 72 MMHG | SYSTOLIC BLOOD PRESSURE: 126 MMHG | HEART RATE: 48 BPM | WEIGHT: 163 LBS | TEMPERATURE: 97.1 F | OXYGEN SATURATION: 96 % | RESPIRATION RATE: 18 BRPM

## 2021-07-20 DIAGNOSIS — Z00.00 PREVENTATIVE HEALTH CARE: Primary | ICD-10-CM

## 2021-07-20 DIAGNOSIS — K21.9 GASTROESOPHAGEAL REFLUX DISEASE WITHOUT ESOPHAGITIS: ICD-10-CM

## 2021-07-20 DIAGNOSIS — H90.6 MIXED HEARING LOSS, BILATERAL: ICD-10-CM

## 2021-07-20 DIAGNOSIS — H61.23 BILATERAL IMPACTED CERUMEN: ICD-10-CM

## 2021-07-20 DIAGNOSIS — D50.8 OTHER IRON DEFICIENCY ANEMIA: ICD-10-CM

## 2021-07-20 PROBLEM — R13.10 DYSPHAGIA: Status: ACTIVE | Noted: 2020-06-29

## 2021-07-20 PROBLEM — R47.1 DYSARTHRIA: Status: ACTIVE | Noted: 2020-06-29

## 2021-07-20 LAB
FERRITIN SERPL-MCNC: 50 NG/ML (ref 26–388)
HGB BLD-MCNC: 12.6 G/DL (ref 13.3–17.7)

## 2021-07-20 PROCEDURE — 82728 ASSAY OF FERRITIN: CPT | Performed by: FAMILY MEDICINE

## 2021-07-20 PROCEDURE — 85018 HEMOGLOBIN: CPT | Performed by: FAMILY MEDICINE

## 2021-07-20 PROCEDURE — 99397 PER PM REEVAL EST PAT 65+ YR: CPT | Performed by: FAMILY MEDICINE

## 2021-07-20 PROCEDURE — 99213 OFFICE O/P EST LOW 20 MIN: CPT | Mod: 25 | Performed by: FAMILY MEDICINE

## 2021-07-20 PROCEDURE — 36415 COLL VENOUS BLD VENIPUNCTURE: CPT | Performed by: FAMILY MEDICINE

## 2021-07-20 RX ORDER — FERROUS GLUCONATE 324(38)MG
324 TABLET ORAL
Qty: 90 TABLET | Refills: 3 | Status: SHIPPED | OUTPATIENT
Start: 2021-07-20 | End: 2022-11-08

## 2021-07-20 ASSESSMENT — MIFFLIN-ST. JEOR: SCORE: 1362.11

## 2021-07-20 ASSESSMENT — ACTIVITIES OF DAILY LIVING (ADL)
CURRENT_FUNCTION: BATHING REQUIRES ASSISTANCE
CURRENT_FUNCTION: SHOPPING REQUIRES ASSISTANCE
CURRENT_FUNCTION: LAUNDRY REQUIRES ASSISTANCE
CURRENT_FUNCTION: PREPARING MEALS REQUIRES ASSISTANCE
CURRENT_FUNCTION: TRANSPORTATION REQUIRES ASSISTANCE
CURRENT_FUNCTION: HOUSEWORK REQUIRES ASSISTANCE

## 2021-07-20 NOTE — PROGRESS NOTES
"SUBJECTIVE:   Henrry LEON White is a 86 year old male who presents for Preventive Visit.  Chief Complaint   Patient presents with     Establish Care     Refill Request     Physical     Blood Draw     patient needs hgb  checked      Patient has been advised of split billing requirements and indicates understanding: Yes   Are you in the first 12 months of your Medicare coverage?  No    Healthy Habits:    In general, how would you rate your overall health?  Good    Frequency of exercise:  2-3 days/week    Duration of exercise:  Less than 15 minutes    Do you usually eat at least 4 servings of fruit and vegetables a day, include whole grains    & fiber and avoid regularly eating high fat or \"junk\" foods?  Yes    Taking medications regularly:  Yes    Barriers to taking medications:  None    Medication side effects:  None    Ability to successfully perform activities of daily living:  Bathing requires assistance, laundry requires assistance, preparing meals requires assistance, shopping requires assistance, transportation requires assistance and housework requires assistance    Home Safety:  No safety concerns identified    Hearing Impairment:  Difficulty following a conversation in a noisy restaurant or crowded room, feel that people are mumbling or not speaking clearly, difficult to understand a speaker at a public meeting or [employer] service, need to ask people to speak up or repeat themselves, difficulty understanding soft or whispered speech and difficulty understanding speech on the telephone    In the past 6 months, have you been bothered by leaking of urine?  No    In general, how would you rate your overall mental or emotional health?  Good      PHQ-2 Total Score:    Additional concerns today:  No    Do you feel safe in your environment? Yes    Have you ever done Advance Care Planning? (For example, a Health Directive, POLST, or a discussion with a medical provider or your loved ones about your wishes): Yes, " advance care planning is on file.    Does have more poor breathing every since his stroke, and he has asbestosis. Just has the feeling of not quite being able to get enough air. The heart valve replacement was supposed to improve it but doesn't seem like it helped that much. Seldom needs albuterol, just once in a while when talking a lot when socializing.     His general energy level is good but he does try not to get depressed since he has lost a lot of ability to do things around the house.   Is able to use walker for short distances.    Uses a power wheelchair and likes that he can go out a little better. Likes to sit on the porch with his 7yo toy poodle    Mostly pretty regular BMs, occasionally has prune juice to help    Fall risk  Fallen 2 or more times in the past year?: Yes  Any fall with injury in the past year?: No  Timed Up and Go Test (>13.5 is fall risk; contact physician) :  (wheelchair not steady without walker)    Cognitive Screening   1) Repeat 3 items (Leader, Season, Table)    2) Clock draw: NORMAL  3) 3 item recall: Recalls 2 objects   Results: NORMAL clock, 1-2 items recalled: COGNITIVE IMPAIRMENT LESS LIKELY    Do you have sleep apnea, excessive snoring or daytime drowsiness?: no    Reviewed and updated as needed this visit by clinical staff  Tobacco  Allergies  Meds  Problems  Med Hx  Surg Hx  Fam Hx  Soc Hx        Reviewed and updated as needed this visit by Provider  Tobacco  Allergies  Meds  Problems  Med Hx  Surg Hx  Fam Hx         Social History     Tobacco Use     Smoking status: Never Smoker     Smokeless tobacco: Never Used   Substance Use Topics     Alcohol use: Yes     Comment: very rare     If you drink alcohol do you typically have >3 drinks per day or >7 drinks per week? No    Alcohol Use 7/20/2021   Prescreen: >3 drinks/day or >7 drinks/week? No     Medication Followup of omeprazole and ferrous gluconate    Taking Medication as prescribed: yes    Side Effects:   "None    Medication Helping Symptoms:  yes     Current providers sharing in care for this patient include:   Patient Care Team:  Keenan Orourke MD as PCP - General (Family Practice)  Keenan Orourke MD as Assigned PCP  Peggy Colbert MD as Assigned Heart and Vascular Provider    The following health maintenance items are reviewed in Epic and correct as of today:  Health Maintenance Due   Topic Date Due     ANNUAL REVIEW OF  ORDERS  Never done     ZOSTER IMMUNIZATION (2 of 3) 01/29/2013     ASTHMA ACTION PLAN  10/01/2019     Review of Systems  Constitutional, HEENT, cardiovascular, pulmonary, gi and gu systems are negative, except as otherwise noted.    OBJECTIVE:   /72   Pulse (!) 48   Temp 97.1  F (36.2  C) (Tympanic)   Resp 18   Ht 1.676 m (5' 6\")   Wt 73.9 kg (163 lb)   SpO2 96%   BMI 26.31 kg/m   Estimated body mass index is 26.31 kg/m  as calculated from the following:    Height as of this encounter: 1.676 m (5' 6\").    Weight as of this encounter: 73.9 kg (163 lb).     Physical Exam  GENERAL: healthy, alert, calm. In wheelchair.  EYES: Eyes grossly normal to inspection, PERRL and conjunctivae and sclerae normal  HENT: ear canals appear normal where they can be seen - b/l cerumen impaction present. nose and mouth without ulcers or lesions  NECK: no adenopathy, no asymmetry, masses, or scars and thyroid normal to palpation  RESP: lungs clear to auscultation - no rales, rhonchi or wheezes  CV: regular rate and rhythm  ABDOMEN: soft, nontender  MS: Mild atrophy BLE with L>R and mildly decreased strength of L side otherwise no abnormalities  SKIN: no suspicious lesions or rashes on brief exam of face, extremities  NEURO: Normal strength and tone, mentation intact and speech normal  PSYCH: mentation appears normal, affect normal/bright, well groomed       ASSESSMENT / PLAN:   Henrry was seen today for establish care, refill request, physical and blood draw.    Diagnoses and all orders for " "this visit:    Preventative health care    Other iron deficiency anemia  -     ferrous gluconate (FERGON) 324 (38 Fe) MG tablet; Take 1 tablet (324 mg) by mouth daily (with breakfast)  -     Hemoglobin; Future  -     Ferritin; Future  -     Hemoglobin  -     Ferritin    Gastroesophageal reflux disease without esophagitis  -     omeprazole (PRILOSEC) 20 MG DR capsule; Take 1 capsule (20 mg) by mouth daily    Mixed hearing loss, bilateral  Comments:  Declines referral for now - feels it's acceptable    Bilateral impacted cerumen  Offered to flush ears and pt desired this but forgot to have CMA do this after appt. Will call and offer ear flushing on CMA schedule when we call for results of above.    Patient has been advised of split billing requirements and indicates understanding: Yes     COUNSELING:  Reviewed preventive health counseling, as reflected in patient instructions    Estimated body mass index is 26.31 kg/m  as calculated from the following:    Height as of this encounter: 1.676 m (5' 6\").    Weight as of this encounter: 73.9 kg (163 lb).  Normal and stable BMI.    He reports that he has never smoked. He has never used smokeless tobacco.    Appropriate preventive services were discussed with this patient, including applicable screening as appropriate for cardiovascular disease, diabetes, osteopenia/osteoporosis, and glaucoma.  As appropriate for age/gender, discussed screening for colorectal cancer, prostate cancer, breast cancer, and cervical cancer. Checklist reviewing preventive services available has been given to the patient.    Reviewed patients plan of care and provided an AVS. The Basic Care Plan (routine screening as documented in Health Maintenance) for Henrry meets the Care Plan requirement. This Care Plan has been established and reviewed with the Patient.    Noemi Sandhu MD  [facility]    "

## 2021-07-20 NOTE — LETTER
My Asthma Action Plan  Name: Henrry Fonseca   YOB: 1935  Date: 7/20/2021   My doctor: Keenan Orourke   My clinic: [facility]      My Rescue Medicine: Albuterol        Dose: 1-2 puffs every 4hr as needed   My Asthma Severity: Mild Persistent  Avoid your asthma triggers: when doing a lot of talking        GREEN ZONE   Good Control    I feel good    No cough or wheeze    Can work, sleep and play without asthma symptoms       Take your asthma control medicine every day.     1. If exercise triggers your asthma, take your rescue medication    15 minutes before exercise or sports, and    During exercise if you have asthma symptoms  2. Spacer to use with inhaler: If you have a spacer, make sure to use it with your inhaler             YELLOW ZONE Getting Worse  I have ANY of these:    I do not feel good    Cough or wheeze    Chest feels tight    Wake up at night   1. Keep taking your Green Zone medications  2. Start taking your rescue medicine:    every 20 minutes for up to 1 hour. Then every 4 hours for 24-48 hours.  3. If you stay in the Yellow Zone for more than 12-24 hours, contact your doctor.  4. If you do not return to the Green Zone in 12-24 hours or you get worse, start taking your oral steroid medicine if prescribed by your provider.           RED ZONE Medical Alert - Get Help  I have ANY of these:    I feel awful    Medicine is not helping    Breathing getting harder    Trouble walking or talking    Nose opens wide to breathe       1. Take your rescue medicine NOW  2. If your provider has prescribed an oral steroid medicine, start taking it NOW  3. Call your doctor NOW  4. If you are still in the Red Zone after 20 minutes and you have not reached your doctor:    Take your rescue medicine again and    Call 911 or go to the emergency room right away    See your regular doctor within 2 weeks of an Emergency Room or Urgent Care visit for follow-up treatment.        The above  medication may be given at school or day care?: N/A (Adult Patient)  Child can carry and use inhaler(s) at school with approval of school nurse?: N/A (Adult Patient)    Electronically signed by: Noemi Sandhu MD, July 20, 2021    Annual Reminders:  Meet with Asthma Educator,  Flu Shot in the Fall, consider Pneumonia Vaccination for patients with asthma (aged 19 and older).    Pharmacy:    [id number] - [place], [place] - [address] [id number] - [place], [place] - [address] [place] [address]                    Asthma Triggers  How To Control Things That Make Your Asthma Worse    Triggers are things that make your asthma worse.  Look at the list below to help you find your triggers and what you can do about them.  You can help prevent asthma flare-ups by staying away from your triggers.      Trigger                                                          What you can do   Cigarette Smoke  Tobacco smoke can make asthma worse. Do not allow smoking in your home, car or around you.  Be sure no one smokes at a child s day care or school.  If you smoke, ask your health care provider for ways to help you quit.  Ask family members to quit too.  Ask your health care provider for a referral to Quit Plan to help you quit smoking, or call 6-022-464-PLAN.     Colds, Flu, Bronchitis  These are common triggers of asthma. Wash your hands often.  Don t touch your eyes, nose or mouth.  Get a flu shot every year.     Dust Mites  These are tiny bugs that live in cloth or carpet. They are too small to see. Wash sheets and blankets in hot water every week.   Encase pillows and mattress in dust mite proof covers.  Avoid having carpet if you can. If you have carpet, vacuum weekly.   Use a dust mask and HEPA vacuum.   Pollen and Outdoor Mold  Some people are allergic to trees, grass, or weed pollen, or molds. Try to keep your windows closed.  Limit time out doors when pollen count is high.   Ask you health care provider about taking medicine  during allergy season.     Animal Dander  Some people are allergic to skin flakes, urine or saliva from pets with fur or feathers. Keep pets with fur or feathers out of your home.    If you can t keep the pet outdoors, then keep the pet out of your bedroom.  Keep the bedroom door closed.  Keep pets off cloth furniture and away from stuffed toys.     Mice, Rats, and Cockroaches  Some people are allergic to the waste from these pests.   Cover food and garbage.  Clean up spills and food crumbs.  Store grease in the refrigerator.   Keep food out of the bedroom.   Indoor Mold  This can be a trigger if your home has high moisture. Fix leaking faucets, pipes, or other sources of water.   Clean moldy surfaces.  Dehumidify basement if it is damp and smelly.   Smoke, Strong Odors, and Sprays  These can reduce air quality. Stay away from strong odors and sprays, such as perfume, powder, hair spray, paints, smoke incense, paint, cleaning products, candles and new carpet.   Exercise or Sports  Some people with asthma have this trigger. Be active!  Ask your doctor about taking medicine before sports or exercise to prevent symptoms.    Warm up for 5-10 minutes before and after sports or exercise.     Other Triggers of Asthma  Cold air:  Cover your nose and mouth with a scarf.  Sometimes laughing or crying can be a trigger.  Some medicines and food can trigger asthma.

## 2021-07-20 NOTE — TELEPHONE ENCOUNTER
Please call Henrry to let him know about improving hemoglobin and iron levels, but he should continue the iron supplement for now and I advise an appointment to check in about how he feels and also recheck the hemoglobin in 3 months.    Also, please let him know I apologize that I forgot to have anyone flush his ears at his appointment (had bilateral cerumen impaction). He can come in for an MA-only visit for ear flushing if he would like!    Thank you  Noemi Sandhu MD

## 2021-07-20 NOTE — PATIENT INSTRUCTIONS
Patient Education     Personalized Prevention Plan    You are due for the preventive services outlined below.  Your care team is available to assist you in scheduling these services.  If you have already completed any of these items, please share that information with your care team to update in your medical record.  Health Maintenance Due   Topic Date Due     ANNUAL REVIEW OF  ORDERS  Never done     Zoster (Shingles) Vaccine (2 of 3) 01/29/2013     Asthma Action Plan - yearly  10/01/2019           - Call your insurance to ask where you can get the Shingles shot the cheapest.  - It's time to follow up with Cardiology so make an appointment with Dr. Colbert at your earliest availability.  - We will call you with the results of your iron and hemoglobin checks and let you know if we want you to stop taking the iron or continue it.

## 2021-08-27 ENCOUNTER — TELEPHONE (OUTPATIENT)
Dept: CARDIOLOGY | Facility: CLINIC | Age: 86
End: 2021-08-27

## 2021-08-27 ENCOUNTER — OFFICE VISIT (OUTPATIENT)
Dept: CARDIOLOGY | Facility: CLINIC | Age: 86
End: 2021-08-27
Attending: PHYSICIAN ASSISTANT
Payer: COMMERCIAL

## 2021-08-27 VITALS
SYSTOLIC BLOOD PRESSURE: 132 MMHG | WEIGHT: 165 LBS | HEIGHT: 66 IN | DIASTOLIC BLOOD PRESSURE: 70 MMHG | OXYGEN SATURATION: 96 % | BODY MASS INDEX: 26.52 KG/M2 | HEART RATE: 61 BPM

## 2021-08-27 DIAGNOSIS — E78.5 HYPERLIPIDEMIA LDL GOAL <70: ICD-10-CM

## 2021-08-27 DIAGNOSIS — I10 ESSENTIAL HYPERTENSION, BENIGN: ICD-10-CM

## 2021-08-27 DIAGNOSIS — D64.9 ACUTE ON CHRONIC ANEMIA: Primary | ICD-10-CM

## 2021-08-27 DIAGNOSIS — Z95.2 S/P TAVR (TRANSCATHETER AORTIC VALVE REPLACEMENT): ICD-10-CM

## 2021-08-27 DIAGNOSIS — I35.0 AORTIC VALVE STENOSIS, ETIOLOGY OF CARDIAC VALVE DISEASE UNSPECIFIED: ICD-10-CM

## 2021-08-27 DIAGNOSIS — I25.10 NONOBSTRUCTIVE ATHEROSCLEROSIS OF CORONARY ARTERY: ICD-10-CM

## 2021-08-27 PROCEDURE — 99215 OFFICE O/P EST HI 40 MIN: CPT | Performed by: NURSE PRACTITIONER

## 2021-08-27 ASSESSMENT — MIFFLIN-ST. JEOR: SCORE: 1371.19

## 2021-08-27 NOTE — TELEPHONE ENCOUNTER
----- Message from Jennifer Garcia RN sent at 8/27/2021 10:58 AM CDT -----  Regarding: RE: echo needed?  No his gradients are normal I would just get the next one at one year.    Jennifer  ----- Message -----  From: Michelle Nicole APRN CNP  Sent: 8/27/2021  10:45 AM CDT  To: Fariha Landry RN, Jennifer Garcia RN  Subject: echo needed?                                     6 mo follow-up today. I know he doesn't need an echo for registry but do you want a 6 mo echo on him?     PIPO Moyer CNP        ADDENDUM:   Pt notified.   PIPO Moyer CNP

## 2021-08-27 NOTE — LETTER
8/27/2021    Keenan Orourke MD  [id number]  [facility] [address]    RE: Henrry Fonseca       Dear Colleague,    I had the pleasure of seeing Henrry Fonseca in the [facility].    Cardiology Clinic Progress Note  Henrry Fonseca MRN# [id number]   YOB: 1935 Age: 86 year old      Primary Cardiologist:   Dr. Colbert           History of Presenting Illness:      Henrry Fonseca is a pleasant 86 year old patient with a past cardiac history significant for   1. Severe aortic stenosis s/p TAVR 1/2021  2. Nonobstructive CAD  3. Hypertension  4. Hyperlipidemia  5. 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   He uses a wheelchair d/t residual left sided weakness from CVA.     He underwent elective TAVR with a 29 mm Goldstein Vamshi 3 valve on 01/26/2021.  LVEDP at the time of the procedure was 19.  His immediate post echo showed a mean gradient of 7 across the valve and no paravalvular leak.  Postop day #1 echo showed an EF of 65%-70%.  Mean gradient was 8 across the aortic valve.  No paravalvular leak. Postoperatively he was noted to have first-degree AV block, left anterior fascicular block and incomplete RBBB with 2 episodes of sinus tachycardia versus accelerated junctional rhythm.  He was discharged with 3-day Zio patch.  Given his relatively recent hemorrhagic CVA he was placed on mono antiplatelet therapy with aspirin 81 mg daily.    Pt was last seen by KATIE Escobedo in February 2021 for 1 month follow-up.  EP reviewed the Zio patch results and felt the patient had second-degree AV block (Wenckebach).  His metoprolol was discontinued and then recommended rechecking event monitor in 2 weeks.  Patient noted a couple mechanical falls but denied any lightheadedness or syncope.  1 month echocardiogram showed EF 60 to 65%,  well-seated TAVR valve with mean gradient 7 mmHg and no AR.  His hemoglobin remained low at 9.5 and aspirin was discontinued and recommended rechecking in 1 month. He continued with some dyspnea on exertion and did not note any significant improvement after TAVR.     Pt presents today for 6-month TAVR follow-up. Recheck of hemoglobin remained low at 9.1 and follow-up with PCP was recommended.  He was started on iron supplementation and hemoglobin improved to 12.6 on 7/20/2021. Event monitor continued to show first and second-degree AV block and he remained asymptomatic with this.  Results reviewed today.    Blood pressure is well controlled today.  Weight is stable.  He continues with some dyspnea on exertion which is maybe slightly improved since his TAVR procedure.  He notices this most when he is getting out of his wheelchair using his walker to get to the bathroom.  Overall, he denies any significant cardiac complaints.  I will check with TAVR team to see if they recommend he have a 6-month TAVR echo. Patient reports no chest pain,  PND, orthopnea, presyncope, syncope, edema, heart racing, or palpitations.    ADDENDUM: no 6 mo echo needed.     Labs:  LIPID RESULTS:  Lab Results   Component Value Date    CHOL 211 (H) 04/27/2010    HDL 73 04/27/2010     04/27/2010    TRIG 81 04/27/2010    CHOLHDLRATIO 3.0 04/27/2010       LIVER ENZYME RESULTS:  Lab Results   Component Value Date    AST 31 11/09/2007    ALT 12 11/09/2007       CBC RESULTS:  Lab Results   Component Value Date    WBC 5.6 04/20/2021    RBC 4.16 (L) 04/20/2021    HGB 12.6 (L) 07/20/2021    HGB 11.2 (L) 04/20/2021    HCT 34.7 (L) 04/20/2021    MCV 83 04/20/2021    MCH 26.9 04/20/2021    MCHC 32.3 04/20/2021    RDW 15.4 (H) 04/20/2021     04/20/2021       BMP RESULTS:  Lab Results   Component Value Date     02/16/2021    POTASSIUM 4.0 02/16/2021    CHLORIDE 108 02/16/2021    CO2 25 02/16/2021    ANIONGAP 6 02/16/2021     (H)  02/16/2021    BUN 29 02/16/2021    CR 1.07 02/16/2021    GFRESTIMATED 63 02/16/2021    GFRESTBLACK 72 02/16/2021    SUNDAY 8.3 (L) 02/16/2021        A1C RESULTS:  Lab Results   Component Value Date    A1C 5.9 04/27/2010       INR RESULTS:  Lab Results   Component Value Date    INR 0.97 01/26/2021    INR 1.06 01/13/2021       Results reviewed today.       Current Cardiac Medications     Amlodipine 5 mg daily  Atorvastatin 20 mg daily                   Assessment and Plan:       Plan  1. Follow-up with Michelle PICKARD or KATIE Chow for 1 year TAVR with echocardiogram, EKG, BMP, and CBC prior (needs [facility]  Form and msg TAVR team)    Patient Instructions   Medication Changes:  None     Recommendations:  1. Check blood pressure at least 1 hour after medications. Call the clinic if your blood pressure is consistently greater than 130/80.     Follow-up:  1. Fasting lab within 2 months (lipid/ALT)  2. Call to schedule Echo, EKG, labs (BMP, CBC) Jan 2022  3. See Claudine WILSON or michelle PICKARD for cardiology follow up at [facility]: Jan 2022. Call in Sept to schedule.     Cardiology Scheduling~[phone]  Cardiology Clinic RN~[phone] (Ani Cope RN)          1. nonobstructive CAD    Pre-TAVR angiogram 1/2021 with mild nonobstructive disease 10% mid LAD, 25% mid RCA    No angina    Continue statin    Aspirin discontinued due to anemia post TAVR    No BB d/t AV block       2. Severe aortic stenosis  Status post TAVR 29 mm Goldstein Vamshi 3 valve on 01/26/2021  Prior symptoms of BECKFORD which have improved slightly post procedure, current NYHA class II  Echo 1 month 2/2021 with mean gradient 7 mmHg and no AR  Not on antiplatelet therapy due to anemia of post TAVR  Lifelong SBE prophylaxis for dental procedures/ cleanings (amoxicillin 2G or clindamycin 600 mg 60 mins prior)      3. Hypertension    Controlled    Continue amlodipine    No av annamaria blockade d/t AV block       4. Hyperlipidemia    No Lipids on file    Continue  atorvastatin 20 mg daily      5. AV block    First and second-degree AV block noted post TAVR    Asymptomatic    AV annamaria blocking agents discontinued               Thank you for allowing me to participate in this delightful patient's care.      This note was completed in part using Dragon voice recognition software. Although reviewed after completion, some word and grammatical errors may occur.    PIPO Moyer CNP, PIPO, CNP           Data:   All laboratory data reviewed      Total time spent today was 43 mins, reviewing labs, testing, notes, documenting notes, and seeing patient.          Constitutional:  cooperative, alert and oriented, well developed, well nourished, in no acute distress  overweight     Skin:  warm and dry to the touch         Head:  normocephalic       Eyes:  pupils equal and round       ENT:  no pallor or cyanosis       Neck:  no stiffness       Respiratory:  clear to auscultation; normal symmetry        Cardiac: regular rate and rhythm; normal S1 and S2                 pulses full and equal, systolic murmur 2/6    GI:  abdomen soft, nondistended     Extremities and Muscular Skeletal:  no edema       Neurological:  affect appropriate; no gross motor deficits       Psych:  Alert and Oriented x 3 , appropriate affact        Thank you for allowing me to participate in the care of your patient.      Sincerely,     PIPO Moyer CNP     [facility]  cc:   Claudine Reilly PA-C  [facility]  [address] [place] [address]

## 2021-08-27 NOTE — PATIENT INSTRUCTIONS
Medication Changes:  None     Recommendations:  1. Check blood pressure at least 1 hour after medications. Call the clinic if your blood pressure is consistently greater than 130/80.     Follow-up:  1. Fasting lab within 2 months (lipid/ALT)  2. Call to schedule Echo, EKG, labs (BMP, CBC) Jan 2022  3. See Claudine WILSON or kaya PICKARD for cardiology follow up at [place]: Jan 2022. Call in Sept to schedule.     Cardiology Scheduling~[phone]  Cardiology Clinic RN~[phone] (Ani Cope, ARI)

## 2021-08-27 NOTE — NURSING NOTE
"Initial /70   Pulse 61   Ht 1.676 m (5' 6\")   Wt 74.8 kg (165 lb)   SpO2 96%   BMI 26.63 kg/m   Estimated body mass index is 26.63 kg/m  as calculated from the following:    Height as of this encounter: 1.676 m (5' 6\").    Weight as of this encounter: 74.8 kg (165 lb). .      "

## 2021-12-11 DIAGNOSIS — I10 ESSENTIAL HYPERTENSION, BENIGN: ICD-10-CM

## 2021-12-13 RX ORDER — ATORVASTATIN CALCIUM 20 MG/1
TABLET, FILM COATED ORAL
Qty: 90 TABLET | Refills: 3 | Status: SHIPPED | OUTPATIENT
Start: 2021-12-13 | End: 2022-11-08

## 2021-12-13 NOTE — TELEPHONE ENCOUNTER
"Requested Prescriptions   Pending Prescriptions Disp Refills     atorvastatin (LIPITOR) 20 MG tablet [Pharmacy Med Name: ATORVASTATIN 20MG TAB] 90 tablet 3     Sig: TAKE ONE TABLET BY MOUTH AT BEDTIME       Statins Protocol Failed - 12/11/2021  4:03 PM        Failed - LDL on file in past 12 months     No lab results found.          Passed - No abnormal creatine kinase in past 12 months     No lab results found.             Passed - Recent (12 mo) or future (30 days) visit within the authorizing provider's specialty     Patient has had an office visit with the authorizing provider or a provider within the authorizing providers department within the previous 12 mos or has a future within next 30 days. See \"Patient Info\" tab in inbasket, or \"Choose Columns\" in Meds & Orders section of the refill encounter.              Passed - Medication is active on med list        Passed - Patient is age 18 or older             "

## 2022-01-14 DIAGNOSIS — Z95.2 S/P TAVR (TRANSCATHETER AORTIC VALVE REPLACEMENT): Primary | ICD-10-CM

## 2022-01-14 DIAGNOSIS — G40.909 SEIZURE DISORDER (H): ICD-10-CM

## 2022-01-18 RX ORDER — GABAPENTIN 600 MG/1
TABLET ORAL
Qty: 180 TABLET | Refills: 3 | Status: SHIPPED | OUTPATIENT
Start: 2022-01-18 | End: 2022-11-08

## 2022-01-18 NOTE — TELEPHONE ENCOUNTER
Pending Prescriptions:                       Disp   Refills    gabapentin (NEURONTIN) 600 MG tablet [Pha*180 ta*3            Sig: TAKE 1 TABLET BY MOUTH IN THE MORNING, ONE-HALF           TABLET AT NOON, AND 1 TABLET IN THE EVENING    Routing refill request to provider for review/approval because:  Drug not on the [facility] refill protocol     Steven Selby RN

## 2022-01-19 DIAGNOSIS — E78.5 HYPERLIPIDEMIA LDL GOAL <70: Primary | ICD-10-CM

## 2022-01-21 ENCOUNTER — LAB (OUTPATIENT)
Dept: LAB | Facility: CLINIC | Age: 87
End: 2022-01-21
Attending: NURSE PRACTITIONER
Payer: COMMERCIAL

## 2022-01-21 ENCOUNTER — HOSPITAL ENCOUNTER (OUTPATIENT)
Dept: CARDIOLOGY | Facility: CLINIC | Age: 87
Discharge: HOME | End: 2022-01-21
Attending: NURSE PRACTITIONER | Admitting: NURSE PRACTITIONER
Payer: COMMERCIAL

## 2022-01-21 DIAGNOSIS — Z95.2 S/P TAVR (TRANSCATHETER AORTIC VALVE REPLACEMENT): ICD-10-CM

## 2022-01-21 DIAGNOSIS — E78.5 HYPERLIPIDEMIA LDL GOAL <70: ICD-10-CM

## 2022-01-21 LAB
ALT SERPL W P-5'-P-CCNC: 24 U/L (ref 0–70)
ANION GAP SERPL CALCULATED.3IONS-SCNC: 3 MMOL/L (ref 3–14)
BUN SERPL-MCNC: 32 MG/DL (ref 7–30)
CALCIUM SERPL-MCNC: 8.8 MG/DL (ref 8.5–10.1)
CHLORIDE BLD-SCNC: 110 MMOL/L (ref 94–109)
CHOLEST SERPL-MCNC: 143 MG/DL
CO2 SERPL-SCNC: 29 MMOL/L (ref 20–32)
CREAT SERPL-MCNC: 1.1 MG/DL (ref 0.66–1.25)
ERYTHROCYTE [DISTWIDTH] IN BLOOD BY AUTOMATED COUNT: 13 % (ref 10–15)
FASTING STATUS PATIENT QL REPORTED: YES
GFR SERPL CREATININE-BSD FRML MDRD: 65 ML/MIN/1.73M2
GLUCOSE BLD-MCNC: 93 MG/DL (ref 70–99)
HCT VFR BLD AUTO: 40.5 % (ref 40–53)
HDLC SERPL-MCNC: 61 MG/DL
HGB BLD-MCNC: 13.2 G/DL (ref 13.3–17.7)
LDLC SERPL CALC-MCNC: 63 MG/DL
LVEF ECHO: NORMAL
MCH RBC QN AUTO: 29.7 PG (ref 26.5–33)
MCHC RBC AUTO-ENTMCNC: 32.6 G/DL (ref 31.5–36.5)
MCV RBC AUTO: 91 FL (ref 78–100)
NONHDLC SERPL-MCNC: 82 MG/DL
PLATELET # BLD AUTO: 169 10E3/UL (ref 150–450)
POTASSIUM BLD-SCNC: 4.1 MMOL/L (ref 3.4–5.3)
RBC # BLD AUTO: 4.45 10E6/UL (ref 4.4–5.9)
SODIUM SERPL-SCNC: 142 MMOL/L (ref 133–144)
TRIGL SERPL-MCNC: 93 MG/DL
WBC # BLD AUTO: 4.8 10E3/UL (ref 4–11)

## 2022-01-21 PROCEDURE — 85027 COMPLETE CBC AUTOMATED: CPT | Performed by: NURSE PRACTITIONER

## 2022-01-21 PROCEDURE — 93306 TTE W/DOPPLER COMPLETE: CPT

## 2022-01-21 PROCEDURE — 80061 LIPID PANEL: CPT | Performed by: NURSE PRACTITIONER

## 2022-01-21 PROCEDURE — 93306 TTE W/DOPPLER COMPLETE: CPT | Mod: 26 | Performed by: INTERNAL MEDICINE

## 2022-01-21 PROCEDURE — 80048 BASIC METABOLIC PNL TOTAL CA: CPT | Performed by: NURSE PRACTITIONER

## 2022-01-21 PROCEDURE — 36415 COLL VENOUS BLD VENIPUNCTURE: CPT | Performed by: NURSE PRACTITIONER

## 2022-01-21 PROCEDURE — 84460 ALANINE AMINO (ALT) (SGPT): CPT | Performed by: NURSE PRACTITIONER

## 2022-01-21 NOTE — RESULT ENCOUNTER NOTE
To be discussed at follow-up next week. Lipids at goal, ALT WNL, BUN minimally elevated - stay hydrated, Hgb improving.

## 2022-01-27 ENCOUNTER — HOSPITAL ENCOUNTER (OUTPATIENT)
Dept: CARDIOLOGY | Facility: CLINIC | Age: 87
Discharge: HOME | End: 2022-01-27
Attending: NURSE PRACTITIONER | Admitting: NURSE PRACTITIONER
Payer: COMMERCIAL

## 2022-01-27 ENCOUNTER — OFFICE VISIT (OUTPATIENT)
Dept: CARDIOLOGY | Facility: CLINIC | Age: 87
End: 2022-01-27
Payer: COMMERCIAL

## 2022-01-27 VITALS
OXYGEN SATURATION: 96 % | HEART RATE: 59 BPM | SYSTOLIC BLOOD PRESSURE: 138 MMHG | WEIGHT: 168 LBS | DIASTOLIC BLOOD PRESSURE: 69 MMHG | BODY MASS INDEX: 27.12 KG/M2

## 2022-01-27 DIAGNOSIS — E78.5 HYPERLIPIDEMIA LDL GOAL <70: ICD-10-CM

## 2022-01-27 DIAGNOSIS — I25.10 NONOBSTRUCTIVE ATHEROSCLEROSIS OF CORONARY ARTERY: ICD-10-CM

## 2022-01-27 DIAGNOSIS — Z95.2 S/P TAVR (TRANSCATHETER AORTIC VALVE REPLACEMENT): Primary | ICD-10-CM

## 2022-01-27 DIAGNOSIS — I10 ESSENTIAL HYPERTENSION, BENIGN: ICD-10-CM

## 2022-01-27 DIAGNOSIS — Z95.2 S/P TAVR (TRANSCATHETER AORTIC VALVE REPLACEMENT): ICD-10-CM

## 2022-01-27 DIAGNOSIS — I35.0 AORTIC VALVE STENOSIS, ETIOLOGY OF CARDIAC VALVE DISEASE UNSPECIFIED: ICD-10-CM

## 2022-01-27 DIAGNOSIS — D64.9 ACUTE ON CHRONIC ANEMIA: ICD-10-CM

## 2022-01-27 PROCEDURE — 93005 ELECTROCARDIOGRAM TRACING: CPT

## 2022-01-27 PROCEDURE — 93010 ELECTROCARDIOGRAM REPORT: CPT | Performed by: NURSE PRACTITIONER

## 2022-01-27 PROCEDURE — 99214 OFFICE O/P EST MOD 30 MIN: CPT | Performed by: NURSE PRACTITIONER

## 2022-01-27 NOTE — PROGRESS NOTES
[facility]  Post-TAVR Clinic Visit      Cardiology Clinic Progress Note  Henrry Fonseca MRN# [id number]   YOB: 1935 Age: 86 year old      Primary Cardiologist:   Dr. Colbert           History of Presenting Illness:      Henrry Fonseca is a pleasant 86 year old patient with a past cardiac history significant for   1. Severe aortic stenosis s/p TAVR 1/2021  2. Nonobstructive CAD  3. Hypertension  4. Hyperlipidemia  5. 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   He uses a wheelchair d/t residual left sided weakness from CVA.     He underwent elective TAVR with a 29 mm Goldstein Vamshi 3 valve on 01/26/2021.  LVEDP at the time of the procedure was 19.  His immediate post echo showed a mean gradient of 7 across the valve and no paravalvular leak.  Postop day #1 echo showed an EF of 65%-70%.  Mean gradient was 8 across the aortic valve.  No paravalvular leak. Postoperatively he was noted to have first-degree AV block, left anterior fascicular block and incomplete RBBB with 2 episodes of sinus tachycardia versus accelerated junctional rhythm.  He was discharged with 3-day Zio patch.  Given his relatively recent hemorrhagic CVA he was placed on mono antiplatelet therapy with aspirin 81 mg daily.    Pt saw KATIE Escobedo in February 2021 for 1 month follow-up.  EP reviewed the Zio patch results and felt the patient had second-degree AV block (Wenckebach).  His metoprolol was discontinued and then recommended rechecking event monitor in 2 weeks.  Patient noted a couple mechanical falls but denied any lightheadedness or syncope.  1 month echocardiogram showed EF 60 to 65%, well-seated TAVR valve with mean gradient 7 mmHg and no AR.  His hemoglobin remained low at 9.5 and aspirin was discontinued and recommended rechecking in 1 month. He continued with some dyspnea on  exertion and did not note any significant improvement after TAVR.     Patient was seen by me in August 2021 for 6-month TAVR follow-up. He was started on iron supplementation for anemia and hemoglobin had improved. Event monitor showed a first and second-degree AV block which he remained asymptomatic with. AV annamaria blocker had previously been discontinued. He continued with some dyspnea on exertion which was slightly improved post TAVR.    Pt presents today for 1 year TAVR follow-up. Lipid profile 1/21/2022 controlled with total cholesterol 143 HDL 61 LDL 63 triglycerides 93 and ALT WNL. CBC showing hemoglobin improved to 13.2 from prior 12.6 and platelets WNL. BMP with normal electrolytes, creatinine, GFR and BUN mildly elevated at 32. EKG today, personally reviewed by me, shows sinus bradycardia with ventricular rate 57 bpm and BBB with anterior fascicular block. Echocardiogram 1/21/2022 showing normal EF 60%, RV normal size and function, normally functioning TAVR valve with mean gradient 8 mmHg and trace PVL, ascending aorta 3.8 cm. Results reviewed today.     Since he was last seen, he continues with dyspnea on exertion which has been stable.  This occurs when he is trying to use the bathroom or walking with his walker, while he is out of his wheelchair.  He denies any lightheadedness or presyncope.  Blood pressure is controlled.  He denies any heart failure symptoms and no angina. Patient reports no chest pain,  PND, orthopnea, presyncope, syncope, edema, heart racing, or palpitations.    KCCQ  Q1A 6  Q1B 6  Q1C 6  Q2 5  Q3 6  Q4 4  Q5 5  Q6 5  Q7 4  Q8A 6  Q8B 6  Q8C 6          Labs:  LIPID RESULTS:  Lab Results   Component Value Date    CHOL 143 01/21/2022    CHOL 211 (H) 04/27/2010    HDL 61 01/21/2022    HDL 73 04/27/2010    LDL 63 01/21/2022     04/27/2010    TRIG 93 01/21/2022    TRIG 81 04/27/2010    CHOLHDLRATIO 3.0 04/27/2010       LIVER ENZYME RESULTS:  Lab Results   Component Value Date    AST  31 11/09/2007    ALT 24 01/21/2022    ALT 12 11/09/2007       CBC RESULTS:  Lab Results   Component Value Date    WBC 4.8 01/21/2022    WBC 5.6 04/20/2021    RBC 4.45 01/21/2022    RBC 4.16 (L) 04/20/2021    HGB 13.2 (L) 01/21/2022    HGB 11.2 (L) 04/20/2021    HCT 40.5 01/21/2022    HCT 34.7 (L) 04/20/2021    MCV 91 01/21/2022    MCV 83 04/20/2021    MCH 29.7 01/21/2022    MCH 26.9 04/20/2021    MCHC 32.6 01/21/2022    MCHC 32.3 04/20/2021    RDW 13.0 01/21/2022    RDW 15.4 (H) 04/20/2021     01/21/2022     04/20/2021       BMP RESULTS:  Lab Results   Component Value Date     01/21/2022     02/16/2021    POTASSIUM 4.1 01/21/2022    POTASSIUM 4.0 02/16/2021    CHLORIDE 110 (H) 01/21/2022    CHLORIDE 108 02/16/2021    CO2 29 01/21/2022    CO2 25 02/16/2021    ANIONGAP 3 01/21/2022    ANIONGAP 6 02/16/2021    GLC 93 01/21/2022     (H) 02/16/2021    BUN 32 (H) 01/21/2022    BUN 29 02/16/2021    CR 1.10 01/21/2022    CR 1.07 02/16/2021    GFRESTIMATED 65 01/21/2022    GFRESTIMATED 63 02/16/2021    GFRESTBLACK 72 02/16/2021    SUNDAY 8.8 01/21/2022    SUNDAY 8.3 (L) 02/16/2021        A1C RESULTS:  Lab Results   Component Value Date    A1C 5.9 04/27/2010       INR RESULTS:  Lab Results   Component Value Date    INR 0.97 01/26/2021    INR 1.06 01/13/2021       Results reviewed today.       Current Cardiac Medications     Amlodipine 5 mg daily  Atorvastatin 20 mg daily                   Assessment and Plan:       Plan    Patient Instructions   Medication Changes:  6. None     Recommendations:  1. Check blood pressure at least 1 hour after medications. Call the clinic if your blood pressure is consistently greater than 130/80.      Follow-up:  1. See Dr. Colbert for cardiology follow up at [facility]: July 2022. Call in March to schedule.     Cardiology Scheduling~[phone]  Cardiology Clinic RN~[phone] (Ani Cope, RN and Lakisha COOPER RN)          1. nonobstructive CAD    Pre-TAVR angiogram  1/2021 with mild nonobstructive disease 10% mid LAD, 25% mid RCA    No angina    Continue statin    Aspirin discontinued due to anemia post TAVR    No BB d/t AV block       2. Severe aortic stenosis  Status post TAVR 29 mm Goldstein Vamshi 3 valve on 01/26/2021  Prior symptoms of BECKFORD which improved slightly post procedure, current NYHA class II   Echo 1 month 2/2021 with mean gradient 7 mmHg and no AR  Echo 1 year 1/2022 8 mmHg and trace PVL  Lifelong SBE prophylaxis for dental procedures/ cleanings (amoxicillin 2G or clindamycin 600 mg 60 mins prior)      3. Hypertension    Controlled    Continue amlodipine    No av annamaria blockade d/t AV block       4. Hyperlipidemia    Last LDL 63 1/2022    Continue atorvastatin 20 mg daily      5. AV block    First and second-degree AV block noted post TAVR    EKG 1 year post TAVR with     Asymptomatic    AV annamaria blocking agents discontinued previously               Thank you for allowing me to participate in this delightful patient's care.      This note was completed in part using Dragon voice recognition software. Although reviewed after completion, some word and grammatical errors may occur.    Michelle Barron, APRN CNP, APRN, CNP           Data:   All laboratory data reviewed         Constitutional:  cooperative, alert and oriented, well developed, well nourished, in no acute distress  overweight     Skin:  warm and dry to the touch         Head:  normocephalic       Eyes:  pupils equal and round       ENT:  no pallor or cyanosis       Neck:  no stiffness       Respiratory:  clear to auscultation; normal symmetry        Cardiac: regular rate and rhythm; normal S1 and S2                 pulses full and equal, systolic murmur 2/6    GI:  abdomen soft, nondistended     Extremities and Muscular Skeletal:  no edema       Neurological:  affect appropriate; no gross motor deficits       Psych:  Alert and Oriented x 3 , appropriate affact

## 2022-01-27 NOTE — TELEPHONE ENCOUNTER
Spoke with patient regarding plans for cardiology assessment. Patient has been discharged from [facility] and is completing therapies at home. He is interested in pursuing cardiology evaluation. Would prefer closer to home. Will arrange echo and OV with Dr. Colbert in [place] at [facility]. Patient understands this will be a consultation with Dr. Colbert and if TAVR would be recommended, remaining evaluation would be in [place]. Scheduling to contact pt to set-up.    Cristina Reynolds RN  [facility]    
Consent: Written consent obtained, risks reviewed including but not limited to crusting, scabbing, blistering, scarring, darker or lighter pigmentary change, incidental hair removal, bruising, and/or incomplete removal.
Detail Level: Detailed
Post-Care Instructions: I reviewed with the patient in detail post-care instructions. Patient should stay away from the sun and wear sun protection until treated areas are fully healed.
Dose Setting #1 (Mj/Cm2): 580
Total Energy In Joules: 76.56
Total Pulses (Will Not Render If 0): 0
Treatment Number: 25
Time Frame For Treatment Response: 48 hours
Patient Id: [id number]
Total Square Area In Cm2 (Whole Numbers Only Please): 132.0
Comments: No erythema, good response last treatment. Patient wanted to increase by 5% today.
Location #1: lower legs
Billing: [id number] (Unlisted special dermatological service or procedure)
% Increase/Decrease From Last Treatment: 5% increased

## 2022-01-27 NOTE — LETTER
1/27/2022    Noemi Sandhu MD  [address]  [facility] [address]    RE: Henrry Fonseca       Dear Colleague,     I had the pleasure of seeing Henrry Fonseca in the [facility].        [facility]  Post-TAVR Clinic Visit      Cardiology Clinic Progress Note  Henrry Fonseca MRN# [id number]   YOB: 1935 Age: 86 year old      Primary Cardiologist:   Dr. Colbert           History of Presenting Illness:      Henrry Fonseca is a pleasant 86 year old patient with a past cardiac history significant for   1. Severe aortic stenosis s/p TAVR 1/2021  2. Nonobstructive CAD  3. Hypertension  4. Hyperlipidemia  5. 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   He uses a wheelchair d/t residual left sided weakness from CVA.     He underwent elective TAVR with a 29 mm Goldstein Vamshi 3 valve on 01/26/2021.  LVEDP at the time of the procedure was 19.  His immediate post echo showed a mean gradient of 7 across the valve and no paravalvular leak.  Postop day #1 echo showed an EF of 65%-70%.  Mean gradient was 8 across the aortic valve.  No paravalvular leak. Postoperatively he was noted to have first-degree AV block, left anterior fascicular block and incomplete RBBB with 2 episodes of sinus tachycardia versus accelerated junctional rhythm.  He was discharged with 3-day Zio patch.  Given his relatively recent hemorrhagic CVA he was placed on mono antiplatelet therapy with aspirin 81 mg daily.    Pt saw KATIE Escobedo in February 2021 for 1 month follow-up.  EP reviewed the Zio patch results and felt the patient had second-degree AV block (Wenckebach).  His metoprolol was discontinued and then recommended rechecking event monitor in 2 weeks.  Patient noted a couple mechanical falls but denied any lightheadedness or syncope.  1 month echocardiogram showed EF 60 to 65%,  well-seated TAVR valve with mean gradient 7 mmHg and no AR.  His hemoglobin remained low at 9.5 and aspirin was discontinued and recommended rechecking in 1 month. He continued with some dyspnea on exertion and did not note any significant improvement after TAVR.     Patient was seen by me in August 2021 for 6-month TAVR follow-up. He was started on iron supplementation for anemia and hemoglobin had improved. Event monitor showed a first and second-degree AV block which he remained asymptomatic with. AV annamaria blocker had previously been discontinued. He continued with some dyspnea on exertion which was slightly improved post TAVR.    Pt presents today for 1 year TAVR follow-up. Lipid profile 1/21/2022 controlled with total cholesterol 143 HDL 61 LDL 63 triglycerides 93 and ALT WNL. CBC showing hemoglobin improved to 13.2 from prior 12.6 and platelets WNL. BMP with normal electrolytes, creatinine, GFR and BUN mildly elevated at 32. EKG today, personally reviewed by me, shows sinus bradycardia with ventricular rate 57 bpm and BBB with anterior fascicular block. Echocardiogram 1/21/2022 showing normal EF 60%, RV normal size and function, normally functioning TAVR valve with mean gradient 8 mmHg and trace PVL, ascending aorta 3.8 cm. Results reviewed today.     Since he was last seen, he continues with dyspnea on exertion which has been stable.  This occurs when he is trying to use the bathroom or walking with his walker, while he is out of his wheelchair.  He denies any lightheadedness or presyncope.  Blood pressure is controlled.  He denies any heart failure symptoms and no angina. Patient reports no chest pain,  PND, orthopnea, presyncope, syncope, edema, heart racing, or palpitations.    KCCQ  Q1A 6  Q1B 6  Q1C 6  Q2 5  Q3 6  Q4 4  Q5 5  Q6 5  Q7 4  Q8A 6  Q8B 6  Q8C 6          Labs:  LIPID RESULTS:  Lab Results   Component Value Date    CHOL 143 01/21/2022    CHOL 211 (H) 04/27/2010    HDL 61 01/21/2022    HDL 73  04/27/2010    LDL 63 01/21/2022     04/27/2010    TRIG 93 01/21/2022    TRIG 81 04/27/2010    CHOLHDLRATIO 3.0 04/27/2010       LIVER ENZYME RESULTS:  Lab Results   Component Value Date    AST 31 11/09/2007    ALT 24 01/21/2022    ALT 12 11/09/2007       CBC RESULTS:  Lab Results   Component Value Date    WBC 4.8 01/21/2022    WBC 5.6 04/20/2021    RBC 4.45 01/21/2022    RBC 4.16 (L) 04/20/2021    HGB 13.2 (L) 01/21/2022    HGB 11.2 (L) 04/20/2021    HCT 40.5 01/21/2022    HCT 34.7 (L) 04/20/2021    MCV 91 01/21/2022    MCV 83 04/20/2021    MCH 29.7 01/21/2022    MCH 26.9 04/20/2021    MCHC 32.6 01/21/2022    MCHC 32.3 04/20/2021    RDW 13.0 01/21/2022    RDW 15.4 (H) 04/20/2021     01/21/2022     04/20/2021       BMP RESULTS:  Lab Results   Component Value Date     01/21/2022     02/16/2021    POTASSIUM 4.1 01/21/2022    POTASSIUM 4.0 02/16/2021    CHLORIDE 110 (H) 01/21/2022    CHLORIDE 108 02/16/2021    CO2 29 01/21/2022    CO2 25 02/16/2021    ANIONGAP 3 01/21/2022    ANIONGAP 6 02/16/2021    GLC 93 01/21/2022     (H) 02/16/2021    BUN 32 (H) 01/21/2022    BUN 29 02/16/2021    CR 1.10 01/21/2022    CR 1.07 02/16/2021    GFRESTIMATED 65 01/21/2022    GFRESTIMATED 63 02/16/2021    GFRESTBLACK 72 02/16/2021    SUNDAY 8.8 01/21/2022    SUNDAY 8.3 (L) 02/16/2021        A1C RESULTS:  Lab Results   Component Value Date    A1C 5.9 04/27/2010       INR RESULTS:  Lab Results   Component Value Date    INR 0.97 01/26/2021    INR 1.06 01/13/2021       Results reviewed today.       Current Cardiac Medications     Amlodipine 5 mg daily  Atorvastatin 20 mg daily                   Assessment and Plan:       Plan    Patient Instructions   Medication Changes:  6. None     Recommendations:  1. Check blood pressure at least 1 hour after medications. Call the clinic if your blood pressure is consistently greater than 130/80.      Follow-up:  1. See Dr. Colbert for cardiology follow up at [facility]: July 2022. Call in March to schedule.     Cardiology Scheduling~[phone]  Cardiology Clinic RN~[phone] (Ani Cope, RN and Lakisha COOPER RN)          1. nonobstructive CAD    Pre-TAVR angiogram 1/2021 with mild nonobstructive disease 10% mid LAD, 25% mid RCA    No angina    Continue statin    Aspirin discontinued due to anemia post TAVR    No BB d/t AV block       2. Severe aortic stenosis  Status post TAVR 29 mm Goldstein Vamshi 3 valve on 01/26/2021  Prior symptoms of BECKFORD which improved slightly post procedure, current NYHA class II   Echo 1 month 2/2021 with mean gradient 7 mmHg and no AR  Echo 1 year 1/2022 8 mmHg and trace PVL  Lifelong SBE prophylaxis for dental procedures/ cleanings (amoxicillin 2G or clindamycin 600 mg 60 mins prior)      3. Hypertension    Controlled    Continue amlodipine    No av annamaria blockade d/t AV block       4. Hyperlipidemia    Last LDL 63 1/2022    Continue atorvastatin 20 mg daily      5. AV block    First and second-degree AV block noted post TAVR    EKG 1 year post TAVR with     Asymptomatic    AV annamaria blocking agents discontinued previously               Thank you for allowing me to participate in this delightful patient's care.      This note was completed in part using Dragon voice recognition software. Although reviewed after completion, some word and grammatical errors may occur.    Michelle Barron, APRN CNP, APRN, CNP           Data:   All laboratory data reviewed         Constitutional:  cooperative, alert and oriented, well developed, well nourished, in no acute distress  overweight     Skin:  warm and dry to the touch         Head:  normocephalic       Eyes:  pupils equal and round       ENT:  no pallor or cyanosis       Neck:  no stiffness       Respiratory:  clear to auscultation; normal symmetry        Cardiac: regular rate and rhythm; normal S1 and S2                 pulses full and equal, systolic murmur 2/6    GI:  abdomen soft,  nondistended     Extremities and Muscular Skeletal:  no edema       Neurological:  affect appropriate; no gross motor deficits       Psych:  Alert and Oriented x 3 , appropriate affact    Thank you for allowing me to participate in the care of your patient.      Sincerely,     PPIO Moyer CNP     [facility]  cc:   PIPO Dubois CNP  [address] [facility] [address]  [place] [address]

## 2022-01-27 NOTE — PATIENT INSTRUCTIONS
Medication Changes:  1. None     Recommendations:  1. Check blood pressure at least 1 hour after medications. Call the clinic if your blood pressure is consistently greater than 130/80.      Follow-up:  1. See Dr. Colbert for cardiology follow up at [facility]: July 2022. Call in March to schedule.     Cardiology Scheduling~[phone]  Cardiology Clinic RN~[phone] (Ani Cope, ARI and Lakisha COOPER RN)

## 2022-05-19 ENCOUNTER — OFFICE VISIT (OUTPATIENT)
Dept: CARDIOLOGY | Facility: CLINIC | Age: 87
End: 2022-05-19
Payer: COMMERCIAL

## 2022-05-19 VITALS
SYSTOLIC BLOOD PRESSURE: 153 MMHG | WEIGHT: 168 LBS | HEART RATE: 54 BPM | DIASTOLIC BLOOD PRESSURE: 69 MMHG | BODY MASS INDEX: 27.12 KG/M2 | OXYGEN SATURATION: 96 %

## 2022-05-19 DIAGNOSIS — Z95.2 S/P TAVR (TRANSCATHETER AORTIC VALVE REPLACEMENT): Primary | ICD-10-CM

## 2022-05-19 PROCEDURE — 99214 OFFICE O/P EST MOD 30 MIN: CPT | Performed by: INTERNAL MEDICINE

## 2022-05-19 NOTE — PROGRESS NOTES
Cardiology Clinic       Assessment & Plan       1.  Severe aortic stenosis, S/P TAVR 29 mm S3  2.  Hemorrhagic CVA with uncontrolled hypertension with residual deficits 2020  3.  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  Seizure disorder  5.  Hyperlipidemia  6.  Hypertension  7.  Non obstructive CAD      Recommendations    1.  Status post TAVR: Reviewed echocardiogram this year and stable valve tolerances.  LV systolic function remains preserved.  We will order 1 for next year  2.  Chronic shortness of breath: Likely his underlying has been stenosis and restrictive lung disease is contributing.  However has not worsened or changed  3.  Status post CVA: Stable with his symptoms and mobility.  He uses a wheelchair.  4.  Hypertension: He is currently on no medical therapy.  He takes his blood pressure religiously at home and states that it is typically in the 120s 130s systolic.  We will go by his home readings.  Please note we are avoiding AV annamaria agents as he had bradycardia after his TAVR.  5.  Return to clinic in 1 years time with pre-clinic echocardiogram.      Peggy Colbert MD, MD        HPI:    Patient is a very pleasant 87-year-old gentleman who had a TAVR procedure in 2021.  I last saw him at that time.  He has been seen by my colleagues in the interim.  Post procedure he did have AV block which was transient and improved after discontinuation of his AV annamaria blocking agents.  Patient has had serial echocardiograms performed demonstrating stability of his TAVR valve.  He does have continued dyspnea which has been a chronic problem given his asbestos exposure with restrictive lung disease.  He also had evidence of anemia and his antiplatelet agents were discontinued.  Slowly his hemoglobin has improved.  Here for a follow-up.  Echocardiogram performed prior to today's visit is as noted below.      Echo Jan 2022  1. The left ventricle is normal in structure, function and size.  The visual  ejection fraction is estimated at 60%.  2. The right ventricle is normal in structure, function and size.  3. s/p TAVR with 29mm Goldstein Vamshi 3. Mean 8mmHg, Vmax 2.1m/s. Trace PVL.  4. The ascending aorta is Borderline dilated. 3.8cm.     Echo from 2/2021 showed EF 60%, TAVR with mean 7mmHg, Vmax 1.9m/s.          Patient Active Problem List   Diagnosis     Generalized osteoarthrosis, unspecified site     Mild intermittent asthma     Asbestosis (H)     Essential hypertension, benign     Syncope and collapse     Back pain     Heel pain     Seizure disorder (H)     Rotator cuff syndrome     Elevated PSA     CARDIOVASCULAR SCREENING; LDL GOAL LESS THAN 160     Advanced directives, counseling/discussion     Senile nuclear sclerosis     Health Care Home     Esophageal reflux     Eczema     Hemorrhagic stroke (H)     Aortic valve stenosis, etiology of cardiac valve disease unspecified     Status post coronary angiogram     Aortic stenosis, severe     Other iron deficiency anemia     Left hemiparesis (H)     Dysarthria     Dysphagia     Mixed hearing loss, bilateral       Past Medical History   I have reviewed this patient's medical history and updated it with pertinent information if needed.   Past Medical History:   Diagnosis Date     Asbestosis(501)     ct chest8/04     Cardiomegaly     echo8/04  stress echo 2000 normal     Cerebral infarction (H)     june 2020     Closed fracture of unspecified part of humerus      Diverticulosis of colon (without mention of hemorrhage)      Esophageal reflux      Other specified anemias      Other specified iron deficiency anemias      Uncomplicated asthma        Past Surgical History   I have reviewed this patient's surgical history and updated it with pertinent information if needed.  Past Surgical History:   Procedure Laterality Date     COLONOSCOPY  8/18/04     Repeat in 10 years.     CV HEART CATHETERIZATION WITH POSSIBLE INTERVENTION N/A 1/13/2021    Procedure:  Heart Catheterization with Possible Intervention;  Surgeon: Dmitriy Frost MD;  Location:  HEART CARDIAC CATH LAB     CV RIGHT HEART CATH MEASUREMENTS RECORDED N/A 1/13/2021    Procedure: Right Heart Cath;  Surgeon: Dmitriy Frost MD;  Location:  HEART CARDIAC CATH LAB     CV TRANSCATHETER AORTIC VALVE REPLACEMENT N/A 1/26/2021    Procedure: Transcatheter Aortic Valve Replacement;  Surgeon: Peggy Colbert MD;  Location:  HEART CARDIAC CATH LAB     ORTHOPEDIC SURGERY      right Fibula repair     PHACOEMULSIFICATION WITH STANDARD INTRAOCULAR LENS IMPLANT  4/7/2011    Procedure:PHACOEMULSIFICATION WITH STANDARD INTRAOCULAR LENS IMPLANT; Surgeon:MJ CHOI; Location:[facility]     PHACOEMULSIFICATION WITH STANDARD INTRAOCULAR LENS IMPLANT  5/9/2011    Procedure:PHACOEMULSIFICATION WITH STANDARD INTRAOCULAR LENS IMPLANT; Surgeon:MJ CHOI; Location:[facility] [place]     SURGICAL HISTORY OF -       vasectomy     SURGICAL HISTORY OF -       appy       Prior to Admission Medications   Cannot display prior to admission medications because the patient has not been admitted in this contact.     [unfilled]  [unfilled]  Allergies   Allergies   Allergen Reactions     Keppra Fatigue     Crying very depressed       Social History    reports that he has never smoked. He has never used smokeless tobacco. He reports current alcohol use. He reports that he does not use drugs.    Family History   Family History   Problem Relation Age of Onset     Cancer Father         lung     Cancer Mother         pancreatic     Alcohol/Drug Son      Alcohol/Drug Son      Cancer Sister         1/2 sister lung cancer       Review of Systems   The comprehensive 10 point Review of Systems is negative other than noted in the HPI or here.     Physical Exam   Vital Signs with Ranges     Wt Readings from Last 4 Encounters:   01/27/22 76.2 kg (168 lb)   08/27/21 74.8 kg (165 lb)   07/20/21 73.9 kg (163 lb)   02/18/21 72.6 kg (160 lb)      [unfilled]      Vitals: There were no vitals taken for this visit.

## 2022-05-19 NOTE — LETTER
5/19/2022    Noemi Sandhu MD  [address]  [facility] [address]    RE: Henrry Fonseca       Dear Colleague,     I had the pleasure of seeing Henrry Fonseca in the [facility].    Cardiology Clinic       Assessment & Plan       1.  Severe aortic stenosis, S/P TAVR 29 mm S3  2.  Hemorrhagic CVA with uncontrolled hypertension with residual deficits 2020  3.  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  Seizure disorder  5.  Hyperlipidemia  6.  Hypertension  7.  Non obstructive CAD      Recommendations    1.  Status post TAVR: Reviewed echocardiogram this year and stable valve tolerances.  LV systolic function remains preserved.  We will order 1 for next year  2.  Chronic shortness of breath: Likely his underlying has been stenosis and restrictive lung disease is contributing.  However has not worsened or changed  3.  Status post CVA: Stable with his symptoms and mobility.  He uses a wheelchair.  4.  Hypertension: He is currently on no medical therapy.  He takes his blood pressure religiously at home and states that it is typically in the 120s 130s systolic.  We will go by his home readings.  Please note we are avoiding AV annamaria agents as he had bradycardia after his TAVR.  5.  Return to clinic in 1 years time with pre-clinic echocardiogram.      Peggy Colbert MD, MD        HPI:    Patient is a very pleasant 87-year-old gentleman who had a TAVR procedure in 2021.  I last saw him at that time.  He has been seen by my colleagues in the interim.  Post procedure he did have AV block which was transient and improved after discontinuation of his AV annamaria blocking agents.  Patient has had serial echocardiograms performed demonstrating stability of his TAVR valve.  He does have continued dyspnea which has been a chronic problem given his asbestos exposure with restrictive lung disease.  He also had evidence of anemia and his antiplatelet agents were discontinued.  Slowly his  hemoglobin has improved.  Here for a follow-up.  Echocardiogram performed prior to today's visit is as noted below.      Echo Jan 2022  1. The left ventricle is normal in structure, function and size. The visual  ejection fraction is estimated at 60%.  2. The right ventricle is normal in structure, function and size.  3. s/p TAVR with 29mm Goldstein Vamshi 3. Mean 8mmHg, Vmax 2.1m/s. Trace PVL.  4. The ascending aorta is Borderline dilated. 3.8cm.     Echo from 2/2021 showed EF 60%, TAVR with mean 7mmHg, Vmax 1.9m/s.          Patient Active Problem List   Diagnosis     Generalized osteoarthrosis, unspecified site     Mild intermittent asthma     Asbestosis (H)     Essential hypertension, benign     Syncope and collapse     Back pain     Heel pain     Seizure disorder (H)     Rotator cuff syndrome     Elevated PSA     CARDIOVASCULAR SCREENING; LDL GOAL LESS THAN 160     Advanced directives, counseling/discussion     Senile nuclear sclerosis     Health Care Home     Esophageal reflux     Eczema     Hemorrhagic stroke (H)     Aortic valve stenosis, etiology of cardiac valve disease unspecified     Status post coronary angiogram     Aortic stenosis, severe     Other iron deficiency anemia     Left hemiparesis (H)     Dysarthria     Dysphagia     Mixed hearing loss, bilateral       Past Medical History   I have reviewed this patient's medical history and updated it with pertinent information if needed.   Past Medical History:   Diagnosis Date     Asbestosis(501)     ct chest8/04     Cardiomegaly     echo8/04  stress echo 2000 normal     Cerebral infarction (H)     june 2020     Closed fracture of unspecified part of humerus      Diverticulosis of colon (without mention of hemorrhage)      Esophageal reflux      Other specified anemias      Other specified iron deficiency anemias      Uncomplicated asthma        Past Surgical History   I have reviewed this patient's surgical history and updated it with pertinent information  if needed.  Past Surgical History:   Procedure Laterality Date     COLONOSCOPY  8/18/04     Repeat in 10 years.     CV HEART CATHETERIZATION WITH POSSIBLE INTERVENTION N/A 1/13/2021    Procedure: Heart Catheterization with Possible Intervention;  Surgeon: Dmitriy Frost MD;  Location:  HEART CARDIAC CATH LAB     CV RIGHT HEART CATH MEASUREMENTS RECORDED N/A 1/13/2021    Procedure: Right Heart Cath;  Surgeon: Dmitriy Frost MD;  Location:  HEART CARDIAC CATH LAB     CV TRANSCATHETER AORTIC VALVE REPLACEMENT N/A 1/26/2021    Procedure: Transcatheter Aortic Valve Replacement;  Surgeon: Peggy Colbert MD;  Location:  HEART CARDIAC CATH LAB     ORTHOPEDIC SURGERY      right Fibula repair     PHACOEMULSIFICATION WITH STANDARD INTRAOCULAR LENS IMPLANT  4/7/2011    Procedure:PHACOEMULSIFICATION WITH STANDARD INTRAOCULAR LENS IMPLANT; Surgeon:MJ CHOI; Location:[facility]     PHACOEMULSIFICATION WITH STANDARD INTRAOCULAR LENS IMPLANT  5/9/2011    Procedure:PHACOEMULSIFICATION WITH STANDARD INTRAOCULAR LENS IMPLANT; Surgeon:MJ CHOI; Location:[facility]     SURGICAL HISTORY OF -       vasectomy     SURGICAL HISTORY OF -       appy       Prior to Admission Medications   Cannot display prior to admission medications because the patient has not been admitted in this contact.     [unfilled]  [unfilled]  Allergies   Allergies   Allergen Reactions     Keppra Fatigue     Crying very depressed       Social History    reports that he has never smoked. He has never used smokeless tobacco. He reports current alcohol use. He reports that he does not use drugs.    Family History   Family History   Problem Relation Age of Onset     Cancer Father         lung     Cancer Mother         pancreatic     Alcohol/Drug Son      Alcohol/Drug Son      Cancer Sister         1/2 sister lung cancer       Review of Systems   The comprehensive 10 point Review of Systems is negative other than noted in the HPI or here.      Physical Exam   Vital Signs with Ranges     Wt Readings from Last 4 Encounters:   01/27/22 76.2 kg (168 lb)   08/27/21 74.8 kg (165 lb)   07/20/21 73.9 kg (163 lb)   02/18/21 72.6 kg (160 lb)     [unfilled]      Vitals: There were no vitals taken for this visit.    Thank you for allowing me to participate in the care of your patient.      Sincerely,     Peggy Colbert MD     [facility]  cc:   Keenan Orourke MD

## 2022-06-13 ENCOUNTER — TELEPHONE (OUTPATIENT)
Dept: FAMILY MEDICINE | Facility: CLINIC | Age: 87
End: 2022-06-13
Payer: COMMERCIAL

## 2022-06-13 DIAGNOSIS — Z95.2 S/P TAVR (TRANSCATHETER AORTIC VALVE REPLACEMENT): ICD-10-CM

## 2022-06-14 RX ORDER — AMOXICILLIN 500 MG/1
2000 CAPSULE ORAL ONCE
Qty: 4 CAPSULE | Refills: 4 | Status: SHIPPED | OUTPATIENT
Start: 2022-06-14 | End: 2022-06-14

## 2022-10-17 DIAGNOSIS — K21.9 GASTROESOPHAGEAL REFLUX DISEASE WITHOUT ESOPHAGITIS: ICD-10-CM

## 2022-10-17 NOTE — TELEPHONE ENCOUNTER
Reason for Call:  Medication or medication refill: Refill    Do you use a [facility]?  Name of the pharmacy and phone number for the current request:    [id number] - [place], [place] - [address]    Name of the medication requested: omeprazole (PRILOSEC) 20 MG DR capsule    Other request: N/A    Can we leave a detailed message on this number? YES    Phone number patient can be reached at: Home number on file [phone] (home)    Best Time: ANYTIME    Call taken on 10/17/2022 at 11:31 AM by Jose C Fleming

## 2022-11-08 ENCOUNTER — OFFICE VISIT (OUTPATIENT)
Dept: FAMILY MEDICINE | Facility: CLINIC | Age: 87
End: 2022-11-08
Payer: COMMERCIAL

## 2022-11-08 VITALS
SYSTOLIC BLOOD PRESSURE: 136 MMHG | DIASTOLIC BLOOD PRESSURE: 78 MMHG | TEMPERATURE: 97.8 F | RESPIRATION RATE: 16 BRPM | HEART RATE: 50 BPM | OXYGEN SATURATION: 96 % | WEIGHT: 161 LBS | BODY MASS INDEX: 25.99 KG/M2

## 2022-11-08 DIAGNOSIS — Z00.00 ENCOUNTER FOR MEDICARE ANNUAL WELLNESS EXAM: Primary | ICD-10-CM

## 2022-11-08 DIAGNOSIS — I10 ESSENTIAL HYPERTENSION, BENIGN: ICD-10-CM

## 2022-11-08 DIAGNOSIS — H61.23 BILATERAL IMPACTED CERUMEN: ICD-10-CM

## 2022-11-08 DIAGNOSIS — J61 ASBESTOSIS (H): ICD-10-CM

## 2022-11-08 DIAGNOSIS — K21.9 GASTROESOPHAGEAL REFLUX DISEASE WITHOUT ESOPHAGITIS: ICD-10-CM

## 2022-11-08 DIAGNOSIS — D50.8 OTHER IRON DEFICIENCY ANEMIA: ICD-10-CM

## 2022-11-08 DIAGNOSIS — G40.909 SEIZURE DISORDER (H): ICD-10-CM

## 2022-11-08 DIAGNOSIS — I69.354 HEMIPLEGIA AND HEMIPARESIS FOLLOWING CEREBRAL INFARCTION AFFECTING LEFT NON-DOMINANT SIDE (H): ICD-10-CM

## 2022-11-08 DIAGNOSIS — I77.810 THORACIC AORTIC ECTASIA (H): ICD-10-CM

## 2022-11-08 PROCEDURE — 69210 REMOVE IMPACTED EAR WAX UNI: CPT | Performed by: FAMILY MEDICINE

## 2022-11-08 PROCEDURE — 99213 OFFICE O/P EST LOW 20 MIN: CPT | Mod: 25 | Performed by: FAMILY MEDICINE

## 2022-11-08 PROCEDURE — 99397 PER PM REEVAL EST PAT 65+ YR: CPT | Mod: 25 | Performed by: FAMILY MEDICINE

## 2022-11-08 RX ORDER — GABAPENTIN 600 MG/1
TABLET ORAL
Qty: 225 TABLET | Refills: 4 | Status: SHIPPED | OUTPATIENT
Start: 2022-11-08 | End: 2023-12-06

## 2022-11-08 RX ORDER — FERROUS GLUCONATE 324(38)MG
324 TABLET ORAL
Qty: 90 TABLET | Refills: 4 | Status: SHIPPED | OUTPATIENT
Start: 2022-11-08 | End: 2023-12-06

## 2022-11-08 RX ORDER — AMLODIPINE BESYLATE 5 MG/1
5 TABLET ORAL DAILY
Qty: 90 TABLET | Refills: 4 | Status: SHIPPED | OUTPATIENT
Start: 2022-11-08 | End: 2023-12-06

## 2022-11-08 RX ORDER — ATORVASTATIN CALCIUM 20 MG/1
TABLET, FILM COATED ORAL
Qty: 90 TABLET | Refills: 4 | Status: SHIPPED | OUTPATIENT
Start: 2022-11-08 | End: 2023-12-06

## 2022-11-08 ASSESSMENT — ENCOUNTER SYMPTOMS
DYSURIA: 0
HEMATOCHEZIA: 0
MYALGIAS: 0
HEMATURIA: 0
WEAKNESS: 0
FEVER: 0
HEADACHES: 0
NERVOUS/ANXIOUS: 0
ARTHRALGIAS: 0
NAUSEA: 0
ABDOMINAL PAIN: 0
HEARTBURN: 0
COUGH: 0
DIZZINESS: 0
PARESTHESIAS: 0
SHORTNESS OF BREATH: 1
CHILLS: 0
CONSTIPATION: 1
DIARRHEA: 0
PALPITATIONS: 0
FREQUENCY: 1
JOINT SWELLING: 0
EYE PAIN: 0

## 2022-11-08 ASSESSMENT — ASTHMA QUESTIONNAIRES
QUESTION_4 LAST FOUR WEEKS HOW OFTEN HAVE YOU USED YOUR RESCUE INHALER OR NEBULIZER MEDICATION (SUCH AS ALBUTEROL): ONCE A WEEK OR LESS
ACT_TOTALSCORE: 23
ACT_TOTALSCORE: 23
QUESTION_5 LAST FOUR WEEKS HOW WOULD YOU RATE YOUR ASTHMA CONTROL: COMPLETELY CONTROLLED
QUESTION_3 LAST FOUR WEEKS HOW OFTEN DID YOUR ASTHMA SYMPTOMS (WHEEZING, COUGHING, SHORTNESS OF BREATH, CHEST TIGHTNESS OR PAIN) WAKE YOU UP AT NIGHT OR EARLIER THAN USUAL IN THE MORNING: NOT AT ALL
QUESTION_2 LAST FOUR WEEKS HOW OFTEN HAVE YOU HAD SHORTNESS OF BREATH: ONCE OR TWICE A WEEK
QUESTION_1 LAST FOUR WEEKS HOW MUCH OF THE TIME DID YOUR ASTHMA KEEP YOU FROM GETTING AS MUCH DONE AT WORK, SCHOOL OR AT HOME: NONE OF THE TIME

## 2022-11-08 ASSESSMENT — ACTIVITIES OF DAILY LIVING (ADL)
CURRENT_FUNCTION: MEDICATION ADMINISTRATION REQUIRES ASSISTANCE
CURRENT_FUNCTION: LAUNDRY REQUIRES ASSISTANCE
CURRENT_FUNCTION: SHOPPING REQUIRES ASSISTANCE
CURRENT_FUNCTION: HOUSEWORK REQUIRES ASSISTANCE
CURRENT_FUNCTION: TELEPHONE REQUIRES ASSISTANCE
CURRENT_FUNCTION: TRANSPORTATION REQUIRES ASSISTANCE
CURRENT_FUNCTION: BATHING REQUIRES ASSISTANCE
CURRENT_FUNCTION: PREPARING MEALS REQUIRES ASSISTANCE

## 2022-11-08 ASSESSMENT — PATIENT HEALTH QUESTIONNAIRE - PHQ9
SUM OF ALL RESPONSES TO PHQ QUESTIONS 1-9: 13
SUM OF ALL RESPONSES TO PHQ QUESTIONS 1-9: 13

## 2022-11-08 ASSESSMENT — PAIN SCALES - GENERAL: PAINLEVEL: NO PAIN (0)

## 2022-11-08 NOTE — PATIENT INSTRUCTIONS
Patient Education   Personalized Prevention Plan  You are due for the preventive services outlined below.  Your care team is available to assist you in scheduling these services.  If you have already completed any of these items, please share that information with your care team to update in your medical record.  Health Maintenance Due   Topic Date Due     ANNUAL REVIEW OF HM ORDERS  Never done     Zoster (Shingles) Vaccine (2 of 3) 01/29/2013     Asthma Control Test  10/20/2021     PHQ-2 (once per calendar year)  01/01/2022     Annual Wellness Visit  07/20/2022     Asthma Action Plan - yearly  07/20/2022     FALL RISK ASSESSMENT  07/20/2022

## 2022-11-08 NOTE — PROGRESS NOTES
"SUBJECTIVE:   Henrry is a 87 year old who presents for Preventive Visit.  Chief Complaint   Patient presents with     Physical     Health Maintenance     Advised patient of .      Patient is concerned that his ears are plugged.    Patient has been advised of split billing requirements and indicates understanding: Yes    Are you in the first 12 months of your Medicare coverage?  No    Healthy Habits:     In general, how would you rate your overall health?  Good    Frequency of exercise:  None    Do you usually eat at least 4 servings of fruit and vegetables a day, include whole grains    & fiber and avoid regularly eating high fat or \"junk\" foods?  Yes    Taking medications regularly:  Yes    Medication side effects:  None    Ability to successfully perform activities of daily living:  Telephone requires assistance, transportation requires assistance, shopping requires assistance, preparing meals requires assistance, housework requires assistance, bathing requires assistance, laundry requires assistance and medication administration requires assistance    Home Safety:  No safety concerns identified    Hearing Impairment:  Feel that people are mumbling or not speaking clearly and need to ask people to speak up or repeat themselves    In the past 6 months, have you been bothered by leaking of urine?  No    In general, how would you rate your overall mental or emotional health?  Fair      PHQ-2 Total Score: 6    Additional concerns today:  Yes  He has two ministers he speaks with about his struggles  His big concern is that he doesn't want to need to be in a nursing home  He gets help from wife's son and daughter.    Do you feel safe in your environment? Yes    Have you ever done Advance Care Planning? (For example, a Health Directive, POLST, or a discussion with a medical provider or your loved ones about your wishes): Yes, advance care planning is on file.    Fall risk  Fallen 2 or more times in the past year?: " No  Any fall with injury in the past year?: No    Cognitive Screening   1) Repeat 3 items (Leader, Season, Table)    2) Clock draw: NORMAL  3) 3 item recall: Recalls 1 object   Results: NORMAL clock, 1-2 items recalled: COGNITIVE IMPAIRMENT LESS LIKELY    Mini-CogTM Copyright STANISLAV Mccullough. Licensed by the author for use in [facility]; reprinted with permission (paolo@[facility]). All rights reserved.      Do you have sleep apnea, excessive snoring or daytime drowsiness?: daytime drowsiness    Reviewed and updated as needed this visit by clinical staff   Tobacco  Allergies  Meds  Problems  Med Hx  Surg Hx  Fam Hx        Reviewed and updated as needed this visit by Provider   Tobacco  Allergies  Meds  Problems  Med Hx  Surg Hx  Fam Hx         Social History     Tobacco Use     Smoking status: Never     Smokeless tobacco: Never   Substance Use Topics     Alcohol use: Yes     Comment: very rare     If you drink alcohol do you typically have >3 drinks per day or >7 drinks per week? No    Alcohol Use 11/8/2022   Prescreen: >3 drinks/day or >7 drinks/week? No   Prescreen: >3 drinks/day or >7 drinks/week? -     Current providers sharing in care for this patient include:   Patient Care Team:  Noemi Sandhu MD as PCP - General (Family Medicine)  Noemi Sandhu MD as Assigned PCP  Peggy Colbert MD as MD (Cardiovascular Disease)  Michelle Nicole APRN CNP as Assigned Heart and Vascular Provider    The following health maintenance items are reviewed in Epic and correct as of today:  Health Maintenance   Topic Date Due     ZOSTER IMMUNIZATION (2 of 3) 01/29/2013     MEDICARE ANNUAL WELLNESS VISIT  07/20/2022     ASTHMA ACTION PLAN  07/20/2022     DTAP/TDAP/TD IMMUNIZATION (4 - Td or Tdap) 12/04/2022     ASTHMA CONTROL TEST  05/08/2023     ANNUAL REVIEW OF HM ORDERS  11/08/2023     FALL RISK ASSESSMENT  11/08/2023     ADVANCE CARE PLANNING  11/08/2027     PHQ-2 (once per calendar year)   "Completed     INFLUENZA VACCINE  Completed     Pneumococcal Vaccine: 65+ Years  Completed     COVID-19 Vaccine  Completed     IPV IMMUNIZATION  Aged Out     MENINGITIS IMMUNIZATION  Aged Out     Review of Systems   Constitutional: Negative for chills and fever.   HENT: Positive for hearing loss. Negative for congestion and ear pain.    Eyes: Negative for pain and visual disturbance.   Respiratory: Positive for shortness of breath. Negative for cough.    Cardiovascular: Negative for chest pain, palpitations and peripheral edema.   Gastrointestinal: Positive for constipation. Negative for abdominal pain, diarrhea, heartburn, hematochezia and nausea.   Genitourinary: Positive for frequency and urgency. Negative for dysuria, genital sores, hematuria, impotence and penile discharge.   Musculoskeletal: Negative for arthralgias, joint swelling and myalgias.   Skin: Negative for rash.   Neurological: Negative for dizziness, weakness, headaches and paresthesias.   Psychiatric/Behavioral: Negative for mood changes. The patient is not nervous/anxious.      OBJECTIVE:   /78 (BP Location: Right arm, Patient Position: Sitting, Cuff Size: Adult Regular)   Pulse 50   Temp 97.8  F (36.6  C) (Tympanic)   Resp 16   Wt 73 kg (161 lb)   SpO2 96%   BMI 25.99 kg/m   Estimated body mass index is 25.99 kg/m  as calculated from the following:    Height as of 8/27/21: 1.676 m (5' 6\").    Weight as of this encounter: 73 kg (161 lb).  Physical Exam  GENERAL: healthy, alert and no distress. Wheelchair bound.  EYES: Eyes grossly normal to inspection, PERRL and conjunctivae and sclerae normal  HENT: Cerumenosis is noted.  Wax is removed by syringing and manual debridement. Patient tolerated without issues. After removal, ear canals and TM's normal, nose and mouth without ulcers or lesions  NECK: no adenopathy, no asymmetry, masses, or scars and thyroid normal to palpation  RESP: seemed to have short inspiratory and expiratory phases, " but otherwise lungs clear to auscultation - no rales, rhonchi or wheezes  CV: regular rate and rhythm, normal S1 S2, no S3 or S4, no murmur, click or rub, no peripheral edema and peripheral pulses strong  ABDOMEN: soft, nontender, no hepatosplenomegaly, no masses and bowel sounds normal  MS: no gross musculoskeletal defects noted, no edema  SKIN: no suspicious lesions or rashes  NEURO: L hemiplegia but is able to extend L leg somewhat. Otherwise nmentation intact and speech normal  PSYCH: mentation appears normal, affect normal/bright      ASSESSMENT / PLAN:   Henrry was seen today for physical and health maintenance.    Diagnoses and all orders for this visit:    Encounter for Medicare annual wellness exam    Essential hypertension, benign  -     amLODIPine (NORVASC) 5 MG tablet; Take 1 tablet (5 mg) by mouth daily  -     atorvastatin (LIPITOR) 20 MG tablet; TAKE ONE TABLET BY MOUTH AT BEDTIME    Other iron deficiency anemia  -     ferrous gluconate (FERGON) 324 (38 Fe) MG tablet; Take 1 tablet (324 mg) by mouth daily (with breakfast)    Seizure disorder (H)  -     gabapentin (NEURONTIN) 600 MG tablet; TAKE 1 TABLET BY MOUTH IN THE MORNING, ONE-HALF TABLET AT NOON, AND 1 TABLET IN THE EVENING Total of 2.5 tablets per day    Thoracic aortic ectasia (H)  Follows closely with Cardiology    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 Does not actively change treatment today. Is not on antiplatelet agent due to anemia.    Asbestosis (H)  Known chronic condition causing restrictive lung disease that causes chronic dyspnea and complicates care when he gets URIs. I have noted this but it doesn't change his care today. He does not follow with pulmonology currently. Not severely debilitating. Has albuterol prn.    Gastroesophageal reflux disease without esophagitis  -     omeprazole  "(PRILOSEC) 20 MG DR capsule; Take 1 capsule (20 mg) by mouth daily    Bilateral impacted cerumen  -     WA REMOVAL IMPACTED CERUMEN IRRIGATION/LVG UNILAT  -     REMOVE IMPACTED CERUMEN    Other orders  -     REVIEW OF HEALTH MAINTENANCE PROTOCOL ORDERS      Patient has been advised of split billing requirements and indicates understanding: Yes      COUNSELING:  Reviewed preventive health counseling, as reflected in patient instructions    Estimated body mass index is 25.99 kg/m  as calculated from the following:    Height as of 8/27/21: 1.676 m (5' 6\").    Weight as of this encounter: 73 kg (161 lb).  Stable, healthy weight.    He reports that he has never smoked. He has never used smokeless tobacco.      Appropriate preventive services were discussed with this patient, including applicable screening as appropriate for cardiovascular disease, diabetes, osteopenia/osteoporosis, and glaucoma.  As appropriate for age/gender, discussed screening for colorectal cancer, prostate cancer, breast cancer, and cervical cancer. Checklist reviewing preventive services available has been given to the patient.    Reviewed patients plan of care and provided an AVS. The Basic Care Plan (routine screening as documented in Health Maintenance) for Henrry meets the Care Plan requirement. This Care Plan has been established and reviewed with the Patient.      Noemi Sandhu MD  [facility]    Identified Health Risks:  Answers for HPI/ROS submitted by the patient on 11/8/2022  PHQ9 TOTAL SCORE: 13      "

## 2023-01-23 DIAGNOSIS — G40.909 SEIZURE DISORDER (H): ICD-10-CM

## 2023-01-23 RX ORDER — GABAPENTIN 600 MG/1
TABLET ORAL
Qty: 225 TABLET | Refills: 4 | Status: CANCELLED | OUTPATIENT
Start: 2023-01-23

## 2023-01-31 NOTE — TELEPHONE ENCOUNTER
GENERAL PRE-PROCEDURE:   Procedure:  Transesophageal echocardiogram  Date/Time:  1/31/2023 3:40 PM    Written consent obtained?: Yes    Risks and benefits: Risks, benefits and alternatives were discussed    Consent given by:  Patient  Patient states understanding of procedure being performed: Yes    Patient's understanding of procedure matches consent: Yes    Procedure consent matches procedure scheduled: Yes    Expected level of sedation:  Moderate  Appropriately NPO:  Yes  Mallampati  :  Grade 4- soft palate obscured by base of tongue  Lungs:  Lungs clear with good breath sounds bilaterally  Heart:  Normal heart sounds with tachycardia  History & Physical reviewed:  History and physical reviewed and no updates needed  Statement of review:  I have reviewed the lab findings, diagnostic data, medications, and the plan for sedation     Sent, please call pt if needed  Noemi Sandhu MD

## 2023-03-17 ENCOUNTER — APPOINTMENT (OUTPATIENT)
Dept: GENERAL RADIOLOGY | Facility: CLINIC | Age: 88
DRG: 177 | End: 2023-03-17
Attending: STUDENT IN AN ORGANIZED HEALTH CARE EDUCATION/TRAINING PROGRAM
Payer: COMMERCIAL

## 2023-03-17 ENCOUNTER — APPOINTMENT (OUTPATIENT)
Dept: CT IMAGING | Facility: CLINIC | Age: 88
DRG: 177 | End: 2023-03-17
Attending: STUDENT IN AN ORGANIZED HEALTH CARE EDUCATION/TRAINING PROGRAM
Payer: COMMERCIAL

## 2023-03-17 ENCOUNTER — NURSE TRIAGE (OUTPATIENT)
Dept: CARDIOLOGY | Facility: CLINIC | Age: 88
End: 2023-03-17

## 2023-03-17 ENCOUNTER — HOSPITAL ENCOUNTER (INPATIENT)
Facility: CLINIC | Age: 88
LOS: 5 days | Discharge: HOME HEALTH | DRG: 177 | End: 2023-03-22
Attending: STUDENT IN AN ORGANIZED HEALTH CARE EDUCATION/TRAINING PROGRAM | Admitting: INTERNAL MEDICINE
Payer: COMMERCIAL

## 2023-03-17 DIAGNOSIS — J18.9 ATYPICAL PNEUMONIA: Primary | ICD-10-CM

## 2023-03-17 DIAGNOSIS — M62.81 GENERALIZED MUSCLE WEAKNESS: ICD-10-CM

## 2023-03-17 DIAGNOSIS — U07.1 INFECTION DUE TO 2019 NOVEL CORONAVIRUS: ICD-10-CM

## 2023-03-17 DIAGNOSIS — I48.91 ATRIAL FIBRILLATION, UNSPECIFIED TYPE (H): ICD-10-CM

## 2023-03-17 DIAGNOSIS — R26.2 AMBULATORY DYSFUNCTION: ICD-10-CM

## 2023-03-17 LAB
ALBUMIN UR-MCNC: NEGATIVE MG/DL
ANION GAP SERPL CALCULATED.3IONS-SCNC: 9 MMOL/L (ref 7–15)
APPEARANCE UR: CLEAR
APTT PPP: 34 SECONDS (ref 22–38)
BASOPHILS # BLD AUTO: 0 10E3/UL (ref 0–0.2)
BASOPHILS NFR BLD AUTO: 0 %
BILIRUB UR QL STRIP: NEGATIVE
BUN SERPL-MCNC: 28.8 MG/DL (ref 8–23)
CALCIUM SERPL-MCNC: 8.7 MG/DL (ref 8.8–10.2)
CHLORIDE SERPL-SCNC: 102 MMOL/L (ref 98–107)
COLOR UR AUTO: YELLOW
CREAT SERPL-MCNC: 1.37 MG/DL (ref 0.67–1.17)
DEPRECATED HCO3 PLAS-SCNC: 24 MMOL/L (ref 22–29)
EOSINOPHIL # BLD AUTO: 0 10E3/UL (ref 0–0.7)
EOSINOPHIL NFR BLD AUTO: 0 %
ERYTHROCYTE [DISTWIDTH] IN BLOOD BY AUTOMATED COUNT: 12.8 % (ref 10–15)
FLUAV RNA SPEC QL NAA+PROBE: NEGATIVE
FLUBV RNA RESP QL NAA+PROBE: NEGATIVE
GFR SERPL CREATININE-BSD FRML MDRD: 50 ML/MIN/1.73M2
GLUCOSE BLDC GLUCOMTR-MCNC: 113 MG/DL (ref 70–99)
GLUCOSE SERPL-MCNC: 114 MG/DL (ref 70–99)
GLUCOSE UR STRIP-MCNC: NEGATIVE MG/DL
HCT VFR BLD AUTO: 38.1 % (ref 40–53)
HGB BLD-MCNC: 12.9 G/DL (ref 13.3–17.7)
HGB UR QL STRIP: NEGATIVE
IMM GRANULOCYTES # BLD: 0 10E3/UL
IMM GRANULOCYTES NFR BLD: 0 %
INR PPP: 1.14 (ref 0.85–1.15)
KETONES UR STRIP-MCNC: NEGATIVE MG/DL
LACTATE SERPL-SCNC: 1.3 MMOL/L (ref 0.7–2)
LEUKOCYTE ESTERASE UR QL STRIP: NEGATIVE
LYMPHOCYTES # BLD AUTO: 0.4 10E3/UL (ref 0.8–5.3)
LYMPHOCYTES NFR BLD AUTO: 6 %
MCH RBC QN AUTO: 29.9 PG (ref 26.5–33)
MCHC RBC AUTO-ENTMCNC: 33.9 G/DL (ref 31.5–36.5)
MCV RBC AUTO: 88 FL (ref 78–100)
MONOCYTES # BLD AUTO: 1 10E3/UL (ref 0–1.3)
MONOCYTES NFR BLD AUTO: 14 %
MUCOUS THREADS #/AREA URNS LPF: PRESENT /LPF
NEUTROPHILS # BLD AUTO: 5.6 10E3/UL (ref 1.6–8.3)
NEUTROPHILS NFR BLD AUTO: 80 %
NITRATE UR QL: NEGATIVE
NRBC # BLD AUTO: 0 10E3/UL
NRBC BLD AUTO-RTO: 0 /100
PH UR STRIP: 6 [PH] (ref 5–7)
PLATELET # BLD AUTO: 168 10E3/UL (ref 150–450)
POTASSIUM SERPL-SCNC: 4.2 MMOL/L (ref 3.4–5.3)
RBC # BLD AUTO: 4.31 10E6/UL (ref 4.4–5.9)
RBC URINE: 2 /HPF
RSV RNA SPEC NAA+PROBE: NEGATIVE
SARS-COV-2 RNA RESP QL NAA+PROBE: POSITIVE
SODIUM SERPL-SCNC: 135 MMOL/L (ref 136–145)
SP GR UR STRIP: 1.01 (ref 1–1.03)
TROPONIN T SERPL HS-MCNC: 58 NG/L
UROBILINOGEN UR STRIP-MCNC: NORMAL MG/DL
WBC # BLD AUTO: 7.2 10E3/UL (ref 4–11)
WBC URINE: 2 /HPF

## 2023-03-17 PROCEDURE — 80061 LIPID PANEL: CPT

## 2023-03-17 PROCEDURE — 0042T CT HEAD PERFUSION W CONTRAST: CPT

## 2023-03-17 PROCEDURE — 70496 CT ANGIOGRAPHY HEAD: CPT

## 2023-03-17 PROCEDURE — 36415 COLL VENOUS BLD VENIPUNCTURE: CPT | Performed by: INTERNAL MEDICINE

## 2023-03-17 PROCEDURE — 81001 URINALYSIS AUTO W/SCOPE: CPT | Performed by: STUDENT IN AN ORGANIZED HEALTH CARE EDUCATION/TRAINING PROGRAM

## 2023-03-17 PROCEDURE — 99285 EMERGENCY DEPT VISIT HI MDM: CPT | Mod: CS,25 | Performed by: STUDENT IN AN ORGANIZED HEALTH CARE EDUCATION/TRAINING PROGRAM

## 2023-03-17 PROCEDURE — 71045 X-RAY EXAM CHEST 1 VIEW: CPT

## 2023-03-17 PROCEDURE — 83036 HEMOGLOBIN GLYCOSYLATED A1C: CPT

## 2023-03-17 PROCEDURE — 99285 EMERGENCY DEPT VISIT HI MDM: CPT | Mod: CS | Performed by: STUDENT IN AN ORGANIZED HEALTH CARE EDUCATION/TRAINING PROGRAM

## 2023-03-17 PROCEDURE — 99223 1ST HOSP IP/OBS HIGH 75: CPT | Mod: AI

## 2023-03-17 PROCEDURE — 85379 FIBRIN DEGRADATION QUANT: CPT

## 2023-03-17 PROCEDURE — 82310 ASSAY OF CALCIUM: CPT | Performed by: STUDENT IN AN ORGANIZED HEALTH CARE EDUCATION/TRAINING PROGRAM

## 2023-03-17 PROCEDURE — 84484 ASSAY OF TROPONIN QUANT: CPT

## 2023-03-17 PROCEDURE — 85610 PROTHROMBIN TIME: CPT | Performed by: STUDENT IN AN ORGANIZED HEALTH CARE EDUCATION/TRAINING PROGRAM

## 2023-03-17 PROCEDURE — 93005 ELECTROCARDIOGRAM TRACING: CPT | Performed by: STUDENT IN AN ORGANIZED HEALTH CARE EDUCATION/TRAINING PROGRAM

## 2023-03-17 PROCEDURE — 250N000009 HC RX 250: Performed by: STUDENT IN AN ORGANIZED HEALTH CARE EDUCATION/TRAINING PROGRAM

## 2023-03-17 PROCEDURE — 70450 CT HEAD/BRAIN W/O DYE: CPT

## 2023-03-17 PROCEDURE — 83605 ASSAY OF LACTIC ACID: CPT | Performed by: INTERNAL MEDICINE

## 2023-03-17 PROCEDURE — 85025 COMPLETE CBC W/AUTO DIFF WBC: CPT | Performed by: STUDENT IN AN ORGANIZED HEALTH CARE EDUCATION/TRAINING PROGRAM

## 2023-03-17 PROCEDURE — 86140 C-REACTIVE PROTEIN: CPT

## 2023-03-17 PROCEDURE — 82962 GLUCOSE BLOOD TEST: CPT

## 2023-03-17 PROCEDURE — 85730 THROMBOPLASTIN TIME PARTIAL: CPT | Performed by: STUDENT IN AN ORGANIZED HEALTH CARE EDUCATION/TRAINING PROGRAM

## 2023-03-17 PROCEDURE — 250N000011 HC RX IP 250 OP 636: Performed by: STUDENT IN AN ORGANIZED HEALTH CARE EDUCATION/TRAINING PROGRAM

## 2023-03-17 PROCEDURE — 87637 SARSCOV2&INF A&B&RSV AMP PRB: CPT | Performed by: STUDENT IN AN ORGANIZED HEALTH CARE EDUCATION/TRAINING PROGRAM

## 2023-03-17 PROCEDURE — 84484 ASSAY OF TROPONIN QUANT: CPT | Performed by: STUDENT IN AN ORGANIZED HEALTH CARE EDUCATION/TRAINING PROGRAM

## 2023-03-17 PROCEDURE — 93010 ELECTROCARDIOGRAM REPORT: CPT | Performed by: STUDENT IN AN ORGANIZED HEALTH CARE EDUCATION/TRAINING PROGRAM

## 2023-03-17 PROCEDURE — 70498 CT ANGIOGRAPHY NECK: CPT

## 2023-03-17 PROCEDURE — 36415 COLL VENOUS BLD VENIPUNCTURE: CPT | Performed by: STUDENT IN AN ORGANIZED HEALTH CARE EDUCATION/TRAINING PROGRAM

## 2023-03-17 PROCEDURE — C9803 HOPD COVID-19 SPEC COLLECT: HCPCS | Performed by: STUDENT IN AN ORGANIZED HEALTH CARE EDUCATION/TRAINING PROGRAM

## 2023-03-17 PROCEDURE — 120N000001 HC R&B MED SURG/OB

## 2023-03-17 RX ORDER — GADOBUTROL 604.72 MG/ML
7.5 INJECTION INTRAVENOUS ONCE
Status: DISCONTINUED | OUTPATIENT
Start: 2023-03-17 | End: 2023-03-17

## 2023-03-17 RX ORDER — AMOXICILLIN 500 MG/1
CAPSULE ORAL
COMMUNITY
Start: 2022-12-26 | End: 2023-07-21

## 2023-03-17 RX ORDER — IOPAMIDOL 755 MG/ML
68 INJECTION, SOLUTION INTRAVASCULAR ONCE
Status: COMPLETED | OUTPATIENT
Start: 2023-03-17 | End: 2023-03-17

## 2023-03-17 RX ORDER — ACETAMINOPHEN 500 MG
500 TABLET ORAL EVERY 6 HOURS PRN
COMMUNITY

## 2023-03-17 RX ORDER — IOPAMIDOL 755 MG/ML
100 INJECTION, SOLUTION INTRAVASCULAR ONCE
Status: COMPLETED | OUTPATIENT
Start: 2023-03-17 | End: 2023-03-17

## 2023-03-17 RX ADMIN — SODIUM CHLORIDE 100 ML: 9 INJECTION, SOLUTION INTRAVENOUS at 17:37

## 2023-03-17 RX ADMIN — SODIUM CHLORIDE 100 ML: 9 INJECTION, SOLUTION INTRAVENOUS at 17:13

## 2023-03-17 RX ADMIN — IOPAMIDOL 100 ML: 755 INJECTION, SOLUTION INTRAVENOUS at 17:37

## 2023-03-17 RX ADMIN — IOPAMIDOL 68 ML: 755 INJECTION, SOLUTION INTRAVENOUS at 17:13

## 2023-03-17 ASSESSMENT — ACTIVITIES OF DAILY LIVING (ADL)
ADLS_ACUITY_SCORE: 35
EQUIPMENT_CURRENTLY_USED_AT_HOME: WHEELCHAIR, MANUAL
CHANGE_IN_FUNCTIONAL_STATUS_SINCE_ONSET_OF_CURRENT_ILLNESS/INJURY: YES
ADLS_ACUITY_SCORE: 32
ADLS_ACUITY_SCORE: 33
ADLS_ACUITY_SCORE: 35

## 2023-03-17 ASSESSMENT — VISUAL ACUITY: OU: NORMAL ACUITY

## 2023-03-17 NOTE — ED PROVIDER NOTES
History     Chief Complaint   Patient presents with     Numbness     HPI  Henrry Fonseca is a 88 year old male with documented past medical history which includes aortic stenosis s/p TAVR, CAD, restrictive lung disease secondary to asbestos exposure, and right-sided hemorrhagic thalamic CVA in 6/2020 due to uncontrolled hypertension resulting in left-sided hemiparesis who presents to the department today accompanied by wife for evaluation of difficulty with ambulation.  Patient explains that he felt well going to bed last night, however when he awoke around typical time of 9 AM he was unable to stand up right or ambulate due to what he perceives to be right leg weakness.  He has been unable to get out of his lounge chair due to the weakness, although does not appreciate right upper extremity weakness.  Staff questions dysarthria but patient and spouse say that is his baseline speech.  Patient denies headache, dizziness, neck pain, chest pain, cough, shortness of breath, abdominal pain or gastrointestinal symptoms.  No recent falls or injuries.  No other complaints.  Of note, patient arrived to the department febrile but he was unaware of fever or chills.      Allergies:  Allergies   Allergen Reactions     Keppra Fatigue     Crying very depressed       Problem List:    Patient Active Problem List    Diagnosis Date Noted     Generalized muscle weakness 03/17/2023     Priority: Medium     Ambulatory dysfunction 03/17/2023     Priority: Medium     Infection due to 2019 novel coronavirus 03/17/2023     Priority: Medium     Thoracic aortic ectasia (H) 11/08/2022     Priority: Medium     Mixed hearing loss, bilateral 07/20/2021     Priority: Medium     Declines referral for now - feels it's acceptable       Other iron deficiency anemia      Priority: Medium     Aortic stenosis, severe 01/26/2021     Priority: Medium     Status post coronary angiogram 01/13/2021     Priority: Medium     Hemorrhagic stroke (H) 10/20/2020      Priority: Medium     Aortic valve stenosis, etiology of cardiac valve disease unspecified 10/20/2020     Priority: Medium     Left hemiparesis (H) 06/29/2020     Priority: Medium     Dysarthria 06/29/2020     Priority: Medium     Dysphagia 06/29/2020     Priority: Medium     Eczema 03/09/2015     Priority: Medium     Esophageal reflux 06/10/2013     Priority: Medium     Health Care Home 12/28/2012     Priority: Medium     Ayana Donald RN-PHN  [facility] / [facility]   [phone]    DX V65.8 REPLACED WITH [facility] (04/08/2013)       Essential hypertension, benign 09/17/2007     Priority: Medium     Asbestosis (H) 11/15/2006     Priority: Medium     Patient has bilateral plaques most likely related to asbestos exposure. He wanted to make sure whether there needs to be a follow up or not in terms of the Ct scan. Plaques stable since 2000 and PFt was normal recently.      Problem list name updated by automated process. Provider to review       Senile nuclear sclerosis 04/06/2011     Priority: Low     Advanced directives, counseling/discussion 03/29/2011     Priority: Low     Patient has completed an Advance/Health Care Directive (HCD), scanned into Epic Media tab, entry date of 9/15/05.    Carlos Ontiveros CMA  March 29, 2011         CARDIOVASCULAR SCREENING; LDL GOAL LESS THAN 160 10/31/2010     Priority: Low     Rotator cuff syndrome 10/13/2009     Priority: Low     Tendonitis, bursitis-labral tear.   2009       Elevated PSA 10/13/2009     Priority: Low     Yearly monitor         Heel pain 11/04/2008     Priority: Low     Seizure disorder (H) 11/04/2008     Priority: Low     Keppra had side effects: Depression       Back pain 06/05/2008     Priority: Low     Syncope and collapse 11/09/2007     Priority: Low     Mild intermittent asthma 05/15/2006     Priority: Low     PFT 9/06 normal        Generalized osteoarthrosis, unspecified site 04/19/2005     Priority: Low        Past Medical  History:    Past Medical History:   Diagnosis Date     Asbestosis(501)      Cardiomegaly      Cerebral infarction (H)      Closed fracture of unspecified part of humerus      Diverticulosis of colon (without mention of hemorrhage)      Esophageal reflux      Other specified anemias      Other specified iron deficiency anemias      Uncomplicated asthma        Past Surgical History:    Past Surgical History:   Procedure Laterality Date     COLONOSCOPY  8/18/04     Repeat in 10 years.     CV HEART CATHETERIZATION WITH POSSIBLE INTERVENTION N/A 1/13/2021    Procedure: Heart Catheterization with Possible Intervention;  Surgeon: Dmitriy Frost MD;  Location:  HEART CARDIAC CATH LAB     CV RIGHT HEART CATH MEASUREMENTS RECORDED N/A 1/13/2021    Procedure: Right Heart Cath;  Surgeon: Dmitriy Frost MD;  Location:  HEART CARDIAC CATH LAB     CV TRANSCATHETER AORTIC VALVE REPLACEMENT N/A 1/26/2021    Procedure: Transcatheter Aortic Valve Replacement;  Surgeon: Peggy Colbert MD;  Location:  HEART CARDIAC CATH LAB     ORTHOPEDIC SURGERY      right Fibula repair     PHACOEMULSIFICATION WITH STANDARD INTRAOCULAR LENS IMPLANT  4/7/2011    Procedure:PHACOEMULSIFICATION WITH STANDARD INTRAOCULAR LENS IMPLANT; Surgeon:MJ CHOI; Location:[facility]     PHACOEMULSIFICATION WITH STANDARD INTRAOCULAR LENS IMPLANT  5/9/2011    Procedure:PHACOEMULSIFICATION WITH STANDARD INTRAOCULAR LENS IMPLANT; Surgeon:MJ CHOI; Location:[facility] OR     SURGICAL HISTORY OF -       vasectomy     SURGICAL HISTORY OF -       appy       Family History:    Family History   Problem Relation Age of Onset     Cancer Father         lung     Cancer Mother         pancreatic     Alcohol/Drug Son      Alcohol/Drug Son      Cancer Sister         1/2 sister lung cancer       Social History:  Marital Status:   [2]  Social History     Tobacco Use     Smoking status: Never     Smokeless tobacco: Never   Substance Use Topics      "Alcohol use: Yes     Comment: very rare     Drug use: No        Medications:    acetaminophen (TYLENOL) 500 MG tablet  albuterol (PROAIR HFA/PROVENTIL HFA/VENTOLIN HFA) 108 (90 Base) MCG/ACT inhaler  amLODIPine (NORVASC) 5 MG tablet  atorvastatin (LIPITOR) 20 MG tablet  ferrous gluconate (FERGON) 324 (38 Fe) MG tablet  gabapentin (NEURONTIN) 600 MG tablet  omeprazole (PRILOSEC) 20 MG DR capsule  amoxicillin (AMOXIL) 500 MG capsule          Review of Systems  Constitutional: Denies recent fever or chills.  Cardiovascular:  Negative for chest discomfort.  Respiratory:  Negative for shortness of breath or difficulty breathing.   Gastrointestinal:  Negative for abdominal pain, nausea or vomiting.   Genitourinary:  Negative for dysuria or urgency.  Musculoskeletal: Positive for lower extremity weakness.  Negative for neck or back pain.  Denies recent injury.  Neurological:  Negative for headache or dizziness.    All others reviewed and are negative.      Physical Exam   BP: 128/77  Pulse: 88  Temp: (!) 101.1  F (38.4  C)  Resp: 18  Height: 167.6 cm (5' 6\")  Weight: 74.8 kg (165 lb)  SpO2: 93 %      Physical Exam  Constitutional:  Well developed, well nourished.  Appears nontoxic and in no acute distress.    HENT:  Normocephalic and atraumatic.  Symmetric in appearance.  Eyes:  Conjunctivae are normal.  Cardiovascular:  No cyanosis.  Irregular rhythm.    Respiratory:  Effort normal without sign of respiratory distress.  No audible wheezing or stridor.  CTAB.   Gastrointestinal:  Soft nondistended abdomen.  Nontender and without guarding.  No rigidity or rebound tenderness.  Negative Greene's sign.  Negative McBurney's point.    Musculoskeletal: Baseline weakness of LUE, moving other 3 extremities spontaneously and without appreciable weakness.  Neurological:  Patient is alert and oriented with mild dysarthria.  Skin:  Skin is warm and dry.  Psychiatric:  Normal mood and affect.      ED Course               "   Procedures                EKG Interpretation:      Interpreted by: Shady Wyatt  Time reviewed: Upon completion    Symptoms at time of EKG: Weakness  Rhythm: Irregular narrow complex  Rate: 78 bpm  Axis: Left  Conduction: First-degree AV block  ST Segments/ T Waves: No pathologic ST-elevations or T-wave abnormalities.  Q Waves: None  Comparison to prior: New atrial fibrillation    Clinical Impression: No sign of ischemia         Critical Care time:  none               Results for orders placed or performed during the hospital encounter of 03/17/23 (from the past 24 hour(s))   Glucose by meter   Result Value Ref Range    GLUCOSE BY METER POCT 113 (H) 70 - 99 mg/dL   CBC with Platelets & Differential    Narrative    The following orders were created for panel order CBC with Platelets & Differential.  Procedure                               Abnormality         Status                     ---------                               -----------         ------                     CBC with platelets and d...[[id number]]  Abnormal            Final result                 Please view results for these tests on the individual orders.   Basic metabolic panel   Result Value Ref Range    Sodium 135 (L) 136 - 145 mmol/L    Potassium 4.2 3.4 - 5.3 mmol/L    Chloride 102 98 - 107 mmol/L    Carbon Dioxide (CO2) 24 22 - 29 mmol/L    Anion Gap 9 7 - 15 mmol/L    Urea Nitrogen 28.8 (H) 8.0 - 23.0 mg/dL    Creatinine 1.37 (H) 0.67 - 1.17 mg/dL    Calcium 8.7 (L) 8.8 - 10.2 mg/dL    Glucose 114 (H) 70 - 99 mg/dL    GFR Estimate 50 (L) >60 mL/min/1.73m2   INR   Result Value Ref Range    INR 1.14 0.85 - 1.15   Partial thromboplastin time   Result Value Ref Range    aPTT 34 22 - 38 Seconds   Troponin T, High Sensitivity   Result Value Ref Range    Troponin T, High Sensitivity 58 (H) <=22 ng/L   CBC with platelets and differential   Result Value Ref Range    WBC Count 7.2 4.0 - 11.0 10e3/uL    RBC Count 4.31 (L) 4.40 - 5.90 10e6/uL    Hemoglobin  12.9 (L) 13.3 - 17.7 g/dL    Hematocrit 38.1 (L) 40.0 - 53.0 %    MCV 88 78 - 100 fL    MCH 29.9 26.5 - 33.0 pg    MCHC 33.9 31.5 - 36.5 g/dL    RDW 12.8 10.0 - 15.0 %    Platelet Count 168 150 - 450 10e3/uL    % Neutrophils 80 %    % Lymphocytes 6 %    % Monocytes 14 %    % Eosinophils 0 %    % Basophils 0 %    % Immature Granulocytes 0 %    NRBCs per 100 WBC 0 <1 /100    Absolute Neutrophils 5.6 1.6 - 8.3 10e3/uL    Absolute Lymphocytes 0.4 (L) 0.8 - 5.3 10e3/uL    Absolute Monocytes 1.0 0.0 - 1.3 10e3/uL    Absolute Eosinophils 0.0 0.0 - 0.7 10e3/uL    Absolute Basophils 0.0 0.0 - 0.2 10e3/uL    Absolute Immature Granulocytes 0.0 <=0.4 10e3/uL    Absolute NRBCs 0.0 10e3/uL   CT Head w/o Contrast    Narrative    EXAM: CT HEAD W/O CONTRAST, CTA HEAD NECK W CONTRAST, CT HEAD PERFUSION W CONTRAST  LOCATION: [facility]  DATE/TIME: 3/17/2023 5:18 PM    INDICATION: Code Stroke to evaluate for potential thrombolysis and thrombectomy. PLEASE READ IMMEDIATELY.  COMPARISON: None.  CONTRAST: 68 mL Isovue 370 (accession [id number]), 100 mL Isovue 370 (accession [id number]), 68 mL Isovue 370 (accession [id number])  TECHNIQUE: Head and neck CT angiogram with IV contrast. Noncontrast head CT followed by axial helical CT images of the head and neck vessels obtained during the arterial phase of intravenous contrast administration. Axial 2D reconstructed images and   multiplanar 3D MIP reconstructed images of the head and neck vessels were performed by the technologist. Additional CT cerebral perfusion was performed utilizing a second contrast bolus. Perfusion data were post processed with generation of standard   perfusion maps and estimation of ischemic/infarcted volumes utilizing standard threshold values. Dose reduction techniques were used. All stenosis measurements made according to NASCET criteria unless otherwise specified.    FINDINGS:  NONCONTRAST HEAD CT:   INTRACRANIAL CONTENTS:  Age-indeterminate lacunar infarcts in the right thalamus and caudate. Remaining gray-white matter interfaces are intact. No hemorrhage or extraaxial collection. No mass effect. Subarachnoid cisterns are patent. Patchy white matter   hypodensities are, while nonspecific, most compatible with chronic microvascular ischemic changes. Mild generalized volume loss. No hydrocephalus.    VISUALIZED ORBITS/SINUSES/MASTOIDS: Bilateral lens replacements. Mild-moderate mucosal thickening scattered about the paranasal sinuses. No middle ear or mastoid effusion.    BONES/SOFT TISSUES: No acute abnormality.    HEAD CTA:  ANTERIOR CIRCULATION: No proximal branch vessel occlusion or flow-limiting stenosis. Mild atherosclerosis scattered about the bilateral carotid siphons. Small conical shaped outpouching directed inferiorly at the right ICA terminus is compatible with   infundibulum for posterior communicating artery.    POSTERIOR CIRCULATION: No proximal branch vessel occlusion or flow-limiting stenosis. No aneurysm or high-flow vascular malformation.    DURAL VENOUS SINUSES: Not well evaluated on a technical basis.    NECK CTA:  RIGHT CAROTID: Mild atherosclerosis without hemodynamically significant stenosis by NASCET criteria at the carotid bifurcation. No dissection.    LEFT CAROTID: Patent without flow-limiting stenosis by NASCET criteria at the carotid bifurcation. No dissection.    VERTEBRAL ARTERIES: Right vertebral artery is dominant. Scattered mild-moderate V2 segment stenosis due to extrinsic compression from degenerative facet/uncovertebral hypertrophy most pronounced on the left (series 4 image 219). No atherosclerotic   flow-limiting stenosis or findings of dissection.    AORTIC ARCH: Three-vessel aortic arch configuration. Visualized great vessels are widely patent.    NONVASCULAR STRUCTURES: Lung apices are clear. No neck mass or lymphadenopathy. Thyroid gland is homogenous. No acute or aggressive appearing osseous  abnormalities. Multilevel degenerative changes in the visualized cervical spine.    CT PERFUSION:  PERFUSION MAPS: Symmetrical cerebral perfusion. No focal deficits in cerebral blood flow or volume to suggest ischemia/oligemia.    RAPID ANALYSIS:  CBF<30%: 0 mL  Tmax>6sec: 0 mL  Mismatch volume: 0 mL  Mismatch ratio: 9      Impression    IMPRESSION:   HEAD CT:  1.  No acute transcortical infarct, intracranial hemorrhage, or mass effect.   2.  Age indeterminate lacunar infarcts in the right caudate and thalamus.  3.  Mild diffuse cerebral volume loss and features compatible with chronic microangiopathic ischemic white matter changes.    HEAD CTA:  1.  No proximal branch vessel occlusion, flow-limiting stenosis, aneurysm, or vascular malformation.    NECK CTA:  1.  No atherosclerotic flow-limiting stenosis or findings of dissection.   2.  Scattered mild-moderate focal narrowings of the V2 vertebral artery segments due to compression from degenerative uncovertebral/facet hypertrophy in the cervical spine.    CT PERFUSION:  1.  Normal cerebral perfusion.    Zach Headley MD notified provider, ELLIOT CABALLERO, of CT head results via telephone at 3/17/2023 5:24 PM, who acknowledge understanding. Discussed CTA results at 5:39 PM.       CTA Head Neck with Contrast    Narrative    EXAM: CT HEAD W/O CONTRAST, CTA HEAD NECK W CONTRAST, CT HEAD PERFUSION W CONTRAST  LOCATION: [facility]  DATE/TIME: 3/17/2023 5:18 PM    INDICATION: Code Stroke to evaluate for potential thrombolysis and thrombectomy. PLEASE READ IMMEDIATELY.  COMPARISON: None.  CONTRAST: 68 mL Isovue 370 (accession [id number]), 100 mL Isovue 370 (accession [id number]), 68 mL Isovue 370 (accession [id number])  TECHNIQUE: Head and neck CT angiogram with IV contrast. Noncontrast head CT followed by axial helical CT images of the head and neck vessels obtained during the arterial phase of intravenous contrast administration. Axial 2D  reconstructed images and   multiplanar 3D MIP reconstructed images of the head and neck vessels were performed by the technologist. Additional CT cerebral perfusion was performed utilizing a second contrast bolus. Perfusion data were post processed with generation of standard   perfusion maps and estimation of ischemic/infarcted volumes utilizing standard threshold values. Dose reduction techniques were used. All stenosis measurements made according to NASCET criteria unless otherwise specified.    FINDINGS:  NONCONTRAST HEAD CT:   INTRACRANIAL CONTENTS: Age-indeterminate lacunar infarcts in the right thalamus and caudate. Remaining gray-white matter interfaces are intact. No hemorrhage or extraaxial collection. No mass effect. Subarachnoid cisterns are patent. Patchy white matter   hypodensities are, while nonspecific, most compatible with chronic microvascular ischemic changes. Mild generalized volume loss. No hydrocephalus.    VISUALIZED ORBITS/SINUSES/MASTOIDS: Bilateral lens replacements. Mild-moderate mucosal thickening scattered about the paranasal sinuses. No middle ear or mastoid effusion.    BONES/SOFT TISSUES: No acute abnormality.    HEAD CTA:  ANTERIOR CIRCULATION: No proximal branch vessel occlusion or flow-limiting stenosis. Mild atherosclerosis scattered about the bilateral carotid siphons. Small conical shaped outpouching directed inferiorly at the right ICA terminus is compatible with   infundibulum for posterior communicating artery.    POSTERIOR CIRCULATION: No proximal branch vessel occlusion or flow-limiting stenosis. No aneurysm or high-flow vascular malformation.    DURAL VENOUS SINUSES: Not well evaluated on a technical basis.    NECK CTA:  RIGHT CAROTID: Mild atherosclerosis without hemodynamically significant stenosis by NASCET criteria at the carotid bifurcation. No dissection.    LEFT CAROTID: Patent without flow-limiting stenosis by NASCET criteria at the carotid bifurcation. No  dissection.    VERTEBRAL ARTERIES: Right vertebral artery is dominant. Scattered mild-moderate V2 segment stenosis due to extrinsic compression from degenerative facet/uncovertebral hypertrophy most pronounced on the left (series 4 image 219). No atherosclerotic   flow-limiting stenosis or findings of dissection.    AORTIC ARCH: Three-vessel aortic arch configuration. Visualized great vessels are widely patent.    NONVASCULAR STRUCTURES: Lung apices are clear. No neck mass or lymphadenopathy. Thyroid gland is homogenous. No acute or aggressive appearing osseous abnormalities. Multilevel degenerative changes in the visualized cervical spine.    CT PERFUSION:  PERFUSION MAPS: Symmetrical cerebral perfusion. No focal deficits in cerebral blood flow or volume to suggest ischemia/oligemia.    RAPID ANALYSIS:  CBF<30%: 0 mL  Tmax>6sec: 0 mL  Mismatch volume: 0 mL  Mismatch ratio: 9      Impression    IMPRESSION:   HEAD CT:  1.  No acute transcortical infarct, intracranial hemorrhage, or mass effect.   2.  Age indeterminate lacunar infarcts in the right caudate and thalamus.  3.  Mild diffuse cerebral volume loss and features compatible with chronic microangiopathic ischemic white matter changes.    HEAD CTA:  1.  No proximal branch vessel occlusion, flow-limiting stenosis, aneurysm, or vascular malformation.    NECK CTA:  1.  No atherosclerotic flow-limiting stenosis or findings of dissection.   2.  Scattered mild-moderate focal narrowings of the V2 vertebral artery segments due to compression from degenerative uncovertebral/facet hypertrophy in the cervical spine.    CT PERFUSION:  1.  Normal cerebral perfusion.    Zach Headley MD notified provider, ELLIOT CABALLERO, of CT head results via telephone at 3/17/2023 5:24 PM, who acknowledge understanding. Discussed CTA results at 5:39 PM.       CT Head Perfusion w Contrast - For Tier 2 Stroke    Narrative    EXAM: CT HEAD W/O CONTRAST, CTA HEAD NECK W CONTRAST, CT HEAD  PERFUSION W CONTRAST  LOCATION: [facility]  DATE/TIME: 3/17/2023 5:18 PM    INDICATION: Code Stroke to evaluate for potential thrombolysis and thrombectomy. PLEASE READ IMMEDIATELY.  COMPARISON: None.  CONTRAST: 68 mL Isovue 370 (accession [id number]), 100 mL Isovue 370 (accession [id number]), 68 mL Isovue 370 (accession [id number])  TECHNIQUE: Head and neck CT angiogram with IV contrast. Noncontrast head CT followed by axial helical CT images of the head and neck vessels obtained during the arterial phase of intravenous contrast administration. Axial 2D reconstructed images and   multiplanar 3D MIP reconstructed images of the head and neck vessels were performed by the technologist. Additional CT cerebral perfusion was performed utilizing a second contrast bolus. Perfusion data were post processed with generation of standard   perfusion maps and estimation of ischemic/infarcted volumes utilizing standard threshold values. Dose reduction techniques were used. All stenosis measurements made according to NASCET criteria unless otherwise specified.    FINDINGS:  NONCONTRAST HEAD CT:   INTRACRANIAL CONTENTS: Age-indeterminate lacunar infarcts in the right thalamus and caudate. Remaining gray-white matter interfaces are intact. No hemorrhage or extraaxial collection. No mass effect. Subarachnoid cisterns are patent. Patchy white matter   hypodensities are, while nonspecific, most compatible with chronic microvascular ischemic changes. Mild generalized volume loss. No hydrocephalus.    VISUALIZED ORBITS/SINUSES/MASTOIDS: Bilateral lens replacements. Mild-moderate mucosal thickening scattered about the paranasal sinuses. No middle ear or mastoid effusion.    BONES/SOFT TISSUES: No acute abnormality.    HEAD CTA:  ANTERIOR CIRCULATION: No proximal branch vessel occlusion or flow-limiting stenosis. Mild atherosclerosis scattered about the bilateral carotid siphons. Small conical shaped outpouching  directed inferiorly at the right ICA terminus is compatible with   infundibulum for posterior communicating artery.    POSTERIOR CIRCULATION: No proximal branch vessel occlusion or flow-limiting stenosis. No aneurysm or high-flow vascular malformation.    DURAL VENOUS SINUSES: Not well evaluated on a technical basis.    NECK CTA:  RIGHT CAROTID: Mild atherosclerosis without hemodynamically significant stenosis by NASCET criteria at the carotid bifurcation. No dissection.    LEFT CAROTID: Patent without flow-limiting stenosis by NASCET criteria at the carotid bifurcation. No dissection.    VERTEBRAL ARTERIES: Right vertebral artery is dominant. Scattered mild-moderate V2 segment stenosis due to extrinsic compression from degenerative facet/uncovertebral hypertrophy most pronounced on the left (series 4 image 219). No atherosclerotic   flow-limiting stenosis or findings of dissection.    AORTIC ARCH: Three-vessel aortic arch configuration. Visualized great vessels are widely patent.    NONVASCULAR STRUCTURES: Lung apices are clear. No neck mass or lymphadenopathy. Thyroid gland is homogenous. No acute or aggressive appearing osseous abnormalities. Multilevel degenerative changes in the visualized cervical spine.    CT PERFUSION:  PERFUSION MAPS: Symmetrical cerebral perfusion. No focal deficits in cerebral blood flow or volume to suggest ischemia/oligemia.    RAPID ANALYSIS:  CBF<30%: 0 mL  Tmax>6sec: 0 mL  Mismatch volume: 0 mL  Mismatch ratio: 9      Impression    IMPRESSION:   HEAD CT:  1.  No acute transcortical infarct, intracranial hemorrhage, or mass effect.   2.  Age indeterminate lacunar infarcts in the right caudate and thalamus.  3.  Mild diffuse cerebral volume loss and features compatible with chronic microangiopathic ischemic white matter changes.    HEAD CTA:  1.  No proximal branch vessel occlusion, flow-limiting stenosis, aneurysm, or vascular malformation.    NECK CTA:  1.  No atherosclerotic  flow-limiting stenosis or findings of dissection.   2.  Scattered mild-moderate focal narrowings of the V2 vertebral artery segments due to compression from degenerative uncovertebral/facet hypertrophy in the cervical spine.    CT PERFUSION:  1.  Normal cerebral perfusion.    Zach Headley MD notified provider, ELLIOT CABALLERO, of CT head results via telephone at 3/17/2023 5:24 PM, who acknowledge understanding. Discussed CTA results at 5:39 PM.       Symptomatic Influenza A/B, RSV, & SARS-CoV2 PCR (COVID-19) Nasopharyngeal    Specimen: Nasopharyngeal; Swab   Result Value Ref Range    Influenza A PCR Negative Negative    Influenza B PCR Negative Negative    RSV PCR Negative Negative    SARS CoV2 PCR Positive (A) Negative    Narrative    Testing was performed using the Xpert Xpress CoV2/Flu/RSV Assay on the Mobile [employer] Instrument. This test should be ordered for the detection of SARS-CoV-2, influenza, and RSV viruses in individuals who meet clinical and/or epidemiological criteria. Test performance is unknown in asymptomatic patients. This test is for in vitro diagnostic use under the FDA EUA for laboratories certified under CLIA to perform high or moderate complexity testing. This test has not been FDA cleared or approved. A negative result does not rule out the presence of PCR inhibitors in the specimen or target RNA in concentration below the limit of detection for the assay. If only one viral target is positive but coinfection with multiple targets is suspected, the sample should be re-tested with another FDA cleared, approved, or authorized test, if coinfection would change clinical management. This test was validated by the [facility]. These laboratories are certified under the Clinical Laboratory Improvement Amendments of 1988 (CLIA-88) as qualified to perform high complexity laboratory testing.   XR Chest 1 View    Narrative    EXAM: XR CHEST 1 VIEW  LOCATION: [facility]  MEDICAL CENTER  DATE/TIME: 3/17/2023 5:43 PM    INDICATION: fever  COMPARISON: None.      Impression    IMPRESSION: TAVR. Left midlung pulmonary opacities likely reflect atelectasis or scarring. The right lung is clear. No pleural effusion. Moderate cardiomegaly with central vascular prominence. No overt pulmonary edema.   UA with Microscopic reflex to Culture    Specimen: Urine, Midstream   Result Value Ref Range    Color Urine Yellow Colorless, Straw, Light Yellow, Yellow    Appearance Urine Clear Clear    Glucose Urine Negative Negative mg/dL    Bilirubin Urine Negative Negative    Ketones Urine Negative Negative mg/dL    Specific Gravity Urine 1.010 1.003 - 1.035    Blood Urine Negative Negative    pH Urine 6.0 5.0 - 7.0    Protein Albumin Urine Negative Negative mg/dL    Urobilinogen Urine Normal Normal, 2.0 mg/dL    Nitrite Urine Negative Negative    Leukocyte Esterase Urine Negative Negative    Mucus Urine Present (A) None Seen /LPF    RBC Urine 2 <=2 /HPF    WBC Urine 2 <=5 /HPF    Narrative    Urine Culture not indicated       Medications   iopamidol (ISOVUE-370) solution 68 mL (68 mLs Intravenous $Given 3/17/23 1713)   sodium chloride 0.9 % bag 500mL for CT scan flush use (100 mLs Intravenous $Given 3/17/23 1713)   iopamidol (ISOVUE-370) solution 100 mL (100 mLs Intravenous $Given 3/17/23 1737)   sodium chloride 0.9 % bag 500mL for CT scan flush use (100 mLs Intravenous $Given 3/17/23 1737)       Assessments & Plan (with Medical Decision Making)   Henrry Fonseca is a 88 year old male who presented to the department for evaluation of right lower extremity weakness which the patient noted upon awakening at around 9 AM.  He has baseline left-sided weakness, most pronounced of the left upper extremity, but on examination does not seem to have objective weakness of the other 3 extremities.  He has mild dysarthria which spouse believes is baseline.  Due to complaint, tier 2 code stroke was initiated and advanced  imaging ordered.  No sign of acute intracranial process via CT/CTA.  However patient's vital signs later revealed a fever, irregular narrow complex rhythm on the monitor appears to indicate new atrial fibrillation.  Stroke neurology team recommended de-escalation and hospital admission for observation, 81 mg aspirin therapy and MRI inpatient tomorrow.  Patient's COVID test returned positive and likely the source for his fever and weakness.  He is unable to ambulate or bear weight, will require hospitalization, accepted as admission via hospitalist Lexa.        Disclaimer:  This note consists of symbols derived from keyboarding, dictation, and/or voice recognition software.  As a result, there may be errors in the script that have gone undetected.  Please consider this when interpreting information found in the chart.        I have reviewed the nursing notes.    I have reviewed the findings, diagnosis, plan and need for follow up with the patient.           New Prescriptions    No medications on file       Final diagnoses:   Infection due to 2019 novel coronavirus   Generalized muscle weakness   Ambulatory dysfunction   Atrial fibrillation, unspecified type (H)       3/17/2023   [facility] EMERGENCY DEPT     Shady Wyatt DO  03/17/23 1939

## 2023-03-17 NOTE — TELEPHONE ENCOUNTER
"1. REASON FOR CALL or QUESTION: \"What is your reason for calling today?\" or \"How can I best help you?\" or \"What question do you have that I can help answer?\" Patient called with concern he may be having another stroke. Today he is experiencing right-sided symptoms. He has dizziness with walking, not with rest. No numbness and no headache. Patient feels concern as this is how he felt with his previous stroke. Recommended that patient present to the ED for immediate evaluation and he verbalized understanding. He will go to ED in [place].    "

## 2023-03-17 NOTE — ED TRIAGE NOTES
Hx of cva 2 years ago with left sided deficit.   Pt reports around 1200 today right leg numbness with difficulty walking.

## 2023-03-17 NOTE — CONSULTS
"  [facility]    Stroke Telephone Note    I was called by Shady Wyatt on 03/17/23 regarding patient Henrry Fonseca. 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 He was doing well until slept last night. Woke up this morning at 0900 with new onset right leg weakness along with slurred speech. BP on presentation 128/77. Does not take antiplatelet agents.    Stroke Code Data (for stroke code without tele)  Stroke code activated 03/17/23   1710   Stroke provider first response  03/17/23   1711            Last known normal 03/16/23   2100        Time of discovery   (or onset of symptoms) 03/17/23   0900   Head CT read by Stroke Neuro Dr/Provider 03/17/23   1718   Was stroke code de-escalated? 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 New onset right leg weakness. Need to rule out ischemic stroke, bleeds.    My recommendations are based on the information provided over the phone by Henrry Fonseca's in-person providers. They are not intended to replace the clinical judgment of his in-person providers. I was not requested to personally see or examine the patient at this time.    The Stroke Staff is Dr. Cabrera.    Enid Shin MD  Vascular Neurology Fellow    To page me or covering stroke neurology team member, click here: [facility]COM  Choose \"On " "Call\" tab at top, then select \"NEUROLOGY/ALL SITES\" from middle drop-down box, press Enter, then look for \"stroke\" or \"telestroke\" for your site.      "

## 2023-03-17 NOTE — PROGRESS NOTES
"After discussion with attending , decision is to admit patient      Recommendations   - Use orderset: \"Ischemic Stroke Routine Admission\" or \"Ischemic Stroke No Thrombolytics/No Thrombectomy ICU Admission\"  - Neurochecks and Vital Signs every 4 hours   - Permissive HTN; goal SBP < 220 mmHg  - PLEASE HOLD ASPIRIN for now  -Please obtain infective endocarditis workup including blood cultures, TTE and KAIA  - Statin: 40  - MRI Brain with and without contrast  - Telemetry, EKG  - Bedside Glucose Monitoring  - A1c, Lipid Panel, Troponin x 3  - PT/OT/SLP  - Stroke Education  - Euthermia, Euglycemia        "

## 2023-03-17 NOTE — ED NOTES
Pt arrived via Triage in his own WC.  Pt has had a CVA in the past and has deficits on his L side.  Weakness to the arms and legs and some speech slurring.   This AM when pt awoke, he noted decrease strength in his Right side, and difficulty transferring into his WC and into his chairs.  The weakness is what brings him in.   LKW was 9pm yesterday.  BG on arrival WNL.     Pt placed on full monitors.  Full neuro exam completed see flowsheet for details.  MD in room for assessment and exam.  Tier 2 stroke code called.     Cardiac:  S1, S2 and irregular, systolic murmur noted. No edema noted.  Monitor shows irreg rhythm with occasional P wave noted.       Lungs:  CTA anterior, no cough, no SOB and pt denies any illness      Abd: Obese rounded slightly firm, no pain with palpation,+BS and pt denies N,V,D     18g in R AC, labs drawn, pt transported to CT with full monitors and 2 RNS.  PLAN:  Will continue to monitor closely and await further orders.

## 2023-03-18 ENCOUNTER — APPOINTMENT (OUTPATIENT)
Dept: MRI IMAGING | Facility: CLINIC | Age: 88
DRG: 177 | End: 2023-03-18
Payer: COMMERCIAL

## 2023-03-18 ENCOUNTER — APPOINTMENT (OUTPATIENT)
Dept: SPEECH THERAPY | Facility: CLINIC | Age: 88
DRG: 177 | End: 2023-03-18
Payer: COMMERCIAL

## 2023-03-18 ENCOUNTER — APPOINTMENT (OUTPATIENT)
Dept: PHYSICAL THERAPY | Facility: CLINIC | Age: 88
DRG: 177 | End: 2023-03-18
Payer: COMMERCIAL

## 2023-03-18 PROBLEM — I61.9 HEMORRHAGIC STROKE (H): Status: ACTIVE | Noted: 2020-10-20

## 2023-03-18 PROBLEM — G81.94 LEFT HEMIPARESIS (H): Status: ACTIVE | Noted: 2020-06-29

## 2023-03-18 LAB
ANION GAP SERPL CALCULATED.3IONS-SCNC: 11 MMOL/L (ref 7–15)
BUN SERPL-MCNC: 28 MG/DL (ref 8–23)
CALCIUM SERPL-MCNC: 8.2 MG/DL (ref 8.8–10.2)
CHLORIDE SERPL-SCNC: 101 MMOL/L (ref 98–107)
CHOLEST SERPL-MCNC: 116 MG/DL
CK SERPL-CCNC: 1985 U/L (ref 39–308)
CREAT SERPL-MCNC: 1.33 MG/DL (ref 0.67–1.17)
CRP SERPL-MCNC: 50.65 MG/L
CRP SERPL-MCNC: 61.57 MG/L
D DIMER PPP FEU-MCNC: 0.91 UG/ML FEU (ref 0–0.5)
D DIMER PPP FEU-MCNC: 0.95 UG/ML FEU (ref 0–0.5)
DEPRECATED HCO3 PLAS-SCNC: 23 MMOL/L (ref 22–29)
ERYTHROCYTE [DISTWIDTH] IN BLOOD BY AUTOMATED COUNT: 12.8 % (ref 10–15)
GFR SERPL CREATININE-BSD FRML MDRD: 51 ML/MIN/1.73M2
GLUCOSE SERPL-MCNC: 110 MG/DL (ref 70–99)
HBA1C MFR BLD: 5.4 %
HCT VFR BLD AUTO: 35.3 % (ref 40–53)
HDLC SERPL-MCNC: 67 MG/DL
HGB BLD-MCNC: 12 G/DL (ref 13.3–17.7)
LACTATE SERPL-SCNC: 1 MMOL/L (ref 0.7–2)
LDLC SERPL CALC-MCNC: 36 MG/DL
MCH RBC QN AUTO: 30 PG (ref 26.5–33)
MCHC RBC AUTO-ENTMCNC: 34 G/DL (ref 31.5–36.5)
MCV RBC AUTO: 88 FL (ref 78–100)
NONHDLC SERPL-MCNC: 49 MG/DL
NT-PROBNP SERPL-MCNC: 8254 PG/ML (ref 0–1800)
PLATELET # BLD AUTO: 142 10E3/UL (ref 150–450)
POTASSIUM SERPL-SCNC: 4 MMOL/L (ref 3.4–5.3)
PROCALCITONIN SERPL IA-MCNC: 0.12 NG/ML
RBC # BLD AUTO: 4 10E6/UL (ref 4.4–5.9)
SODIUM SERPL-SCNC: 135 MMOL/L (ref 136–145)
TRIGL SERPL-MCNC: 65 MG/DL
TROPONIN T SERPL HS-MCNC: 74 NG/L
TROPONIN T SERPL HS-MCNC: 89 NG/L
TROPONIN T SERPL HS-MCNC: 96 NG/L
WBC # BLD AUTO: 5.6 10E3/UL (ref 4–11)

## 2023-03-18 PROCEDURE — 97530 THERAPEUTIC ACTIVITIES: CPT | Mod: GP | Performed by: PHYSICAL THERAPIST

## 2023-03-18 PROCEDURE — 84145 PROCALCITONIN (PCT): CPT | Performed by: INTERNAL MEDICINE

## 2023-03-18 PROCEDURE — 99233 SBSQ HOSP IP/OBS HIGH 50: CPT | Performed by: INTERNAL MEDICINE

## 2023-03-18 PROCEDURE — 120N000001 HC R&B MED SURG/OB

## 2023-03-18 PROCEDURE — 85027 COMPLETE CBC AUTOMATED: CPT

## 2023-03-18 PROCEDURE — 85379 FIBRIN DEGRADATION QUANT: CPT

## 2023-03-18 PROCEDURE — 82550 ASSAY OF CK (CPK): CPT | Performed by: INTERNAL MEDICINE

## 2023-03-18 PROCEDURE — 250N000011 HC RX IP 250 OP 636: Performed by: INTERNAL MEDICINE

## 2023-03-18 PROCEDURE — 250N000011 HC RX IP 250 OP 636

## 2023-03-18 PROCEDURE — 250N000013 HC RX MED GY IP 250 OP 250 PS 637

## 2023-03-18 PROCEDURE — 36415 COLL VENOUS BLD VENIPUNCTURE: CPT

## 2023-03-18 PROCEDURE — 255N000002 HC RX 255 OP 636: Performed by: INTERNAL MEDICINE

## 2023-03-18 PROCEDURE — 84484 ASSAY OF TROPONIN QUANT: CPT

## 2023-03-18 PROCEDURE — 92610 EVALUATE SWALLOWING FUNCTION: CPT | Mod: GN | Performed by: SPEECH-LANGUAGE PATHOLOGIST

## 2023-03-18 PROCEDURE — 36415 COLL VENOUS BLD VENIPUNCTURE: CPT | Performed by: INTERNAL MEDICINE

## 2023-03-18 PROCEDURE — 83880 ASSAY OF NATRIURETIC PEPTIDE: CPT | Performed by: INTERNAL MEDICINE

## 2023-03-18 PROCEDURE — 86140 C-REACTIVE PROTEIN: CPT

## 2023-03-18 PROCEDURE — 87040 BLOOD CULTURE FOR BACTERIA: CPT

## 2023-03-18 PROCEDURE — 97161 PT EVAL LOW COMPLEX 20 MIN: CPT | Mod: GP | Performed by: PHYSICAL THERAPIST

## 2023-03-18 PROCEDURE — 84484 ASSAY OF TROPONIN QUANT: CPT | Performed by: INTERNAL MEDICINE

## 2023-03-18 PROCEDURE — A9585 GADOBUTROL INJECTION: HCPCS | Performed by: INTERNAL MEDICINE

## 2023-03-18 PROCEDURE — 82374 ASSAY BLOOD CARBON DIOXIDE: CPT

## 2023-03-18 PROCEDURE — 70553 MRI BRAIN STEM W/O & W/DYE: CPT

## 2023-03-18 PROCEDURE — 93005 ELECTROCARDIOGRAM TRACING: CPT

## 2023-03-18 PROCEDURE — 83605 ASSAY OF LACTIC ACID: CPT | Performed by: INTERNAL MEDICINE

## 2023-03-18 RX ORDER — ONDANSETRON 2 MG/ML
4 INJECTION INTRAMUSCULAR; INTRAVENOUS EVERY 6 HOURS PRN
Status: DISCONTINUED | OUTPATIENT
Start: 2023-03-18 | End: 2023-03-22 | Stop reason: HOSPADM

## 2023-03-18 RX ORDER — ATORVASTATIN CALCIUM 20 MG/1
20 TABLET, FILM COATED ORAL EVERY EVENING
Status: DISCONTINUED | OUTPATIENT
Start: 2023-03-18 | End: 2023-03-18

## 2023-03-18 RX ORDER — ENOXAPARIN SODIUM 100 MG/ML
40 INJECTION SUBCUTANEOUS EVERY 24 HOURS
Status: DISCONTINUED | OUTPATIENT
Start: 2023-03-18 | End: 2023-03-22 | Stop reason: HOSPADM

## 2023-03-18 RX ORDER — ONDANSETRON 4 MG/1
4 TABLET, ORALLY DISINTEGRATING ORAL EVERY 6 HOURS PRN
Status: DISCONTINUED | OUTPATIENT
Start: 2023-03-18 | End: 2023-03-22 | Stop reason: HOSPADM

## 2023-03-18 RX ORDER — FUROSEMIDE 10 MG/ML
20 INJECTION INTRAMUSCULAR; INTRAVENOUS EVERY 12 HOURS
Status: DISCONTINUED | OUTPATIENT
Start: 2023-03-18 | End: 2023-03-20

## 2023-03-18 RX ORDER — GABAPENTIN 300 MG/1
300 CAPSULE ORAL DAILY
Status: DISCONTINUED | OUTPATIENT
Start: 2023-03-18 | End: 2023-03-22 | Stop reason: HOSPADM

## 2023-03-18 RX ORDER — ATORVASTATIN CALCIUM 20 MG/1
20 TABLET, FILM COATED ORAL
Status: DISCONTINUED | OUTPATIENT
Start: 2023-03-18 | End: 2023-03-22 | Stop reason: HOSPADM

## 2023-03-18 RX ORDER — GABAPENTIN 600 MG/1
600 TABLET ORAL 2 TIMES DAILY
Status: DISCONTINUED | OUTPATIENT
Start: 2023-03-18 | End: 2023-03-22 | Stop reason: HOSPADM

## 2023-03-18 RX ORDER — LIDOCAINE 40 MG/G
CREAM TOPICAL
Status: DISCONTINUED | OUTPATIENT
Start: 2023-03-18 | End: 2023-03-22 | Stop reason: HOSPADM

## 2023-03-18 RX ORDER — GADOBUTROL 604.72 MG/ML
7 INJECTION INTRAVENOUS ONCE
Status: COMPLETED | OUTPATIENT
Start: 2023-03-18 | End: 2023-03-18

## 2023-03-18 RX ORDER — AMLODIPINE BESYLATE 5 MG/1
5 TABLET ORAL DAILY
Status: DISCONTINUED | OUTPATIENT
Start: 2023-03-18 | End: 2023-03-20

## 2023-03-18 RX ADMIN — GABAPENTIN 600 MG: 600 TABLET, FILM COATED ORAL at 10:27

## 2023-03-18 RX ADMIN — GADOBUTROL 7 ML: 604.72 INJECTION INTRAVENOUS at 09:54

## 2023-03-18 RX ADMIN — FUROSEMIDE 20 MG: 10 INJECTION, SOLUTION INTRAMUSCULAR; INTRAVENOUS at 12:30

## 2023-03-18 RX ADMIN — ENOXAPARIN SODIUM 40 MG: 100 INJECTION SUBCUTANEOUS at 01:32

## 2023-03-18 RX ADMIN — GABAPENTIN 300 MG: 300 CAPSULE ORAL at 12:30

## 2023-03-18 RX ADMIN — GABAPENTIN 600 MG: 600 TABLET, FILM COATED ORAL at 20:38

## 2023-03-18 ASSESSMENT — ACTIVITIES OF DAILY LIVING (ADL)
ADLS_ACUITY_SCORE: 32
ADLS_ACUITY_SCORE: 32
ADLS_ACUITY_SCORE: 34
ADLS_ACUITY_SCORE: 34
DEPENDENT_IADLS:: CLEANING;COOKING;LAUNDRY;SHOPPING;TRANSPORTATION
ADLS_ACUITY_SCORE: 34
ADLS_ACUITY_SCORE: 34
ADLS_ACUITY_SCORE: 42
ADLS_ACUITY_SCORE: 34
ADLS_ACUITY_SCORE: 32
ADLS_ACUITY_SCORE: 34
ADLS_ACUITY_SCORE: 42
ADLS_ACUITY_SCORE: 34

## 2023-03-18 NOTE — PROGRESS NOTES
03/18/23 1236   Appointment Info   Signing Clinician's Name / Credentials (PT) Shelia Brewer, PT   Living Environment   People in Home spouse   Current Living Arrangements house   Home Accessibility no concerns   Transportation Anticipated family or friend will provide   Living Environment Comments reports no steps to enter and no steps inside, has walk in shower with shower chair, has been using wc for mobility in the home the last week and wife was helping as well, wife does the cooking   Self-Care   Usual Activity Tolerance moderate   Current Activity Tolerance poor   Equipment Currently Used at Home wheelchair, manual;walker, avery;shower chair   Activity/Exercise/Self-Care Comment baseline pt uses hemiwlaker on his R side to ambulate to bathroom or living room reports its about 25-30 ft, otherwise uses manual wc, at baseline dresses self, does exercises for L shoulder and elbow ROM   General Information   Onset of Illness/Injury or Date of Surgery 03/17/23   Referring Physician Sharita Lindquist PA-C   Patient/Family Therapy Goals Statement (PT) to return home   Pertinent History of Current Problem (include personal factors and/or comorbidities that impact the POC) Hx of L weakness from stroke, had 2 days of cough and woke up unable to get up due to weakness and had R leg new weakness, found to be COVID +, had MRI this AM to rule out stroke   Existing Precautions/Restrictions fall   Cognition   Affect/Mental Status (Cognition) WFL   Orientation Status (Cognition) oriented to;person;place;situation   Pain Assessment   Patient Currently in Pain No   Posture    Posture Comments L hemiparesis and L hand is flexed with fist made at all times   Range of Motion (ROM)   ROM Comment unable to move L UE on its own, does use R hand to do some ROM exercises for shoulder and elbow but does not raise it overhead   Strength (Manual Muscle Testing)   Strength Comments R hip flexion 3+/5 sustaining against resistance, R DF  4/5   Bed Mobility   Comment, (Bed Mobility) HHA to pull up to sitting EOB min A   Transfers   Comment, (Transfers) pt moving quickly and impulsive with movements sit to stand reaching out for anything to hold onto as did not have AD in room and needed to use commode urgently, min to mod A for safety with bed to commode transfer, R leg appeared to be buckling at times but then other times was stable, moved quickly it was hard to fully assess   Gait/Stairs (Locomotion)   Mercer Level (Gait) set up;supervision;verbal cues;minimum assist (75% patient effort)   Distance in Feet 2'   Pattern (Gait) step-to   Deviations/Abnormal Patterns (Gait) festinating/shuffling;stride length decreased;weight shifting decreased   Balance   Balance Comments needs UE support on R for balance in standing   Sensory Examination   Sensory Perception Comments denies changes to R  LE, reports L is at baseline for sensory changes   Muscle Tone   Muscle Tone Comments increased L tone in UE   Clinical Impression   Criteria for Skilled Therapeutic Intervention Yes, treatment indicated   PT Diagnosis (PT) impaired functional mobility   Influenced by the following impairments weakness R LE and L side (baseline), impaired balance, impulsive   Functional limitations due to impairments impaired be dmob, transfers, gait   Clinical Presentation (PT Evaluation Complexity) Evolving/Changing   Clinical Presentation Rationale clinical judgement   Clinical Decision Making (Complexity) low complexity   Planned Therapy Interventions (PT) balance training;bed mobility training;gait training;strengthening;transfer training;risk factor education;home program guidelines   Risk & Benefits of therapy have been explained evaluation/treatment results reviewed;care plan/treatment goals reviewed;risks/benefits reviewed;current/potential barriers reviewed;patient   PT Total Evaluation Time   PT Eval, Low Complexity Minutes (05476) 10   Physical Therapy Goals   PT  Frequency 6x/week   PT Predicted Duration/Target Date for Goal Attainment 03/23/23   PT Goals Bed Mobility;Transfers;Gait   PT: Bed Mobility Supine to/from sit;Minimal assist  (with rail)   PT: Transfers Supervision/stand-by assist;Sit to/from stand;Bed to/from chair;Assistive device  (oniel walker)   PT: Gait Supervision/stand-by assist;Oniel walker;25 feet   Interventions   Interventions Quick Adds Therapeutic Activity   PT Discharge Planning   PT Plan Sat 1/6- gait with hemiwalker, transfers and safety   PT Discharge Recommendation (DC Rec) home with assist;home with home care physical therapy   PT Rationale for DC Rec anticipate with increasing mobility while here over 1-2 days would improve safety with mobility to be able to return home if wife can provide SBA up to min A as pt is moving quickly and impusively and R leg weaker than baseline, he reports he could use the wc in the house for mobility and just do transfers until stronger, rec home PT, do not anticipate the need for TCU unless wife is unable to physically assist- I did not see him walk with walker yet, we just did transfers today and was unsteady and impulsive   PT Brief overview of current status min A to get out of bed, SBA to get into bed, stood min A and took steps to transfer to commode and back, unsteady and needed up to mod A today, after session left a oniel walker in the room to try next time up as pt uses that at home

## 2023-03-18 NOTE — H&P
[facility]    History and Physical  Hospital Medicine       Date of Admission:  3/17/2023  Date of Service: 3/18/2023     Assessment & Plan   Henrry Fonseca is a 88 year old male who presents on 3/17/2023 with right leg weakness. Patient is being admitted for ongoing possible stroke workup as well as management of new covid diagnosis.     Right leg weakness  Last known well at bedtime 3/16, awoke 3/17 with right lower extremity weakness and headache. Denies right arm or face symptoms. Stroke code called in ER; head CT shows no acute infarct, age indeterminate lacunar infarcts in right caudate & thalamus. CTA shows no acute concerning changes. CT perfusion is normal. Symptoms slowly improving but still present on admission. Possibly due to small stroke not visualized on CT, or could be related to new covid diagnosis, below. Stroke neuro de-escalated stroke code, but has left some recommendations for further workup given stroke history & difficult exam with underlying paralysis.   -Neuro checks & vital signs Q4hrs  -Permissive HTN (goal SBP <220)  -HOLD aspirin for now  -Recommend endocarditis workup (blood cultures & TTE ordered. KAIA not available at [facility])  -Continue atorvastatin (note that should be monitored for myalgias given increased risk of this with concomitant use of Paxlovid for covid)  -MRI brain with and without contrast (ordered for AM)  -Continuous telemetry  -Lipid panel & hemoglobin A1C ordered  -Troponin x3 (ordered 2 follow ups to trend. Initial mildly elevated, 58)  -PT, OT, speech ordered  -Maintain euglycemia, euthermia    Infection due to 2019 novel coronavirus  Cough began about 2 days PTA (3/15), but weakness began AM of admission (3/17). Oxygenating well on room air at time of admission. Qualifies for Paxlovid (high risk for progression but not hypoxemic) based on current admission order set guidelines.   Vaccinated with Moderna 3/12/2021, 4/9/2021, pfizer booster  11/8/2021, Moderna bivalent booster 9/14/2022.   -Paxlovid 150/100mg BID. Plan for 5 day course. Note to watch for myalgias with this while patient is also on statin (do not want to hold statin due to possible stroke and criticality of stroke risk optimization)  -Oxygen available PRN to maintain SPO2 89-94%  -Enoxaparin 40mg subcutaneous Q24hrs for VTE prophylaxis   -Covid labs ordered x4 days  -Isolation precautions    Addendum: initial CRP 50.65, d-dimer 0.95. Both mildly elevated, which is to be expected with covid.     Possible atrial fibrillation, new onset  EKG in ER shows possible atrial fibrillation. No known history of afib. Patient denies any chest pain, pressure, discomfort. Some lightheadedness over past day, but not exertional. Auscultated to be in sinus rhythm during admission exam. [id number] is 5 indicating good anticoagulation candidate, but HasBled is 4 indicating patient is high risk for major bleeding & alternative should be considered. Discussed possible atrial fibrillation with patient as well as possible treatment with anti-coagulation. Patient does not want to make any decisions currently, but is aware continued conversations may happen. If a fib is present, could be contributor to leg weakness & possible stroke.   -Continuous telemetry  -Conditional EKG for any symptom development  -For now, continue prophylactic dosing of lovenox for covid after conversation with patient rather than increasing to treatment dose.     Addendum: initially after telemetry is placed patient appears to be in sinus rhythm with 1st degree AV block.     Acute kidney injury  Mild. Baseline creatinine around 1.05 - 1.1, creatinine on presentation 1.37. BUN:Cr suggests pre-renal, likely mild dehydration. Usually would avoid fluids in covid patients, and patient is tolerating PO so will encourage PO fluids overnight and if not improving would consider small bolus.   -Encourage generous PO fluids  -BMP in  "AM    Ambulatory dysfunction  History of hemorrhagic stroke   Left hemiparesis   Hemorrhagic right-sided stroke in June 2020 due to uncontrolled hypertension resulting in left-sided paralysis, worse in RUE. Uses wheelchair & walker at baseline. No changes from baseline functionality on left side at time of admission.     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 Does not require oxygen at home. May be contributing to cough and makes patient higher risk for covid progression. No acute interventions.     Essential hypertension, benign  Generally controlled since presentation. Managed PTA with amlodipine 5mg daily.  Per cardiology note in May 2022, patient should avoid AV annamaria blocking agents because he had bradycardia after TAVR.   -Hold PTA amlodipine due to interaction with Paxlovid and for permissive HTN per stroke neuro recommendations    Aortic stenosis s/p TAVR  TAVR 1/26/2021. Last ECHO 1/2022 shows normal LV & RV structure, function, & size with EF 60%. Patient appears euvolemic on exam. No acute interventions.     Seizure disorder  History of seizure disorder noted in chart. Cannot find documentation of date of last seizure and patient cannot recall a recent event. Managed PTA with gabapentin 600mg BID in AM & PM, with additional 300mg dose at noon.   -Continue PTA gabapentin    Anemia  Mild, chronic & stable, normocytic. Baseline hemoglobin around 11-12, hemoglobin on presentation 12.9. No signs of acute bleeding. Monitor.     Clinically Significant Risk Factors Present on Admission                       # Overweight: Estimated body mass index is 26.26 kg/m  as calculated from the following:    Height as of this encounter: 1.676 m (5' 6\").    Weight as of this encounter: 73.8 kg (162 lb 11.2 oz).            Diet: Combination Diet Regular Diet Adult    DVT Prophylaxis: Enoxaparin (Lovenox) SQ  Panda Catheter: Not present  Code Status: Full Code    Lines: PIV, telemetry    Disposition Plan    "   Expected Discharge Date: 03/19/2023             Entered: Sharita Lindquist PA-C 03/18/2023, 12:20 AM     Status: Patient is appropriate for inpatient  Sharita Lindquist PA-C        The patient's care was discussed with the Attending Physician, Dr. Lynn Ritchie, the patient, and bedside RN.    Primary Care Physician   Clinic - [facility] [phone]    History is obtained from the patient, who is an okay historian, handoff from ER provider, and review of old records via the EMR.    History of Present Illness   Henrry Fonseca is a 88 year old male with past medical history of hemorrhagic stroke resulting in left hemiparesis, generalized weakness, hypertension, esophageal reflux, severe aortic stenosis, asbestosis now presents on 3/17/2023 with right leg weakness.    Patient went to bed evening 3/16 feeling normal, woke up a.m. 3/17 and felt weaker.  Patient struggled to get out of bed.  Felt his weakness was primarily located in his right lower extremity.  He said he had some associated headache in the morning as well.  Headache was generalized.  Not associated with any vision changes or tinnitus.  Denies any new paresthesias, numbness, tingling.  Denies any light sensitivity.  Denies any discs are 3 a or other speech difficulty.  Patient's weakness persisted.  He has left sided paralysis at baseline with limited left leg motion due to chronic left hip pain so it was difficult for him to compare right-sided weakness to left-sided symptoms.  The patient has a history of hemorrhagic stroke resulting in left-sided paracentesis, and says that these weakness symptoms feel similar but not exactly like previous stroke symptoms.     The patient does feel he developed a slight cough over the past day or 2 that is worse than his baseline discomfort due to asbestosis.  Denies productive cough, denies hemoptysis.  Patient denies feeling feverish, but endorses significant chills.Patient denies chest pain,  heart palpitations, lightheadedness, syncope, limb swelling.  He denies any unilateral leg pain or warmth.  Denies any pleuritic chest pain with deep breathing.  Patient denies any abdominal pain, nausea, or vomiting.  Denies any urinary symptoms or diarrhea.  The patient endorses chronic left hip pain that is unchanged from previous.  No radiation into groin.    Patient presented to the emergency department for weakness.  Upon presentation a stroke code was called.  Imaging did not reveal obvious stroke. Patient was subsequently discovered to have a fever, and then endorsed a cough.  His son was recently sick with COVID a couple weeks ago, and the patient does report he had contact with his son.  Then, patient's COVID test came back positive.  Patient says he continues to have some weakness in his right leg but it is beginning to improve.  He continues to have cough, but not requiring oxygen in ER.  The patient otherwise feels comfortable and has no additional new complaints at time of admission.    Review of Systems   Constitutional: denies weight loss  Eyes: denies changes in vision, discharge, or pain in eyes  HENT: denies changes in hearing, sore throat  Respiratory: see HPI  Cardiovascular: see HPI  Gastroenterology: denies constipation, diarrhea, GERD symptoms  Genitourinary: denies dysuria  Integumentary: no new rashes or skin changes  Musculoskeletal: see HPI  Neuro: denies numbness, tingling, headaches, tremor  Psychiatric: denies significant changes to mood    Past Medical History      Infection due to 2019 novel coronavirus 03/17/2023     Priority: Medium     Thoracic aortic ectasia (H) 11/08/2022     Priority: Medium     Mixed hearing loss, bilateral 07/20/2021     Priority: Medium     Other iron deficiency anemia      Priority: Medium     Aortic stenosis, severe 01/26/2021     Priority: Medium     Status post coronary angiogram 01/13/2021     Priority: Medium     Hemorrhagic stroke (H) 10/20/2020      Priority: Medium     Aortic valve stenosis, etiology of cardiac valve disease unspecified 10/20/2020     Priority: Medium     Left hemiparesis (H) 06/29/2020     Priority: Medium     Dysarthria 06/29/2020     Priority: Medium     Dysphagia 06/29/2020     Priority: Medium     Eczema 03/09/2015     Priority: Medium     Esophageal reflux 06/10/2013     Priority: Medium     Essential hypertension, benign 09/17/2007     Priority: Medium     Asbestosis (H) 11/15/2006     Priority: Medium     Senile nuclear sclerosis 04/06/2011     Priority: Low     Rotator cuff syndrome 10/13/2009     Priority: Low     Elevated PSA 10/13/2009     Priority: Low     Heel pain 11/04/2008     Priority: Low     Seizure disorder (H) 11/04/2008     Priority: Low     Back pain 06/05/2008     Priority: Low     Syncope and collapse 11/09/2007     Priority: Low     Mild intermittent asthma 05/15/2006     Priority: Low     Generalized osteoarthrosis, unspecified site 04/19/2005     Priority: Low      Past Surgical History   Past Surgical History:   Procedure Laterality Date     COLONOSCOPY  8/18/04     Repeat in 10 years.     CV HEART CATHETERIZATION WITH POSSIBLE INTERVENTION N/A 1/13/2021    Procedure: Heart Catheterization with Possible Intervention;  Surgeon: Dmitriy Frost MD;  Location:  HEART CARDIAC CATH LAB     CV RIGHT HEART CATH MEASUREMENTS RECORDED N/A 1/13/2021    Procedure: Right Heart Cath;  Surgeon: Dmitriy Frost MD;  Location: [facility] CARDIAC CATH LAB     CV TRANSCATHETER AORTIC VALVE REPLACEMENT N/A 1/26/2021    Procedure: Transcatheter Aortic Valve Replacement;  Surgeon: Peggy Colbert MD;  Location:  HEART CARDIAC CATH LAB     ORTHOPEDIC SURGERY      right Fibula repair     PHACOEMULSIFICATION WITH STANDARD INTRAOCULAR LENS IMPLANT  4/7/2011    Procedure:PHACOEMULSIFICATION WITH STANDARD INTRAOCULAR LENS IMPLANT; Surgeon:MJ CHOI; Location:[facility] OR     PHACOEMULSIFICATION WITH STANDARD INTRAOCULAR LENS  IMPLANT  5/9/2011    Procedure:PHACOEMULSIFICATION WITH STANDARD INTRAOCULAR LENS IMPLANT; Surgeon:MJ CHOI; Location:[place] OR     SURGICAL HISTORY OF -       vasectomy     SURGICAL HISTORY OF -       appy        Prior to Admission Medications   Prior to Admission Medications   Prescriptions Last Dose Informant Patient Reported? Taking?   acetaminophen (TYLENOL) 500 MG tablet 3/17/2023 at am Spouse/Significant Other Yes Yes   Sig: Take 500 mg by mouth every 6 hours as needed for mild pain   albuterol (PROAIR HFA/PROVENTIL HFA/VENTOLIN HFA) 108 (90 Base) MCG/ACT inhaler 3/17/2023 at 1200 Spouse/Significant Other No Yes   Sig: Inhale 2 puffs into the lungs every 4 hours as needed for shortness of breath / dyspnea   amLODIPine (NORVASC) 5 MG tablet 3/17/2023 at am Spouse/Significant Other No Yes   Sig: Take 1 tablet (5 mg) by mouth daily   amoxicillin (AMOXIL) 500 MG capsule  Spouse/Significant Other Yes No   Sig: TAKE 4 CAPSULES BY MOUTH ONCE FOR ONE DOSE 30 MINUTES BEFORE DENTAL APPOINTMENT   atorvastatin (LIPITOR) 20 MG tablet 3/16/2023 at hs Spouse/Significant Other No Yes   Sig: TAKE ONE TABLET BY MOUTH AT BEDTIME   ferrous gluconate (FERGON) 324 (38 Fe) MG tablet 3/17/2023 at am Spouse/Significant Other No Yes   Sig: Take 1 tablet (324 mg) by mouth daily (with breakfast)   gabapentin (NEURONTIN) 600 MG tablet 3/17/2023 at 1200 Spouse/Significant Other No Yes   Sig: TAKE 1 TABLET BY MOUTH IN THE MORNING, ONE-HALF TABLET AT NOON, AND 1 TABLET IN THE EVENING Total of 2.5 tablets per day   omeprazole (PRILOSEC) 20 MG DR capsule Past Month Spouse/Significant Other No Yes   Sig: Take 1 capsule (20 mg) by mouth daily      Facility-Administered Medications: None     Allergies   Allergies   Allergen Reactions     Keppra Fatigue     Crying very depressed     Family History    Family History   Problem Relation Age of Onset     Cancer Father         lung     Cancer Mother         pancreatic     Alcohol/Drug Son       "Alcohol/Drug Son      Cancer Sister         1/2 sister lung cancer     Social History   Social History     Socioeconomic History     Marital status:      Physical Exam   /54 (BP Location: Right arm)   Pulse 91   Temp 100.3  F (37.9  C) (Oral)   Resp 18   Ht 1.676 m (5' 6\")   Wt 73.8 kg (162 lb 11.2 oz)   SpO2 93%   BMI 26.26 kg/m       Weight: 162 lbs 11.19 oz Body mass index is 26.26 kg/m .     Constitutional: Alert, oriented, cooperative, no apparent distress, appears nontoxic  Eyes: Eyes are clear, pupils are equal, round  HENT: Oropharynx is clear. Tongue is midline, moist, no fasciculations. No evidence of cranial trauma.  Cardiovascular: Auscultated to be in regular sinus rhythm with controlled rate, holosystolic murmur present 2/6 heard best at LUSB and RUSB, and no additional rubs or gallops noted.  Good peripheral pulses in wrists bilaterally. No lower extremity edema. Genevieve's sign negative.  Respiratory: Minimally course with possibly some expiratory wheezes but no rhonchi or crackles. Respirations unlabored on room air.   GI: Soft, non-tender, normal bowel sounds, non-distended  Genitourinary: Deferred  Musculoskeletal: Slightly decreased muscle bulk. Left hemiparesis demonstrated by minimal LUE movement. Some LLE movement impairment but seems more related to chronic hip pain.   Skin: Warm and dry, no rashes. No LE erythema or warmth.   Neurologic: Neck supple. No speech slurring, interacting appropriately. Oriented to self, year, month, situation. Minor left facial droop with smiling, otherwise remainder of facial movements are intact.     Data   Data reviewed today:   Recent Labs   Lab 03/17/23  1708 03/17/23  1655   WBC 7.2  --    HGB 12.9*  --    MCV 88  --      --    INR 1.14  --    *  --    POTASSIUM 4.2  --    CHLORIDE 102  --    CO2 24  --    BUN 28.8*  --    CR 1.37*  --    ANIONGAP 9  --    SUNDAY 8.7*  --    * 113*     Recent Results (from the past 24 " hour(s))   CT Head w/o Contrast    Narrative    EXAM: CT HEAD W/O CONTRAST, CTA HEAD NECK W CONTRAST, CT HEAD PERFUSION W CONTRAST  LOCATION: [facility]  DATE/TIME: 3/17/2023 5:18 PM  INDICATION: Code Stroke to evaluate for potential thrombolysis and thrombectomy. PLEASE READ IMMEDIATELY.  COMPARISON: None.  CONTRAST: 68 mL Isovue 370 (accession [id number]), 100 mL Isovue 370 (accession [id number]), 68 mL Isovue 370 (accession [id number])  TECHNIQUE: Head and neck CT angiogram with IV contrast. Noncontrast head CT followed by axial helical CT images of the head and neck vessels obtained during the arterial phase of intravenous contrast administration. Axial 2D reconstructed images and   multiplanar 3D MIP reconstructed images of the head and neck vessels were performed by the technologist. Additional CT cerebral perfusion was performed utilizing a second contrast bolus. Perfusion data were post processed with generation of standard   perfusion maps and estimation of ischemic/infarcted volumes utilizing standard threshold values. Dose reduction techniques were used. All stenosis measurements made according to NASCET criteria unless otherwise specified.  FINDINGS:  NONCONTRAST HEAD CT:   INTRACRANIAL CONTENTS: Age-indeterminate lacunar infarcts in the right thalamus and caudate. Remaining gray-white matter interfaces are intact. No hemorrhage or extraaxial collection. No mass effect. Subarachnoid cisterns are patent. Patchy white matter   hypodensities are, while nonspecific, most compatible with chronic microvascular ischemic changes. Mild generalized volume loss. No hydrocephalus.  VISUALIZED ORBITS/SINUSES/MASTOIDS: Bilateral lens replacements. Mild-moderate mucosal thickening scattered about the paranasal sinuses. No middle ear or mastoid effusion.  BONES/SOFT TISSUES: No acute abnormality.  HEAD CTA:  ANTERIOR CIRCULATION: No proximal branch vessel occlusion or flow-limiting stenosis.  Mild atherosclerosis scattered about the bilateral carotid siphons. Small conical shaped outpouching directed inferiorly at the right ICA terminus is compatible with   infundibulum for posterior communicating artery.  POSTERIOR CIRCULATION: No proximal branch vessel occlusion or flow-limiting stenosis. No aneurysm or high-flow vascular malformation.  DURAL VENOUS SINUSES: Not well evaluated on a technical basis.  NECK CTA:  RIGHT CAROTID: Mild atherosclerosis without hemodynamically significant stenosis by NASCET criteria at the carotid bifurcation. No dissection.  LEFT CAROTID: Patent without flow-limiting stenosis by NASCET criteria at the carotid bifurcation. No dissection.  VERTEBRAL ARTERIES: Right vertebral artery is dominant. Scattered mild-moderate V2 segment stenosis due to extrinsic compression from degenerative facet/uncovertebral hypertrophy most pronounced on the left (series 4 image 219). No atherosclerotic   flow-limiting stenosis or findings of dissection.  AORTIC ARCH: Three-vessel aortic arch configuration. Visualized great vessels are widely patent.  NONVASCULAR STRUCTURES: Lung apices are clear. No neck mass or lymphadenopathy. Thyroid gland is homogenous. No acute or aggressive appearing osseous abnormalities. Multilevel degenerative changes in the visualized cervical spine.  CT PERFUSION:  PERFUSION MAPS: Symmetrical cerebral perfusion. No focal deficits in cerebral blood flow or volume to suggest ischemia/oligemia.  RAPID ANALYSIS:  CBF<30%: 0 mL  Tmax>6sec: 0 mL  Mismatch volume: 0 mL  Mismatch ratio: 9    Impression    IMPRESSION:   HEAD CT:  1.  No acute transcortical infarct, intracranial hemorrhage, or mass effect.   2.  Age indeterminate lacunar infarcts in the right caudate and thalamus.  3.  Mild diffuse cerebral volume loss and features compatible with chronic microangiopathic ischemic white matter changes.  HEAD CTA:  1.  No proximal branch vessel occlusion, flow-limiting stenosis,  aneurysm, or vascular malformation.  NECK CTA:  1.  No atherosclerotic flow-limiting stenosis or findings of dissection.   2.  Scattered mild-moderate focal narrowings of the V2 vertebral artery segments due to compression from degenerative uncovertebral/facet hypertrophy in the cervical spine.  CT PERFUSION:  1.  Normal cerebral perfusion.  Zach Headley MD notified provider, ELLIOT CABALLERO, of CT head results via telephone at 3/17/2023 5:24 PM, who acknowledge understanding. Discussed CTA results at 5:39 PM.   CTA Head Neck with Contrast    Narrative    EXAM: CT HEAD W/O CONTRAST, CTA HEAD NECK W CONTRAST, CT HEAD PERFUSION W CONTRAST  LOCATION: [facility]  DATE/TIME: 3/17/2023 5:18 PM  INDICATION: Code Stroke to evaluate for potential thrombolysis and thrombectomy. PLEASE READ IMMEDIATELY.  COMPARISON: None.  CONTRAST: 68 mL Isovue 370 (accession [id number]), 100 mL Isovue 370 (accession [id number]), 68 mL Isovue 370 (accession [id number])  TECHNIQUE: Head and neck CT angiogram with IV contrast. Noncontrast head CT followed by axial helical CT images of the head and neck vessels obtained during the arterial phase of intravenous contrast administration. Axial 2D reconstructed images and   multiplanar 3D MIP reconstructed images of the head and neck vessels were performed by the technologist. Additional CT cerebral perfusion was performed utilizing a second contrast bolus. Perfusion data were post processed with generation of standard   perfusion maps and estimation of ischemic/infarcted volumes utilizing standard threshold values. Dose reduction techniques were used. All stenosis measurements made according to NASCET criteria unless otherwise specified.  FINDINGS:  NONCONTRAST HEAD CT:   INTRACRANIAL CONTENTS: Age-indeterminate lacunar infarcts in the right thalamus and caudate. Remaining gray-white matter interfaces are intact. No hemorrhage or extraaxial collection. No mass effect.  Subarachnoid cisterns are patent. Patchy white matter   hypodensities are, while nonspecific, most compatible with chronic microvascular ischemic changes. Mild generalized volume loss. No hydrocephalus.  VISUALIZED ORBITS/SINUSES/MASTOIDS: Bilateral lens replacements. Mild-moderate mucosal thickening scattered about the paranasal sinuses. No middle ear or mastoid effusion.  BONES/SOFT TISSUES: No acute abnormality.  HEAD CTA:  ANTERIOR CIRCULATION: No proximal branch vessel occlusion or flow-limiting stenosis. Mild atherosclerosis scattered about the bilateral carotid siphons. Small conical shaped outpouching directed inferiorly at the right ICA terminus is compatible with   infundibulum for posterior communicating artery.  POSTERIOR CIRCULATION: No proximal branch vessel occlusion or flow-limiting stenosis. No aneurysm or high-flow vascular malformation.  DURAL VENOUS SINUSES: Not well evaluated on a technical basis.  NECK CTA:  RIGHT CAROTID: Mild atherosclerosis without hemodynamically significant stenosis by NASCET criteria at the carotid bifurcation. No dissection.  LEFT CAROTID: Patent without flow-limiting stenosis by NASCET criteria at the carotid bifurcation. No dissection.  VERTEBRAL ARTERIES: Right vertebral artery is dominant. Scattered mild-moderate V2 segment stenosis due to extrinsic compression from degenerative facet/uncovertebral hypertrophy most pronounced on the left (series 4 image 219). No atherosclerotic   flow-limiting stenosis or findings of dissection.  AORTIC ARCH: Three-vessel aortic arch configuration. Visualized great vessels are widely patent.  NONVASCULAR STRUCTURES: Lung apices are clear. No neck mass or lymphadenopathy. Thyroid gland is homogenous. No acute or aggressive appearing osseous abnormalities. Multilevel degenerative changes in the visualized cervical spine.  CT PERFUSION:  PERFUSION MAPS: Symmetrical cerebral perfusion. No focal deficits in cerebral blood flow or volume  to suggest ischemia/oligemia.  RAPID ANALYSIS:  CBF<30%: 0 mL  Tmax>6sec: 0 mL  Mismatch volume: 0 mL  Mismatch ratio: 9    Impression    IMPRESSION:   HEAD CT:  1.  No acute transcortical infarct, intracranial hemorrhage, or mass effect.   2.  Age indeterminate lacunar infarcts in the right caudate and thalamus.  3.  Mild diffuse cerebral volume loss and features compatible with chronic microangiopathic ischemic white matter changes.  HEAD CTA:  1.  No proximal branch vessel occlusion, flow-limiting stenosis, aneurysm, or vascular malformation.  NECK CTA:  1.  No atherosclerotic flow-limiting stenosis or findings of dissection.   2.  Scattered mild-moderate focal narrowings of the V2 vertebral artery segments due to compression from degenerative uncovertebral/facet hypertrophy in the cervical spine.  CT PERFUSION:  1.  Normal cerebral perfusion.  Zach Headley MD notified provider, ELLIOT CABALLERO, of CT head rsults via telephone at 3/17/2023 5:24 PM, who acknowledge understanding. Discussed CTA results at 5:39 PM.   CT Head Perfusion w Contrast - For Tier 2 Stroke    Narrative    EXAM: CT HEAD W/O CONTRAST, CTA HEAD NECK W CONTRAST, CT HEAD PERFUSION W CONTRAST  LOCATION: [facility]  DATE/TIME: 3/17/2023 5:18 PM  INDICATION: Code Stroke to evaluate for potential thrombolysis and thrombectomy. PLEASE READ IMMEDIATELY.  COMPARISON: None.  CONTRAST: 68 mL Isovue 370 (accession [id number]), 100 mL Isovue 370 (accession [id number]), 68 mL Isovue 370 (accession [id number])  TECHNIQUE: Head and neck CT angiogram with IV contrast. Noncontrast head CT followed by axial helical CT images of the head and neck vessels obtained during the arterial phase of intravenous contrast administration. Axial 2D reconstructed images and   multiplanar 3D MIP reconstructed images of the head and neck vessels were performed by the technologist. Additional CT cerebral perfusion was performed utilizing a second  contrast bolus. Perfusion data were post processed with generation of standard   perfusion maps and estimation of ischemic/infarcted volumes utilizing standard threshold values. Dose reduction techniques were used. All stenosis measurements made according to NASCET criteria unless otherwise specified.  FINDINGS:  NONCONTRAST HEAD CT:   INTRACRANIAL CONTENTS: Age-indeterminate lacunar infarcts in the right thalamus and caudate. Remaining gray-white matter interfaces are intact. No hemorrhage or extraaxial collection. No mass effect. Subarachnoid cisterns are patent. Patchy white matter   hypodensities are, while nonspecific, most compatible with chronic microvascular ischemic changes. Mild generalized volume loss. No hydrocephalus.  VISUALIZED ORBITS/SINUSES/MASTOIDS: Bilateral lens replacements. Mild-moderate mucosal thickening scattered about the paranasal sinuses. No middle ear or mastoid effusion.  BONES/SOFT TISSUES: No acute abnormality.  HEAD CTA:  ANTERIOR CIRCULATION: No proximal branch vessel occlusion or flow-limiting stenosis. Mild atherosclerosis scattered about the bilateral carotid siphons. Small conical shaped outpouching directed inferiorly at the right ICA terminus is compatible with   infundibulum for posterior communicating artery.  POSTERIOR CIRCULATION: No proximal branch vessel occlusion or flow-limiting stenosis. No aneurysm or high-flow vascular malformation.  DURAL VENOUS SINUSES: Not well evaluated on a technical basis.  NECK CTA:  RIGHT CAROTID: Mild atherosclerosis without hemodynamically significant stenosis by NASCET criteria at the carotid bifurcation. No dissection.  LEFT CAROTID: Patent without flow-limiting stenosis by NASCET criteria at the carotid bifurcation. No dissection.  VERTEBRAL ARTERIES: Right vertebral artery is dominant. Scattered mild-moderate V2 segment stenosis due to extrinsic compression from degenerative facet/uncovertebral hypertrophy most pronounced on the left  (series 4 image 219). No atherosclerotic   flow-limiting stenosis or findings of dissection.  AORTIC ARCH: Three-vessel aortic arch configuration. Visualized great vessels are widely patent.  NONVASCULAR STRUCTURES: Lung apices are clear. No neck mass or lymphadenopathy. Thyroid gland is homogenous. No acute or aggressive appearing osseous abnormalities. Multilevel degenerative changes in the visualized cervical spine.  CT PERFUSION:  PERFUSION MAPS: Symmetrical cerebral perfusion. No focal deficits in cerebral blood flow or volume to suggest ischemia/oligemia.  RAPID ANALYSIS:  CBF<30%: 0 mL  Tmax>6sec: 0 mL  Mismatch volume: 0 mL  Mismatch ratio: 9    Impression    IMPRESSION:   HEAD CT:  1.  No acute transcortical infarct, intracranial hemorrhage, or mass effect.   2.  Age indeterminate lacunar infarcts in the right caudate and thalamus.  3.  Mild diffuse cerebral volume loss and features compatible with chronic microangiopathic ischemic white matter changes.  HEAD CTA:  1.  No proximal branch vessel occlusion, flow-limiting stenosis, aneurysm, or vascular malformation.  NECK CTA:  1.  No atherosclerotic flow-limiting stenosis or findings of dissection.   2.  Scattered mild-moderate focal narrowings of the V2 vertebral artery segments due to compression from degenerative uncovertebral/facet hypertrophy in the cervical spine.  CT PERFUSION:  1.  Normal cerebral perfusion.  Zach Headley MD notified provider, ELLIOT CABALLERO, of CT head results via telephone at 3/17/2023 5:24 PM, who acknowledge understanding. Discussed CTA results at 5:39 PM.   XR Chest 1 View    Narrative    EXAM: XR CHEST 1 VIEW  LOCATION: [facility]  DATE/TIME: 3/17/2023 5:43 PM  INDICATION: fever  COMPARISON: None.    Impression    IMPRESSION: TAVR. Left midlung pulmonary opacities likely reflect atelectasis or scarring. The right lung is clear. No pleural effusion. Moderate cardiomegaly with central vascular  prominence. No overt pulmonary edema.     I personally reviewed the EKG tracing showing possible atrial fibrillation

## 2023-03-18 NOTE — PROGRESS NOTES
"Pt alert, disoriented to time and situation. On 3 L O2 via nc. Left sided weakness. Has not ambulated overnight. Pt wheelchair in room. Denies nausea. IV saline locked. Has infrequent, congested cough. LS coarse. Triggered sepsis protocol, lactic 1.3 Denies chest pain. Neuros intact.     Plan MRI today    /57 (BP Location: Right arm)   Pulse 89   Temp 99.9  F (37.7  C) (Oral)   Resp 20   Ht 1.676 m (5' 6\")   Wt 73.8 kg (162 lb 11.2 oz)   SpO2 93%   BMI 26.26 kg/m       "

## 2023-03-18 NOTE — ED NOTES
".[facility] [place][note 3]   Admission Handoff    The patient is Henrry A White, 88 year old who arrived in the ED by WHEELCHAIR from home with a complaint of Numbness  . The patient's current symptoms are new and during this time the symptoms have remained the same. In the ED, patient was diagnosed with   Final diagnoses:   Infection due to 2019 novel coronavirus   Generalized muscle weakness   Ambulatory dysfunction   Atrial fibrillation, unspecified type (H)         Needed?: No    Allergies:    Allergies   Allergen Reactions    Keppra Fatigue     Crying very depressed       Past Medical Hx:   Past Medical History:   Diagnosis Date    Asbestosis(501)     ct chest8/04    Cardiomegaly     echo8/04  stress echo 2000 normal    Cerebral infarction (H)     june 2020    Closed fracture of unspecified part of humerus     Diverticulosis of colon (without mention of hemorrhage)     Esophageal reflux     Other specified anemias     Other specified iron deficiency anemias     Uncomplicated asthma        Initial vitals were: BP: 128/77  Pulse: 88  Temp: (!) 101.1  F (38.4  C)  Resp: 18  Height: 167.6 cm (5' 6\")  Weight: 74.8 kg (165 lb)  SpO2: 93 %   Recent vital Signs: /76   Pulse 84   Temp (!) 101.1  F (38.4  C) (Tympanic)   Resp 20   Ht 1.676 m (5' 6\")   Wt 74.8 kg (165 lb)   SpO2 92%   BMI 26.63 kg/m      Elimination Status: Continent: Yes     Activity Level: 2 assist    Fall Status: Reason for falls risk:  Mobility and Reason for falls risk: Other- past CVA with deficits on L side.  Uses WC.  Can stand and pivot      Baseline Mental status: WDL  Current Mental Status changes: at basesline    Infection present or suspected this encounter:  yes Resp COVID  Sepsis suspected: No    Isolation type: yes droplet    Bariatric equipment needed?: No    In the ED these meds were given:   Medications   iopamidol (ISOVUE-370) solution 68 mL (68 mLs Intravenous $Given 3/17/23 6087)   sodium " chloride 0.9 % bag 500mL for CT scan flush use (100 mLs Intravenous $Given 3/17/23 1713)   iopamidol (ISOVUE-370) solution 100 mL (100 mLs Intravenous $Given 3/17/23 1737)   sodium chloride 0.9 % bag 500mL for CT scan flush use (100 mLs Intravenous $Given 3/17/23 1737)       Drips running?  No    Home pump  No    Current LDAs: Peripheral IV: Site R AC; Gauge 18      Results:   Labs/Imaging  Ordered and Resulted from Time of ED Arrival Up to the Time of Departure from the ED  Results for orders placed or performed during the hospital encounter of 03/17/23 (from the past 24 hour(s))   Glucose by meter   Result Value Ref Range    GLUCOSE BY METER POCT 113 (H) 70 - 99 mg/dL   CBC with Platelets & Differential    Narrative    The following orders were created for panel order CBC with Platelets & Differential.  Procedure                               Abnormality         Status                     ---------                               -----------         ------                     CBC with platelets and d...[[id number]]  Abnormal            Final result                 Please view results for these tests on the individual orders.   Basic metabolic panel   Result Value Ref Range    Sodium 135 (L) 136 - 145 mmol/L    Potassium 4.2 3.4 - 5.3 mmol/L    Chloride 102 98 - 107 mmol/L    Carbon Dioxide (CO2) 24 22 - 29 mmol/L    Anion Gap 9 7 - 15 mmol/L    Urea Nitrogen 28.8 (H) 8.0 - 23.0 mg/dL    Creatinine 1.37 (H) 0.67 - 1.17 mg/dL    Calcium 8.7 (L) 8.8 - 10.2 mg/dL    Glucose 114 (H) 70 - 99 mg/dL    GFR Estimate 50 (L) >60 mL/min/1.73m2   INR   Result Value Ref Range    INR 1.14 0.85 - 1.15   Partial thromboplastin time   Result Value Ref Range    aPTT 34 22 - 38 Seconds   Troponin T, High Sensitivity   Result Value Ref Range    Troponin T, High Sensitivity 58 (H) <=22 ng/L   CBC with platelets and differential   Result Value Ref Range    WBC Count 7.2 4.0 - 11.0 10e3/uL    RBC Count 4.31 (L) 4.40 - 5.90 10e6/uL     Hemoglobin 12.9 (L) 13.3 - 17.7 g/dL    Hematocrit 38.1 (L) 40.0 - 53.0 %    MCV 88 78 - 100 fL    MCH 29.9 26.5 - 33.0 pg    MCHC 33.9 31.5 - 36.5 g/dL    RDW 12.8 10.0 - 15.0 %    Platelet Count 168 150 - 450 10e3/uL    % Neutrophils 80 %    % Lymphocytes 6 %    % Monocytes 14 %    % Eosinophils 0 %    % Basophils 0 %    % Immature Granulocytes 0 %    NRBCs per 100 WBC 0 <1 /100    Absolute Neutrophils 5.6 1.6 - 8.3 10e3/uL    Absolute Lymphocytes 0.4 (L) 0.8 - 5.3 10e3/uL    Absolute Monocytes 1.0 0.0 - 1.3 10e3/uL    Absolute Eosinophils 0.0 0.0 - 0.7 10e3/uL    Absolute Basophils 0.0 0.0 - 0.2 10e3/uL    Absolute Immature Granulocytes 0.0 <=0.4 10e3/uL    Absolute NRBCs 0.0 10e3/uL   CT Head w/o Contrast    Narrative    EXAM: CT HEAD W/O CONTRAST, CTA HEAD NECK W CONTRAST, CT HEAD PERFUSION W CONTRAST  LOCATION: [facility]  DATE/TIME: 3/17/2023 5:18 PM    INDICATION: Code Stroke to evaluate for potential thrombolysis and thrombectomy. PLEASE READ IMMEDIATELY.  COMPARISON: None.  CONTRAST: 68 mL Isovue 370 (accession [id number]), 100 mL Isovue 370 (accession [id number]), 68 mL Isovue 370 (accession [id number])  TECHNIQUE: Head and neck CT angiogram with IV contrast. Noncontrast head CT followed by axial helical CT images of the head and neck vessels obtained during the arterial phase of intravenous contrast administration. Axial 2D reconstructed images and   multiplanar 3D MIP reconstructed images of the head and neck vessels were performed by the technologist. Additional CT cerebral perfusion was performed utilizing a second contrast bolus. Perfusion data were post processed with generation of standard   perfusion maps and estimation of ischemic/infarcted volumes utilizing standard threshold values. Dose reduction techniques were used. All stenosis measurements made according to NASCET criteria unless otherwise specified.    FINDINGS:  NONCONTRAST HEAD CT:   INTRACRANIAL CONTENTS:  Age-indeterminate lacunar infarcts in the right thalamus and caudate. Remaining gray-white matter interfaces are intact. No hemorrhage or extraaxial collection. No mass effect. Subarachnoid cisterns are patent. Patchy white matter   hypodensities are, while nonspecific, most compatible with chronic microvascular ischemic changes. Mild generalized volume loss. No hydrocephalus.    VISUALIZED ORBITS/SINUSES/MASTOIDS: Bilateral lens replacements. Mild-moderate mucosal thickening scattered about the paranasal sinuses. No middle ear or mastoid effusion.    BONES/SOFT TISSUES: No acute abnormality.    HEAD CTA:  ANTERIOR CIRCULATION: No proximal branch vessel occlusion or flow-limiting stenosis. Mild atherosclerosis scattered about the bilateral carotid siphons. Small conical shaped outpouching directed inferiorly at the right ICA terminus is compatible with   infundibulum for posterior communicating artery.    POSTERIOR CIRCULATION: No proximal branch vessel occlusion or flow-limiting stenosis. No aneurysm or high-flow vascular malformation.    DURAL VENOUS SINUSES: Not well evaluated on a technical basis.    NECK CTA:  RIGHT CAROTID: Mild atherosclerosis without hemodynamically significant stenosis by NASCET criteria at the carotid bifurcation. No dissection.    LEFT CAROTID: Patent without flow-limiting stenosis by NASCET criteria at the carotid bifurcation. No dissection.    VERTEBRAL ARTERIES: Right vertebral artery is dominant. Scattered mild-moderate V2 segment stenosis due to extrinsic compression from degenerative facet/uncovertebral hypertrophy most pronounced on the left (series 4 image 219). No atherosclerotic   flow-limiting stenosis or findings of dissection.    AORTIC ARCH: Three-vessel aortic arch configuration. Visualized great vessels are widely patent.    NONVASCULAR STRUCTURES: Lung apices are clear. No neck mass or lymphadenopathy. Thyroid gland is homogenous. No acute or aggressive appearing osseous  abnormalities. Multilevel degenerative changes in the visualized cervical spine.    CT PERFUSION:  PERFUSION MAPS: Symmetrical cerebral perfusion. No focal deficits in cerebral blood flow or volume to suggest ischemia/oligemia.    RAPID ANALYSIS:  CBF<30%: 0 mL  Tmax>6sec: 0 mL  Mismatch volume: 0 mL  Mismatch ratio: 9      Impression    IMPRESSION:   HEAD CT:  1.  No acute transcortical infarct, intracranial hemorrhage, or mass effect.   2.  Age indeterminate lacunar infarcts in the right caudate and thalamus.  3.  Mild diffuse cerebral volume loss and features compatible with chronic microangiopathic ischemic white matter changes.    HEAD CTA:  1.  No proximal branch vessel occlusion, flow-limiting stenosis, aneurysm, or vascular malformation.    NECK CTA:  1.  No atherosclerotic flow-limiting stenosis or findings of dissection.   2.  Scattered mild-moderate focal narrowings of the V2 vertebral artery segments due to compression from degenerative uncovertebral/facet hypertrophy in the cervical spine.    CT PERFUSION:  1.  Normal cerebral perfusion.    Zach Headley MD notified provider, ELLIOT CABALLERO, of CT head results via telephone at 3/17/2023 5:24 PM, who acknowledge understanding. Discussed CTA results at 5:39 PM.       CTA Head Neck with Contrast    Narrative    EXAM: CT HEAD W/O CONTRAST, CTA HEAD NECK W CONTRAST, CT HEAD PERFUSION W CONTRAST  LOCATION: [facility]  DATE/TIME: 3/17/2023 5:18 PM    INDICATION: Code Stroke to evaluate for potential thrombolysis and thrombectomy. PLEASE READ IMMEDIATELY.  COMPARISON: None.  CONTRAST: 68 mL Isovue 370 (accession [id number]), 100 mL Isovue 370 (accession [id number]), 68 mL Isovue 370 (accession [id number])  TECHNIQUE: Head and neck CT angiogram with IV contrast. Noncontrast head CT followed by axial helical CT images of the head and neck vessels obtained during the arterial phase of intravenous contrast administration. Axial 2D  reconstructed images and   multiplanar 3D MIP reconstructed images of the head and neck vessels were performed by the technologist. Additional CT cerebral perfusion was performed utilizing a second contrast bolus. Perfusion data were post processed with generation of standard   perfusion maps and estimation of ischemic/infarcted volumes utilizing standard threshold values. Dose reduction techniques were used. All stenosis measurements made according to NASCET criteria unless otherwise specified.    FINDINGS:  NONCONTRAST HEAD CT:   INTRACRANIAL CONTENTS: Age-indeterminate lacunar infarcts in the right thalamus and caudate. Remaining gray-white matter interfaces are intact. No hemorrhage or extraaxial collection. No mass effect. Subarachnoid cisterns are patent. Patchy white matter   hypodensities are, while nonspecific, most compatible with chronic microvascular ischemic changes. Mild generalized volume loss. No hydrocephalus.    VISUALIZED ORBITS/SINUSES/MASTOIDS: Bilateral lens replacements. Mild-moderate mucosal thickening scattered about the paranasal sinuses. No middle ear or mastoid effusion.    BONES/SOFT TISSUES: No acute abnormality.    HEAD CTA:  ANTERIOR CIRCULATION: No proximal branch vessel occlusion or flow-limiting stenosis. Mild atherosclerosis scattered about the bilateral carotid siphons. Small conical shaped outpouching directed inferiorly at the right ICA terminus is compatible with   infundibulum for posterior communicating artery.    POSTERIOR CIRCULATION: No proximal branch vessel occlusion or flow-limiting stenosis. No aneurysm or high-flow vascular malformation.    DURAL VENOUS SINUSES: Not well evaluated on a technical basis.    NECK CTA:  RIGHT CAROTID: Mild atherosclerosis without hemodynamically significant stenosis by NASCET criteria at the carotid bifurcation. No dissection.    LEFT CAROTID: Patent without flow-limiting stenosis by NASCET criteria at the carotid bifurcation. No  dissection.    VERTEBRAL ARTERIES: Right vertebral artery is dominant. Scattered mild-moderate V2 segment stenosis due to extrinsic compression from degenerative facet/uncovertebral hypertrophy most pronounced on the left (series 4 image 219). No atherosclerotic   flow-limiting stenosis or findings of dissection.    AORTIC ARCH: Three-vessel aortic arch configuration. Visualized great vessels are widely patent.    NONVASCULAR STRUCTURES: Lung apices are clear. No neck mass or lymphadenopathy. Thyroid gland is homogenous. No acute or aggressive appearing osseous abnormalities. Multilevel degenerative changes in the visualized cervical spine.    CT PERFUSION:  PERFUSION MAPS: Symmetrical cerebral perfusion. No focal deficits in cerebral blood flow or volume to suggest ischemia/oligemia.    RAPID ANALYSIS:  CBF<30%: 0 mL  Tmax>6sec: 0 mL  Mismatch volume: 0 mL  Mismatch ratio: 9      Impression    IMPRESSION:   HEAD CT:  1.  No acute transcortical infarct, intracranial hemorrhage, or mass effect.   2.  Age indeterminate lacunar infarcts in the right caudate and thalamus.  3.  Mild diffuse cerebral volume loss and features compatible with chronic microangiopathic ischemic white matter changes.    HEAD CTA:  1.  No proximal branch vessel occlusion, flow-limiting stenosis, aneurysm, or vascular malformation.    NECK CTA:  1.  No atherosclerotic flow-limiting stenosis or findings of dissection.   2.  Scattered mild-moderate focal narrowings of the V2 vertebral artery segments due to compression from degenerative uncovertebral/facet hypertrophy in the cervical spine.    CT PERFUSION:  1.  Normal cerebral perfusion.    Zach Headley MD notified provider, ELLIOT CABALLERO, of CT head results via telephone at 3/17/2023 5:24 PM, who acknowledge understanding. Discussed CTA results at 5:39 PM.       CT Head Perfusion w Contrast - For Tier 2 Stroke    Narrative    EXAM: CT HEAD W/O CONTRAST, CTA HEAD NECK W CONTRAST, CT HEAD  PERFUSION W CONTRAST  LOCATION: [facility]  DATE/TIME: 3/17/2023 5:18 PM    INDICATION: Code Stroke to evaluate for potential thrombolysis and thrombectomy. PLEASE READ IMMEDIATELY.  COMPARISON: None.  CONTRAST: 68 mL Isovue 370 (accession [id number]), 100 mL Isovue 370 (accession [id number]), 68 mL Isovue 370 (accession [id number])  TECHNIQUE: Head and neck CT angiogram with IV contrast. Noncontrast head CT followed by axial helical CT images of the head and neck vessels obtained during the arterial phase of intravenous contrast administration. Axial 2D reconstructed images and   multiplanar 3D MIP reconstructed images of the head and neck vessels were performed by the technologist. Additional CT cerebral perfusion was performed utilizing a second contrast bolus. Perfusion data were post processed with generation of standard   perfusion maps and estimation of ischemic/infarcted volumes utilizing standard threshold values. Dose reduction techniques were used. All stenosis measurements made according to NASCET criteria unless otherwise specified.    FINDINGS:  NONCONTRAST HEAD CT:   INTRACRANIAL CONTENTS: Age-indeterminate lacunar infarcts in the right thalamus and caudate. Remaining gray-white matter interfaces are intact. No hemorrhage or extraaxial collection. No mass effect. Subarachnoid cisterns are patent. Patchy white matter   hypodensities are, while nonspecific, most compatible with chronic microvascular ischemic changes. Mild generalized volume loss. No hydrocephalus.    VISUALIZED ORBITS/SINUSES/MASTOIDS: Bilateral lens replacements. Mild-moderate mucosal thickening scattered about the paranasal sinuses. No middle ear or mastoid effusion.    BONES/SOFT TISSUES: No acute abnormality.    HEAD CTA:  ANTERIOR CIRCULATION: No proximal branch vessel occlusion or flow-limiting stenosis. Mild atherosclerosis scattered about the bilateral carotid siphons. Small conical shaped outpouching  directed inferiorly at the right ICA terminus is compatible with   infundibulum for posterior communicating artery.    POSTERIOR CIRCULATION: No proximal branch vessel occlusion or flow-limiting stenosis. No aneurysm or high-flow vascular malformation.    DURAL VENOUS SINUSES: Not well evaluated on a technical basis.    NECK CTA:  RIGHT CAROTID: Mild atherosclerosis without hemodynamically significant stenosis by NASCET criteria at the carotid bifurcation. No dissection.    LEFT CAROTID: Patent without flow-limiting stenosis by NASCET criteria at the carotid bifurcation. No dissection.    VERTEBRAL ARTERIES: Right vertebral artery is dominant. Scattered mild-moderate V2 segment stenosis due to extrinsic compression from degenerative facet/uncovertebral hypertrophy most pronounced on the left (series 4 image 219). No atherosclerotic   flow-limiting stenosis or findings of dissection.    AORTIC ARCH: Three-vessel aortic arch configuration. Visualized great vessels are widely patent.    NONVASCULAR STRUCTURES: Lung apices are clear. No neck mass or lymphadenopathy. Thyroid gland is homogenous. No acute or aggressive appearing osseous abnormalities. Multilevel degenerative changes in the visualized cervical spine.    CT PERFUSION:  PERFUSION MAPS: Symmetrical cerebral perfusion. No focal deficits in cerebral blood flow or volume to suggest ischemia/oligemia.    RAPID ANALYSIS:  CBF<30%: 0 mL  Tmax>6sec: 0 mL  Mismatch volume: 0 mL  Mismatch ratio: 9      Impression    IMPRESSION:   HEAD CT:  1.  No acute transcortical infarct, intracranial hemorrhage, or mass effect.   2.  Age indeterminate lacunar infarcts in the right caudate and thalamus.  3.  Mild diffuse cerebral volume loss and features compatible with chronic microangiopathic ischemic white matter changes.    HEAD CTA:  1.  No proximal branch vessel occlusion, flow-limiting stenosis, aneurysm, or vascular malformation.    NECK CTA:  1.  No atherosclerotic  flow-limiting stenosis or findings of dissection.   2.  Scattered mild-moderate focal narrowings of the V2 vertebral artery segments due to compression from degenerative uncovertebral/facet hypertrophy in the cervical spine.    CT PERFUSION:  1.  Normal cerebral perfusion.    Zach Headley MD notified provider, ELLIOT CABALLERO, of CT head results via telephone at 3/17/2023 5:24 PM, who acknowledge understanding. Discussed CTA results at 5:39 PM.       Symptomatic Influenza A/B, RSV, & SARS-CoV2 PCR (COVID-19) Nasopharyngeal    Specimen: Nasopharyngeal; Swab   Result Value Ref Range    Influenza A PCR Negative Negative    Influenza B PCR Negative Negative    RSV PCR Negative Negative    SARS CoV2 PCR Positive (A) Negative    Narrative    Testing was performed using the Xpert Xpress CoV2/Flu/RSV Assay on the [employer] Instrument. This test should be ordered for the detection of SARS-CoV-2, influenza, and RSV viruses in individuals who meet clinical and/or epidemiological criteria. Test performance is unknown in asymptomatic patients. This test is for in vitro diagnostic use under the FDA EUA for laboratories certified under CLIA to perform high or moderate complexity testing. This test has not been FDA cleared or approved. A negative result does not rule out the presence of PCR inhibitors in the specimen or target RNA in concentration below the limit of detection for the assay. If only one viral target is positive but coinfection with multiple targets is suspected, the sample should be re-tested with another FDA cleared, approved, or authorized test, if coinfection would change clinical management. This test was validated by the [facility] [employer]. These laboratories are certified under the Clinical Laboratory Improvement Amendments of 1988 (CLIA-88) as qualified to perform high complexity laboratory testing.   XR Chest 1 View    Narrative    EXAM: XR CHEST 1 VIEW  LOCATION: [facility]  DATE/TIME: 3/17/2023 5:43 PM    INDICATION: fever  COMPARISON: None.      Impression    IMPRESSION: TAVR. Left midlung pulmonary opacities likely reflect atelectasis or scarring. The right lung is clear. No pleural effusion. Moderate cardiomegaly with central vascular prominence. No overt pulmonary edema.   UA with Microscopic reflex to Culture    Specimen: Urine, Midstream   Result Value Ref Range    Color Urine Yellow Colorless, Straw, Light Yellow, Yellow    Appearance Urine Clear Clear    Glucose Urine Negative Negative mg/dL    Bilirubin Urine Negative Negative    Ketones Urine Negative Negative mg/dL    Specific Gravity Urine 1.010 1.003 - 1.035    Blood Urine Negative Negative    pH Urine 6.0 5.0 - 7.0    Protein Albumin Urine Negative Negative mg/dL    Urobilinogen Urine Normal Normal, 2.0 mg/dL    Nitrite Urine Negative Negative    Leukocyte Esterase Urine Negative Negative    Mucus Urine Present (A) None Seen /LPF    RBC Urine 2 <=2 /HPF    WBC Urine 2 <=5 /HPF    Narrative    Urine Culture not indicated       For the majority of the shift this patient's behavior was Green     Cardiac Rhythm: A fib  Pt needs tele? No  Skin/wound Issues: None    Code Status:    Pain control: pt had none    Nausea control: pt had none    Abnormal labs/tests/findings requiring intervention: COVID +    Patient tested for COVID 19 prior to admission: YES     OBS brochure/video discussed/provided to patient/family:      Family present during ED course? Wife and son on arrival, but left immediately    Family Comments/Social Situation comments:     Tasks needing completion: None    Jesica Pelletier, RN

## 2023-03-18 NOTE — PROVIDER NOTIFICATION
ICU called via [employer] to notify pt experiencing V tach and PVC's. Checked on pt and no s/s.  MD notified via [employer].

## 2023-03-18 NOTE — PROGRESS NOTES
DATE:  3/18/2023   TIME OF RECEIPT FROM LAB:  9842  LAB TEST:  CK  LAB VALUE:  1,985  RESULTS GIVEN WITH READ-BACK TO (PROVIDER):  Lynn Ritchie MD  TIME LAB VALUE REPORTED TO PROVIDER:   1450

## 2023-03-18 NOTE — PROGRESS NOTES
[facility] Medicine Progress Note  Date of Service (when I saw the patient): 03/18/2023    REASON FOR ADMISSION / INTERVAL HISTORY:  Henrry Fonseca is a 88 year old male who presents on 3/17/2023 with right leg weakness. He continues to have weakness R leg. Mild SOB but no CP.        Assessment & Plan      Right leg weakness  Last known well at bedtime 3/16, awoke 3/17 with right lower extremity weakness and headache. Denies right arm or face symptoms. Stroke code called in ER; head CT shows no acute infarct, age indeterminate lacunar infarcts in right caudate & thalamus. CTA shows no acute concerning changes. CT perfusion is normal. Symptoms slowly improving but still present on admission. Stroke neuro de-escalated stroke code, but has left some recommendations for further workup given stroke history & difficult exam with underlying paralysis.   MRI brain with and without contrast is negative. Blood cx-p(r/o endocarditis per neuro stroke)  -will have PT eval. Ck CK     Infection due to 2019 novel coronavirus  Cough began about 2 days PTA (3/15), but weakness began AM of admission (3/17). Oxygenating well on room air at time of admission. Qualifies for Paxlovid (high risk for progression but not hypoxemic) based on current admission order set guidelines.   Vaccinated with Moderna 3/12/2021, 4/9/2021, pfizer booster 11/8/2021, Moderna bivalent booster 9/14/2022.   Was not hypoxic. Did have temp 101 and still spiking LGT. Started on Paxlovid 150/100mg BID on admit . Plan for 5 day course. Note to watch for myalgias with this while patient is also on statin (do not want to hold statin due to possible stroke and criticality of stroke risk optimization)  Still spiking LGT. CXR on admit negative for infiltrate. PCT today 0.12.  -continue paxlovid/ lovenox. Follow labs     Cardiac arrythmia-afib vs SR with first degree block  Acute diastolic CHF  Type 2 NSTEMI due to Demand  ischemia  Aortic stenosis s/p TAVR  CXR showed vascular congestion. BNP is >8k. Last ECHO 1/22 showed EF 60%. Troponins 74-96. Did have 7 beat run of v tach today  -start iv lasix, ECHO Monday. Will have cards see Monday if arrythmia persists.      Acute kidney injury  Mild. Baseline creatinine around 1.05 - 1.1, creatinine on presentation 1.37. likely due to covid infection , poor po intake.   -follow labs now with IV lasix as above     Ambulatory dysfunction  History of hemorrhagic stroke   Left hemiparesis   Hemorrhagic right-sided stroke in June 2020 due to uncontrolled hypertension resulting in left-sided paralysis, worse in RUE. Uses wheelchair & walker at baseline. No changes from baseline functionality on left side at time of admission.      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 Does not require oxygen at home. May be contributing to cough and makes patient higher risk for covid progression. No acute interventions.      Essential hypertension, benign  Generally controlled since presentation. Managed PTA with amlodipine 5mg daily.  Per cardiology note in May 2022, patient should avoid AV annamaria blocking agents because he had bradycardia after TAVR.    PTA amlodipine  On hold due to interaction with Paxlovid. BP stable  -continue to hold amlodipine.       Seizure disorder  History of seizure disorder noted in chart. Cannot find documentation of date of last seizure and patient cannot recall a recent event. Managed PTA with gabapentin 600mg BID in AM & PM, with additional 300mg dose at noon.   -Continue PTA gabapentin     Anemia  Mild, chronic & stable, normocytic. Baseline hemoglobin around 11-12, hemoglobin on presentation 12.9. No signs of acute bleeding. Monitor.    DISPO  Will be in hospital 2-3 days atleast           Diet: Combination Diet Regular Diet Adult    DVT Prophylaxis: Enoxaparin (Lovenox) SQ  Panda Catheter: Not present  Code Status:   special-only CPR no intubation  Lines:  "LILLIAN CROWDER MD   Pg [phone]        ROS:  As described in A/P and Exam.  Otherwise ALL are  negative.    PHYSICAL EXAM:  All vitals have been reviewed    Blood pressure 126/62, pulse 74, temperature 100  F (37.8  C), temperature source Oral, resp. rate 16, height 1.676 m (5' 6\"), weight 73.8 kg (162 lb 11.2 oz), SpO2 95 %.    I/O this shift:  In: -   Out: 180 [Urine:180]    GENERAL APPEARANCE: healthy, alert and no distress  EYES: conjunctiva clear, eyes grossly normal  HENT: external ears and nose normal   RESP: lungs -few crackles lower 1/3 - no rales, rhonchi or wheezes  CV: regular rate and rhythm, normal S1 S2, no S3 or S4 and no murmur, click or rub   ABDOMEN: soft, nontender, no HSM or masses and bowel sounds normal  MS: no clubbing, cyanosis; no edema  SKIN: clear without significant rashes or lesions  NEURO: -non-focal moves all 4 extr    ROUTINE  LABS (Last four results)  CMP  Recent Labs   Lab 03/18/23 0227 03/17/23  1708 03/17/23  1655   * 135*  --    POTASSIUM 4.0 4.2  --    CHLORIDE 101 102  --    CO2 23 24  --    ANIONGAP 11 9  --    * 114* 113*   BUN 28.0* 28.8*  --    CR 1.33* 1.37*  --    GFRESTIMATED 51* 50*  --    SUNDAY 8.2* 8.7*  --      CBC  Recent Labs   Lab 03/18/23 0227 03/17/23 1708   WBC 5.6 7.2   RBC 4.00* 4.31*   HGB 12.0* 12.9*   HCT 35.3* 38.1*   MCV 88 88   MCH 30.0 29.9   MCHC 34.0 33.9   RDW 12.8 12.8   * 168     INR  Recent Labs   Lab 03/17/23  1708   INR 1.14     Arterial Blood GasNo lab results found in last 7 days.    Recent Results (from the past 24 hour(s))   CT Head w/o Contrast    Narrative    EXAM: CT HEAD W/O CONTRAST, CTA HEAD NECK W CONTRAST, CT HEAD PERFUSION W CONTRAST  LOCATION: [facility]  DATE/TIME: 3/17/2023 5:18 PM    INDICATION: Code Stroke to evaluate for potential thrombolysis and thrombectomy. PLEASE READ IMMEDIATELY.  COMPARISON: None.  CONTRAST: 68 mL Isovue 370 (accession [id number]), 100 mL Isovue " 370 (accession [id number]), 68 mL Isovue 370 (accession [id number])  TECHNIQUE: Head and neck CT angiogram with IV contrast. Noncontrast head CT followed by axial helical CT images of the head and neck vessels obtained during the arterial phase of intravenous contrast administration. Axial 2D reconstructed images and   multiplanar 3D MIP reconstructed images of the head and neck vessels were performed by the technologist. Additional CT cerebral perfusion was performed utilizing a second contrast bolus. Perfusion data were post processed with generation of standard   perfusion maps and estimation of ischemic/infarcted volumes utilizing standard threshold values. Dose reduction techniques were used. All stenosis measurements made according to NASCET criteria unless otherwise specified.    FINDINGS:  NONCONTRAST HEAD CT:   INTRACRANIAL CONTENTS: Age-indeterminate lacunar infarcts in the right thalamus and caudate. Remaining gray-white matter interfaces are intact. No hemorrhage or extraaxial collection. No mass effect. Subarachnoid cisterns are patent. Patchy white matter   hypodensities are, while nonspecific, most compatible with chronic microvascular ischemic changes. Mild generalized volume loss. No hydrocephalus.    VISUALIZED ORBITS/SINUSES/MASTOIDS: Bilateral lens replacements. Mild-moderate mucosal thickening scattered about the paranasal sinuses. No middle ear or mastoid effusion.    BONES/SOFT TISSUES: No acute abnormality.    HEAD CTA:  ANTERIOR CIRCULATION: No proximal branch vessel occlusion or flow-limiting stenosis. Mild atherosclerosis scattered about the bilateral carotid siphons. Small conical shaped outpouching directed inferiorly at the right ICA terminus is compatible with   infundibulum for posterior communicating artery.    POSTERIOR CIRCULATION: No proximal branch vessel occlusion or flow-limiting stenosis. No aneurysm or high-flow vascular malformation.    DURAL VENOUS SINUSES: Not well evaluated  on a technical basis.    NECK CTA:  RIGHT CAROTID: Mild atherosclerosis without hemodynamically significant stenosis by NASCET criteria at the carotid bifurcation. No dissection.    LEFT CAROTID: Patent without flow-limiting stenosis by NASCET criteria at the carotid bifurcation. No dissection.    VERTEBRAL ARTERIES: Right vertebral artery is dominant. Scattered mild-moderate V2 segment stenosis due to extrinsic compression from degenerative facet/uncovertebral hypertrophy most pronounced on the left (series 4 image 219). No atherosclerotic   flow-limiting stenosis or findings of dissection.    AORTIC ARCH: Three-vessel aortic arch configuration. Visualized great vessels are widely patent.    NONVASCULAR STRUCTURES: Lung apices are clear. No neck mass or lymphadenopathy. Thyroid gland is homogenous. No acute or aggressive appearing osseous abnormalities. Multilevel degenerative changes in the visualized cervical spine.    CT PERFUSION:  PERFUSION MAPS: Symmetrical cerebral perfusion. No focal deficits in cerebral blood flow or volume to suggest ischemia/oligemia.    RAPID ANALYSIS:  CBF<30%: 0 mL  Tmax>6sec: 0 mL  Mismatch volume: 0 mL  Mismatch ratio: 9      Impression    IMPRESSION:   HEAD CT:  1.  No acute transcortical infarct, intracranial hemorrhage, or mass effect.   2.  Age indeterminate lacunar infarcts in the right caudate and thalamus.  3.  Mild diffuse cerebral volume loss and features compatible with chronic microangiopathic ischemic white matter changes.    HEAD CTA:  1.  No proximal branch vessel occlusion, flow-limiting stenosis, aneurysm, or vascular malformation.    NECK CTA:  1.  No atherosclerotic flow-limiting stenosis or findings of dissection.   2.  Scattered mild-moderate focal narrowings of the V2 vertebral artery segments due to compression from degenerative uncovertebral/facet hypertrophy in the cervical spine.    CT PERFUSION:  1.  Normal cerebral perfusion.    Zach Headley MD notified  provider, ELLIOT CABALLERO, of CT head results via telephone at 3/17/2023 5:24 PM, who acknowledge understanding. Discussed CTA results at 5:39 PM.       CTA Head Neck with Contrast    Narrative    EXAM: CT HEAD W/O CONTRAST, CTA HEAD NECK W CONTRAST, CT HEAD PERFUSION W CONTRAST  LOCATION: [facility]  DATE/TIME: 3/17/2023 5:18 PM    INDICATION: Code Stroke to evaluate for potential thrombolysis and thrombectomy. PLEASE READ IMMEDIATELY.  COMPARISON: None.  CONTRAST: 68 mL Isovue 370 (accession [id number]), 100 mL Isovue 370 (accession [id number]), 68 mL Isovue 370 (accession [id number])  TECHNIQUE: Head and neck CT angiogram with IV contrast. Noncontrast head CT followed by axial helical CT images of the head and neck vessels obtained during the arterial phase of intravenous contrast administration. Axial 2D reconstructed images and   multiplanar 3D MIP reconstructed images of the head and neck vessels were performed by the technologist. Additional CT cerebral perfusion was performed utilizing a second contrast bolus. Perfusion data were post processed with generation of standard   perfusion maps and estimation of ischemic/infarcted volumes utilizing standard threshold values. Dose reduction techniques were used. All stenosis measurements made according to NASCET criteria unless otherwise specified.    FINDINGS:  NONCONTRAST HEAD CT:   INTRACRANIAL CONTENTS: Age-indeterminate lacunar infarcts in the right thalamus and caudate. Remaining gray-white matter interfaces are intact. No hemorrhage or extraaxial collection. No mass effect. Subarachnoid cisterns are patent. Patchy white matter   hypodensities are, while nonspecific, most compatible with chronic microvascular ischemic changes. Mild generalized volume loss. No hydrocephalus.    VISUALIZED ORBITS/SINUSES/MASTOIDS: Bilateral lens replacements. Mild-moderate mucosal thickening scattered about the paranasal sinuses. No middle ear or mastoid  effusion.    BONES/SOFT TISSUES: No acute abnormality.    HEAD CTA:  ANTERIOR CIRCULATION: No proximal branch vessel occlusion or flow-limiting stenosis. Mild atherosclerosis scattered about the bilateral carotid siphons. Small conical shaped outpouching directed inferiorly at the right ICA terminus is compatible with   infundibulum for posterior communicating artery.    POSTERIOR CIRCULATION: No proximal branch vessel occlusion or flow-limiting stenosis. No aneurysm or high-flow vascular malformation.    DURAL VENOUS SINUSES: Not well evaluated on a technical basis.    NECK CTA:  RIGHT CAROTID: Mild atherosclerosis without hemodynamically significant stenosis by NASCET criteria at the carotid bifurcation. No dissection.    LEFT CAROTID: Patent without flow-limiting stenosis by NASCET criteria at the carotid bifurcation. No dissection.    VERTEBRAL ARTERIES: Right vertebral artery is dominant. Scattered mild-moderate V2 segment stenosis due to extrinsic compression from degenerative facet/uncovertebral hypertrophy most pronounced on the left (series 4 image 219). No atherosclerotic   flow-limiting stenosis or findings of dissection.    AORTIC ARCH: Three-vessel aortic arch configuration. Visualized great vessels are widely patent.    NONVASCULAR STRUCTURES: Lung apices are clear. No neck mass or lymphadenopathy. Thyroid gland is homogenous. No acute or aggressive appearing osseous abnormalities. Multilevel degenerative changes in the visualized cervical spine.    CT PERFUSION:  PERFUSION MAPS: Symmetrical cerebral perfusion. No focal deficits in cerebral blood flow or volume to suggest ischemia/oligemia.    RAPID ANALYSIS:  CBF<30%: 0 mL  Tmax>6sec: 0 mL  Mismatch volume: 0 mL  Mismatch ratio: 9      Impression    IMPRESSION:   HEAD CT:  1.  No acute transcortical infarct, intracranial hemorrhage, or mass effect.   2.  Age indeterminate lacunar infarcts in the right caudate and thalamus.  3.  Mild diffuse cerebral  volume loss and features compatible with chronic microangiopathic ischemic white matter changes.    HEAD CTA:  1.  No proximal branch vessel occlusion, flow-limiting stenosis, aneurysm, or vascular malformation.    NECK CTA:  1.  No atherosclerotic flow-limiting stenosis or findings of dissection.   2.  Scattered mild-moderate focal narrowings of the V2 vertebral artery segments due to compression from degenerative uncovertebral/facet hypertrophy in the cervical spine.    CT PERFUSION:  1.  Normal cerebral perfusion.    Zach Headley MD notified provider, ELLIOT CABALLERO, of CT head results via telephone at 3/17/2023 5:24 PM, who acknowledge understanding. Discussed CTA results at 5:39 PM.       CT Head Perfusion w Contrast - For Tier 2 Stroke    Narrative    EXAM: CT HEAD W/O CONTRAST, CTA HEAD NECK W CONTRAST, CT HEAD PERFUSION W CONTRAST  LOCATION: [facility]  DATE/TIME: 3/17/2023 5:18 PM    INDICATION: Code Stroke to evaluate for potential thrombolysis and thrombectomy. PLEASE READ IMMEDIATELY.  COMPARISON: None.  CONTRAST: 68 mL Isovue 370 (accession [id number]), 100 mL Isovue 370 (accession [id number]), 68 mL Isovue 370 (accession [id number])  TECHNIQUE: Head and neck CT angiogram with IV contrast. Noncontrast head CT followed by axial helical CT images of the head and neck vessels obtained during the arterial phase of intravenous contrast administration. Axial 2D reconstructed images and   multiplanar 3D MIP reconstructed images of the head and neck vessels were performed by the technologist. Additional CT cerebral perfusion was performed utilizing a second contrast bolus. Perfusion data were post processed with generation of standard   perfusion maps and estimation of ischemic/infarcted volumes utilizing standard threshold values. Dose reduction techniques were used. All stenosis measurements made according to NASCET criteria unless otherwise specified.    FINDINGS:  NONCONTRAST HEAD  CT:   INTRACRANIAL CONTENTS: Age-indeterminate lacunar infarcts in the right thalamus and caudate. Remaining gray-white matter interfaces are intact. No hemorrhage or extraaxial collection. No mass effect. Subarachnoid cisterns are patent. Patchy white matter   hypodensities are, while nonspecific, most compatible with chronic microvascular ischemic changes. Mild generalized volume loss. No hydrocephalus.    VISUALIZED ORBITS/SINUSES/MASTOIDS: Bilateral lens replacements. Mild-moderate mucosal thickening scattered about the paranasal sinuses. No middle ear or mastoid effusion.    BONES/SOFT TISSUES: No acute abnormality.    HEAD CTA:  ANTERIOR CIRCULATION: No proximal branch vessel occlusion or flow-limiting stenosis. Mild atherosclerosis scattered about the bilateral carotid siphons. Small conical shaped outpouching directed inferiorly at the right ICA terminus is compatible with   infundibulum for posterior communicating artery.    POSTERIOR CIRCULATION: No proximal branch vessel occlusion or flow-limiting stenosis. No aneurysm or high-flow vascular malformation.    DURAL VENOUS SINUSES: Not well evaluated on a technical basis.    NECK CTA:  RIGHT CAROTID: Mild atherosclerosis without hemodynamically significant stenosis by NASCET criteria at the carotid bifurcation. No dissection.    LEFT CAROTID: Patent without flow-limiting stenosis by NASCET criteria at the carotid bifurcation. No dissection.    VERTEBRAL ARTERIES: Right vertebral artery is dominant. Scattered mild-moderate V2 segment stenosis due to extrinsic compression from degenerative facet/uncovertebral hypertrophy most pronounced on the left (series 4 image 219). No atherosclerotic   flow-limiting stenosis or findings of dissection.    AORTIC ARCH: Three-vessel aortic arch configuration. Visualized great vessels are widely patent.    NONVASCULAR STRUCTURES: Lung apices are clear. No neck mass or lymphadenopathy. Thyroid gland is homogenous. No acute or  aggressive appearing osseous abnormalities. Multilevel degenerative changes in the visualized cervical spine.    CT PERFUSION:  PERFUSION MAPS: Symmetrical cerebral perfusion. No focal deficits in cerebral blood flow or volume to suggest ischemia/oligemia.    RAPID ANALYSIS:  CBF<30%: 0 mL  Tmax>6sec: 0 mL  Mismatch volume: 0 mL  Mismatch ratio: 9      Impression    IMPRESSION:   HEAD CT:  1.  No acute transcortical infarct, intracranial hemorrhage, or mass effect.   2.  Age indeterminate lacunar infarcts in the right caudate and thalamus.  3.  Mild diffuse cerebral volume loss and features compatible with chronic microangiopathic ischemic white matter changes.    HEAD CTA:  1.  No proximal branch vessel occlusion, flow-limiting stenosis, aneurysm, or vascular malformation.    NECK CTA:  1.  No atherosclerotic flow-limiting stenosis or findings of dissection.   2.  Scattered mild-moderate focal narrowings of the V2 vertebral artery segments due to compression from degenerative uncovertebral/facet hypertrophy in the cervical spine.    CT PERFUSION:  1.  Normal cerebral perfusion.    Zach Headley MD notified provider, ELLIOT CABALLERO, of CT head results via telephone at 3/17/2023 5:24 PM, who acknowledge understanding. Discussed CTA results at 5:39 PM.       XR Chest 1 View    Narrative    EXAM: XR CHEST 1 VIEW  LOCATION: [facility]  DATE/TIME: 3/17/2023 5:43 PM    INDICATION: fever  COMPARISON: None.      Impression    IMPRESSION: TAVR. Left midlung pulmonary opacities likely reflect atelectasis or scarring. The right lung is clear. No pleural effusion. Moderate cardiomegaly with central vascular prominence. No overt pulmonary edema.   MR Brain w/o & w Contrast    Narrative    EXAM: MR BRAIN W/O and W CONTRAST  LOCATION: [facility]  DATE/TIME: 3/18/2023 10:17 AM    INDICATION: Dysarthria. Right leg weakness.  COMPARISON: Head CT 03/17/2023  CONTRAST: 7 mL  Gadavist  TECHNIQUE: Routine multiplanar multisequence head MRI without and with intravenous contrast.    FINDINGS:  INTRACRANIAL CONTENTS: No acute or subacute infarct. No mass, acute hemorrhage, or extra-axial fluid collections. Chronic hemorrhagic infarct in the right lentiform nucleus extending into the right thalamus. Mild presumed chronic small vessel ischemic   change. Mild generalized cerebral atrophy. No hydrocephalus. Normal position of the cerebellar tonsils. No pathologic contrast enhancement.    SELLA: No abnormality accounting for technique.    OSSEOUS STRUCTURES/SOFT TISSUES: Normal marrow signal. The major intracranial vascular flow voids are maintained.     ORBITS: Prior bilateral cataract surgery. Visualized portions of the orbits are otherwise unremarkable.     SINUSES/MASTOIDS: Moderate paranasal sinus polypoid mucosal thickening. No middle ear or mastoid effusion.       Impression    IMPRESSION:  1.  No mass, acute hemorrhage or acute stroke.  2.  Chronic hemorrhagic infarct in the right lentiform nucleus extending into the right thalamus.  3.  Mild generalized volume loss.  4.  Moderate paranasal sinus inflammation.

## 2023-03-18 NOTE — PLAN OF CARE
Goal Outcome Evaluation:      Neuro: A&O x4  Cardiac:  WNL    Respiratory: Coarse, expiratory wheezes, on RA  GI/: Distended, rounded abdomen. Needs assistance with urinal  Diet/appetite: Regular  Activity: A2 pivot  Pain: Denies  Skin: Abdominal abrasion, scattered bruising  LDA's:  IV SL     Other: Episode of VTach and PVC's-pt denies s/s.      Plan: Echo 3/20

## 2023-03-18 NOTE — PROGRESS NOTES
Impression:  Oral and pharyngeal swallow is WFL with a regular consistency diet.  Pt reports no chewing or swallowing deficits but limited oral intake with solids due to not hungry and food not hot enough.  No overt s/sx aspiration today with oral intake.  Pt states speech intelligibility is improved and at baseline.  [facility]   No ST treatment indicated.     Bedside speech/swallow Evaluation  03/18/23   Appointment Info   Signing Clinician's Name / Credentials (SLP) Chantel Khoury MA, CCC/SLP   General Information   Onset of Illness/Injury or Date of Surgery 03/17/23   Referring Physician Sharita Lindquist PA-C   Patient/Family Therapy Goal Statement (SLP) Pt reports improved speech and no concerns for dysphagia.   Pertinent History of Current Problem Henrry Fonseca is a 88 year old male who presents on 3/17/2023 with right leg weakness. Patient is being admitted for ongoing possible stroke workup as well as management of new covid diagnosis.   General Observations Pt seen bedside and is alert and cooperative.   Pain Assessment   Patient Currently in Pain No   Type of Evaluation   Type of Evaluation Swallow Evaluation   Oral Motor   Oral Musculature generally intact   Structural Abnormalities none present   Mucosal Quality adequate   Dentition (Oral Motor)   Dentition (Oral Motor) natural dentition   Facial Symmetry (Oral Motor)   Facial Symmetry (Oral Motor) WNL   Lip Function (Oral Motor)   Lip Range of Motion (Oral Motor) WNL   Lip Strength (Oral Motor) WNL   Lip Coordination (Oral Motor) left side   Comment, Lip Function (Oral Motor) WNL   Tongue Function (Oral Motor)   Tongue Strength (Oral Motor) WNL   Tongue Coordination/Speed (Oral Motor) WNL   Tongue ROM (Oral Motor) WNL   Comment, Tongue Function (Oral Motor) WFL   Jaw Function (Oral Motor)   Jaw Function (Oral Motor) WNL   Cough/Swallow/Gag Reflex (Oral Motor)   Soft Palate/Velum (Oral Motor) WNL   Volitional Throat Clear/Cough (Oral  Motor) WNL   Volitional Swallow (Oral Motor) WNL   Vocal Quality/Secretion Management (Oral Motor)   Vocal Quality (Oral Motor) WNL   General Swallowing Observations   Past History of Dysphagia No   Current Diet/Method of Nutritional Intake (General Swallowing Observations, NIS) regular diet   Swallowing Evaluation Clinical swallow evaluation   Clinical Swallow Evaluation   Feeding Assistance no assistance needed   Clinical Swallow Evaluation Textures Trialed thin liquids;soft & bite-sized   Clinical Swallow Eval: Thin Liquid Texture Trial   Mode of Presentation, Thin Liquids straw;self-fed   Volume of Liquid or Food Presented consecutive sips   Oral Phase of Swallow WFL   Pharyngeal Phase of Swallow intact   Diagnostic Statement WNL   Clinical Swallow Eval: Soft & Bite Sized   Mode of Presentation self-fed   Volume Presented small bite roll   Oral Phase WFL   Pharyngeal Phase intact   Diagnostic Statement WFL   Esophageal Phase of Swallow   Patient reports or presents with symptoms of esophageal dysphagia No   Swallowing Recommendations   Diet Consistency Recommendations regular diet   Supervision Level for Intake patient independent   Comment, Swallowing Recommendations Oral and pharyngeal swallow is WFL with a regular consistency diet.  Pt reports no chewing or swallowing deficits but limited oral intake with solids due to not hungry and food not hot enough.  No overt s/sx aspiration.  Pt states speech intelligibility is improved and at baseline.  Normal oral mech exam.   No ST treatment indicataed.   Clinical Impression   Criteria for Skilled Therapeutic Interventions Met (SLP Eval) Evaluation only   SLP Total Evaluation Time   Eval: oral/pharyngeal swallow function, clinical swallow Minutes (55359) 11   SLP Discharge Planning   SLP Rationale for DC Rec No ST treatment indicated.   Total Session Time   Total Session Time (sum of timed and untimed services) 11

## 2023-03-18 NOTE — CONSULTS
Care Management Initial Consult    General Information  Assessment completed with: Spouse or significant other,    Type of CM/SW Visit: Initial Assessment    Primary Care Provider verified and updated as needed: Yes   Readmission within the last 30 days:     Reason for Consult: discharge planning  Advance Care Planning: Advance Care Planning Reviewed: no concerns identified        Communication Assessment  Patient's communication style: spoken language (English or Bilingual)    Hearing Difficulty or Deaf: no   Wear Glasses or Blind: yes    Cognitive  Cognitive/Neuro/Behavioral: WDL  Level of Consciousness: alert  Arousal Level: opens eyes spontaneously  Orientation: disoriented to, situation  Mood/Behavior: calm  Best Language: 0 - No aphasia  Speech: dysarthric    Living Environment:   People in home: spouse     Current living Arrangements: house      Able to return to prior arrangements: yes     Family/Social Support:  Care provided by: self, spouse/significant other  Provides care for: no one  Marital Status:   Wife  [place]       Description of Support System: Supportive, Involved    Support Assessment: Adequate family and caregiver support, Adequate social supports    Current Resources:   Patient receiving home care services: No     Community Resources: None  Equipment currently used at home: wheelchair, manual, walker, avery, shower chair  Supplies currently used at home: None    Employment/Financial:  Employment Status: retired        Financial Concerns: No concerns identified      Lifestyle & Psychosocial Needs:  Social Determinants of Health     Tobacco Use: Low Risk      Smoking Tobacco Use: Never     Smokeless Tobacco Use: Never     Passive Exposure: Not on file   Alcohol Use: Not on file   Financial Resource Strain: Not on file   Food Insecurity: Not on file   Transportation Needs: Not on file   Physical Activity: Not on file   Stress: Not on file   Social Connections: Not on file   Intimate Partner  Violence: Not on file   Depression: At risk     PHQ-2 Score: 6   Housing Stability: Not on file     Functional Status:  Prior to admission patient needed assistance:   Dependent ADLs:: Ambulation-walker, Bathing, Wheelchair-independent  Dependent IADLs:: Cleaning, Cooking, Laundry, Shopping, Transportation     Mental Health Status:  Mental Health Status: No Current Concerns       Chemical Dependency Status:  Chemical Dependency Status: No Current Concerns           Values/Beliefs:  Spiritual, Cultural Beliefs, Mu-ism Practices, Values that affect care: no             Additional Information:    CM spoke with spouse, Ayah, over the phone due to patient COVID+ status. Patient lives in a home with spouse independently in the community. Ayah states that she assists patient with bathing and IADLs. Patient mainly uses a w/c for mobility but has a avery walker that he uses for short distances in the house.     Discussed therapy recommendations for home care PT at discharge. Ayah reports that patient has had home care in the past and feels he would benefit from home care services at discharge. No agency preference.     Family will transport at discharge.     [facility] (Phone: [phone]) accepted patient for RN/PT services.     PLAN: [facility] PT/RN    LAURA LI RN

## 2023-03-18 NOTE — UTILIZATION REVIEW
Admission Status; Secondary Review Determination         Under the authority of the Utilization Management Committee, the utilization review process indicated a secondary review on the above patient.  The review outcome is based on review of the medical records, discussions with staff, and applying clinical experience noted on the date of the review.        (x)      Inpatient Status Appropriate - This patient's medical care is consistent with medical management for inpatient care and reasonable inpatient medical practice.       RATIONALE FOR DETERMINATION     The patient is a 88-year-old male admitted on 3/17/2023..  He was doing well the day prior to admission and developed right lower extremity weakness and headache.  MRI does not show any acute stroke.  Patient did have a positive COVID 19 test for acute infection.  He does have an abnormal chest x-ray showing opacities in the left midlung could be atelectasis according to the report versus other diagnoses.  The patient has had a Tmax of 101.1 in the last 24 hours.  He has been on 3 L/min to maintain O2 sats in the lower 90s.  He is currently off O2 with a 95% oxygen saturation.  His CRP is elevated at 61 and creatinine is elevated at 1.33.  His baseline renal function is 1.05-1.1 so this is an elevation and possibly related to dehydration.  Nursing notes from today do describe the patient having runs of V. tach and PVCs.  nursing notes also describe disorientation to time and situation and persisting left-sided weakness.  He has a persisting congested cough according to today's nursing notes.  Based on need for additional evaluation given sudden weakness in his lower extremity, recommend continuation of inpatient status as it is unlikely he will be discharged today and does require additional evaluation to determine if findings are related to COVID or another etiology.  The severity of illness, intensity of service provided, expected LOS and risk for adverse  outcome make the care complex, high risk and appropriate for hospital admission.        The information on this document is developed by the utilization review team in order for the business office to ensure compliance.  This only denotes the appropriateness of proper admission status and does not reflect the quality of care rendered.         The definitions of Inpatient Status and Observation Status used in making the determination above are those provided in the CMS Coverage Manual, Chapter 1 and Chapter 6, section 70.4.      Sincerely,     Arnaud Tran MD  Physician Advisor  Utilization Review/ Case Management  [facility].

## 2023-03-18 NOTE — PROGRESS NOTES
"[facility] ADMISSION NOTE    Patient admitted to room 2313 at approximately 2120 via cart from emergency room. Patient was accompanied by transport tech.     Verbal SBAR report received from ARI Mooney prior to patient arrival.   Patient trasferred to bed via self. Patient alert and oriented X 3. The patient is not having any pain.  . Admission vital signs: Blood pressure 135/54, pulse 91, temperature 100.3  F (37.9  C), temperature source Oral, resp. rate 18, height 1.676 m (5' 6\"), weight 73.8 kg (162 lb 11.2 oz), SpO2 93 %. Patient was oriented to plan of care, call light, bed controls, tv, telephone, bathroom and visiting hours.     Risk Assessment    The following safety risks were identified during admission: fall. Yellow risk band applied: YES.     Skin Initial Assessment    This writer admitted this patient and completed a partial skin assessment and Kunal score in the Adult PCS flowsheet. Appropriate interventions initiated as needed.     Secondary skin check completed by deferred         Education    Patient has a [place] to Observation order: No  Observation education completed and documented: N/A      Guillermina Herrera RN    "

## 2023-03-19 ENCOUNTER — APPOINTMENT (OUTPATIENT)
Dept: PHYSICAL THERAPY | Facility: CLINIC | Age: 88
DRG: 177 | End: 2023-03-19
Payer: COMMERCIAL

## 2023-03-19 ENCOUNTER — APPOINTMENT (OUTPATIENT)
Dept: GENERAL RADIOLOGY | Facility: CLINIC | Age: 88
DRG: 177 | End: 2023-03-19
Attending: INTERNAL MEDICINE
Payer: COMMERCIAL

## 2023-03-19 LAB
ANION GAP SERPL CALCULATED.3IONS-SCNC: 12 MMOL/L (ref 7–15)
BUN SERPL-MCNC: 37.6 MG/DL (ref 8–23)
CALCIUM SERPL-MCNC: 8.3 MG/DL (ref 8.8–10.2)
CHLORIDE SERPL-SCNC: 98 MMOL/L (ref 98–107)
CREAT SERPL-MCNC: 1.62 MG/DL (ref 0.67–1.17)
CRP SERPL-MCNC: 130.43 MG/L
D DIMER PPP FEU-MCNC: 1.36 UG/ML FEU (ref 0–0.5)
DEPRECATED HCO3 PLAS-SCNC: 24 MMOL/L (ref 22–29)
ERYTHROCYTE [DISTWIDTH] IN BLOOD BY AUTOMATED COUNT: 12.6 % (ref 10–15)
GFR SERPL CREATININE-BSD FRML MDRD: 41 ML/MIN/1.73M2
GLUCOSE SERPL-MCNC: 112 MG/DL (ref 70–99)
HCT VFR BLD AUTO: 36.8 % (ref 40–53)
HGB BLD-MCNC: 12.5 G/DL (ref 13.3–17.7)
LACTATE SERPL-SCNC: 1.9 MMOL/L (ref 0.7–2)
MCH RBC QN AUTO: 29.7 PG (ref 26.5–33)
MCHC RBC AUTO-ENTMCNC: 34 G/DL (ref 31.5–36.5)
MCV RBC AUTO: 87 FL (ref 78–100)
PLATELET # BLD AUTO: 123 10E3/UL (ref 150–450)
POTASSIUM SERPL-SCNC: 3.8 MMOL/L (ref 3.4–5.3)
PROCALCITONIN SERPL IA-MCNC: 0.87 NG/ML
RBC # BLD AUTO: 4.21 10E6/UL (ref 4.4–5.9)
SODIUM SERPL-SCNC: 134 MMOL/L (ref 136–145)
WBC # BLD AUTO: 8.5 10E3/UL (ref 4–11)

## 2023-03-19 PROCEDURE — 36415 COLL VENOUS BLD VENIPUNCTURE: CPT | Performed by: INTERNAL MEDICINE

## 2023-03-19 PROCEDURE — 86140 C-REACTIVE PROTEIN: CPT

## 2023-03-19 PROCEDURE — 80048 BASIC METABOLIC PNL TOTAL CA: CPT

## 2023-03-19 PROCEDURE — 250N000012 HC RX MED GY IP 250 OP 636 PS 637: Performed by: INTERNAL MEDICINE

## 2023-03-19 PROCEDURE — 84145 PROCALCITONIN (PCT): CPT | Performed by: INTERNAL MEDICINE

## 2023-03-19 PROCEDURE — 250N000011 HC RX IP 250 OP 636

## 2023-03-19 PROCEDURE — 99233 SBSQ HOSP IP/OBS HIGH 50: CPT | Performed by: INTERNAL MEDICINE

## 2023-03-19 PROCEDURE — 36415 COLL VENOUS BLD VENIPUNCTURE: CPT

## 2023-03-19 PROCEDURE — 83605 ASSAY OF LACTIC ACID: CPT | Performed by: INTERNAL MEDICINE

## 2023-03-19 PROCEDURE — 258N000003 HC RX IP 258 OP 636: Performed by: INTERNAL MEDICINE

## 2023-03-19 PROCEDURE — 71045 X-RAY EXAM CHEST 1 VIEW: CPT

## 2023-03-19 PROCEDURE — 85014 HEMATOCRIT: CPT

## 2023-03-19 PROCEDURE — 250N000013 HC RX MED GY IP 250 OP 250 PS 637: Performed by: INTERNAL MEDICINE

## 2023-03-19 PROCEDURE — 120N000001 HC R&B MED SURG/OB

## 2023-03-19 PROCEDURE — 97530 THERAPEUTIC ACTIVITIES: CPT | Mod: GP | Performed by: PHYSICAL THERAPIST

## 2023-03-19 PROCEDURE — 250N000011 HC RX IP 250 OP 636: Performed by: INTERNAL MEDICINE

## 2023-03-19 PROCEDURE — 85379 FIBRIN DEGRADATION QUANT: CPT

## 2023-03-19 PROCEDURE — 250N000013 HC RX MED GY IP 250 OP 250 PS 637

## 2023-03-19 PROCEDURE — XW033E5 INTRODUCTION OF REMDESIVIR ANTI-INFECTIVE INTO PERIPHERAL VEIN, PERCUTANEOUS APPROACH, NEW TECHNOLOGY GROUP 5: ICD-10-PCS | Performed by: INTERNAL MEDICINE

## 2023-03-19 RX ORDER — ACETAMINOPHEN 325 MG/1
650 TABLET ORAL EVERY 6 HOURS PRN
Status: DISCONTINUED | OUTPATIENT
Start: 2023-03-19 | End: 2023-03-22 | Stop reason: HOSPADM

## 2023-03-19 RX ORDER — CEFTRIAXONE 1 G/1
1 INJECTION, POWDER, FOR SOLUTION INTRAMUSCULAR; INTRAVENOUS EVERY 24 HOURS
Status: DISCONTINUED | OUTPATIENT
Start: 2023-03-19 | End: 2023-03-22 | Stop reason: HOSPADM

## 2023-03-19 RX ADMIN — DEXAMETHASONE 6 MG: 2 TABLET ORAL at 11:11

## 2023-03-19 RX ADMIN — ACETAMINOPHEN 650 MG: 325 TABLET, FILM COATED ORAL at 08:51

## 2023-03-19 RX ADMIN — REMDESIVIR 200 MG: 100 INJECTION, POWDER, LYOPHILIZED, FOR SOLUTION INTRAVENOUS at 20:49

## 2023-03-19 RX ADMIN — SODIUM CHLORIDE 50 ML: 9 INJECTION, SOLUTION INTRAVENOUS at 21:39

## 2023-03-19 RX ADMIN — GABAPENTIN 600 MG: 600 TABLET, FILM COATED ORAL at 08:51

## 2023-03-19 RX ADMIN — ACETAMINOPHEN 650 MG: 325 TABLET, FILM COATED ORAL at 02:09

## 2023-03-19 RX ADMIN — GABAPENTIN 300 MG: 300 CAPSULE ORAL at 11:11

## 2023-03-19 RX ADMIN — FUROSEMIDE 20 MG: 10 INJECTION, SOLUTION INTRAMUSCULAR; INTRAVENOUS at 00:22

## 2023-03-19 RX ADMIN — ENOXAPARIN SODIUM 40 MG: 100 INJECTION SUBCUTANEOUS at 00:22

## 2023-03-19 RX ADMIN — CEFTRIAXONE SODIUM 1 G: 1 INJECTION, POWDER, FOR SOLUTION INTRAMUSCULAR; INTRAVENOUS at 11:12

## 2023-03-19 RX ADMIN — AZITHROMYCIN MONOHYDRATE 500 MG: 500 INJECTION, POWDER, LYOPHILIZED, FOR SOLUTION INTRAVENOUS at 12:17

## 2023-03-19 RX ADMIN — GABAPENTIN 600 MG: 600 TABLET, FILM COATED ORAL at 20:43

## 2023-03-19 ASSESSMENT — ACTIVITIES OF DAILY LIVING (ADL)
ADLS_ACUITY_SCORE: 42
ADLS_ACUITY_SCORE: 50
ADLS_ACUITY_SCORE: 50
ADLS_ACUITY_SCORE: 42
ADLS_ACUITY_SCORE: 50
ADLS_ACUITY_SCORE: 50
ADLS_ACUITY_SCORE: 46
ADLS_ACUITY_SCORE: 50

## 2023-03-19 NOTE — PROGRESS NOTES
"Pt alert, oriented, assist 2 pivot transfers. Denies pain, nausea. On 3 L O2 via nc. IV lasix given. Male purewick in place. Pt had temp of 100.9 notified telemed, tylenol ordered and given. Tolerating regular diet.     /67 (BP Location: Right arm)   Pulse 60   Temp 97.2  F (36.2  C) (Oral)   Resp 18   Ht 1.676 m (5' 6\")   Wt 73.8 kg (162 lb 11.2 oz)   SpO2 96%   BMI 26.26 kg/m      "

## 2023-03-19 NOTE — PROGRESS NOTES
[facility] Medicine Progress Note  Date of Service (when I saw the patient): 03/19/2023    REASON FOR ADMISSION / INTERVAL HISTORY:  Henrry Fonseca is a 88 year old male who presents on 3/17/2023 with right leg weakness. He continues to have weakness R leg. Mild SOB but no CP.   Feels sick-mild SOB.        Assessment & Plan      Right leg weakness  Last known well at bedtime 3/16, awoke 3/17 with right lower extremity weakness and headache. Denies right arm or face symptoms. Stroke code called in ER; head CT shows no acute infarct, age indeterminate lacunar infarcts in right caudate & thalamus. CTA shows no acute concerning changes. CT perfusion is normal. Symptoms slowly improving but still present on admission. Stroke neuro de-escalated stroke code, but has left some recommendations for further workup given stroke history & difficult exam with underlying paralysis.   MRI brain with and without contrast is negative. Blood cx-p(r/o endocarditis per neuro stroke)  CK elevated -likely due to covid.   -will have PT see  when improved.     Infection due to 2019 novel coronavirus  Viral pneumonia due to covid 19 along with possible bacterial superinfection  Acute hypoxic resp failure  Cough began about 2 days PTA (3/15), but weakness began AM of admission (3/17). Was Oxygenating well on room air at time of admission and hence was started on paxlovid.    Vaccinated with Moderna 3/12/2021, 4/9/2021, pfizer booster 11/8/2021, Moderna bivalent booster 9/14/2022.    Did have temp 101 and still spiking LGT. Started on Paxlovid 150/100mg BID on admit . CXR on admit negative for infiltrate. PCT was  0.12.  Pt decompensated last evening-needed o2 at 2-3 L to keep sats >90%. tmax 100.9. pt does not feel well. All inflamatory markers are worse , d dimer 1.3. PCT is worse at 0.8. CXR shows possible LML infiltrate. Lungs show diffuse exp wheezes. Blood cx-p  Started iv rocephin/  zithromax/ decadron/ remdesevir and stopped paxlovid. Will follow daily covid labs, blood cx, ECHO on Monday. Continue o2 to keep sats >90%. Discussed with ID-agree with plan.      Cardiac arrythmia-afib vs SR with first degree block  Acute diastolic CHF  Type 2 NSTEMI due to [place] ischemia  Aortic stenosis s/p TAVR  CXR showed vascular congestion. BNP is >8k. Last ECHO 1/22 showed EF 60%. Troponins 74-96. Did have 7 beat run of v tach 3/18.conitues to have arrythmias-janice now in 60s. Sinus janice. Gave iv lasix 20mg x 2. Cr today 1.6 from 1.3.  -stop lasix,  ECHO Monday. Will have cards see Monday if arrythmia persists.      Acute kidney injury  Mild. Baseline creatinine around 1.05 - 1.1, creatinine on presentation 1.37. likely due to covid infection , poor po intake.   Will rx as above and follow labs.      Ambulatory dysfunction  History of hemorrhagic stroke   Left hemiparesis   Hemorrhagic right-sided stroke in June 2020 due to uncontrolled hypertension resulting in left-sided paralysis, worse in RUE. Uses wheelchair & walker at baseline. No changes from baseline functionality on left side at time of admission.      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 Does not require oxygen at home. May be contributing to cough and makes patient higher risk for covid progression. No acute interventions.      Essential hypertension, benign  Generally controlled since presentation. Managed PTA with amlodipine 5mg daily.  Per cardiology note in May 2022, patient should avoid AV annamaria blocking agents because he had bradycardia after TAVR.    PTA amlodipine  On hold due to interaction with Paxlovid. BP stable  -continue to hold amlodipine.       Seizure disorder  History of seizure disorder noted in chart. Cannot find documentation of date of last seizure and patient cannot recall a recent event. Managed PTA with gabapentin 600mg BID in AM & PM, with additional 300mg dose at noon.   -Continue PTA  "gabapentin     Anemia  Mild, chronic & stable, normocytic. Baseline hemoglobin around 11-12, hemoglobin on presentation 12.9. No signs of acute bleeding. Monitor.    DISPO  Will be in hospital 2-3 days atleast           Diet: Combination Diet Regular Diet Adult    DVT Prophylaxis: Enoxaparin (Lovenox) SQ  Panda Catheter: Not present  Code Status:   special-only CPR no intubation  Lines: PIV    KATHRINE CROWDER MD   Pg [phone]        ROS:  As described in A/P and Exam.  Otherwise ALL are  negative.    PHYSICAL EXAM:  All vitals have been reviewed    Blood pressure 135/73, pulse 66, temperature 98.6  F (37  C), temperature source Oral, resp. rate 17, height 1.676 m (5' 6\"), weight 73.8 kg (162 lb 11.2 oz), SpO2 93 %.    I/O this shift:  In: 100 [P.O.:100]  Out: 300 [Urine:300]    GENERAL APPEARANCE: ill appearing, alert and no distress  EYES: conjunctiva clear, eyes grossly normal  HENT: external ears and nose normal   RESP: lungs --B exp  Wheezes-few ronchi/ crackles L LUNG  CV: regular rate and rhythm, normal S1 S2, no S3 or S4 and no murmur, click or rub   ABDOMEN: soft, nontender, no HSM or masses and bowel sounds normal  MS: no clubbing, cyanosis; no edema  SKIN: clear without significant rashes or lesions  NEURO: -non-focal moves all 4 extr    ROUTINE  LABS (Last four results)  CMP  Recent Labs   Lab 03/19/23  0545 03/18/23 0227 03/17/23  1708 03/17/23  1655   * 135* 135*  --    POTASSIUM 3.8 4.0 4.2  --    CHLORIDE 98 101 102  --    CO2 24 23 24  --    ANIONGAP 12 11 9  --    * 110* 114* 113*   BUN 37.6* 28.0* 28.8*  --    CR 1.62* 1.33* 1.37*  --    GFRESTIMATED 41* 51* 50*  --    SUNDAY 8.3* 8.2* 8.7*  --      CBC  Recent Labs   Lab 03/19/23  0545 03/18/23 0227 03/17/23  1708   WBC 8.5 5.6 7.2   RBC 4.21* 4.00* 4.31*   HGB 12.5* 12.0* 12.9*   HCT 36.8* 35.3* 38.1*   MCV 87 88 88   MCH 29.7 30.0 29.9   MCHC 34.0 34.0 33.9   RDW 12.6 12.8 12.8   * 142* 168     INR  Recent Labs   Lab " 03/17/23  1708   INR 1.14     Arterial Blood GasNo lab results found in last 7 days.    Recent Results (from the past 24 hour(s))   XR Chest Port 1 View    Narrative    EXAM: XR CHEST PORTABLE 1 VIEW  LOCATION: [facility]  DATE/TIME: 03/19/2023, 10:12 AM    INDICATION: COVID. Hypoxic. Evaluate for pneumonia.  COMPARISON: 03/17/2023.      Impression    IMPRESSION: There is new silhouetting of the left hemidiaphragm from radiograph of one day prior, representing either atelectasis or infectious process. Right lung is clear. No pneumothorax or obvious pleural effusion. Heart size is stable. TAVR again   noted.

## 2023-03-20 ENCOUNTER — APPOINTMENT (OUTPATIENT)
Dept: OCCUPATIONAL THERAPY | Facility: CLINIC | Age: 88
DRG: 177 | End: 2023-03-20
Payer: COMMERCIAL

## 2023-03-20 ENCOUNTER — APPOINTMENT (OUTPATIENT)
Dept: CARDIOLOGY | Facility: CLINIC | Age: 88
DRG: 177 | End: 2023-03-20
Attending: INTERNAL MEDICINE
Payer: COMMERCIAL

## 2023-03-20 LAB
ANION GAP SERPL CALCULATED.3IONS-SCNC: 13 MMOL/L (ref 7–15)
BUN SERPL-MCNC: 41.5 MG/DL (ref 8–23)
CALCIUM SERPL-MCNC: 7.9 MG/DL (ref 8.8–10.2)
CHLORIDE SERPL-SCNC: 101 MMOL/L (ref 98–107)
CREAT SERPL-MCNC: 1.2 MG/DL (ref 0.67–1.17)
CRP SERPL-MCNC: 212.22 MG/L
D DIMER PPP FEU-MCNC: 0.98 UG/ML FEU (ref 0–0.5)
DEPRECATED HCO3 PLAS-SCNC: 21 MMOL/L (ref 22–29)
ERYTHROCYTE [DISTWIDTH] IN BLOOD BY AUTOMATED COUNT: 13 % (ref 10–15)
GFR SERPL CREATININE-BSD FRML MDRD: 58 ML/MIN/1.73M2
GLUCOSE SERPL-MCNC: 157 MG/DL (ref 70–99)
HCT VFR BLD AUTO: 33.7 % (ref 40–53)
HGB BLD-MCNC: 11.6 G/DL (ref 13.3–17.7)
LVEF ECHO: NORMAL
MCH RBC QN AUTO: 29.7 PG (ref 26.5–33)
MCHC RBC AUTO-ENTMCNC: 34.4 G/DL (ref 31.5–36.5)
MCV RBC AUTO: 86 FL (ref 78–100)
PLATELET # BLD AUTO: 130 10E3/UL (ref 150–450)
POTASSIUM SERPL-SCNC: 3.7 MMOL/L (ref 3.4–5.3)
RBC # BLD AUTO: 3.9 10E6/UL (ref 4.4–5.9)
SODIUM SERPL-SCNC: 135 MMOL/L (ref 136–145)
WBC # BLD AUTO: 6.9 10E3/UL (ref 4–11)

## 2023-03-20 PROCEDURE — 250N000012 HC RX MED GY IP 250 OP 636 PS 637: Performed by: INTERNAL MEDICINE

## 2023-03-20 PROCEDURE — 97535 SELF CARE MNGMENT TRAINING: CPT | Mod: GO

## 2023-03-20 PROCEDURE — 99232 SBSQ HOSP IP/OBS MODERATE 35: CPT | Performed by: INTERNAL MEDICINE

## 2023-03-20 PROCEDURE — 258N000003 HC RX IP 258 OP 636: Performed by: INTERNAL MEDICINE

## 2023-03-20 PROCEDURE — 97165 OT EVAL LOW COMPLEX 30 MIN: CPT | Mod: GO

## 2023-03-20 PROCEDURE — 250N000013 HC RX MED GY IP 250 OP 250 PS 637

## 2023-03-20 PROCEDURE — 80048 BASIC METABOLIC PNL TOTAL CA: CPT

## 2023-03-20 PROCEDURE — 250N000013 HC RX MED GY IP 250 OP 250 PS 637: Performed by: INTERNAL MEDICINE

## 2023-03-20 PROCEDURE — 85027 COMPLETE CBC AUTOMATED: CPT

## 2023-03-20 PROCEDURE — 120N000001 HC R&B MED SURG/OB

## 2023-03-20 PROCEDURE — 36415 COLL VENOUS BLD VENIPUNCTURE: CPT

## 2023-03-20 PROCEDURE — 250N000011 HC RX IP 250 OP 636

## 2023-03-20 PROCEDURE — 86140 C-REACTIVE PROTEIN: CPT

## 2023-03-20 PROCEDURE — 250N000011 HC RX IP 250 OP 636: Performed by: INTERNAL MEDICINE

## 2023-03-20 PROCEDURE — 93306 TTE W/DOPPLER COMPLETE: CPT | Mod: 26 | Performed by: INTERNAL MEDICINE

## 2023-03-20 PROCEDURE — 93306 TTE W/DOPPLER COMPLETE: CPT

## 2023-03-20 PROCEDURE — 85379 FIBRIN DEGRADATION QUANT: CPT

## 2023-03-20 RX ORDER — AMLODIPINE BESYLATE 5 MG/1
5 TABLET ORAL DAILY
Status: DISCONTINUED | OUTPATIENT
Start: 2023-03-20 | End: 2023-03-22 | Stop reason: HOSPADM

## 2023-03-20 RX ADMIN — REMDESIVIR 100 MG: 100 INJECTION, POWDER, LYOPHILIZED, FOR SOLUTION INTRAVENOUS at 19:37

## 2023-03-20 RX ADMIN — SODIUM CHLORIDE 50 ML: 9 INJECTION, SOLUTION INTRAVENOUS at 19:52

## 2023-03-20 RX ADMIN — DEXAMETHASONE 6 MG: 2 TABLET ORAL at 09:33

## 2023-03-20 RX ADMIN — CEFTRIAXONE SODIUM 1 G: 1 INJECTION, POWDER, FOR SOLUTION INTRAMUSCULAR; INTRAVENOUS at 12:02

## 2023-03-20 RX ADMIN — AZITHROMYCIN MONOHYDRATE 500 MG: 500 INJECTION, POWDER, LYOPHILIZED, FOR SOLUTION INTRAVENOUS at 12:39

## 2023-03-20 RX ADMIN — GABAPENTIN 300 MG: 300 CAPSULE ORAL at 12:13

## 2023-03-20 RX ADMIN — AMLODIPINE BESYLATE 5 MG: 5 TABLET ORAL at 15:52

## 2023-03-20 RX ADMIN — GABAPENTIN 600 MG: 600 TABLET, FILM COATED ORAL at 19:36

## 2023-03-20 RX ADMIN — GABAPENTIN 600 MG: 600 TABLET, FILM COATED ORAL at 09:33

## 2023-03-20 RX ADMIN — ENOXAPARIN SODIUM 40 MG: 100 INJECTION SUBCUTANEOUS at 00:18

## 2023-03-20 ASSESSMENT — ACTIVITIES OF DAILY LIVING (ADL)
ADLS_ACUITY_SCORE: 46
ADLS_ACUITY_SCORE: 42
ADLS_ACUITY_SCORE: 46
ADLS_ACUITY_SCORE: 50
ADLS_ACUITY_SCORE: 46
ADLS_ACUITY_SCORE: 42
ADLS_ACUITY_SCORE: 42
ADLS_ACUITY_SCORE: 46
ADLS_ACUITY_SCORE: 42
ADLS_ACUITY_SCORE: 46
ADLS_ACUITY_SCORE: 42
ADLS_ACUITY_SCORE: 42

## 2023-03-20 NOTE — TELEPHONE ENCOUNTER
Pt admitted to hospital. COVID positive. No stroke but with positive blood cultures--will need to r/o endocarditis as pt is post TAVR. Troponin elevated. Will continue to follow as pt is established with cardiology clinic. Will also alert Dr Colbert as he is patient's primary cardiologist. Ani Cope RN Cardiology March 20, 2023, 7:43 AM

## 2023-03-20 NOTE — PROGRESS NOTES
Assumed care:  1900 to 0700    Neuro:  A/O x4.  Makes needs known.      Cardiac:  VSS.  SR.     Respiratory:  Infrequent productive cough.  Able to maintain saturations on 1 L O2 via nc.     GI/:  Loose stool x1.  Primofit in place with good UOP.      Activity:  Assist x1-2 with activity.      Pain:  Denies.     Skin/wound: intact.     LDA's:  R PIV SL.  Remdesivir as ordered.

## 2023-03-20 NOTE — PROGRESS NOTES
03/20/23 1000   Appointment Info   Signing Clinician's Name / Credentials (OT) Susan Do OTR/L   Living Environment   People in Home spouse   Current Living Arrangements house   Home Accessibility no concerns   Transportation Anticipated family or friend will provide   Living Environment Comments pt able to use w/c in home , has multi g/bs, good s/u following d/c from Sister Maciel from [facility]   Self-Care   Equipment Currently Used at Home shower chair;grab bar, toilet;grab bar, tub/shower;walker, avery   Activity/Exercise/Self-Care Comment Pt was able to dress self with exception of wife occas pulling up pants pt states   Instrumental Activities of Daily Living (IADL)   IADL Comments wife sets up pill box , supportive son , wife cooks and pays someone to clean   General Information   Onset of Illness/Injury or Date of Surgery 03/17/23   Referring Physician Dr Ritchie   Patient/Family Therapy Goal Statement (OT) return home with wife with HH   Additional Occupational Profile Info/Pertinent History of Current Problem Henrry Fonseca is a 88 year old male who presents on 3/17/2023 with right leg weakness. He continues to have weakness R leg. Mild SOB but no CP   General Observations and Info Pt feeling good today and transfering with SBA of one with improved safety noted   Cognitive Status Examination   Orientation Status person;place   Follows Commands does not follow one-step commands;50-74% accuracy   Safety Deficit impulsivity   Cognitive Status Comments Pt on several occas during assessment   stating he needs to lock w/c brakes for safety and uses g/b in bathroom for safety to pull up clothing   Pain Assessment   Patient Currently in Pain No   Posture   Posture Comments leans slightly to L in bed   Range of Motion Comprehensive   Comment, General Range of Motion spastic L UE and LEs , RUE WFL   Coordination   Upper Extremity Coordination Left UE impaired   Coordination Comments spastic L UE , no AROM noted    Transfers   Transfers wheelchair transfer;toilet transfer   Toilet Transfer   Type (Toilet Transfer) stand pivot/stand step   Mot[place] Level (Toilet Transfer) supervision;verbal cues   Toilet Transfer Comments Pt requireing v/c due to new setting and set up from home   Wheelchair Transfer   Type (Wheelchair Transfer) stand pivot/stand step   [place] Level (Wheelchair Transfer) contact guard   Comments chair placed on R   Activities of Daily Living   BADL Assessment/Intervention lower body dressing;feeding;toileting   Lower Body Dressing Assessment/Training   Motley Level (Lower Body Dressing) contact guard assist   Eating/Self Feeding   Motley Level (Feeding) set up   Toileting   Assistive Devices (Toileting) grab bar, toilet;raised toilet seat   Motley Level (Toileting) contact guard assist   Clinical Impression   Criteria for Skilled Therapeutic Interventions Met (OT) Yes, treatment indicated   OT Diagnosis weakness   OT Problem List-Impairments impacting ADL activity tolerance impaired;cognition;mobility   ADL comments/analysis pt appears slightly weaker than baseline it appears   Assessment of Occupational Performance 1-3 Performance Deficits   Planned Therapy Interventions (OT) ADL retraining;transfer training;progressive activity/exercise;home program guidelines   Clinical Decision Making Complexity (OT) low complexity   Risk & Benefits of therapy have been explained evaluation/treatment results reviewed;care plan/treatment goals reviewed;risks/benefits reviewed;current/potential barriers reviewed;participants voiced agreement with care plan;participants included;patient   OT Total Evaluation Time   OT Eval, Low Complexity Minutes (85285) 10   OT Goals   Therapy Frequency (OT) 5 times/wk   OT Predicted Duration/Target Date for Goal Attainment 03/27/23   OT Goals Lower Body Dressing;Toilet Transfer/Toileting;OT Goal 1   OT: Lower Body Dressing Supervision/stand-by assist;using  adaptive equipment   OT: Toilet Transfer/Toileting Supervision/stand-by assist;cleaning and garment management;using adaptive equipment   OT: Goal 1 Pt will transfer in/out of w/c with SBA   Interventions   Interventions Quick Adds Self-Care/Home Management;Therapeutic Activity;Therapeutic Procedures/Exercise   Self-Care/Home Management   Self-Care/Home Mgmt/ADL, Compensatory, Meal Prep Minutes (01949) 14   Symptoms Noted During/After Treatment (Meal Preparation/Planning Training) fatigue   Treatment Detail/Skilled Intervention OT: pt became tearful at points when referring to wife and needing to assist him at home not wanting to place too much on wife. Pt required SBA supine to sitting EOB, CGA to tranfer to w/c placed on R, CGA toilet transfer using g/b with v/cs for safety as well as for LB dressing seated in personel w/c using g/b to stand and head to balance on wall. V/c needed for cueing where briefs were on LEs. Pt at times confused due to unfamiliar set up as at home. He was alert and oriented to self, place and month   OT Discharge Planning   OT Plan 1/5 Mon, POC, LB dressing, toilet transfers and w/c transfers   OT Discharge Recommendation (DC Rec) home with home care occupational therapy;home with assist   OT Rationale for DC Rec recommend home with HH when medically stable appearing slightly below baseline per pt   OT Brief overview of current status CGA w/c and toilet transfer x A1 , CGA LB dressing with use of bathroom g/b for safety   Total Session Time   Timed Code Treatment Minutes 14   Total Session Time (sum of timed and untimed services) 24

## 2023-03-20 NOTE — PROGRESS NOTES
"Time: Assumed care of patient from     Reason for Admission: COVID    Activity: Assist X1 with device/G/W    Neuro: A/O    GI/: WLN, patients Prom fit removed and ambulation promoted. Watery BM     Diet: Combination regular diet     IV Access: Saline locked    Vitals: BP (!) 141/70 (BP Location: Right arm, Patient Position: Supine)   Pulse 70   Temp 97.8  F (36.6  C) (Oral)   Resp 18   Ht 1.676 m (5' 6\")   Wt 73.8 kg (162 lb 11.2 oz)   SpO2 95%   BMI 26.26 kg/m       Patient on room air, using I.S. up to 750    Cough, infrequent congested. Course lung sounds with wheezes expiratory. Promoted coughing and using I.S.    Pain: None    Plan: Continue to monitor. Patient states they feel much better today.  Telemetry: yes     Zaira Evans RN       "

## 2023-03-20 NOTE — PROGRESS NOTES
[facility] Medicine Progress Note  Date of Service (when I saw the patient): 03/20/2023    REASON FOR ADMISSION / INTERVAL HISTORY:  Henrry Fonseca is a 88 year old male who presents on 3/17/2023 with right leg weakness. He continues to have weakness R leg. Mild SOB but no CP.   Feels a lot better today.        Assessment & Plan      Right leg weakness  Last known well at bedtime 3/16, awoke 3/17 with right lower extremity weakness and headache. Denies right arm or face symptoms. Stroke code called in ER; head CT shows no acute infarct, age indeterminate lacunar infarcts in right caudate & thalamus. CTA shows no acute concerning changes. CT perfusion is normal. Symptoms slowly improving but still present on admission. Stroke neuro de-escalated stroke code, but has left some recommendations for further workup given stroke history & difficult exam with underlying paralysis.   MRI brain with and without contrast is negative. Blood cx-p(r/o endocarditis per neuro stroke)  CK elevated -likely due to covid.   Improving -pt walking in room with PT.      Infection due to 2019 novel coronavirus  Viral pneumonia due to covid 19 along with possible bacterial superinfection  Acute hypoxic resp failure  Cough began about 2 days PTA (3/15), but weakness began AM of admission (3/17). Was Oxygenating well on room air at time of admission and hence was started on paxlovid.    Vaccinated with Moderna 3/12/2021, 4/9/2021, pfizer booster 11/8/2021, Moderna bivalent booster 9/14/2022.    Did have temp 101 and still spiking LGT. Started on Paxlovid 150/100mg BID on admit . CXR on admit negative for infiltrate. PCT was  0.12.  Pt decompensated 3/18 evening-needed o2 at 2-3 L to keep sats >90%. tmax 100.9. pt does not feel well. All inflamatory markers are worse , d dimer 1.3. PCT is worse at 0.8. CXR shows possible LML infiltrate. Lungs show diffuse exp wheezes. Blood cx-p  Started iv  Hodgkin lymphoma rocephin/ zithromax/ decadron/ remdesevir and stopped paxlovid on 3/19.   Pt feels lot better today. Not needing o2 any more. CRP still elevated but other markers improving.  -continue current meds. Follow labs. Resume o2 if needed to keep sats >90%     Cardiac arrythmia-afib vs SR with first degree block  Acute diastolic CHF  Type 2 NSTEMI due to Demand ischemia  Aortic stenosis s/p TAVR  CXR showed vascular congestion. BNP is >8k. Last ECHO 1/22 showed EF 60%. Troponins 74-96. Did have 7 beat run of v tach 3/18... Gave iv lasix 20mg x 2. Cr was  1.6 from 1.3 on 3/19 hence stopped lasix.  Cr stable -at baseline. Stop tele. F/u cards OP      Acute kidney injury  Mild. Baseline creatinine around 1.05 - 1.1, creatinine on presentation 1.37. likely due to covid infection , poor po intake.   Resolved.       Ambulatory dysfunction  History of hemorrhagic stroke   Left hemiparesis   Hemorrhagic right-sided stroke in June 2020 due to uncontrolled hypertension resulting in left-sided paralysis, worse in RUE. Uses wheelchair & walker at baseline. No changes from baseline functionality on left side at time of admission.      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 Does not require oxygen at home. May be contributing to cough and makes patient higher risk for covid progression. No acute interventions.      Essential hypertension, benign  Generally controlled since presentation. Managed PTA with amlodipine 5mg daily.  Per cardiology note in May 2022, patient should avoid AV annamaria blocking agents because he had bradycardia after TAVR.    PTA amlodipine . BP on higher side  -resume  amlodipine.       Seizure disorder  History of seizure disorder noted in chart. Cannot find documentation of date of last seizure and patient cannot recall a recent event. Managed PTA with gabapentin 600mg BID in AM & PM, with additional 300mg dose at noon.   -Continue PTA gabapentin     Anemia  Mild, chronic & stable, normocytic.  "Baseline hemoglobin around 11-12, hemoglobin on presentation 12.9. No signs of acute bleeding. Monitor.    DISPO  Will be in hospital 1-2 days atleast           Diet: Combination Diet Regular Diet Adult    DVT Prophylaxis: Enoxaparin (Lovenox) SQ  Panda Catheter: Not present  Code Status:   special-only CPR no intubation  Lines: PIV    KATHRINE CROWDER MD   Pg [phone]        ROS:  As described in A/P and Exam.  Otherwise ALL are  negative.    PHYSICAL EXAM:  All vitals have been reviewed    Blood pressure (!) 141/70, pulse 70, temperature 97.8  F (36.6  C), temperature source Oral, resp. rate 18, height 1.676 m (5' 6\"), weight 73.8 kg (162 lb 11.2 oz), SpO2 95 %.    I/O this shift:  In: -   Out: 600 [Urine:600]    GENERAL APPEARANCE: ill appearing, alert and no distress  EYES: conjunctiva clear, eyes grossly normal  HENT: external ears and nose normal   RESP: lungs --CTA-b. Few diffuse ronchi  CV: regular rate and rhythm, normal S1 S2, no S3 or S4 and no murmur, click or rub   ABDOMEN: soft, nontender, no HSM or masses and bowel sounds normal  MS: no clubbing, cyanosis; no edema  SKIN: clear without significant rashes or lesions  NEURO: -non-focal moves all 4 extr    ROUTINE  LABS (Last four results)  CMP  Recent Labs   Lab 03/20/23 0422 03/19/23  0545 03/18/23 0227 03/17/23  1708   * 134* 135* 135*   POTASSIUM 3.7 3.8 4.0 4.2   CHLORIDE 101 98 101 102   CO2 21* 24 23 24   ANIONGAP 13 12 11 9   * 112* 110* 114*   BUN 41.5* 37.6* 28.0* 28.8*   CR 1.20* 1.62* 1.33* 1.37*   GFRESTIMATED 58* 41* 51* 50*   SUNDAY 7.9* 8.3* 8.2* 8.7*     CBC  Recent Labs   Lab 03/20/23 0422 03/19/23  0545 03/18/23 0227 03/17/23  1708   WBC 6.9 8.5 5.6 7.2   RBC 3.90* 4.21* 4.00* 4.31*   HGB 11.6* 12.5* 12.0* 12.9*   HCT 33.7* 36.8* 35.3* 38.1*   MCV 86 87 88 88   MCH 29.7 29.7 30.0 29.9   MCHC 34.4 34.0 34.0 33.9   RDW 13.0 12.6 12.8 12.8   * 123* 142* 168     INR  Recent Labs   Lab 03/17/23  1708   INR 1.14 " Hodgkin lymphoma of lymph nodes of multiple regions, unspecified Hodgkin lymphoma type     Arterial Blood GasNo lab results found in last 7 days.    Recent Results (from the past 24 hour(s))   Echocardiogram Complete   Result Value    LVEF  60%    Narrative    [id number]  [id number]  [id number]  [id number]^VEL^KATHRINE     [facility]  Echocardiography Laboratory  [address]  [place] [id number]     Name: RYAN CHU  MRN: [id number]  : 1935  Study Date: 2023 11:14 AM  Age: 88 yrs  Gender: Male  Patient Location: [facility]  Reason For Study: CHF  Ordering Physician: KATHRINE CROWDER  Referring Physician: [facility], [facility]  Performed By: Tiffani Cabello RDCS     BSA: 1.8 m2  Height: 66 in  Weight: 162 lb  HR: 74  BP: 130/70 mmHg  ______________________________________________________________________________  Procedure  Complete Portable Echo Adult.  ______________________________________________________________________________  Interpretation Summary     1. The left ventricle is normal in structure, function and size. The visual  ejection fraction is estimated at 60%.  2. The right ventricle is normal in structure, function and size.  3. s/p TAVR with 29mm Goldstein Vamshi 3. Mean 7mmHg, Vmax 2.0m/s, trace PVL.     Echo 2022 showed EF 60%, TAVR with mean 8mmHg, Vmax 2.1m/s.  ______________________________________________________________________________  Left Ventricle  The left ventricle is normal in structure, function and size. There is mild to  moderate concentric left ventricular hypertrophy. The visual ejection fraction  is estimated at 60%. Left ventricular diastolic function is indeterminate.  Normal left ventricular wall motion.     Right Ventricle  The right ventricle is normal in structure, function and size.     Atria  Normal left atrial size. Right atrial size is normal. There is no atrial shunt  seen.     Mitral Valve  The mitral valve is normal in structure and function.     Tricuspid Valve  There is mild (1+) tricuspid regurgitation.     Aortic  Valve  There is a bioprosthetic aortic valve. s/p TAVR with 29mm Goldstein Vamshi 3.  Mean 7mmHg, Vmax 2.0m/s, trace PVL.     Pulmonic Valve  The pulmonic valve is normal in structure and function.     Vessels  The ascending aorta is Borderline dilated. The inferior vena cava was normal  in size with preserved respiratory variability.     Pericardium  There is no pericardial effusion.     Rhythm  The rhythm was undetermined.  ______________________________________________________________________________  MMode/2D Measurements & Calculations  IVSd: 1.6 cm     LVIDd: 4.3 cm  LVIDs: 3.0 cm  LVPWd: 1.2 cm  FS: 30.7 %  LV mass(C)d: 237.3 grams  LV mass(C)dI: 129.8 grams/m2  LA dimension: 4.3 cm  asc Aorta Diam: 3.7 cm  LVOT diam: 2.6 cm  LVOT area: 5.3 cm2  RWT: 0.56     Doppler Measurements & Calculations  MV E max juan: 54.8 cm/sec  Ao V2 max: 199.7 cm/sec  Ao max P.0 mmHg  Ao V2 mean: 124.2 cm/sec  Ao mean P.3 mmHg  Ao V2 VTI: 34.5 cm  HALEIGH(I,D): 2.2 cm2  HALEIGH(V,D): 2.4 cm2  LV V1 max PG: 3.2 mmHg  LV V1 max: 89.8 cm/sec  LV V1 VTI: 14.4 cm  SV(LVOT): 76.3 ml  SI(LVOT): 41.7 ml/m2  TV V2 max: 265.7 cm/sec  TV max P.2 mmHg  AV Juan Ratio (DI): 0.45  HALEIGH Index (cm2/m2): 1.2     ______________________________________________________________________________  Report approved by: Liliana Alston 2023 01:01 PM                  Lymphoma

## 2023-03-21 ENCOUNTER — APPOINTMENT (OUTPATIENT)
Dept: OCCUPATIONAL THERAPY | Facility: CLINIC | Age: 88
DRG: 177 | End: 2023-03-21
Payer: COMMERCIAL

## 2023-03-21 ENCOUNTER — APPOINTMENT (OUTPATIENT)
Dept: PHYSICAL THERAPY | Facility: CLINIC | Age: 88
DRG: 177 | End: 2023-03-21
Payer: COMMERCIAL

## 2023-03-21 LAB
ANION GAP SERPL CALCULATED.3IONS-SCNC: 12 MMOL/L (ref 7–15)
BUN SERPL-MCNC: 52 MG/DL (ref 8–23)
CALCIUM SERPL-MCNC: 8 MG/DL (ref 8.8–10.2)
CHLORIDE SERPL-SCNC: 103 MMOL/L (ref 98–107)
CREAT SERPL-MCNC: 1.22 MG/DL (ref 0.67–1.17)
CRP SERPL-MCNC: 129.84 MG/L
D DIMER PPP FEU-MCNC: 0.96 UG/ML FEU (ref 0–0.5)
DEPRECATED HCO3 PLAS-SCNC: 21 MMOL/L (ref 22–29)
ERYTHROCYTE [DISTWIDTH] IN BLOOD BY AUTOMATED COUNT: 13 % (ref 10–15)
GFR SERPL CREATININE-BSD FRML MDRD: 57 ML/MIN/1.73M2
GLUCOSE SERPL-MCNC: 154 MG/DL (ref 70–99)
HCT VFR BLD AUTO: 31.5 % (ref 40–53)
HGB BLD-MCNC: 11 G/DL (ref 13.3–17.7)
MCH RBC QN AUTO: 30 PG (ref 26.5–33)
MCHC RBC AUTO-ENTMCNC: 34.9 G/DL (ref 31.5–36.5)
MCV RBC AUTO: 86 FL (ref 78–100)
PLATELET # BLD AUTO: 162 10E3/UL (ref 150–450)
POTASSIUM SERPL-SCNC: 3.6 MMOL/L (ref 3.4–5.3)
RBC # BLD AUTO: 3.67 10E6/UL (ref 4.4–5.9)
SODIUM SERPL-SCNC: 136 MMOL/L (ref 136–145)
WBC # BLD AUTO: 6.8 10E3/UL (ref 4–11)

## 2023-03-21 PROCEDURE — 250N000013 HC RX MED GY IP 250 OP 250 PS 637

## 2023-03-21 PROCEDURE — 250N000013 HC RX MED GY IP 250 OP 250 PS 637: Performed by: INTERNAL MEDICINE

## 2023-03-21 PROCEDURE — 85379 FIBRIN DEGRADATION QUANT: CPT

## 2023-03-21 PROCEDURE — 82310 ASSAY OF CALCIUM: CPT

## 2023-03-21 PROCEDURE — 99232 SBSQ HOSP IP/OBS MODERATE 35: CPT | Performed by: INTERNAL MEDICINE

## 2023-03-21 PROCEDURE — 86140 C-REACTIVE PROTEIN: CPT

## 2023-03-21 PROCEDURE — 97535 SELF CARE MNGMENT TRAINING: CPT | Mod: GO

## 2023-03-21 PROCEDURE — 258N000003 HC RX IP 258 OP 636: Performed by: INTERNAL MEDICINE

## 2023-03-21 PROCEDURE — 36415 COLL VENOUS BLD VENIPUNCTURE: CPT

## 2023-03-21 PROCEDURE — 97530 THERAPEUTIC ACTIVITIES: CPT | Mod: GP | Performed by: PHYSICAL THERAPIST

## 2023-03-21 PROCEDURE — 250N000012 HC RX MED GY IP 250 OP 636 PS 637: Performed by: INTERNAL MEDICINE

## 2023-03-21 PROCEDURE — 250N000011 HC RX IP 250 OP 636

## 2023-03-21 PROCEDURE — 120N000001 HC R&B MED SURG/OB

## 2023-03-21 PROCEDURE — 85027 COMPLETE CBC AUTOMATED: CPT

## 2023-03-21 PROCEDURE — 250N000011 HC RX IP 250 OP 636: Performed by: INTERNAL MEDICINE

## 2023-03-21 RX ADMIN — REMDESIVIR 100 MG: 100 INJECTION, POWDER, LYOPHILIZED, FOR SOLUTION INTRAVENOUS at 19:57

## 2023-03-21 RX ADMIN — CEFTRIAXONE SODIUM 1 G: 1 INJECTION, POWDER, FOR SOLUTION INTRAMUSCULAR; INTRAVENOUS at 11:48

## 2023-03-21 RX ADMIN — ENOXAPARIN SODIUM 40 MG: 100 INJECTION SUBCUTANEOUS at 00:01

## 2023-03-21 RX ADMIN — AZITHROMYCIN MONOHYDRATE 500 MG: 500 INJECTION, POWDER, LYOPHILIZED, FOR SOLUTION INTRAVENOUS at 12:31

## 2023-03-21 RX ADMIN — GABAPENTIN 600 MG: 600 TABLET, FILM COATED ORAL at 09:14

## 2023-03-21 RX ADMIN — GABAPENTIN 300 MG: 300 CAPSULE ORAL at 11:53

## 2023-03-21 RX ADMIN — GABAPENTIN 600 MG: 600 TABLET, FILM COATED ORAL at 19:56

## 2023-03-21 RX ADMIN — SODIUM CHLORIDE 50 ML: 9 INJECTION, SOLUTION INTRAVENOUS at 20:05

## 2023-03-21 RX ADMIN — DEXAMETHASONE 6 MG: 2 TABLET ORAL at 09:15

## 2023-03-21 RX ADMIN — AMLODIPINE BESYLATE 5 MG: 5 TABLET ORAL at 09:14

## 2023-03-21 ASSESSMENT — ACTIVITIES OF DAILY LIVING (ADL)
ADLS_ACUITY_SCORE: 45
ADLS_ACUITY_SCORE: 46
ADLS_ACUITY_SCORE: 47
ADLS_ACUITY_SCORE: 47
ADLS_ACUITY_SCORE: 46
ADLS_ACUITY_SCORE: 47
ADLS_ACUITY_SCORE: 45
ADLS_ACUITY_SCORE: 45
ADLS_ACUITY_SCORE: 47
ADLS_ACUITY_SCORE: 45

## 2023-03-21 NOTE — PROGRESS NOTES
[facility] Medicine Progress Note  Date of Service (when I saw the patient): 03/21/2023    REASON FOR ADMISSION / INTERVAL HISTORY:  Henrry Fonseca is a 88 year old male who presents on 3/17/2023 with right leg weakness. He continues to have weakness R leg. Mild SOB but no CP.   Feels tired and worn out today. Still coughing        Assessment & Plan      Right leg weakness  Last known well at bedtime 3/16, awoke 3/17 with right lower extremity weakness and headache. Denies right arm or face symptoms. Stroke code called in ER; head CT shows no acute infarct, age indeterminate lacunar infarcts in right caudate & thalamus. CTA shows no acute concerning changes. CT perfusion is normal. Symptoms slowly improving but still present on admission. Stroke neuro de-escalated stroke code, but has left some recommendations for further workup given stroke history & difficult exam with underlying paralysis.   MRI brain with and without contrast is negative. Blood cx-p(r/o endocarditis per neuro stroke)  CK elevated -likely due to covid.   Improving -pt walking in room with PT.      Infection due to 2019 novel coronavirus  Viral pneumonia due to covid 19 along with possible bacterial superinfection  Acute hypoxic resp failure  Cough began about 2 days PTA (3/15), but weakness began AM of admission (3/17). Was Oxygenating well on room air at time of admission and hence was started on paxlovid.    Vaccinated with Moderna 3/12/2021, 4/9/2021, pfizer booster 11/8/2021, Moderna bivalent booster 9/14/2022.    Did have temp 101 and still spiking LGT. Started on Paxlovid 150/100mg BID on admit . CXR on admit negative for infiltrate. PCT was  0.12.  Pt decompensated 3/18 evening-needed o2 at 2-3 L to keep sats >90%. tmax 100.9. pt does not feel well. All inflamatory markers are worse , d dimer 1.3. PCT is worse at 0.8. CXR shows possible LML infiltrate. Lungs show diffuse exp wheezes. Blood  cx-p  Started iv rocephin/ zithromax/ decadron/ remdesevir and stopped paxlovid on 3/19.   Pt feels lot better today. Not needing o2 any more. All  markers improving.    -continue current meds. Follow labs. Resume o2 if needed to keep sats >90%     Cardiac arrythmia-afib vs SR with first degree block  Acute diastolic CHF  Type 2 NSTEMI due to Demand ischemia  Aortic stenosis s/p TAVR  CXR showed vascular congestion. BNP is >8k. Last ECHO 1/22 showed EF 60%. Troponins 74-96. Did have 7 beat run of v tach 3/18... Gave iv lasix 20mg x 2. Cr was  1.6 from 1.3 on 3/19 hence stopped lasix.  Cr stable -at baseline. Stopped tele. F/u cards OP      Acute kidney injury  Mild. Baseline creatinine around 1.05 - 1.1, creatinine on presentation 1.37. likely due to covid infection , poor po intake.   Resolved.       Ambulatory dysfunction  History of hemorrhagic stroke   Left hemiparesis   Hemorrhagic right-sided stroke in June 2020 due to uncontrolled hypertension resulting in left-sided paralysis, worse in RUE. Uses wheelchair & walker at baseline. No changes from baseline functionality on left side at time of admission.      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 Does not require oxygen at home. May be contributing to cough and makes patient higher risk for covid progression. No acute interventions.      Essential hypertension, benign  Generally controlled since presentation. Managed PTA with amlodipine 5mg daily.  Per cardiology note in May 2022, patient should avoid AV annamaria blocking agents because he had bradycardia after TAVR.    PTA amlodipine . BP stable  -continue med     Seizure disorder  History of seizure disorder noted in chart. Cannot find documentation of date of last seizure and patient cannot recall a recent event. Managed PTA with gabapentin 600mg BID in AM & PM, with additional 300mg dose at noon.   -Continue PTA gabapentin     Anemia  Mild, chronic & stable, normocytic. Baseline hemoglobin around  "11-12, hemoglobin on presentation 12.9. No signs of acute bleeding. Monitor.    DISPO  Will be in hospital 1-2 days atleast           Diet: Combination Diet Regular Diet Adult    DVT Prophylaxis: Enoxaparin (Lovenox) SQ  Panda Catheter: Not present  Code Status:   special-only CPR no intubation  Lines: PIV    KATHRINE CROWDER MD   Pg [phone]        ROS:  As described in A/P and Exam.  Otherwise ALL are  negative.    PHYSICAL EXAM:  All vitals have been reviewed    Blood pressure 131/63, pulse 58, temperature 97.2  F (36.2  C), temperature source Oral, resp. rate 18, height 1.676 m (5' 6\"), weight 73.8 kg (162 lb 11.2 oz), SpO2 94 %.    I/O this shift:  In: -   Out: 220 [Urine:220]    GENERAL APPEARANCE: ill appearing, alert and no distress  EYES: conjunctiva clear, eyes grossly normal  HENT: external ears and nose normal   RESP: lungs --CTA-b. Few basal crackles  CV: regular rate and rhythm, normal S1 S2, no S3 or S4 and no murmur, click or rub   ABDOMEN: soft, nontender, no HSM or masses and bowel sounds normal  MS: no clubbing, cyanosis; no edema  SKIN: clear without significant rashes or lesions  NEURO: -non-focal moves all 4 extr    ROUTINE  LABS (Last four results)  CMP  Recent Labs   Lab 03/21/23 0542 03/20/23 0422 03/19/23 0545 03/18/23 0227    135* 134* 135*   POTASSIUM 3.6 3.7 3.8 4.0   CHLORIDE 103 101 98 101   CO2 21* 21* 24 23   ANIONGAP 12 13 12 11   * 157* 112* 110*   BUN 52.0* 41.5* 37.6* 28.0*   CR 1.22* 1.20* 1.62* 1.33*   GFRESTIMATED 57* 58* 41* 51*   SUNDAY 8.0* 7.9* 8.3* 8.2*     CBC  Recent Labs   Lab 03/21/23  0542 03/20/23 0422 03/19/23  0545 03/18/23 0227   WBC 6.8 6.9 8.5 5.6   RBC 3.67* 3.90* 4.21* 4.00*   HGB 11.0* 11.6* 12.5* 12.0*   HCT 31.5* 33.7* 36.8* 35.3*   MCV 86 86 87 88   MCH 30.0 29.7 29.7 30.0   MCHC 34.9 34.4 34.0 34.0   RDW 13.0 13.0 12.6 12.8    130* 123* 142*     INR  Recent Labs   Lab 03/17/23  1708   INR 1.14     Arterial Blood GasNo lab results " found in last 7 days.    No results found for this or any previous visit (from the past 24 hour(s)).

## 2023-03-21 NOTE — PROGRESS NOTES
Patient is alert and oriented.  On Special Precautions Isolation for Covid.  Uses call light appropriately.  Had a bowel movement on nights using a bedpan. Request help with the urinal in bed due to left sided arm paralysis and left leg weakness from a prior stroke.  Saline Locked.  On room air, expiratory wheezes in the bases, congested cough.  Regular diet.  Denies pain.

## 2023-03-21 NOTE — PROGRESS NOTES
Alert and oriented. Up to wheelchair with assist of one and walker. Dyspneic with exertion and easily fatigued. Lungs clear, no wheezing. Room air. IV antibiotics administered. Denies pain.   Voiding with no difficulty, loose bowel movement today.  Using call light.

## 2023-03-22 ENCOUNTER — APPOINTMENT (OUTPATIENT)
Dept: OCCUPATIONAL THERAPY | Facility: CLINIC | Age: 88
DRG: 177 | End: 2023-03-22
Payer: COMMERCIAL

## 2023-03-22 VITALS
BODY MASS INDEX: 26.15 KG/M2 | OXYGEN SATURATION: 95 % | HEART RATE: 60 BPM | RESPIRATION RATE: 18 BRPM | WEIGHT: 162.7 LBS | HEIGHT: 66 IN | DIASTOLIC BLOOD PRESSURE: 67 MMHG | TEMPERATURE: 97.4 F | SYSTOLIC BLOOD PRESSURE: 146 MMHG

## 2023-03-22 LAB
ERYTHROCYTE [DISTWIDTH] IN BLOOD BY AUTOMATED COUNT: 12.9 % (ref 10–15)
HCT VFR BLD AUTO: 28.4 % (ref 40–53)
HGB BLD-MCNC: 10 G/DL (ref 13.3–17.7)
HOLD SPECIMEN: NORMAL
MCH RBC QN AUTO: 30.2 PG (ref 26.5–33)
MCHC RBC AUTO-ENTMCNC: 35.2 G/DL (ref 31.5–36.5)
MCV RBC AUTO: 86 FL (ref 78–100)
PLATELET # BLD AUTO: 149 10E3/UL (ref 150–450)
RBC # BLD AUTO: 3.31 10E6/UL (ref 4.4–5.9)
WBC # BLD AUTO: 5.6 10E3/UL (ref 4–11)

## 2023-03-22 PROCEDURE — 36415 COLL VENOUS BLD VENIPUNCTURE: CPT

## 2023-03-22 PROCEDURE — 250N000011 HC RX IP 250 OP 636

## 2023-03-22 PROCEDURE — 258N000003 HC RX IP 258 OP 636: Performed by: INTERNAL MEDICINE

## 2023-03-22 PROCEDURE — 99239 HOSP IP/OBS DSCHRG MGMT >30: CPT | Performed by: INTERNAL MEDICINE

## 2023-03-22 PROCEDURE — 250N000011 HC RX IP 250 OP 636: Performed by: INTERNAL MEDICINE

## 2023-03-22 PROCEDURE — 93005 ELECTROCARDIOGRAM TRACING: CPT

## 2023-03-22 PROCEDURE — 250N000013 HC RX MED GY IP 250 OP 250 PS 637: Performed by: INTERNAL MEDICINE

## 2023-03-22 PROCEDURE — 85014 HEMATOCRIT: CPT

## 2023-03-22 PROCEDURE — 250N000013 HC RX MED GY IP 250 OP 250 PS 637

## 2023-03-22 PROCEDURE — 250N000012 HC RX MED GY IP 250 OP 636 PS 637: Performed by: INTERNAL MEDICINE

## 2023-03-22 PROCEDURE — 97535 SELF CARE MNGMENT TRAINING: CPT | Mod: GO

## 2023-03-22 RX ORDER — CEFUROXIME AXETIL 500 MG/1
500 TABLET ORAL 2 TIMES DAILY
Qty: 14 TABLET | Refills: 0 | Status: SHIPPED | OUTPATIENT
Start: 2023-03-22 | End: 2023-03-27

## 2023-03-22 RX ORDER — ATROPINE SULFATE 0.1 MG/ML
0.2 INJECTION INTRAVENOUS ONCE
Status: DISCONTINUED | OUTPATIENT
Start: 2023-03-22 | End: 2023-03-22

## 2023-03-22 RX ORDER — ATROPINE SULFATE 0.1 MG/ML
0.2 INJECTION INTRAVENOUS
Status: DISCONTINUED | OUTPATIENT
Start: 2023-03-22 | End: 2023-03-22 | Stop reason: HOSPADM

## 2023-03-22 RX ADMIN — AMLODIPINE BESYLATE 5 MG: 5 TABLET ORAL at 08:58

## 2023-03-22 RX ADMIN — CEFTRIAXONE SODIUM 1 G: 1 INJECTION, POWDER, FOR SOLUTION INTRAMUSCULAR; INTRAVENOUS at 10:57

## 2023-03-22 RX ADMIN — GABAPENTIN 600 MG: 600 TABLET, FILM COATED ORAL at 08:58

## 2023-03-22 RX ADMIN — GABAPENTIN 300 MG: 300 CAPSULE ORAL at 13:05

## 2023-03-22 RX ADMIN — ENOXAPARIN SODIUM 40 MG: 100 INJECTION SUBCUTANEOUS at 00:19

## 2023-03-22 RX ADMIN — AZITHROMYCIN MONOHYDRATE 500 MG: 500 INJECTION, POWDER, LYOPHILIZED, FOR SOLUTION INTRAVENOUS at 12:41

## 2023-03-22 RX ADMIN — DEXAMETHASONE 6 MG: 2 TABLET ORAL at 08:58

## 2023-03-22 ASSESSMENT — ACTIVITIES OF DAILY LIVING (ADL)
ADLS_ACUITY_SCORE: 45
ADLS_ACUITY_SCORE: 42
ADLS_ACUITY_SCORE: 42
ADLS_ACUITY_SCORE: 45
ADLS_ACUITY_SCORE: 42

## 2023-03-22 NOTE — DISCHARGE SUMMARY
[place] Hospitalist Discharge Summary    Henrry Fonseca MRN# [id number]   Age: 88 year old YOB: 1935     Date of Admission:  3/17/2023  Date of Discharge::  3/22/2023  Admitting Physician:  Lynn Ritchie MD  Discharge Physician:  Lynn Ritchie MD  Primary Physician: Fanny Alexander [facility]  Transferring Facility: N/A     Home clinic: [facility]          Admission Diagnoses:   Generalized muscle weakness [M62.81]  Ambulatory dysfunction [R26.2]  Atrial fibrillation, unspecified type (H) [I48.91]  Infection due to 2019 novel coronavirus [U07.1]          Discharge Diagnosis:     Principle diagnosis: Infection due to 2019 novel coronavirus  Viral pneumonia due to covid 19 along with possible bacterial superinfection  Acute hypoxic resp failure  Secondary diagnoses:  Patient Active Problem List   Diagnosis     Generalized osteoarthrosis, unspecified site     Mild intermittent asthma     Asbestosis (H)     Essential hypertension, benign     Syncope and collapse     Back pain     Heel pain     Seizure disorder (H)     Rotator cuff syndrome     Elevated PSA     CARDIOVASCULAR SCREENING; LDL GOAL LESS THAN 160     Advanced directives, counseling/discussion     Senile nuclear sclerosis     Health Care Home     Esophageal reflux     Eczema     Hemorrhagic stroke (H)     Aortic valve stenosis, etiology of cardiac valve disease unspecified     Status post coronary angiogram     Aortic stenosis, severe     Other iron deficiency anemia     Left hemiparesis (H)     Dysarthria     Dysphagia     Mixed hearing loss, bilateral     Thoracic aortic ectasia (H)     Generalized muscle weakness     Ambulatory dysfunction     Infection due to 2019 novel coronavirus          Brief History of Presenting Illness:   As per admit hx  Henrry Fonseca is a 88 year old male with past medical history of 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     Patient went to bed evening 3/16 feeling normal, woke up a.m. 3/17 and felt weaker.  Patient struggled to get out of bed.  Felt his weakness was primarily located in his right lower extremity.  He said he had some associated headache in the morning as well.  Headache was generalized.  Not associated with any vision changes or tinnitus.  Denies any new paresthesias, numbness, tingling.  Denies any light sensitivity.  Denies any discs are 3 a or other speech difficulty.  Patient's weakness persisted.  He has left sided paralysis at baseline with limited left leg motion due to chronic left hip pain so it was difficult for him to compare right-sided weakness to left-sided symptoms.  The patient has a history of hemorrhagic stroke resulting in left-sided paracentesis, and says that these weakness symptoms feel similar but not exactly like previous stroke symptoms.      The patient does feel he developed a slight cough over the past day or 2 that is worse than his baseline discomfort due to asbestosis.  Denies productive cough, denies hemoptysis.  Patient denies feeling feverish, but endorses significant chills.Patient denies chest pain, heart palpitations, lightheadedness, syncope, limb swelling.  He denies any unilateral leg pain or warmth.  Denies any pleuritic chest pain with deep breathing.  Patient denies any abdominal pain, nausea, or vomiting.  Denies any urinary symptoms or diarrhea.  The patient endorses chronic left hip pain that is unchanged from previous.  No radiation into groin.     Patient presented to the emergency department for weakness.  Upon presentation a stroke code was called.  Imaging did not reveal obvious stroke. Patient was subsequently discovered to have a fever, and then endorsed a cough.  His son was recently sick with COVID a couple weeks ago, and the patient does report he had contact with his  son.  Then, patient's COVID test came back positive.  Patient says he continues to have some weakness in his right leg but it is beginning to improve.  He continues to have cough, but not requiring oxygen in ER.  The patient otherwise feels comfortable and has no additional new complaints at time of admission         Hospital Course:   Right leg weakness  Last known well at bedtime 3/16, awoke 3/17 with right lower extremity weakness and headache. Denies right arm or face symptoms. Stroke code called in ER; head CT shows no acute infarct, age indeterminate lacunar infarcts in right caudate & thalamus. CTA shows no acute concerning changes. CT perfusion is normal. Symptoms slowly improving but still present on admission. Stroke neuro de-escalated stroke code, but has left some recommendations for further workup given stroke history & difficult exam with underlying paralysis.   MRI brain with and without contrast is negative. Blood cx-p(r/o endocarditis per neuro stroke)  CK elevated -likely due to covid.   Improved -pt walking in room with PT.      Infection due to 2019 novel coronavirus  Viral pneumonia due to covid 19 along with possible bacterial superinfection  Acute hypoxic resp failure  Cough began about 2 days PTA (3/15), but weakness began AM of admission (3/17). Was Oxygenating well on room air at time of admission and hence was started on paxlovid.    Vaccinated with Moderna 3/12/2021, 4/9/2021, pfizer booster 11/8/2021, Moderna bivalent booster 9/14/2022.    Did have temp 101 and still spiking LGT. Started on Paxlovid 150/100mg BID on admit . CXR on admit negative for infiltrate. PCT was  0.12.  Pt decompensated 3/18 evening-needed o2 at 2-3 L to keep sats >90%. tmax 100.9. pt does not feel well. All inflamatory markers are worse , d dimer 1.3. PCT is worse at 0.8. CXR shows possible LML infiltrate. Lungs show diffuse exp wheezes. Blood cx-p  Started iv rocephin/ zithromax/ decadron/ remdesevir and  stopped paxlovid on 3/19.   Pt feels lot better today. Not needing o2 any more last couple days. All  markers improving. Completed 3 days of high dose zithroax, 4 days rocephin.   Will discharge on ceftin x 5 days.      Cardiac arrythmia-brief afib then  SR with first degree block  Acute diastolic CHF  Type 2 NSTEMI due to Demand ischemia  Aortic stenosis s/p TAVR  CXR showed vascular congestion. BNP is >8k. Last ECHO 1/22 showed EF 60%. Troponins 74-96. Did have 7 beat run of v tach 3/18... Gave iv lasix 20mg x 2. Cr was  1.6 from 1.3 on 3/19 hence stopped lasix.  Cr stable -at baseline. Stopped tele. HR stable -low as 40s at HS-asymptomatic . F/u  PMD-if any concerns for recurrence of afib,needs to be seen cards      Acute kidney injury  Mild. Baseline creatinine around 1.05 - 1.1, creatinine on presentation 1.37. likely due to covid infection , poor po intake.   Resolved.       Ambulatory dysfunction  History of hemorrhagic stroke   Left hemiparesis   Hemorrhagic right-sided stroke in June 2020 due to uncontrolled hypertension resulting in left-sided paralysis, worse in RUE. Uses wheelchair & walker at baseline. No changes from baseline functionality on left side at time of admission.      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 Does not require oxygen at home. May be contributing to cough and makes patient higher risk for covid progression. No acute interventions.      Essential hypertension, benign  Generally controlled since presentation. Managed PTA with amlodipine 5mg daily.  Per cardiology note in May 2022, patient should avoid AV annamaria blocking agents because he had bradycardia after TAVR.    PTA amlodipine . BP stable  -continue med     Seizure disorder  History of seizure disorder noted in chart. Cannot find documentation of date of last seizure and patient cannot recall a recent event. Managed PTA with gabapentin 600mg BID in AM & PM, with additional 300mg dose at noon.   -Continue PTA  gabapentin     Anemia  Mild, chronic & stable, normocytic. Baseline hemoglobin around 11-12, hemoglobin on presentation 12.9. No signs of acute bleeding. Monitor.     DISPO  SW/ PT seen. Pt ok to discharge home with HC          Procedures:   No procedures performed during this admission         Allergies:      Allergies   Allergen Reactions     Keppra Fatigue     Crying very depressed             Medications Prior to Admission:     Medications Prior to Admission   Medication Sig Dispense Refill Last Dose     acetaminophen (TYLENOL) 500 MG tablet Take 500 mg by mouth every 6 hours as needed for mild pain   3/17/2023 at am     albuterol (PROAIR HFA/PROVENTIL HFA/VENTOLIN HFA) 108 (90 Base) MCG/ACT inhaler Inhale 2 puffs into the lungs every 4 hours as needed for shortness of breath / dyspnea 18 g 3 3/17/2023 at 1200     amLODIPine (NORVASC) 5 MG tablet Take 1 tablet (5 mg) by mouth daily 90 tablet 4 3/17/2023 at am     atorvastatin (LIPITOR) 20 MG tablet TAKE ONE TABLET BY MOUTH AT BEDTIME 90 tablet 4 3/16/2023 at hs     ferrous gluconate (FERGON) 324 (38 Fe) MG tablet Take 1 tablet (324 mg) by mouth daily (with breakfast) 90 tablet 4 3/17/2023 at am     gabapentin (NEURONTIN) 600 MG tablet TAKE 1 TABLET BY MOUTH IN THE MORNING, ONE-HALF TABLET AT NOON, AND 1 TABLET IN THE EVENING Total of 2.5 tablets per day 225 tablet 4 3/17/2023 at 1200     omeprazole (PRILOSEC) 20 MG DR capsule Take 1 capsule (20 mg) by mouth daily 90 capsule 4 Past Month     amoxicillin (AMOXIL) 500 MG capsule TAKE 4 CAPSULES BY MOUTH ONCE FOR ONE DOSE 30 MINUTES BEFORE DENTAL APPOINTMENT                Discharge Medications:     Current Discharge Medication List      START taking these medications    Details   cefuroxime (CEFTIN) 500 MG tablet Take 1 tablet (500 mg) by mouth 2 times daily for 5 days  Qty: 14 tablet, Refills: 0    Associated Diagnoses: Atypical pneumonia         CONTINUE these medications which have NOT CHANGED    Details  "  acetaminophen (TYLENOL) 500 MG tablet Take 500 mg by mouth every 6 hours as needed for mild pain      albuterol (PROAIR HFA/PROVENTIL HFA/VENTOLIN HFA) 108 (90 Base) MCG/ACT inhaler Inhale 2 puffs into the lungs every 4 hours as needed for shortness of breath / dyspnea  Qty: 18 g, Refills: 3    Comments: Pharmacy may dispense brand covered by insurance (Proair, or proventil or ventolin or generic albuterol inhaler)  Associated Diagnoses: Asthma      amLODIPine (NORVASC) 5 MG tablet Take 1 tablet (5 mg) by mouth daily  Qty: 90 tablet, Refills: 4    Associated Diagnoses: Essential hypertension, benign      atorvastatin (LIPITOR) 20 MG tablet TAKE ONE TABLET BY MOUTH AT BEDTIME  Qty: 90 tablet, Refills: 4    Associated Diagnoses: Essential hypertension, benign      ferrous gluconate (FERGON) 324 (38 Fe) MG tablet Take 1 tablet (324 mg) by mouth daily (with breakfast)  Qty: 90 tablet, Refills: 4    Associated Diagnoses: Other iron deficiency anemia      gabapentin (NEURONTIN) 600 MG tablet TAKE 1 TABLET BY MOUTH IN THE MORNING, ONE-HALF TABLET AT NOON, AND 1 TABLET IN THE EVENING Total of 2.5 tablets per day  Qty: 225 tablet, Refills: 4    Associated Diagnoses: Seizure disorder (H)      omeprazole (PRILOSEC) 20 MG DR capsule Take 1 capsule (20 mg) by mouth daily  Qty: 90 capsule, Refills: 4    Associated Diagnoses: Gastroesophageal reflux disease without esophagitis      amoxicillin (AMOXIL) 500 MG capsule TAKE 4 CAPSULES BY MOUTH ONCE FOR ONE DOSE 30 MINUTES BEFORE DENTAL APPOINTMENT                   Consultations:   No consultations were requested during this admission            Discharge Exam:   Blood pressure (!) 146/67, pulse 60, temperature 97.4  F (36.3  C), temperature source Oral, resp. rate 18, height 1.676 m (5' 6\"), weight 73.8 kg (162 lb 11.2 oz), SpO2 95 %.  GENERAL APPEARANCE: healthy, alert and no distress  EYES: conjunctiva clear, eyes grossly normal  HENT: external ears and nose normal   NECK: " supple, no masses or adenopathy  RESP: lungs clear to auscultation - no rales, rhonchi or wheezes  CV: regular rate and rhythm, normal S1 S2, no S3 or S4 and no murmur, click or rub   ABDOMEN: soft, nontender, no HSM or masses and bowel sounds normal  MS: no clubbing, cyanosis; no edema  SKIN: clear without significant rashes or lesions  NEURO: Normal strength and tone, sensory exam grossly normal, mentation intact and speech normal    Unresulted Labs Ordered in the Past 30 Days of this Admission     Date and Time Order Name Status Description    3/18/2023  1:19 AM Blood Culture Hand, Right Preliminary     3/18/2023  1:19 AM Blood Culture Hand, Right Preliminary           No results found for this or any previous visit (from the past 24 hour(s)).         Pending Tests at Discharge:   None         Discharge Instructions and Follow-Up:     Discharge diet: Regular   Discharge activity: Activity as tolerated   Discharge follow-up: Follow up with primary care provider in 1-2 weeks           Discharge Disposition:     Discharged to home      Attestation:  I have reviewed today's vital signs, notes, medications, labs and imaging.    Time Spent on this Encounter   I, Lynn Ritchie MD, personally saw the patient today and spent greater than 30 minutes discharging this patient.    Lynn Ritchie MD

## 2023-03-22 NOTE — PROGRESS NOTES
Care Management Discharge Note    Discharge Date: 03/22/2023       Discharge Disposition: Home Care; [facility] (Phone: [phone])    Discharge Services: None    Discharge DME: None    Discharge Transportation: family or friend will provide    Private pay costs discussed: Not applicable    PAS Confirmation Code:  NA  Patient/family educated on Medicare website which has current facility and service quality ratings: yes    Education Provided on the Discharge Plan:  Yes  Persons Notified of Discharge Plans: Henrry  Patient/Family in Agreement with the Plan: yes    Handoff Referral Completed: Yes    Additional Information:  Per IDT rounds, MD states that pt is medically stable for discharge today.    PT/OT recommends that pt go home with home care services.  Pt & family are in agreement.    Pt is accepted for cares at [facility] (Phone: [phone]) for RN, PT and HHA services.   Pt is aware that [facility] has 48 hours to call his home number to make initial appointment.    Transportation discussed and pt's son in law to transport.      PARMINDER Bourgeois

## 2023-03-22 NOTE — PLAN OF CARE
MELIDA WILKINSG DISCHARGE NOTE    Patient discharged to home at 4:01 PM via wheel chair. Accompanied by staff to front entryway where family picking up patient to bring him home. Discharge instructions reviewed with patient, opportunity offered to ask questions. Prescriptions sent to patients preferred pharmacy. All belongings sent with patient.    Jaclyn Means RN

## 2023-03-22 NOTE — PLAN OF CARE
Occupational Therapy Discharge Summary    Reason for therapy discharge:    Discharged to home.    Progress towards therapy goal(s). See goals on Care Plan in [facility] electronic health record for goal details.  Goals partially met.  Barriers to achieving goals:   discharge from facility.    Therapy recommendation(s):    No further therapy is recommended.

## 2023-03-22 NOTE — PROVIDER NOTIFICATION
03/22/23 0417   Cardiac (VS)   Pulse Rate & Regularity apical pulse irregular;bradycardic   ECG Rhythm Sinus bradycardia;First degree AV block;Second degree AV block (Type II)   Lead Monitored Lead II     Heart rate briefly 28. Charge nurse Lilly AKINS RN notified.

## 2023-03-22 NOTE — PLAN OF CARE
Physical Therapy Discharge Summary    Reason for therapy discharge:    Discharged to home with home therapy.    Progress towards therapy goal(s). See goals on Care Plan in [facility] electronic health record for goal details.  Goals partially met.  Barriers to achieving goals:   discharge from facility.    Therapy recommendation(s):    Continued therapy is recommended.  Rationale/Recommendations:  Recommend continued home care PT to increase indep and safety in own environment.

## 2023-03-22 NOTE — PROGRESS NOTES
Patient is alert and oriented.  Special Precautions Isolation.  On Telemetry. Bradycardic when sleeping heart rates in the 40's, has dropped down into the 20's a couple times. Getting a STAT EKG at 0550.  States he is feeling much better.  Up with walker and assist of one, has his own wheel chair here in room. Left side arm paralysis from a previous stroke with left leg weakness.  On room air, lung sounds decreased.  Denies pain.  Uses urinal for voiding and up to bathroom for bowel movements.

## 2023-03-23 ENCOUNTER — MEDICAL CORRESPONDENCE (OUTPATIENT)
Dept: HEALTH INFORMATION MANAGEMENT | Facility: CLINIC | Age: 88
End: 2023-03-23

## 2023-03-23 ENCOUNTER — TELEPHONE (OUTPATIENT)
Dept: FAMILY MEDICINE | Facility: CLINIC | Age: 88
End: 2023-03-23
Payer: COMMERCIAL

## 2023-03-23 LAB
BACTERIA BLD CULT: NO GROWTH
BACTERIA BLD CULT: NO GROWTH

## 2023-03-23 NOTE — TELEPHONE ENCOUNTER
Griselda, [facility], [phone], called for orders:    Start of care today    1.  Skilled nursing 1x per week for 4 weeks   Then every other week for 5 weeks  plus 2 prn visits.    2.  HHA 1 time a week for 3 weeks.    3.  Pt is Covid positive beginning 3/17/23.  Need order to lift precautions as of 3/27/23.  Pt is currently recovering well from Covid and is not requiring additional therapies to treat.    4.  Home care is obligated to notify that PHQ9 screen revealed that pt admits to some thoughts of suicide if something happened to his wife but denies currently active thoughts of suicide and no plan.  Referred is being placed to mental health nurse for follow up.    Authorized #1 and 2 per clinic standing order.    #3 and 4 routed to provider.    Guillermina Peña RN

## 2023-03-27 ENCOUNTER — TELEPHONE (OUTPATIENT)
Dept: FAMILY MEDICINE | Facility: CLINIC | Age: 88
End: 2023-03-27
Payer: COMMERCIAL

## 2023-03-27 NOTE — TELEPHONE ENCOUNTER
Dr. Street,    [facility] homecare calls.  Patient has recently had 2 falls at home without injury. 2nd fall the paramedics came to help get the patient up.  Patient has residual left sided weakness from a stroke.  Was declining PT eval.  Patient now with wanting the PT after the falls.  I have given V.O. for PT eval.  Homecare wanting you to be aware of falls.  Sulma CARBALLO RN

## 2023-03-27 NOTE — TELEPHONE ENCOUNTER
Noted, if another fall or change in his symptoms I would recommend more urgent evaluation ER/UC.     FARRUKH MENA, DO

## 2023-03-27 NOTE — TELEPHONE ENCOUNTER
Called and left message from Dr. Street on Griselda's confidential VM.   Keara Marie RN on 3/27/2023 at 2:08 PM

## 2023-03-30 ENCOUNTER — TELEPHONE (OUTPATIENT)
Dept: FAMILY MEDICINE | Facility: CLINIC | Age: 88
End: 2023-03-30
Payer: COMMERCIAL

## 2023-03-30 ENCOUNTER — HOSPITAL ENCOUNTER (OUTPATIENT)
Facility: CLINIC | Age: 88
Setting detail: OBSERVATION
Discharge: HOME HEALTH | End: 2023-03-31
Attending: FAMILY MEDICINE | Admitting: HOSPITALIST
Payer: COMMERCIAL

## 2023-03-30 DIAGNOSIS — U07.1 INFECTION DUE TO 2019 NOVEL CORONAVIRUS: ICD-10-CM

## 2023-03-30 DIAGNOSIS — F43.9 SITUATIONAL STRESS: ICD-10-CM

## 2023-03-30 DIAGNOSIS — R53.1 GENERAL WEAKNESS: ICD-10-CM

## 2023-03-30 DIAGNOSIS — F43.21 ADJUSTMENT DISORDER WITH DEPRESSED MOOD: Primary | ICD-10-CM

## 2023-03-30 LAB
ALBUMIN SERPL BCG-MCNC: 3.3 G/DL (ref 3.5–5.2)
ALBUMIN SERPL BCG-MCNC: 3.3 G/DL (ref 3.5–5.2)
ALP SERPL-CCNC: 63 U/L (ref 40–129)
ALP SERPL-CCNC: 63 U/L (ref 40–129)
ALT SERPL W P-5'-P-CCNC: 29 U/L (ref 10–50)
ALT SERPL W P-5'-P-CCNC: 29 U/L (ref 10–50)
ANION GAP SERPL CALCULATED.3IONS-SCNC: 7 MMOL/L (ref 7–15)
AST SERPL W P-5'-P-CCNC: 28 U/L (ref 10–50)
AST SERPL W P-5'-P-CCNC: 29 U/L (ref 10–50)
BASOPHILS # BLD AUTO: 0 10E3/UL (ref 0–0.2)
BASOPHILS NFR BLD AUTO: 0 %
BILIRUB DIRECT SERPL-MCNC: <0.2 MG/DL (ref 0–0.3)
BILIRUB SERPL-MCNC: 0.6 MG/DL
BILIRUB SERPL-MCNC: 0.6 MG/DL
BUN SERPL-MCNC: 26.4 MG/DL (ref 8–23)
CALCIUM SERPL-MCNC: 8.6 MG/DL (ref 8.8–10.2)
CHLORIDE SERPL-SCNC: 106 MMOL/L (ref 98–107)
CREAT SERPL-MCNC: 1.16 MG/DL (ref 0.67–1.17)
DEPRECATED HCO3 PLAS-SCNC: 26 MMOL/L (ref 22–29)
EOSINOPHIL # BLD AUTO: 0 10E3/UL (ref 0–0.7)
EOSINOPHIL NFR BLD AUTO: 1 %
ERYTHROCYTE [DISTWIDTH] IN BLOOD BY AUTOMATED COUNT: 13.2 % (ref 10–15)
FLUAV RNA SPEC QL NAA+PROBE: NEGATIVE
FLUBV RNA RESP QL NAA+PROBE: NEGATIVE
GFR SERPL CREATININE-BSD FRML MDRD: 61 ML/MIN/1.73M2
GLUCOSE SERPL-MCNC: 127 MG/DL (ref 70–99)
HCT VFR BLD AUTO: 33 % (ref 40–53)
HGB BLD-MCNC: 10.9 G/DL (ref 13.3–17.7)
HOLD SPECIMEN: NORMAL
IMM GRANULOCYTES # BLD: 0.1 10E3/UL
IMM GRANULOCYTES NFR BLD: 1 %
LYMPHOCYTES # BLD AUTO: 0.7 10E3/UL (ref 0.8–5.3)
LYMPHOCYTES NFR BLD AUTO: 9 %
MCH RBC QN AUTO: 29.5 PG (ref 26.5–33)
MCHC RBC AUTO-ENTMCNC: 33 G/DL (ref 31.5–36.5)
MCV RBC AUTO: 89 FL (ref 78–100)
MONOCYTES # BLD AUTO: 1 10E3/UL (ref 0–1.3)
MONOCYTES NFR BLD AUTO: 12 %
NEUTROPHILS # BLD AUTO: 6.4 10E3/UL (ref 1.6–8.3)
NEUTROPHILS NFR BLD AUTO: 77 %
NRBC # BLD AUTO: 0 10E3/UL
NRBC BLD AUTO-RTO: 0 /100
PLATELET # BLD AUTO: 230 10E3/UL (ref 150–450)
POTASSIUM SERPL-SCNC: 4.2 MMOL/L (ref 3.4–5.3)
PROT SERPL-MCNC: 6.2 G/DL (ref 6.4–8.3)
PROT SERPL-MCNC: 6.3 G/DL (ref 6.4–8.3)
RBC # BLD AUTO: 3.69 10E6/UL (ref 4.4–5.9)
RSV RNA SPEC NAA+PROBE: NEGATIVE
SARS-COV-2 RNA RESP QL NAA+PROBE: POSITIVE
SODIUM SERPL-SCNC: 139 MMOL/L (ref 136–145)
WBC # BLD AUTO: 8.3 10E3/UL (ref 4–11)

## 2023-03-30 PROCEDURE — G0378 HOSPITAL OBSERVATION PER HR: HCPCS | Mod: CS

## 2023-03-30 PROCEDURE — 250N000013 HC RX MED GY IP 250 OP 250 PS 637

## 2023-03-30 PROCEDURE — 36415 COLL VENOUS BLD VENIPUNCTURE: CPT | Performed by: FAMILY MEDICINE

## 2023-03-30 PROCEDURE — G0378 HOSPITAL OBSERVATION PER HR: HCPCS

## 2023-03-30 PROCEDURE — 82248 BILIRUBIN DIRECT: CPT | Performed by: FAMILY MEDICINE

## 2023-03-30 PROCEDURE — 99285 EMERGENCY DEPT VISIT HI MDM: CPT | Mod: CS | Performed by: FAMILY MEDICINE

## 2023-03-30 PROCEDURE — 90791 PSYCH DIAGNOSTIC EVALUATION: CPT

## 2023-03-30 PROCEDURE — 93005 ELECTROCARDIOGRAM TRACING: CPT

## 2023-03-30 PROCEDURE — C9803 HOPD COVID-19 SPEC COLLECT: HCPCS | Performed by: FAMILY MEDICINE

## 2023-03-30 PROCEDURE — 99222 1ST HOSP IP/OBS MODERATE 55: CPT

## 2023-03-30 PROCEDURE — 87637 SARSCOV2&INF A&B&RSV AMP PRB: CPT | Performed by: FAMILY MEDICINE

## 2023-03-30 PROCEDURE — 999N000157 HC STATISTIC RCP TIME EA 10 MIN

## 2023-03-30 PROCEDURE — 85025 COMPLETE CBC W/AUTO DIFF WBC: CPT | Performed by: FAMILY MEDICINE

## 2023-03-30 RX ORDER — PANTOPRAZOLE SODIUM 40 MG/1
40 TABLET, DELAYED RELEASE ORAL
Status: DISCONTINUED | OUTPATIENT
Start: 2023-03-31 | End: 2023-03-31 | Stop reason: HOSPADM

## 2023-03-30 RX ORDER — ATORVASTATIN CALCIUM 20 MG/1
20 TABLET, FILM COATED ORAL EVERY EVENING
Status: DISCONTINUED | OUTPATIENT
Start: 2023-03-31 | End: 2023-03-31 | Stop reason: HOSPADM

## 2023-03-30 RX ORDER — AMOXICILLIN 250 MG
1-2 CAPSULE ORAL 2 TIMES DAILY
Status: DISCONTINUED | OUTPATIENT
Start: 2023-03-30 | End: 2023-03-31 | Stop reason: HOSPADM

## 2023-03-30 RX ORDER — AMLODIPINE BESYLATE 5 MG/1
5 TABLET ORAL DAILY
Status: DISCONTINUED | OUTPATIENT
Start: 2023-03-31 | End: 2023-03-31 | Stop reason: HOSPADM

## 2023-03-30 RX ORDER — POLYETHYLENE GLYCOL 3350 17 G/17G
17 POWDER, FOR SOLUTION ORAL DAILY PRN
Status: DISCONTINUED | OUTPATIENT
Start: 2023-03-30 | End: 2023-03-31 | Stop reason: HOSPADM

## 2023-03-30 RX ORDER — ACETAMINOPHEN 325 MG/1
650 TABLET ORAL EVERY 4 HOURS PRN
Status: DISCONTINUED | OUTPATIENT
Start: 2023-03-30 | End: 2023-03-31 | Stop reason: HOSPADM

## 2023-03-30 RX ORDER — GABAPENTIN 600 MG/1
600 TABLET ORAL 2 TIMES DAILY
Status: DISCONTINUED | OUTPATIENT
Start: 2023-03-30 | End: 2023-03-31 | Stop reason: HOSPADM

## 2023-03-30 RX ORDER — FERROUS GLUCONATE 324(38)MG
324 TABLET ORAL
Status: DISCONTINUED | OUTPATIENT
Start: 2023-03-31 | End: 2023-03-31 | Stop reason: HOSPADM

## 2023-03-30 RX ORDER — ESCITALOPRAM OXALATE 10 MG/1
10 TABLET ORAL DAILY
Status: DISCONTINUED | OUTPATIENT
Start: 2023-03-31 | End: 2023-03-31 | Stop reason: HOSPADM

## 2023-03-30 RX ADMIN — Medication 3 MG: at 23:54

## 2023-03-30 RX ADMIN — GABAPENTIN 600 MG: 600 TABLET, FILM COATED ORAL at 23:54

## 2023-03-30 RX ADMIN — SENNOSIDES AND DOCUSATE SODIUM 1 TABLET: 50; 8.6 TABLET ORAL at 23:54

## 2023-03-30 ASSESSMENT — COLUMBIA-SUICIDE SEVERITY RATING SCALE - C-SSRS
5. HAVE YOU STARTED TO WORK OUT OR WORKED OUT THE DETAILS OF HOW TO KILL YOURSELF? DO YOU INTEND TO CARRY OUT THIS PLAN?: YES
TOTAL  NUMBER OF ABORTED OR SELF INTERRUPTED ATTEMPTS LIFETIME: NO
1. HAVE YOU WISHED YOU WERE DEAD OR WISHED YOU COULD GO TO SLEEP AND NOT WAKE UP?: YES
ATTEMPT LIFETIME: NO
2. HAVE YOU ACTUALLY HAD ANY THOUGHTS OF KILLING YOURSELF?: YES
TOTAL  NUMBER OF INTERRUPTED ATTEMPTS LIFETIME: NO
5. HAVE YOU STARTED TO WORK OUT OR WORKED OUT THE DETAILS OF HOW TO KILL YOURSELF? DO YOU INTEND TO CARRY OUT THIS PLAN?: YES
6. HAVE YOU EVER DONE ANYTHING, STARTED TO DO ANYTHING, OR PREPARED TO DO ANYTHING TO END YOUR LIFE?: NO
1. IN THE PAST MONTH, HAVE YOU WISHED YOU WERE DEAD OR WISHED YOU COULD GO TO SLEEP AND NOT WAKE UP?: YES

## 2023-03-30 ASSESSMENT — ACTIVITIES OF DAILY LIVING (ADL)
ADLS_ACUITY_SCORE: 35
ADLS_ACUITY_SCORE: 33
ADLS_ACUITY_SCORE: 35

## 2023-03-30 NOTE — PROGRESS NOTES
Reviewing chart due to high probability of admit to Med/Surg unit. Alban Bravo RN on 3/30/2023 at 6:57 PM

## 2023-03-30 NOTE — PLAN OF CARE
Henrry Fonseca  March 30, 2023  Plan of Care Hand-off Note     Patient Care Path: Inpatient Medical    Plan for Care:     Pt presenting in the ER today due to worsening of depression, anxiety and suicidal ideations.  Pt endorsed increased depression and anxiety symptoms.  Pt reported having poor sleep and loss of appetite.  Pt denied having acute psychosis and marilynn.  Pt endorsed suicidal ideations with intent and plan to running his car in the garage.  Pt denied having HI, access to firearms, history of SIB and previous suicide attempt.  Pt living independently with his wife.  Pt had a stroke about 2.5 years ago which affected his left side of body, functioning and mobility.  Pt reported he was using a walker and wheelchair to ambulate at home.  Pt recently had medical admission on 3/17/23 to 3/22/23 at [facility] due to COVID-19 positive and ambulatory dysfunction.  Pt has significant health issues and ambulatory issues which complicate his placement. Pt likely to on boarding in the ER for longer than 24 hours to find an open inpatient geriatric psychiatric bed which was not feasible.  Writer recommended Pt to engage in Transitional Care Unit for further rehab.  Extended Care service will follow up with Pt to provide further therapeutic support while on boarding and transfer to TCU placement.    Critical Safety Issues: Pt endorsed increased suicidal ideations with intent and plan to running his car in the garage to die by carbon monoxide poisoning.    Overview:  This patient is a child/adolescent: No    This patient has additional special visitor precautions: No    Legal Status: Voluntary    Contacts:   sonChapito: Contact [phone]  wifeAyah: Contact [phone]    Psychiatry Consult:  Adult Psychiatry Consult requested related to mental health symptoms and suicidal ideations. Psychiatry IP Consult Order Placed: NA    Updated Attending Provider and Pt's sonChapito  regarding  plan of care.    Amrit Osman, [facility]

## 2023-03-30 NOTE — ED PROVIDER NOTES
History     Chief Complaint   Patient presents with     Suicidal     Generalized Weakness     HPI     Henrry Fonseca is a 88 year old male who comes in via EMS from home with depression and suicidality.  His wife called 911.  He says that he wants to die.  He says that he is distraught because he has had COVID and feels like he is never going to get better.  He is also worried about his wife who has COVID and has had a cough for a year and he feels like she is never going to get better.  He lost his brother a few days ago who is in hospice with some sort of dementing illness.  He does not know what he needs right now.  He would prefer to just die and is not looking for a specific intervention.  He is not short of breath and is not having significant chest pain.  He is not having any abdominal pain.  He has had some loose bowels but no frequent diarrhea.  He is not having any new voiding dysfunction.  He does not have a fever.  He has not had any recent injuries but he has a bruise on his scrotum on the anterior superior and and on the base of the penis.  He is not certain how that occurred and he has no pain in that area.  He is voiding without any difficulty.    Allergies:  Allergies   Allergen Reactions     Keppra Fatigue     Crying very depressed       Problem List:    Patient Active Problem List    Diagnosis Date Noted     Generalized muscle weakness 03/17/2023     Priority: Medium     Ambulatory dysfunction 03/17/2023     Priority: Medium     Infection due to 2019 novel coronavirus 03/17/2023     Priority: Medium     Thoracic aortic ectasia (H) 11/08/2022     Priority: Medium     Mixed hearing loss, bilateral 07/20/2021     Priority: Medium     Declines referral for now - feels it's acceptable       Other iron deficiency anemia      Priority: Medium     Aortic stenosis, severe 01/26/2021     Priority: Medium     Status post coronary angiogram 01/13/2021     Priority: Medium     Hemorrhagic stroke (H)  10/20/2020     Priority: Medium     Aortic valve stenosis, etiology of cardiac valve disease unspecified 10/20/2020     Priority: Medium     Left hemiparesis (H) 06/29/2020     Priority: Medium     Dysarthria 06/29/2020     Priority: Medium     Dysphagia 06/29/2020     Priority: Medium     Eczema 03/09/2015     Priority: Medium     Esophageal reflux 06/10/2013     Priority: Medium     Health Care Home 12/28/2012     Priority: Medium     Ayana Donald RN-PHN  [facility] / [facility]   [phone]    DX V65.8 REPLACED WITH 62899 HEALTH CARE HOME (04/08/2013)       Essential hypertension, benign 09/17/2007     Priority: Medium     Asbestosis (H) 11/15/2006     Priority: Medium     Patient has bilateral plaques most likely related to asbestos exposure. He wanted to make sure whether there needs to be a follow up or not in terms of the Ct scan. Plaques stable since 2000 and PFt was normal recently.      Problem list name updated by automated process. Provider to review       Senile nuclear sclerosis 04/06/2011     Priority: Low     Advanced directives, counseling/discussion 03/29/2011     Priority: Low     Patient has completed an Advance/Health Care Directive (HCD), scanned into Epic Media tab, entry date of 9/15/05.    Carlos Ontiveros [facility]  March 29, 2011         CARDIOVASCULAR SCREENING; LDL GOAL LESS THAN 160 10/31/2010     Priority: Low     Rotator cuff syndrome 10/13/2009     Priority: Low     Tendonitis, bursitis-labral tear.   2009       Elevated PSA 10/13/2009     Priority: Low     Yearly monitor         Heel pain 11/04/2008     Priority: Low     Seizure disorder (H) 11/04/2008     Priority: Low     Keppra had side effects: Depression       Back pain 06/05/2008     Priority: Low     Syncope and collapse 11/09/2007     Priority: Low     Mild intermittent asthma 05/15/2006     Priority: Low     PFT 9/06 normal        Generalized osteoarthrosis, unspecified site 04/19/2005     Priority: Low         Past Medical History:    Past Medical History:   Diagnosis Date     Asbestosis(501)      Cardiomegaly      Cerebral infarction (H)      Closed fracture of unspecified part of humerus      Diverticulosis of colon (without mention of hemorrhage)      Esophageal reflux      Other specified anemias      Other specified iron deficiency anemias      Uncomplicated asthma        Past Surgical History:    Past Surgical History:   Procedure Laterality Date     COLONOSCOPY  8/18/04     Repeat in 10 years.     CV HEART CATHETERIZATION WITH POSSIBLE INTERVENTION N/A 1/13/2021    Procedure: Heart Catheterization with Possible Intervention;  Surgeon: Dmitriy Frost MD;  Location:  HEART CARDIAC CATH LAB     CV RIGHT HEART CATH MEASUREMENTS RECORDED N/A 1/13/2021    Procedure: Right Heart Cath;  Surgeon: Dmitriy Frost MD;  Location:  HEART CARDIAC CATH LAB     CV TRANSCATHETER AORTIC VALVE REPLACEMENT N/A 1/26/2021    Procedure: Transcatheter Aortic Valve Replacement;  Surgeon: Peggy Colbert MD;  Location:  HEART CARDIAC CATH LAB     ORTHOPEDIC SURGERY      right Fibula repair     PHACOEMULSIFICATION WITH STANDARD INTRAOCULAR LENS IMPLANT  4/7/2011    Procedure:PHACOEMULSIFICATION WITH STANDARD INTRAOCULAR LENS IMPLANT; Surgeon:MJ CHOI; Location:[place] OR     PHACOEMULSIFICATION WITH STANDARD INTRAOCULAR LENS IMPLANT  5/9/2011    Procedure:PHACOEMULSIFICATION WITH STANDARD INTRAOCULAR LENS IMPLANT; Surgeon:MJ CHOI; Location:[place] OR     SURGICAL HISTORY OF -       vasectomy     SURGICAL HISTORY OF -       appy       Family History:    Family History   Problem Relation Age of Onset     Cancer Father         lung     Cancer Mother         pancreatic     Alcohol/Drug Son      Alcohol/Drug Son      Cancer Sister         1/2 sister lung cancer       Social History:  Marital Status:   [2]  Social History     Tobacco Use     Smoking status: Never     Smokeless tobacco: Never   Substance Use  "Topics     Alcohol use: Yes     Comment: very rare     Drug use: No        Medications:    acetaminophen (TYLENOL) 500 MG tablet  albuterol (PROAIR HFA/PROVENTIL HFA/VENTOLIN HFA) 108 (90 Base) MCG/ACT inhaler  amLODIPine (NORVASC) 5 MG tablet  amoxicillin (AMOXIL) 500 MG capsule  atorvastatin (LIPITOR) 20 MG tablet  ferrous gluconate (FERGON) 324 (38 Fe) MG tablet  gabapentin (NEURONTIN) 600 MG tablet  omeprazole (PRILOSEC) 20 MG DR capsule      Review of Systems    All other systems are reviewed and are negative    Physical Exam   BP: (!) 173/89  Pulse: 68  Temp: 98.3  F (36.8  C)  Resp: 18  Height: 167.6 cm (5' 6\")  Weight: 74.8 kg (165 lb)  SpO2: 97 %      Physical Exam     Nursing note and vitals were reviewed.  Constitutional: Awake and alert, adequately nourished and developed appearing 88-year-old in no apparent discomfort, who does not appear acutely ill, and who answers questions appropriately and cooperates with examination.  HEENT: Atraumatic and normocephalic.  Voice quality is normal.  PERRL.  EOMI.   Neck: Freely mobile.  Cardiovascular: Cardiac examination reveals normal heart rate and regular rhythm without murmur.  Pulmonary/Chest: Breathing is unlabored.  Breath sounds are clear and equal bilaterally.  There no retractions, tachypnea, rales, wheezes, or rhonchi.  Abdomen: Soft, nontender, no HSM or masses rebound or guarding.  Musculoskeletal: Extremities are warm and well-perfused and without edema  Neurological: Alert, oriented, thought content logical, coherent.  He moves his extremities symmetrically.  No facial droop is present.  Motor tone is normal.  Skin: Warm, dry, no rashes.  Psychiatric: Affect depressed appearing but he will make good eye contact and cooperate with examination.  He is not agitated.  He does not appear to be anxious and he does not appear to attend internal stimuli.  Speech is fluent.  Genitalia: There is ecchymosis at the base of the penis on the dorsal surface and at " the proximal anterior surface of the scrotum without hematoma or tenderness or erythema on the circumcised phallus that is otherwise normal in appearance with bilaterally descended testicles without abnormal size, shape or appearance.    ED Course     Mental Health Risk Assessment      PSS-3    Date and Time Over the past 2 weeks have you felt down, depressed, or hopeless? Over the past 2 weeks have you had thoughts of killing yourself? Have you ever attempted to kill yourself? When did this last happen? User   03/30/23 1354 yes yes no -- MES      C-SSRS ([place])    Date and Time Q1 Wished to be Dead (Past Month) Q2 Suicidal Thoughts (Past Month) Q3 Suicidal Thought Method Q4 Suicidal Intent without Specific Plan Q5 Suicide Intent with Specific Plan Q6 Suicide Behavior (Lifetime) Within the Past 3 Months? RETIRED: Level of Risk per Screen Screening Not Complete User   03/30/23 1441 yes yes yes no yes no -- -- -- [facility]   03/30/23 1354 yes yes yes no yes no -- -- -- [facility]              Suicide assessment completed by mental health (D.E.C., LCSW, etc.)       Procedures              Critical Care time:  none               Results for orders placed or performed during the hospital encounter of 03/30/23 (from the past 24 hour(s))   [place] Draw    Narrative    The following orders were created for panel order [place] Draw.  Procedure                               Abnormality         Status                     ---------                               -----------         ------                     Extra Blue Top Tube[[id number]]                              Final result               Extra Red Top Tube[[id number]]                               Final result               Extra Green Top (Lithium...[[id number]]                      Final result               Extra Purple Top Tube[[id number]]                            Final result                 Please view results for these tests on the individual orders.   Extra Blue Top Tube    Result Value Ref Range    Hold Specimen JIC    Extra Red Top Tube   Result Value Ref Range    Hold Specimen JIC    Extra Green Top (Lithium Heparin) Tube   Result Value Ref Range    Hold Specimen JIC    Extra Purple Top Tube   Result Value Ref Range    Hold Specimen JIC    CBC with platelets, differential    Narrative    The following orders were created for panel order CBC with platelets, differential.  Procedure                               Abnormality         Status                     ---------                               -----------         ------                     CBC with platelets and d...[[id number]]  Abnormal            Final result                 Please view results for these tests on the individual orders.   Comprehensive metabolic panel   Result Value Ref Range    Sodium 139 136 - 145 mmol/L    Potassium 4.2 3.4 - 5.3 mmol/L    Chloride 106 98 - 107 mmol/L    Carbon Dioxide (CO2) 26 22 - 29 mmol/L    Anion Gap 7 7 - 15 mmol/L    Urea Nitrogen 26.4 (H) 8.0 - 23.0 mg/dL    Creatinine 1.16 0.67 - 1.17 mg/dL    Calcium 8.6 (L) 8.8 - 10.2 mg/dL    Glucose 127 (H) 70 - 99 mg/dL    Alkaline Phosphatase 63 40 - 129 U/L    AST 28 10 - 50 U/L    ALT 29 10 - 50 U/L    Protein Total 6.3 (L) 6.4 - 8.3 g/dL    Albumin 3.3 (L) 3.5 - 5.2 g/dL    Bilirubin Total 0.6 <=1.2 mg/dL    GFR Estimate 61 >60 mL/min/1.73m2   CBC with platelets and differential   Result Value Ref Range    WBC Count 8.3 4.0 - 11.0 10e3/uL    RBC Count 3.69 (L) 4.40 - 5.90 10e6/uL    Hemoglobin 10.9 (L) 13.3 - 17.7 g/dL    Hematocrit 33.0 (L) 40.0 - 53.0 %    MCV 89 78 - 100 fL    MCH 29.5 26.5 - 33.0 pg    MCHC 33.0 31.5 - 36.5 g/dL    RDW 13.2 10.0 - 15.0 %    Platelet Count 230 150 - 450 10e3/uL    % Neutrophils 77 %    % Lymphocytes 9 %    % Monocytes 12 %    % Eosinophils 1 %    % Basophils 0 %    % Immature Granulocytes 1 %    NRBCs per 100 WBC 0 <1 /100    Absolute Neutrophils 6.4 1.6 - 8.3 10e3/uL    Absolute Lymphocytes 0.7 (L) 0.8  - 5.3 10e3/uL    Absolute Monocytes 1.0 0.0 - 1.3 10e3/uL    Absolute Eosinophils 0.0 0.0 - 0.7 10e3/uL    Absolute Basophils 0.0 0.0 - 0.2 10e3/uL    Absolute Immature Granulocytes 0.1 <=0.4 10e3/uL    Absolute NRBCs 0.0 10e3/uL   Symptomatic Influenza A/B, RSV, & SARS-CoV2 PCR (COVID-19) Nasopharyngeal    Specimen: Nasopharyngeal; Swab   Result Value Ref Range    Influenza A PCR Negative Negative    Influenza B PCR Negative Negative    RSV PCR Negative Negative    SARS CoV2 PCR Positive (A) Negative    Narrative    Testing was performed using the Xpert Xpress CoV2/Flu/RSV Assay on the Cepheid GeneXpert Instrument. This test should be ordered for the detection of SARS-CoV-2, influenza, and RSV viruses in individuals who meet clinical and/or epidemiological criteria. Test performance is unknown in asymptomatic patients. This test is for in vitro diagnostic use under the FDA EUA for laboratories certified under CLIA to perform high or moderate complexity testing. This test has not been FDA cleared or approved. A negative result does not rule out the presence of PCR inhibitors in the specimen or target RNA in concentration below the limit of detection for the assay. If only one viral target is positive but coinfection with multiple targets is suspected, the sample should be re-tested with another FDA cleared, approved, or authorized test, if coinfection would change clinical management. This test was validated by the [facility]. These laboratories are certified under the Clinical Laboratory Improvement Amendments of 1988 (CLIA-88) as qualified to perform high complexity laboratory testing.       Medications - No data to display     Patient met with the [facility] therapists.  They talked about his desire to die due to his health problems and situational stressors with his wife's health problems and his brother's recent death.  In the end however the patient stated that he could contract for safety and his  son was willing to withhold his car keys.  However neither of them thinks that he is able to manage at home due to excessive weakness due to his COVID infection.  They believe he needs transitional care before he will be able to manage at home.     Assessments & Plan (with Medical Decision Making)     88-year-old male presented from home via EMS.  His wife called paramedics because he was expressing suicidal ideation.  He was assessed by the [facility] 's.  In the end the patient admits that he is not actually going to harm himself but feels overwhelmed by his current situation with his COVID infection and generalized weakness and inability to care for himself and for his wife who also has COVID and some distress over the death of his brother a few days ago.  However he is willing to contract for safety and his son feels that he can be safe at home from the standpoint of his depression.  However he is not really able to take care of him for himself and they both feel that he needs transitional care until he can get strong enough to ambulate adequately and care for himself in his own home.  Therefore he will be admitted to the hospital here while we search for transitional care placement.  This may be challenging with his COVID infection.  He still has a positive COVID test although symptomatically he is improving and he shows no evidence of a complication from COVID and thus far with no increased work of breathing and reassuring vital signs and reassuring laboratory studies.  He does not need additional antiviral therapy.  I discussed his case with Keli Lindquist of the hospital service and they will assume care on admission and look for transitional care placement for him.    I have reviewed the nursing notes.    I have reviewed the findings, diagnosis, plan and need for follow up with the patient.           Medical Decision Making  The patient's presentation was of high complexity (an acute health issue posing  potential threat to life or bodily function).    The patient's evaluation involved:  an assessment requiring an independent historian (Corroboration from his son and perspective from his son regarding his risk for self-harm)  review of external note(s) from 1 sources (I reviewed the discharge summary from his recent hospitalization)  ordering and/or review of 3+ test(s) in this encounter (see separate area of note for details)  review of 3+ test result(s) ordered prior to this encounter (I reviewed his most recent laboratory studies prior to this visit.)    The patient's management necessitated high risk (a decision regarding hospitalization).        New Prescriptions    No medications on file       Final diagnoses:   Infection due to 2019 novel coronavirus   General weakness   Situational stress       3/30/2023   [facility] EMERGENCY DEPT     Roby Izaguirre MD  03/30/23 [id number]

## 2023-03-30 NOTE — TELEPHONE ENCOUNTER
"[facility] calls with a FYI on Henrry. She got to Henrry's house today and his wife greeted her stating he is not doing well at all today. He was expressing suicidal comments such as \"I just want this all to end\" and hopelessness. Earlier this week he was speaking of this also and they had behavioral health get involved. They did call 911 and took Henrry to the ER. She states his vitals are all stable. Will forward to Dr Street as an FYI. Thank you!    Celsa Coyle RN     "

## 2023-03-30 NOTE — H&P
[facility]    History and Physical  Hospital Medicine       Date of Admission:  3/30/2023  Date of Service: 3/30/2023     Assessment & Plan   Henrry Fonseca is a 88 year old male who presents on 3/30/2023 with generalized weakness, failure to thrive, and suicidal ideation.     Generalized muscle weakness  Failure to thrive  Wheelchair / walker at baseline. Lives in independent home with wife and has been having a much harder time getting around. Getting depressed because he cannot complete ADLs like he used to. No acute changes. General physical decline. Will likely  need TCU / home care?    -Physical therpay consult  -Occupational therapy consult  -Care management consult  -Fall precautions    Suicidal ideation, resolved  Plan to poison self with carbon monoxide using car in garage. DEC assessment recommends finding TCU to attempt physical rehab to improve patient mobility & improve quality of life. Has supportive wife & son. On admission, patient endorses clear thinking & regret about previous statements, saying he was just really overwhelmed. Is open to starting medication & talking with someone about this. Not currently a threat to self or others.   -Initiate escitalopram 10mg daily  -EKG & LFTs ordered to screen for hepatic impairment & QT prolongation prior to initiating selective serotonin reuptake inhibitor  -Melatonin 3mg at bedtime PRN to help with sleep  -Care management consult, appreciate assistance    Essential hypertension, benign  Generally controlled since presentation. Managed PTA with amlodipine 5mg daily.  Per cardiology note in May 2022, patient should avoid AV annamaira blocking agents because he had bradycardia after TAVR.   -Continue PTA amlodipine with hold parameters    Chronic diastolic CHF  Aortic stenosis s/p TAVR  History of cardiac arrhythmia - brief afib & vtach  Recent previous admission patient had acute CHF exacerbation with episode of a fib then 7 beat run  "of Vtach 3/18. Baseline HR running 50's, remained asymptomatic. Recommended cardiology follow up if any concerns for recurrent arrhythmia. Current admission appears euvolemic, denies acute concerns  -Continue PTA atorvastatin 20mg at bedtime    History of stroke  Left hemiparesis  Ambulatory dysfunction  Hemorrhagic right-sided stroke in June 2020 due to uncontrolled hypertension resulting in left-sided paralysis, worse in RUE. Uses wheelchair & walker at baseline. No changes from baseline functionality currently.     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 Does not require oxygen at home. Monitor.     Seizure disorder  History of seizure disorder noted in chart. Cannot find documentation of date of last seizure and patient cannot recall a recent event. Managed PTA with gabapentin 600mg BID in AM & PM, with additional 300mg dose at noon.   -Continue PTA gabapentin    Clinically Significant Risk Factors Present on Admission              # Hypoalbuminemia: Lowest albumin = 3.3 g/dL at 3/30/2023  1:58 PM, will monitor as appropriate          # Overweight: Estimated body mass index is 26.63 kg/m  as calculated from the following:    Height as of this encounter: 1.676 m (5' 6\").    Weight as of this encounter: 74.8 kg (165 lb).            Diet:  regular  DVT Prophylaxis: Ambulate every shift  Panda Catheter: Not present  Code Status:   full code  Lines: PIV    Disposition Plan      Expected Discharge Date: 03/31/2023             Entered: Sharita Lindquist PA-C 03/30/2023, 5:45 PM     Status: Patient is appropriate for observation  Sharita Lindquist PA-C        The patient's care was discussed with the Attending Physician, Dr. Inocente Casanova, bedside RN, and the patient.    Primary Care Physician   [address] [phone]    History is obtained from the patient, who is an ok historian, handoff from ER provider, and review of old records via the EMR.    History of Present Illness   Henrry LEON Raymundo is " a 88 year old male with past medical history of seizure disorder, HTN, GERD, stroke with hemiparesis, dysarthria now presents on 3/30/2023 with generalized weakness, failure to thrive, and suicidal ideation.     Patient recently admitted 3/17 - 3/22 for generalized muscle weakness and covid 19 infection. Upon discharge patient says he has had persistent symptoms.  Symptoms are not debilitating, patient just says he has not yet returned to previous baseline.  His wife had covid about 1 year ago and has not recovered fully. He also lost his brother 2 days prior to admission, and said this in combination with he and his wife's health issues really just put him over the edge.  For the past 2 days he has been feeling very hopeless with his life. He reports suicidal ideation with a plan to go in his garage and turn on the car to induce carbon monoxide poisoning. Patient says he is not able to take care of his wife like he used to, and this is a major  of his feelings of inadequacy.  He says his wife is very supportive, and he feels bad for the comments that he made today about wanting to end his life.  The patient says these thoughts were fleeting, and he no longer feels this way.  He continues to feel overwhelmed and very stressed, but denies any desire to hurt himself.  Denies any previous history of suicidal thoughts or ideation.  Denies any history of suicide attempts.  Patient has never been medicated for anxiety or depression in the past.  Patient denies any history of manic episodes, uncontrollable spending of money, or careless decisions that he made and later regretted.    Upon arrival to the emergency department patient received lab work-up.  He generally denies any physical complaints.  He continues to feel generally weak, but this is not a change from baseline.  He denies any worsening dyspnea.  Denies any chest pain, pressure, discomfort.  He denies any lightheadedness or dizziness.  He has had a  decreased appetite, but denies any nausea or vomiting.  Denies abdominal pain.     Review of Systems   Constitutional: denies weight loss, fever, chills  Eyes: denies changes in vision, discharge, or pain in eyes  HENT: denies changes in hearing, sore throat  Respiratory: denies new or changing cough, dyspnea  Cardiovascular: denies chest pain, heart palpitations, lightheadedness, syncope, limb swelling  Gastroenterology: denies constipation, diarrhea, GERD symptoms  Genitourinary: denies dysuria  Integumentary: no new rashes or skin changes  Musculoskeletal: denies new muscle pain or joint trauma  Neuro: denies numbness, tingling, headaches, tremor  Psychiatric: See HPI    Past Medical History      Aortic stenosis, severe 01/26/2021     Priority: Medium     Status post coronary angiogram 01/13/2021     Priority: Medium     Hemorrhagic stroke (H) 10/20/2020     Priority: Medium     Aortic valve stenosis, etiology of cardiac valve disease unspecified 10/20/2020     Priority: Medium     Left hemiparesis (H) 06/29/2020     Priority: Medium     Dysarthria 06/29/2020     Priority: Medium     Dysphagia 06/29/2020     Priority: Medium     Eczema 03/09/2015     Priority: Medium     Esophageal reflux 06/10/2013     Priority: Medium     Essential hypertension, benign 09/17/2007     Rotator cuff syndrome 10/13/2009     Priority: Low     Heel pain 11/04/2008     Priority: Low     Seizure disorder (H) 11/04/2008     Priority: Low     Back pain 06/05/2008     Priority: Low     Syncope and collapse 11/09/2007     Priority: Low     Mild intermittent asthma 05/15/2006     Priority: Low     Generalized osteoarthrosis, unspecified site 04/19/2005     Priority: Low      Past Surgical History   Past Surgical History:   Procedure Laterality Date     COLONOSCOPY  8/18/04     Repeat in 10 years.     CV HEART CATHETERIZATION WITH POSSIBLE INTERVENTION N/A 1/13/2021    Procedure: Heart Catheterization with Possible Intervention;  Surgeon:  Dmitriy Frost MD;  Location:  HEART CARDIAC CATH LAB     CV RIGHT HEART CATH MEASUREMENTS RECORDED N/A 1/13/2021    Procedure: Right Heart Cath;  Surgeon: Dmitriy Frost MD;  Location:  HEART CARDIAC CATH LAB     CV TRANSCATHETER AORTIC VALVE REPLACEMENT N/A 1/26/2021    Procedure: Transcatheter Aortic Valve Replacement;  Surgeon: Peggy Colbert MD;  Location:  HEART CARDIAC CATH LAB     ORTHOPEDIC SURGERY      right Fibula repair     PHACOEMULSIFICATION WITH STANDARD INTRAOCULAR LENS IMPLANT  4/7/2011    Procedure:PHACOEMULSIFICATION WITH STANDARD INTRAOCULAR LENS IMPLANT; Surgeon:MJ CHOI; Location:[facility] OR     PHACOEMULSIFICATION WITH STANDARD INTRAOCULAR LENS IMPLANT  5/9/2011    Procedure:PHACOEMULSIFICATION WITH STANDARD INTRAOCULAR LENS IMPLANT; Surgeon:MJ CHOI; Location:[facility] OR     SURGICAL HISTORY OF -       vasectomy     SURGICAL HISTORY OF -       appy      Prior to Admission Medications   Prior to Admission Medications   Prescriptions Last Dose Informant Patient Reported? Taking?   acetaminophen (TYLENOL) 500 MG tablet  Spouse/Significant Other Yes No   Sig: Take 500 mg by mouth every 6 hours as needed for mild pain   albuterol (PROAIR HFA/PROVENTIL HFA/VENTOLIN HFA) 108 (90 Base) MCG/ACT inhaler  Spouse/Significant Other No No   Sig: Inhale 2 puffs into the lungs every 4 hours as needed for shortness of breath / dyspnea   amLODIPine (NORVASC) 5 MG tablet  Spouse/Significant Other No No   Sig: Take 1 tablet (5 mg) by mouth daily   amoxicillin (AMOXIL) 500 MG capsule  Spouse/Significant Other Yes No   Sig: TAKE 4 CAPSULES BY MOUTH ONCE FOR ONE DOSE 30 MINUTES BEFORE DENTAL APPOINTMENT   atorvastatin (LIPITOR) 20 MG tablet  Spouse/Significant Other No No   Sig: TAKE ONE TABLET BY MOUTH AT BEDTIME   ferrous gluconate (FERGON) 324 (38 Fe) MG tablet  Spouse/Significant Other No No   Sig: Take 1 tablet (324 mg) by mouth daily (with breakfast)   gabapentin (NEURONTIN) 600  "MG tablet  Spouse/Significant Other No No   Sig: TAKE 1 TABLET BY MOUTH IN THE MORNING, ONE-HALF TABLET AT NOON, AND 1 TABLET IN THE EVENING Total of 2.5 tablets per day   omeprazole (PRILOSEC) 20 MG DR capsule  Spouse/Significant Other No No   Sig: Take 1 capsule (20 mg) by mouth daily      Facility-Administered Medications: None     Allergies   Allergies   Allergen Reactions     Keppra Fatigue     Crying very depressed     Family History    Family History   Problem Relation Age of Onset     Cancer Father         lung     Cancer Mother         pancreatic     Alcohol/Drug Son      Alcohol/Drug Son      Cancer Sister         1/2 sister lung cancer     Social History   Social History     Socioeconomic History     Marital status:        Physical Exam   BP (!) 147/79   Pulse 52   Temp 98.3  F (36.8  C) (Oral)   Resp 14   Ht 1.676 m (5' 6\")   Wt 74.8 kg (165 lb)   SpO2 95%   BMI 26.63 kg/m       Weight: 165 lbs 0 oz Body mass index is 26.63 kg/m .     Constitutional: Alert, oriented, cooperative, no apparent distress, appears nontoxic.  Patient is tearful.  Eyes: Eyes are clear, pupils are equal, round.  HENT: Oropharynx is clear and moist. No evidence of cranial trauma.  Cardiovascular: Regular rate and rhythm, normal S1 and S2, and no murmur noted. Good peripheral pulses in wrists bilaterally. No lower extremity edema.  Respiratory: Clear to auscultation bilaterally.  Respirations unlabored on room air.  GI: Soft, non-tender, normal bowel sounds, nondistended  Genitourinary: Deferred  Musculoskeletal: Normal muscle bulk, tone decreased on left side.  Skin: Warm and dry, no rashes.   Neurologic: Neck supple.  Interacting appropriately without speech impairment or facial droop.    Data   Data reviewed today:   Recent Labs   Lab 03/30/23  1358   WBC 8.3   HGB 10.9*   MCV 89         POTASSIUM 4.2   CHLORIDE 106   CO2 26   BUN 26.4*   CR 1.16   ANIONGAP 7   SUNDAY 8.6*   *   ALBUMIN 3.3* "   PROTTOTAL 6.3*   BILITOTAL 0.6   ALKPHOS 63   ALT 29   AST 28
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
## 2023-03-30 NOTE — ED NOTES
"[facility]   Admission Handoff    The patient is Henrry A White, 88 year old who arrived in the ED by AMBULANCE from emergency room with a complaint of Suicidal and Generalized Weakness  . The patient's current symptoms are new and during this time the symptoms have increased. In the ED, patient was diagnosed with   Final diagnoses:   Infection due to 2019 novel coronavirus   General weakness   Situational stress         Needed?: No    Allergies:    Allergies   Allergen Reactions    Keppra Fatigue     Crying very depressed       Past Medical Hx:   Past Medical History:   Diagnosis Date    Asbestosis(501)     ct chest8/04    Cardiomegaly     echo8/04  stress echo 2000 normal    Cerebral infarction (H)     june 2020    Closed fracture of unspecified part of humerus     Diverticulosis of colon (without mention of hemorrhage)     Esophageal reflux     Other specified anemias     Other specified iron deficiency anemias     Uncomplicated asthma        Initial vitals were: BP: (!) 173/89  Pulse: 68  Temp: 98.3  F (36.8  C)  Resp: 18  Height: 167.6 cm (5' 6\")  Weight: 74.8 kg (165 lb)  SpO2: 97 %   Recent vital Signs: /65   Pulse 55   Temp 98.3  F (36.8  C) (Oral)   Resp 14   Ht 1.676 m (5' 6\")   Wt 74.8 kg (165 lb)   SpO2 95%   BMI 26.63 kg/m      Elimination Status: Continent: Yes     Activity Level: Independent    Fall Status: none      Baseline Mental status: WDL  Current Mental Status changes: other suicidal thoughts, feels down and depressed     Infection present or suspected this encounter: no  Sepsis suspected: No    Isolation type: none    Bariatric equipment needed?: No    In the ED these meds were given: Medications - No data to display    Drips running?  No    Home pump  No    Current LDAs: Peripheral IV: Site 18; Gauge     Results:   Labs/Imaging  Ordered and Resulted from Time of ED Arrival Up to the Time of Departure from the ED  Results for orders placed or " performed during the hospital encounter of 03/30/23 (from the past 24 hour(s))   [facility] Draw    Narrative    The following orders were created for panel order [facility] Draw.  Procedure                               Abnormality         Status                     ---------                               -----------         ------                     Extra Blue Top Tube[[id number]]                              Final result               Extra Red Top Tube[[id number]]                               Final result               Extra Green Top (Lithium...[[id number]]                      Final result               Extra Purple Top Tube[[id number]]                            Final result                 Please view results for these tests on the individual orders.   Extra Blue Top Tube   Result Value Ref Range    Hold Specimen JIC    Extra Red Top Tube   Result Value Ref Range    Hold Specimen JIC    Extra Green Top (Lithium Heparin) Tube   Result Value Ref Range    Hold Specimen JIC    Extra Purple Top Tube   Result Value Ref Range    Hold Specimen JIC    CBC with platelets, differential    Narrative    The following orders were created for panel order CBC with platelets, differential.  Procedure                               Abnormality         Status                     ---------                               -----------         ------                     CBC with platelets and d...[[id number]]  Abnormal            Final result                 Please view results for these tests on the individual orders.   Comprehensive metabolic panel   Result Value Ref Range    Sodium 139 136 - 145 mmol/L    Potassium 4.2 3.4 - 5.3 mmol/L    Chloride 106 98 - 107 mmol/L    Carbon Dioxide (CO2) 26 22 - 29 mmol/L    Anion Gap 7 7 - 15 mmol/L    Urea Nitrogen 26.4 (H) 8.0 - 23.0 mg/dL    Creatinine 1.16 0.67 - 1.17 mg/dL    Calcium 8.6 (L) 8.8 - 10.2 mg/dL    Glucose 127 (H) 70 - 99 mg/dL    Alkaline Phosphatase 63 40 - 129 U/L    AST 28 10 - 50  U/L    ALT 29 10 - 50 U/L    Protein Total 6.3 (L) 6.4 - 8.3 g/dL    Albumin 3.3 (L) 3.5 - 5.2 g/dL    Bilirubin Total 0.6 <=1.2 mg/dL    GFR Estimate 61 >60 mL/min/1.73m2   CBC with platelets and differential   Result Value Ref Range    WBC Count 8.3 4.0 - 11.0 10e3/uL    RBC Count 3.69 (L) 4.40 - 5.90 10e6/uL    Hemoglobin 10.9 (L) 13.3 - 17.7 g/dL    Hematocrit 33.0 (L) 40.0 - 53.0 %    MCV 89 78 - 100 fL    MCH 29.5 26.5 - 33.0 pg    MCHC 33.0 31.5 - 36.5 g/dL    RDW 13.2 10.0 - 15.0 %    Platelet Count 230 150 - 450 10e3/uL    % Neutrophils 77 %    % Lymphocytes 9 %    % Monocytes 12 %    % Eosinophils 1 %    % Basophils 0 %    % Immature Granulocytes 1 %    NRBCs per 100 WBC 0 <1 /100    Absolute Neutrophils 6.4 1.6 - 8.3 10e3/uL    Absolute Lymphocytes 0.7 (L) 0.8 - 5.3 10e3/uL    Absolute Monocytes 1.0 0.0 - 1.3 10e3/uL    Absolute Eosinophils 0.0 0.0 - 0.7 10e3/uL    Absolute Basophils 0.0 0.0 - 0.2 10e3/uL    Absolute Immature Granulocytes 0.1 <=0.4 10e3/uL    Absolute NRBCs 0.0 10e3/uL   Symptomatic Influenza A/B, RSV, & SARS-CoV2 PCR (COVID-19) Nasopharyngeal    Specimen: Nasopharyngeal; Swab   Result Value Ref Range    Influenza A PCR Negative Negative    Influenza B PCR Negative Negative    RSV PCR Negative Negative    SARS CoV2 PCR Positive (A) Negative    Narrative    Testing was performed using the Xpert Xpress CoV2/Flu/RSV Assay on the [employer] Instrument. This test should be ordered for the detection of SARS-CoV-2, influenza, and RSV viruses in individuals who meet clinical and/or epidemiological criteria. Test performance is unknown in asymptomatic patients. This test is for in vitro diagnostic use under the FDA EUA for laboratories certified under CLIA to perform high or moderate complexity testing. This test has not been FDA cleared or approved. A negative result does not rule out the presence of PCR inhibitors in the specimen or target RNA in concentration below the limit of detection  for the assay. If only one viral target is positive but coinfection with multiple targets is suspected, the sample should be re-tested with another FDA cleared, approved, or authorized test, if coinfection would change clinical management. This test was validated by the [facility] [employer]. These laboratories are certified under the Clinical Laboratory Improvement Amendments of 1988 (CLIA-88) as qualified to perform high complexity laboratory testing.       For the majority of the shift this patient's behavior was Green     Cardiac Rhythm: Normal Sinus  Pt needs tele? No  Skin/wound Issues: None    Code Status: unknown    Pain control: good    Nausea control: good    Abnormal labs/tests/findings requiring intervention: covid positive    Patient tested for COVID 19 prior to admission: YES     OBS brochure/video discussed/provided to patient/family: Yes     Family present during ED course? Yes     Family Comments/Social Situation comments:     Tasks needing completion:  ongoing nursing care    Sonal Rosen RN    See note below from provider  Sonal Rosen RN    Henrry Fonseca is a 88 year old male who comes in via EMS from home with depression and suicidality.  His wife called 911.  He says that he wants to die.  He says that he is distraught because he has had COVID and feels like he is never going to get better.  He is also worried about his wife who has COVID and has had a cough for a year and he feels like she is never going to get better.  He lost his brother a few days ago who is in hospice with some sort of dementing illness.  He does not know what he needs right now.  He would prefer to just die and is not looking for a specific intervention.  He is not short of breath and is not having significant chest pain.  He is not having any abdominal pain.  He has had some loose bowels but no frequent diarrhea.  He is not having any new voiding dysfunction.  He does not have a fever.  He has not had any  recent injuries but he has a bruise on his scrotum on the anterior superior and and on the base of the penis.  He is not certain how that occurred and he has no pain in that area.  He is voiding without any difficulty.         88-year-old male presented from home via EMS.  His wife called paramedics because he was expressing suicidal ideation.  He was assessed by the [facility] 's.  In the end the patient admits that he is not actually going to harm himself but feels overwhelmed by his current situation with his COVID infection and generalized weakness and inability to care for himself and for his wife who also has COVID and some distress over the death of his brother a few days ago.  However he is willing to contract for safety and his son feels that he can be safe at home from the standpoint of his depression.  However he is not really able to take care of him for himself and they both feel that he needs transitional care until he can get strong enough to ambulate adequately and care for himself in his own home.  Therefore he will be admitted to the hospital here while we search for transitional care placement.  This may be challenging with his COVID infection.  He still has a positive COVID test although symptomatically he is improving and he shows no evidence of a complication from COVID and thus far with no increased work of breathing and reassuring vital signs and reassuring laboratory studies.  He does not need additional antiviral therapy.  I discussed his case with Keli Lindquist of the hospital service and they will assume care on admission and look for transitional care placement for him.    Patient met with the [facility] therapists.  They talked about his desire to die due to his health problems and situational stressors with his wife's health problems and his brother's recent death.  In the end however the patient stated that he could contract for safety and his son was willing to withhold his car keys.   However neither of them thinks that he is able to manage at home due to excessive weakness due to his COVID infection.  They believe he needs transitional care before he will be able to manage at home

## 2023-03-30 NOTE — ED TRIAGE NOTES
"Pt arrives from home via [facility]. Pt has had multiple recent health concerns. Pt is stating \"that I want to end it all, I have figured out how I would end it all at home, but I'm not gonna tell you\".      Triage Assessment     Row Name 03/30/23 0340       Triage Assessment (Adult)    Airway WDL WDL       Respiratory WDL    Respiratory WDL cough    Cough Type nonproductive       Skin Circulation/Temperature WDL    Skin Circulation/Temperature WDL WDL       Cardiac WDL    Cardiac WDL WDL       Peripheral/Neurovascular WDL    Peripheral Neurovascular WDL WDL       Cognitive/Neuro/Behavioral WDL    Cognitive/Neuro/Behavioral WDL WDL              "

## 2023-03-31 ENCOUNTER — APPOINTMENT (OUTPATIENT)
Dept: OCCUPATIONAL THERAPY | Facility: CLINIC | Age: 88
End: 2023-03-31
Payer: COMMERCIAL

## 2023-03-31 ENCOUNTER — APPOINTMENT (OUTPATIENT)
Dept: PHYSICAL THERAPY | Facility: CLINIC | Age: 88
End: 2023-03-31
Payer: COMMERCIAL

## 2023-03-31 VITALS
BODY MASS INDEX: 25.01 KG/M2 | HEIGHT: 66 IN | RESPIRATION RATE: 16 BRPM | DIASTOLIC BLOOD PRESSURE: 59 MMHG | TEMPERATURE: 97.5 F | SYSTOLIC BLOOD PRESSURE: 132 MMHG | HEART RATE: 63 BPM | WEIGHT: 155.65 LBS | OXYGEN SATURATION: 94 %

## 2023-03-31 LAB
ANION GAP SERPL CALCULATED.3IONS-SCNC: 11 MMOL/L (ref 7–15)
BUN SERPL-MCNC: 24.2 MG/DL (ref 8–23)
CALCIUM SERPL-MCNC: 8.3 MG/DL (ref 8.8–10.2)
CHLORIDE SERPL-SCNC: 107 MMOL/L (ref 98–107)
CREAT SERPL-MCNC: 0.99 MG/DL (ref 0.67–1.17)
DEPRECATED HCO3 PLAS-SCNC: 21 MMOL/L (ref 22–29)
ERYTHROCYTE [DISTWIDTH] IN BLOOD BY AUTOMATED COUNT: 13.2 % (ref 10–15)
GFR SERPL CREATININE-BSD FRML MDRD: 73 ML/MIN/1.73M2
GLUCOSE SERPL-MCNC: 80 MG/DL (ref 70–99)
HCT VFR BLD AUTO: 30.1 % (ref 40–53)
HGB BLD-MCNC: 10 G/DL (ref 13.3–17.7)
MCH RBC QN AUTO: 29.9 PG (ref 26.5–33)
MCHC RBC AUTO-ENTMCNC: 33.2 G/DL (ref 31.5–36.5)
MCV RBC AUTO: 90 FL (ref 78–100)
PLATELET # BLD AUTO: 209 10E3/UL (ref 150–450)
POTASSIUM SERPL-SCNC: 3.9 MMOL/L (ref 3.4–5.3)
RBC # BLD AUTO: 3.34 10E6/UL (ref 4.4–5.9)
SODIUM SERPL-SCNC: 139 MMOL/L (ref 136–145)
WBC # BLD AUTO: 5.6 10E3/UL (ref 4–11)

## 2023-03-31 PROCEDURE — 99239 HOSP IP/OBS DSCHRG MGMT >30: CPT | Performed by: HOSPITALIST

## 2023-03-31 PROCEDURE — 97165 OT EVAL LOW COMPLEX 30 MIN: CPT | Mod: GO

## 2023-03-31 PROCEDURE — 85027 COMPLETE CBC AUTOMATED: CPT

## 2023-03-31 PROCEDURE — 250N000013 HC RX MED GY IP 250 OP 250 PS 637

## 2023-03-31 PROCEDURE — 250N000013 HC RX MED GY IP 250 OP 250 PS 637: Performed by: HOSPITALIST

## 2023-03-31 PROCEDURE — 97530 THERAPEUTIC ACTIVITIES: CPT | Mod: GP

## 2023-03-31 PROCEDURE — G0378 HOSPITAL OBSERVATION PER HR: HCPCS

## 2023-03-31 PROCEDURE — 80048 BASIC METABOLIC PNL TOTAL CA: CPT

## 2023-03-31 PROCEDURE — 97161 PT EVAL LOW COMPLEX 20 MIN: CPT | Mod: GP

## 2023-03-31 PROCEDURE — 97535 SELF CARE MNGMENT TRAINING: CPT | Mod: GO

## 2023-03-31 PROCEDURE — 36415 COLL VENOUS BLD VENIPUNCTURE: CPT

## 2023-03-31 RX ORDER — ESCITALOPRAM OXALATE 10 MG/1
10 TABLET ORAL DAILY
Qty: 30 TABLET | Refills: 0 | Status: SHIPPED | OUTPATIENT
Start: 2023-04-01 | End: 2023-04-06

## 2023-03-31 RX ORDER — LANOLIN ALCOHOL/MO/W.PET/CERES
3 CREAM (GRAM) TOPICAL
Qty: 90 TABLET | Refills: 0 | Status: SHIPPED | OUTPATIENT
Start: 2023-03-31

## 2023-03-31 RX ADMIN — PANTOPRAZOLE SODIUM 40 MG: 40 TABLET, DELAYED RELEASE ORAL at 06:20

## 2023-03-31 RX ADMIN — GABAPENTIN 600 MG: 600 TABLET, FILM COATED ORAL at 08:25

## 2023-03-31 RX ADMIN — SENNOSIDES AND DOCUSATE SODIUM 1 TABLET: 50; 8.6 TABLET ORAL at 08:25

## 2023-03-31 RX ADMIN — ESCITALOPRAM OXALATE 10 MG: 10 TABLET ORAL at 08:25

## 2023-03-31 RX ADMIN — FERROUS GLUCONATE 324 MG: 324 TABLET ORAL at 08:25

## 2023-03-31 RX ADMIN — AMLODIPINE BESYLATE 5 MG: 5 TABLET ORAL at 08:25

## 2023-03-31 ASSESSMENT — ACTIVITIES OF DAILY LIVING (ADL)
ADLS_ACUITY_SCORE: 34
DEPENDENT_IADLS:: CLEANING;COOKING;LAUNDRY;SHOPPING;MEAL PREPARATION;TRANSPORTATION
ADLS_ACUITY_SCORE: 34
ADLS_ACUITY_SCORE: 33
ADLS_ACUITY_SCORE: 34

## 2023-03-31 NOTE — PLAN OF CARE
"    [place] [facility] ADMISSION NOTE    Patient admitted to room 2215 at approximately 2100 via cart from emergency room. Patient was accompanied by transport tech.     Verbal SBAR report received from [facility] Ro prior to patient arrival.     Patient trasferred to bed via air noman. Patient alert and oriented X 3. The patient is not having any pain.  . Admission vital signs: Blood pressure (!) 163/66, pulse 60, temperature 98  F (36.7  C), temperature source Oral, resp. rate 17, height 1.676 m (5' 6\"), weight 70.6 kg (155 lb 10.3 oz), SpO2 97 %. Patient was oriented to plan of care, call light, bed controls, tv, telephone, bathroom, and visiting hours.     Risk Assessment    The following safety risks were identified during admission: fall. Yellow risk band applied: YES.     Skin Initial Assessment    This writer admitted this patient and completed a full skin assessment and Kunal score in the Adult PCS flowsheet. Appropriate interventions initiated as needed.     Secondary skin check completed by RN.    Kunal Risk Assessment  Sensory Perception: 4-->no impairment  Moisture: 4-->rarely moist  Activity: 2-->chairfast  Mobility: 3-->slightly limited  Nutrition: 3-->adequate  Friction and Shear: 3-->no apparent problem  Kunal Score: 19  Sensory/Activity/Mobility Interventions: Encourage activity/OOB  Mattress: Standard gel/foam mattress (IsoFlex, Atmos Air, etc.)  Bed Frame: Standard width and length    Education    Patient has a [place] to Observation order: Yes  Observation education completed and documented: Yes      Allie Lennon RN      "

## 2023-03-31 NOTE — ED NOTES
Observation Brochure     Patient informed of observation status based on provider's order.  Observation brochure was given. Patient stated understanding. Questions answered.  Ashely Marks RN

## 2023-03-31 NOTE — ED NOTES
Pt denied SI. Reports he feels overwhelmed with his current situation with his wife illness, his own illness, risk for falling and his brothers death. Pt reports at home he typically ambulates  with no assistive devices, however his left foot drags related to recent CVA so he sometimes uses a wc. Pt is calm, cooperative. Calls appropriately.

## 2023-03-31 NOTE — PLAN OF CARE
[facility] DISCHARGE NOTE    Patient discharged to home at 5:37 PM via wheel chair. Accompanied by son and staff. Discharge instructions reviewed with patient and son, opportunity offered to ask questions. Prescriptions sent to patients preferred pharmacy. All belongings sent with patient.    Richardson Jj RN

## 2023-03-31 NOTE — PROGRESS NOTES
[facility]      OUTPATIENT OCCUPATIONAL THERAPY  EVALUATION  PLAN OF TREATMENT FOR OUTPATIENT REHABILITATION  (COMPLETE FOR INITIAL CLAIMS ONLY)  Patient's Last Name, First Name, M.I.  YOB: 1935  Henrry Fonseca                          Provider's Name  [facility] Medical Record No.  [id number]                               Onset Date:  03/30/23   Start of Care Date:  03/31/23     Type:     ___PT   _X_OT   ___SLP Medical Diagnosis:  Suicidal, Generalized Weakness                        OT Diagnosis:  weakness, activity tolerance   Visits from SOC:  1   _________________________________________________________________________________  Plan of Treatment/Functional Goals    Planned Interventions: ADL retraining, transfer training, progressive activity/exercise, home program guidelines   Goals: See Occupational Therapy Goals on Care Plan in [employer] electronic health record.    Therapy Frequency: 5 times/wk  Predicted Duration of Therapy Intervention: 04/05/23  _________________________________________________________________________________    I CERTIFY THE NEED FOR THESE SERVICES FURNISHED UNDER        THIS PLAN OF TREATMENT AND WHILE UNDER MY CARE     (Physician co-signature of this document indicates review and certification of the therapy plan).              Certification date from: 03/31/23, Certification date to: 04/05/23    Referring Physician: Sharita Lindquist PA-C            Initial Assessment        See Occupational Therapy evaluation dated 03/31/23 in Epic electronic health record.     03/31/23 1000   Appointment Info   Signing Clinician's Name / Credentials (OT) GIFTY Oliva/L   Quick Adds   Quick Adds Certification   Living Environment   People in Home spouse   Current Living Arrangements house   Home Accessibility no concerns   Transportation  Anticipated family or friend will provide   Living Environment Comments Pt able to use w/c in home , has multi g/bs, shower chair, pt propels w/c with LEs   Self-Care   Equipment Currently Used at Home shower chair;grab bar, toilet;grab bar, tub/shower;walker, avery   Activity/Exercise/Self-Care Comment Pt reports IND of ADLs at baseline, needs occasionally help from wife during UB dressing. Reports completing LB dressing in wheelchair at home.   Instrumental Activities of Daily Living (IADL)   IADL Comments wife and pt set up medications together, supportive son , wife cooks and pays someone to clean   General Information   Onset of Illness/Injury or Date of Surgery 03/30/23   Referring Physician Sharita Lindquist PA-C   Patient/Family Therapy Goal Statement (OT) to go home   Additional Occupational Profile Info/Pertinent History of Current Problem Henrry Fonseca is a 88 year old male who comes in via EMS from home with depression and suicidality.  His wife called 911.  He says that he wants to die.  He says that he is distraught because he has had COVID and feels like he is never going to get better.  He is also worried about his wife who has COVID and has had a cough for a year and he feels like she is never going to get better.  He lost his brother a few days ago who is in hospice with some sort of dementing illness.  He does not know what he needs right now.  He would prefer to just die and is not looking for a specific intervention.  He is not short of breath and is not having significant chest pain.  He is not having any abdominal pain.  He has had some loose bowels but no frequent diarrhea.  He is not having any new voiding dysfunction.  He does not have a fever.  He has not had any recent injuries but he has a bruise on his scrotum on the anterior superior and and on the base of the penis.  He is not certain how that occurred and he has no pain in that area.  He is voiding without any difficulty.    General Observations and Info Pt seems to be moving near baseline.   Cognitive Status Examination   Orientation Status person;place   Affect/Mental Status (Cognitive) sad/depressed affect  (Pt reports he was having a down day yesterday, his wife is in her 90s and has had a cough and he worries about her, and his brother  recently)   Cognitive Status Comments Pt able to state where he is, what the day of the week is, and the year. Pt saying it's April, therapist explained it s the last day of March.   Pain Assessment   Patient Currently in Pain No   Range of Motion Comprehensive   Comment, General Range of Motion WFL   Strength Comprehensive (MMT)   Comment, General Manual Muscle Testing (MMT) Assessment WFL   Bed Mobility   Comment (Bed Mobility) SBA   Transfers   Transfer Comments SBA for sit to stand, CGA for ambulation   Activities of Daily Living   BADL Assessment/Intervention lower body dressing   Lower Body Dressing Assessment/Training   [place] Level (Lower Body Dressing) supervision   Comment, (Lower Body Dressing) Pt able to don sock sitting EOB with no LOB during sitting.   Clinical Impression   Criteria for Skilled Therapeutic Interventions Met (OT) Yes, treatment indicated   OT Diagnosis weakness, activity tolerance   OT Problem List-Impairments impacting ADL activity tolerance impaired;mobility   ADL comments/analysis Pt appears near baseline, slightly weaker   Assessment of Occupational Performance 1-3 Performance Deficits   Planned Therapy Interventions (OT) ADL retraining;transfer training;progressive activity/exercise;home program guidelines   Clinical Decision Making Complexity (OT) low complexity   Risk & Benefits of therapy have been explained evaluation/treatment results reviewed;care plan/treatment goals reviewed;risks/benefits reviewed;current/potential barriers reviewed;participants voiced agreement with care plan;participants included;patient   OT Total Evaluation Time   OT Hardeep  Low Complexity Minutes (24410) 10   Therapy Certification   Medical Diagnosis Suicidal, Generalized Weakness   Start of Care Date 03/31/23   Certification date from 03/31/23   Certification date to 04/05/23   OT Goals   Therapy Frequency (OT) 5 times/wk   OT Predicted Duration/Target Date for Goal Attainment 04/05/23   OT Goals Lower Body Dressing;Toilet Transfer/Toileting;OT Goal 1   OT: Lower Body Dressing Supervision/stand-by assist;using adaptive equipment   OT: Toilet Transfer/Toileting Supervision/stand-by assist;cleaning and garment management;using adaptive equipment   OT: Goal 1 Pt will be able to tolerate 5 minutes of standing tolerance to engage in ADLS when home.   Interventions   Interventions Quick Adds Self-Care/Home Management;Therapeutic Activity;Therapeutic Procedures/Exercise   Self-Care/Home Management   Self-Care/Home Mgmt/ADL, Compensatory, Meal Prep Minutes (04047) 8   Symptoms Noted During/After Treatment (Meal Preparation/Planning Training) none   Treatment Detail/Skilled Intervention OT: Pt able to don socks sitting EOB with no loss of balance. Pt ambulates in room with avery walker and CGA to sit in chair. All needs within reach at the end of the session.   OT Discharge Planning   OT Plan 1/5 FRI POC LB dressing, toilet transfers, standing tolerance   OT Discharge Recommendation (DC Rec) home with home care occupational therapy   OT Rationale for DC Rec Pt appears close to baseline.  HH OT for continued increase strength   OT Brief overview of current status CGA for mobility, IND with donning socks   Total Session Time   Timed Code Treatment Minutes 8   Total Session Time (sum of timed and untimed services) 18

## 2023-03-31 NOTE — PROGRESS NOTES
03/31/23 1030   Appointment Info   Signing Clinician's Name / Credentials (PT) Selene Hurtado, PT   Rehab Comments (PT) Hx of CVA with L sided weakness   Quick Adds   Quick Adds Certification   Living Environment   People in Home spouse   Current Living Arrangements house   Home Accessibility no concerns;wheelchair accessible   Living Environment Comments One level home, no stairs   Self-Care   Equipment Currently Used at Home shower chair;grab bar, toilet;grab bar, tub/shower;walker, avery;wheelchair, manual   Fall history within last six months no   Activity/Exercise/Self-Care Comment Indep with mobility with w/c or hemiwalker pending how pt feels and ADL's.   General Information   Onset of Illness/Injury or Date of Surgery 03/30/23   Referring Physician Sharita Lindquist PA-C   Patient/Family Therapy Goals Statement (PT) Return home   Pertinent History of Current Problem (include personal factors and/or comorbidities that impact the POC) Henrry Fonseca is a 88 year old male who presents on 3/30/2023 with generalized weakness, failure to thrive, and suicidal ideation.   Cognition   Affect/Mental Status (Cognition) WFL   Orientation Status (Cognition) oriented x 3   Follows Commands (Cognition) WFL   Pain Assessment   Patient Currently in Pain No   Strength (Manual Muscle Testing)   Strength Comments Chronic LLE weakness from old CVA   Bed Mobility   Comment, (Bed Mobility) supine>sit with SBA   Transfers   Comment, (Transfers) sit>stand from EOB wtih SBA and hemiwalker   Gait/Stairs (Locomotion)   [place] Level (Gait) supervision   Assistive Device (Gait) walker, avery   Distance in Feet 20   Pattern (Gait) step-through   Deviations/Abnormal Patterns (Gait) festinating/shuffling;stride length decreased;weight shifting decreased   Clinical Impression   Criteria for Skilled Therapeutic Intervention Yes, treatment indicated   PT Diagnosis (PT) impaired functional mobility   Influenced by the following  "impairments LE weakness, reduced activity tolerance   Functional limitations due to impairments impaired transfers, gait   Clinical Presentation (PT Evaluation Complexity) Stable/Uncomplicated   Clinical Presentation Rationale clinical reasoning   Clinical Decision Making (Complexity) low complexity   Planned Therapy Interventions (PT) balance training;bed mobility training;gait training;strengthening;transfer training;risk factor education;home program guidelines   Anticipated Equipment Needs at Discharge (PT)   (has all equipment)   Risk & Benefits of therapy have been explained evaluation/treatment results reviewed;care plan/treatment goals reviewed;risks/benefits reviewed;current/potential barriers reviewed;participants voiced agreement with care plan;participants included;patient   PT Total Evaluation Time   PT Eval, Low Complexity Minutes (37219) 15   Therapy Certification   Start of care date 03/31/23   Certification date from 03/31/23   Certification date to 04/07/23   Medical Diagnosis weakness   Physical Therapy Goals   PT Frequency 4x/week   PT Predicted Duration/Target Date for Goal Attainment 04/07/23   PT Goals Transfers;Gait   PT: Transfers Supervision/stand-by assist;Bed to/from chair   PT: Gait Supervision/stand-by assist;Oniel walker;50 feet   Interventions   Interventions Quick Adds Therapeutic Activity   Therapeutic Activity   Therapeutic Activities: dynamic activities to improve functional performance Minutes (17616) 8   Symptoms Noted During/After Treatment None   Treatment Detail/Skilled Intervention see above for mobility assessment and assist levels, occassional cues for sequencing but pt demo good safety awareness throughout, would rec resumption of home care PT as pt appears near his baseline mobility level, pt in agreement with this plan and states feels better today compared to his \"bad day\" yesterday. remains in chair at end of session, chair alarm on   PT Discharge Planning   PT Plan " Fri 1/4; PT plan amb with hemiwalker, LE strengthening, anticipate 1-2 more sessions prior to return to baseline   PT Discharge Recommendation (DC Rec) home with assist;home with home care physical therapy   PT Rationale for DC Rec pt appears to be mobilzing near his baseline and states he feels better today compared to yesterday, would benefit from resumption of home care PT to increase indep in own environment   PT Brief overview of current status amb 20 feet with R hemiwalker and SBA   Total Session Time   Timed Code Treatment Minutes 8   Total Session Time (sum of timed and untimed services) 23

## 2023-03-31 NOTE — PROGRESS NOTES
"2100-0730    Neuro: Patient is alert.  Disoriented to time.  Mood is down.  No thoughts of suicide.  Cardiac: WNL  Respiratory: infrequent dry cough. LS are clear.  O2 sats 95% on RA.    GI/: Continent of bowel and bladder.  Using urinal with assistance.  Urine is clear nataly.  No c/o nausea.    Mobility: Patient did not get out of bed this shift.  With assist of one sat on the edge of the bed.    Diet: Regular diet.  Patient reports that his appetite has been poor.  Pain: Patient denies pain.  Skin: Bruising noted to scrotum and shaft of penis.  Coccyx is red/blachable. Bilateral feet dry and flaky.  LDA: PIV right upper forearm is saline locked.  VS:/52 (BP Location: Right arm)   Pulse 58   Temp 97.4  F (36.3  C) (Oral)   Resp 18   Ht 1.676 m (5' 6\")   Wt 70.6 kg (155 lb 10.3 oz)   SpO2 95%   BMI 25.12 kg/m        "

## 2023-03-31 NOTE — PROGRESS NOTES
[facility] Progress Note    4077    Saw pt Henrry A White per discussion at rounds.      Illness Narrative - Admitted after SI, depression and anxiety.      Distress - Henrry said he felt better today and wasn't suicidal but still sad and depressed.  His spouse is sick and he feels like he is a burden to her and to his children.  His mobility is very limited because of a past stroke and he says he can't do the things that he used to.       Coping - He does lean into his luzmaria and says the Our Father every day.  He has been in touch with his .  He does feel well supported by his children.        Meaning-Making - He is trying to come to terms with his inability to care for himself and the sadness that comes with that.      Plan - [facility] will remain available for support.    Bertha Robertson  Associate   Pager number:  [phone]

## 2023-03-31 NOTE — CONSULTS
Care Management Initial Consult    General Information  Assessment completed with: Patient,    Type of CM/SW Visit: Initial Assessment    Primary Care Provider verified and updated as needed: Yes   Readmission within the last 30 days:        Reason for Consult: discharge planning  Advance Care Planning: Advance Care Planning Reviewed: no concerns identified        Communication Assessment  Patient's communication style: spoken language (English or Bilingual)    Hearing Difficulty or Deaf: yes      Cognitive  Cognitive/Neuro/Behavioral: .WDL except, orientation  Level of Consciousness: alert  Arousal Level: opens eyes spontaneously  Orientation: disoriented to, time  Mood/Behavior: cooperative  Best Language: 0 - No aphasia  Speech: clear, spontaneous    Living Environment:   People in home: spouse     Current living Arrangements: house      Able to return to prior arrangements: yes     Family/Social Support:  Care provided by: self, spouse/significant other  Provides care for: no one, unable/limited ability to care for self  Marital Status:   Wife, Children  [place]       Description of Support System: Supportive, Involved    Support Assessment: Adequate social supports, Adequate family and caregiver support    Current Resources:   Patient receiving home care services: Yes  Skilled Home Care Services: Skilled Nursing, Home Health Aid, Physical Therapy  Community Resources: None  Equipment currently used at home: grab bar, toilet, grab bar, tub/shower, walker, avery, shower chair, wheelchair, manual  Supplies currently used at home: None    Employment/Financial:  Employment Status: retired        Financial Concerns: No concerns identified     Lifestyle & Psychosocial Needs:  Social Determinants of Health     Tobacco Use: Low Risk      Smoking Tobacco Use: Never     Smokeless Tobacco Use: Never     Passive Exposure: Not on file   Alcohol Use: Not on file   Financial Resource Strain: Not on file   Food Insecurity:  "Not on file   Transportation Needs: Not on file   Physical Activity: Not on file   Stress: Not on file   Social Connections: Not on file   Intimate Partner Violence: Not on file   Depression: At risk     PHQ-2 Score: 6   Housing Stability: Not on file     Functional Status:  Prior to admission patient needed assistance:   Dependent ADLs:: Ambulation-walker, Wheelchair-independent  Dependent IADLs:: Cleaning, Cooking, Laundry, Shopping, Meal Preparation, Transportation            Values/Beliefs:  Spiritual, Cultural Beliefs, Oriental orthodox Practices, Values that affect care: no             Additional Information:    CM received referral to assist with discharge planning. Met with patient at bedside and introduced self and role.     Patient reports that he lives with spouse in a one level home independently in the community. He states the only reason he is hospitalized is because he made some  \"comments about suicide\". Patient reports that he was feeling really down on himself yesterday because of his own health and wifes health. He says he worries about his wife all the time. He reports feeling much better today but hopes he can remain hospitalized for one more day so he can get some rest.     Discussed therapy recommendations for home care at discharge. Patient reports that he feels he is at his baseline for mobility, is in much better spirits than yesterday, and feels safe to discharge home when medically ready. He is in agreement with resuming his home care services at discharge.     Patient will arrange transport at discharge.     PLAN: Home with resumption of [facility] (Phone: [phone]) RN/PT/SAIRA/SYED LI RN      "

## 2023-03-31 NOTE — PROGRESS NOTES
[facility]  Patient is currently open to home care services with [facility]. The patient is currently receiving RN PT HHA  services.  [facility]  and team have been notified of patient admission.  [facility] liaison will continue to follow patient during stay.  If appropriate provide orders to resume home care at time of discharge.

## 2023-03-31 NOTE — PLAN OF CARE
Goal Outcome Evaluation:      Neuro: A&O x4  Cardiac: WNL  Respiratory: WNL  GI/: WNL  Diet/appetite: Regular  Activity: A1 with 4 point cane  Pain: Denies  Skin: Scattered bruising  LDA's:  None    Other: No suicidal ideation     Plan: Possible discharge tomorrow and resume HC services

## 2023-03-31 NOTE — PROGRESS NOTES
[facility]      OUTPATIENT PHYSICAL THERAPY EVALUATION  PLAN OF TREATMENT FOR OUTPATIENT REHABILITATION  (COMPLETE FOR INITIAL CLAIMS ONLY)  Patient's Last Name, First Name, M.I.  YOB: 1935  Henrry Fonseca                        Provider's Name  [facility] Medical Record No.  [id number]                               Onset Date:  03/30/23   Start of Care Date:  03/31/23      Type:     _X_PT   ___OT   ___SLP Medical Diagnosis:  weakness                        PT Diagnosis:  impaired functional mobility   Visits from SOC:  1   _________________________________________________________________________________  Plan of Treatment/Functional Goals    Planned Interventions: balance training, bed mobility training, gait training, strengthening, transfer training, risk factor education, home program guidelines     Goals: See Physical Therapy Goals on Care Plan in [employer] electronic health record.    Therapy Frequency: 4x/week  Predicted Duration of Therapy Intervention: 04/07/23  _________________________________________________________________________________    I CERTIFY THE NEED FOR THESE SERVICES FURNISHED UNDER        THIS PLAN OF TREATMENT AND WHILE UNDER MY CARE     (Physician co-signature of this document indicates review and certification of the therapy plan).                Certification date from: 03/31/23, Certification date to: 04/07/23    Referring Physician: Sharita Lindquist PA-C            Initial Assessment        See Physical Therapy evaluation dated 03/31/23 in Epic electronic health record.

## 2023-04-03 ENCOUNTER — TELEPHONE (OUTPATIENT)
Dept: FAMILY MEDICINE | Facility: CLINIC | Age: 88
End: 2023-04-03
Payer: COMMERCIAL

## 2023-04-03 NOTE — TELEPHONE ENCOUNTER
Left detailed message on confidential voicemail for orders below and to fax the paperwork to the clinic.   Keara Marie RN on 4/3/2023 at 11:55 AM

## 2023-04-03 NOTE — TELEPHONE ENCOUNTER
Reason for Call:  Home Health Care    Joana with [facility] called regarding (reason for call): Please call with verbal orders - OK to leave detailed message    Orders are needed for this patient.     PT: 1x a week X 2 weeks for strength and mobility      Pt Provider: Jad    Phone Number Homecare Nurse can be reached at: [phone]    Can we leave a detailed message on this number? YES    Phone number patient can be reached at: Home number on file [phone] (home)    Best Time: any    Call taken on 4/3/2023 at 7:36 AM by Jaelyn Latif

## 2023-04-05 NOTE — TELEPHONE ENCOUNTER
Called Kavin Rapp [phone], RN Sup. at [facility] and she reports patient is needing Home Care Services paperwork signed but missed his appointment with Dr. Street on 3/31/23 due to patient in emergency room.     Appointment scheduled with Dr. Street 4/6/23 to address Home Care Service forms that need to be signed.     Keara Marie RN on 4/5/2023 at 4:45 PM

## 2023-04-05 NOTE — TELEPHONE ENCOUNTER
Kavin Rapp is calling back re: Reny Street to sign orders as pt appt that he issued was on 3/31 and at that time he was in the hospital.  Can we call her back regarding this situation as he needs some orders signed?  Can we get this pt in soon?  [phone], RN Sup. At [facility].    Sulma Lion  [facility]

## 2023-04-06 ENCOUNTER — OFFICE VISIT (OUTPATIENT)
Dept: FAMILY MEDICINE | Facility: CLINIC | Age: 88
End: 2023-04-06
Payer: COMMERCIAL

## 2023-04-06 VITALS
OXYGEN SATURATION: 96 % | DIASTOLIC BLOOD PRESSURE: 68 MMHG | HEIGHT: 66 IN | WEIGHT: 155 LBS | BODY MASS INDEX: 24.91 KG/M2 | SYSTOLIC BLOOD PRESSURE: 116 MMHG | RESPIRATION RATE: 16 BRPM | HEART RATE: 54 BPM

## 2023-04-06 DIAGNOSIS — Z86.59 HISTORY OF SUICIDAL IDEATION: ICD-10-CM

## 2023-04-06 DIAGNOSIS — D64.9 ANEMIA, UNSPECIFIED TYPE: ICD-10-CM

## 2023-04-06 DIAGNOSIS — F43.9 SITUATIONAL STRESS: ICD-10-CM

## 2023-04-06 DIAGNOSIS — I69.354 HEMIPLEGIA AND HEMIPARESIS FOLLOWING CEREBRAL INFARCTION AFFECTING LEFT NON-DOMINANT SIDE (H): Primary | ICD-10-CM

## 2023-04-06 DIAGNOSIS — F43.21 ADJUSTMENT DISORDER WITH DEPRESSED MOOD: ICD-10-CM

## 2023-04-06 PROCEDURE — 99214 OFFICE O/P EST MOD 30 MIN: CPT | Performed by: FAMILY MEDICINE

## 2023-04-06 RX ORDER — ESCITALOPRAM OXALATE 10 MG/1
10 TABLET ORAL DAILY
Qty: 90 TABLET | Refills: 1 | Status: SHIPPED | OUTPATIENT
Start: 2023-04-06 | End: 2023-10-20

## 2023-04-06 ASSESSMENT — PATIENT HEALTH QUESTIONNAIRE - PHQ9
10. IF YOU CHECKED OFF ANY PROBLEMS, HOW DIFFICULT HAVE THESE PROBLEMS MADE IT FOR YOU TO DO YOUR WORK, TAKE CARE OF THINGS AT HOME, OR GET ALONG WITH OTHER PEOPLE: NOT DIFFICULT AT ALL
SUM OF ALL RESPONSES TO PHQ QUESTIONS 1-9: 11
SUM OF ALL RESPONSES TO PHQ QUESTIONS 1-9: 11

## 2023-04-06 NOTE — PATIENT INSTRUCTIONS
Cardiology appt-May, 2023  Follow up 3 months  Social Work-Shelia will call you  Home health referral is placed

## 2023-04-06 NOTE — PROGRESS NOTES
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 Uses wheelchair & walker at baseline. No changes from baseline functionality currently.  Would be interested in having home care stay longer term to help with showering/other ADLs.  - Home Care Referral  - Primary Care - Care Coordination Referral    Anemia, unspecified type  Mildly noted during hospitalization, we will plan to recheck  - CBC with platelets and differential  - Iron and iron binding capacity    History of suicidal ideation  Declines today.  Discussed considering counseling, patient and son-in-law plan to discuss this more.        Depression Screening Follow Up        4/6/2023     3:20 PM   PHQ   PHQ-9 Total Score 11    11   Q9: Thoughts of better off dead/self-harm past 2 weeks Several days    Several days   F/U: Thoughts of suicide or self-harm Yes    Yes   F/U: Self harm-plan No    No   F/U: Self-harm action No    No   F/U: Safety concerns No    No       Follow Up      Follow Up Actions Taken  Crisis resource information provided in the After Visit Summary   -Recommended meeting with counselor, patient declined at this time    Discussed the following ways the patient can remain in a safe environment:  be around others    -Follow-up in 1 month    FARRUKH MENA DO  [facility]    Tarun Sales is a 88 year old, presenting for the following health issues:  Hospital F/U        4/6/2023     3:21 PM   Additional Questions   Roomed by Angela SÁNCHEZ MA     HPI          11/8/2022     2:42 PM 4/6/2023     3:20 PM   PHQ   PHQ-9 Total Score 13 11    11   Q9: Thoughts of better off dead/self-harm past 2 weeks Several days Several days    Several days   F/U: Thoughts of suicide or self-harm No Yes    Yes   F/U: Self harm-plan  No    No   F/U: Self-harm action  No    No   F/U: Safety concerns No No    No       Hospital Follow-up Visit:    Hospital/Nursing Home/IP Rehab Facility: [facility]  Date of Admission: 3/17/2023 and 3/30/2023  Date of Discharge: 3/22/2023 and 3/31/2023  Reason(s) for Admission: 1st Admission: Generalized muscle weakness [M62.81]  Ambulatory dysfunction [R26.2]  Atrial fibrillation, unspecified type (H) [I48.91]  Infection due to 2019 novel coronavirus [U07.1]    2nd Admission: Generalized muscle weakness; Failure to thrive; Suicidal ideation    Was your hospitalization related to COVID-19? No   Problems taking medications regularly:  None  Medication changes since discharge: None  Problems adhering to non-medication therapy:  None    Summary of hospitalization:  [facility] discharge summary reviewed  Diagnostic Tests/Treatments reviewed.  Follow up needed: none  Other Healthcare Providers Involved in Patient s Care:         Homecare  Update since discharge: improved.    Plan of care communicated with patient and family    -Patient presents with his son in law today. Has been unfortunately under a lot of stress lately.  Frustrated after his stroke as he is not as mobile and able to do things as he previously was.  Unfortunately recent had COVID and has been weak and recovering after this.  His brother passed away within the last couple weeks worsening his mood.  Was hospitalized for passive suicidal ideation, patient declined plan of harming himself.  Reports significant support from wife son and son-in-law     Review of Systems   Constitutional, HEENT, cardiovascular, pulmonary, gi and gu systems are negative, except as  "otherwise noted.      Objective    /68 (BP Location: Right arm, Patient Position: Chair, Cuff Size: Adult Regular)   Pulse 54   Resp 16   Ht 1.676 m (5' 6\")   Wt 70.3 kg (155 lb)   SpO2 96%   BMI 25.02 kg/m    Body mass index is 25.02 kg/m .  Physical Exam  Constitutional:       Appearance: Normal appearance.      Comments: In wheelchair   Cardiovascular:      Pulses: Normal pulses.   Pulmonary:      Effort: Pulmonary effort is normal.      Breath sounds: Normal breath sounds.   Abdominal:      General: Bowel sounds are normal.   Musculoskeletal:      Right lower leg: No edema.      Left lower leg: No edema.   Skin:     General: Skin is warm and dry.   Neurological:      Mental Status: He is alert.   Psychiatric:         Thought Content: Thought content normal.         Judgment: Judgment normal.                "

## 2023-04-07 ENCOUNTER — TELEPHONE (OUTPATIENT)
Dept: FAMILY MEDICINE | Facility: CLINIC | Age: 88
End: 2023-04-07
Payer: COMMERCIAL

## 2023-04-07 NOTE — DISCHARGE SUMMARY
[facility]  Discharge Summary - Hospital Medicine  Date of Admission:  3/30/2023; Date of Discharge:  4/6/2023      Primary Care     StreetReny KRISTY  [address] [facility] [address]      Hospital Course      Generalized muscle weakness  Failure to thrive  Wheelchair / walker at baseline. Lives in independent home with wife and has been having a much harder time getting around. Getting depressed because he cannot complete ADLs like he used to. No acute changes. General physical decline. Pt/ot saw patient and advised could go home.  - discharge home  - home care referral placed    Suicidal ideation, resolved  Plan to poison self with carbon monoxide using car in garage. DEC assessment recommends finding TCU to attempt physical rehab to improve patient mobility & improve quality of life. Has supportive wife & son. On admission, patient endorses clear thinking & regret about previous statements, saying he was just really overwhelmed. Is open to starting medication & talking with someone about this. Not currently a threat to self or others. Family in agreement with discharge home at this point  -Initiated escitalopram 10mg daily, continue at discharge   -Melatonin 3mg at bedtime PRN to help with sleep  -Care management consult, appreciate assistance     Essential hypertension, benign  Generally controlled since presentation. Managed PTA with amlodipine 5mg daily.  Per cardiology note in May 2022, patient should avoid AV annamaria blocking agents because he had bradycardia after TAVR.   -Continue PTA amlodipine with hold parameters     Chronic diastolic CHF  Aortic stenosis s/p TAVR  History of cardiac arrhythmia - brief afib & vtach  Recent previous admission patient had acute CHF exacerbation with episode of a fib then 7 beat run of Vtach 3/18. Baseline HR running 50's, remained asymptomatic. Recommended cardiology follow up if any concerns for recurrent arrhythmia. 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 Uses wheelchair & walker at baseline. No changes from baseline functionality currently.      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 Does not require oxygen at home. Monitor.      Seizure disorder  History of seizure disorder noted in chart. Cannot find documentation of date of last seizure and patient cannot recall a recent event. Managed PTA with gabapentin 600mg BID in AM & PM, with additional 300mg dose at noon.   -Continue PTA gabapentin      Pending Results   Unresulted Labs Ordered in the Past 30 Days of this Admission     No orders found from 2/28/2023 to 3/31/2023.          Discharge Diagnoses     Essential hypertension, benign    Esophageal reflux    Aortic stenosis, severe    Left hemiparesis (H)    Generalized muscle weakness    Ambulatory dysfunction    Seizure disorder (H)    * No resolved hospital problems. *          Discharge Disposition   Discharged to home    Discharge Orders      Primary Care - Care Coordination Referral      Home Care Referral      Reason for your hospital stay    Depression  Thoughts of self harm     Follow-up and recommended labs and tests     Follow up with primary care provider, FARRUKH MENA, within 7 days for hospital follow- up.  No follow up labs or test are needed.     Activity    Your activity upon discharge: activity as tolerated     Diet    Follow this diet upon discharge: Orders Placed This Encounter      Regular Diet Adult         Discharge Medications   Discharge Medication List as of 3/31/2023  5:24 PM      START taking these medications    Details   melatonin 3 MG tablet Take 1 tablet (3 mg) by mouth nightly as needed for sleep, Disp-90 tablet, R-0, E-Prescribe       escitalopram (LEXAPRO) 10 MG tablet Take 1 tablet (10 mg) by mouth daily, Disp-30 tablet, R-0, E-Prescribe         CONTINUE these medications which have NOT CHANGED    Details   acetaminophen (TYLENOL) 500 MG tablet Take 500 mg by mouth every 6 hours as needed for mild pain, Historical      albuterol (PROAIR HFA/PROVENTIL HFA/VENTOLIN HFA) 108 (90 Base) MCG/ACT inhaler Inhale 2 puffs into the lungs every 4 hours as needed for shortness of breath / dyspnea, Disp-18 g, R-3, E-PrescribePharmacy may dispense brand covered by insurance (Proair, or proventil or ventolin or generic albuterol inhaler)      amLODIPine (NORVASC) 5 MG tablet Take 1 tablet (5 mg) by mouth daily, Disp-90 tablet, R-4, E-Prescribe      amoxicillin (AMOXIL) 500 MG capsule TAKE 4 CAPSULES BY MOUTH ONCE FOR ONE DOSE 30 MINUTES BEFORE DENTAL APPOINTMENT, Historical      atorvastatin (LIPITOR) 20 MG tablet TAKE ONE TABLET BY MOUTH AT BEDTIME, Disp-90 tablet, R-4, E-Prescribe      ferrous gluconate (FERGON) 324 (38 Fe) MG tablet Take 1 tablet (324 mg) by mouth daily (with breakfast), Disp-90 tablet, R-4, E-Prescribe      gabapentin (NEURONTIN) 600 MG tablet TAKE 1 TABLET BY MOUTH IN THE MORNING, ONE-HALF TABLET AT NOON, AND 1 TABLET IN THE EVENING Total of 2.5 tablets per day, Disp-225 tablet, R-4, E-Prescribe      omeprazole (PRILOSEC) 20 MG DR capsule Take 1 capsule (20 mg) by mouth daily, Disp-90 capsule, R-4, E-Prescribe           Allergies   Allergies   Allergen Reactions     Keppra Fatigue     Crying very depressed       Consultations This Hospital Stay   No consultations were requested during this admission    Significant Results and Procedures     Physical Exam      Vitals:    03/30/23 1351 03/30/23 2059   Weight: 74.8 kg (165 lb) 70.6 kg (155 lb 10.3 oz)       Constitutional: well appearing     The discharge plan was discussed with the patient and family    IInocente MD, personally saw the patient today and spent greater than 30  minutes discharging this patient.    Inocente Casanova MD

## 2023-04-07 NOTE — TELEPHONE ENCOUNTER
Salome Rapp RN with [facility] called back to say that form that was faxed over this morning is the only form that needs to be signed by Dr Street. Form is in her in-basket.  Please fax to [phone] when signed.   Sulma Lopez RN

## 2023-04-07 NOTE — TELEPHONE ENCOUNTER
Called and left message on confidential voicemail for Kavin Rapp [phone], RN Sup. at [facility] and asked that she call the clinic to request the specific home care orders patient is needing after visit with PCP yesterday.    Keara Marie RN on 4/7/2023 at 11:44 AM

## 2023-04-07 NOTE — TELEPHONE ENCOUNTER
New care coordination order from PCP 4/6/23: 88 year old male hx of stroke with hemiplegia, needing ongoing PT/OT and home health aide to help with bathing.    It appears this is already in progress per this Telephone encounter. Routing to clinic for more details on what care coordination needs are, if any.     Thank you.    PARMINDER Bhatti   Social Work Primary Care Clinic Care Coordinator   [facility]

## 2023-04-11 ENCOUNTER — TELEPHONE (OUTPATIENT)
Dept: FAMILY MEDICINE | Facility: CLINIC | Age: 88
End: 2023-04-11
Payer: COMMERCIAL

## 2023-04-11 NOTE — TELEPHONE ENCOUNTER
Griselda calling looking for verbal orders on when to lift Covid precautions with Henrry. He tested positive in the ER on 3/30/23 so she is assuming they can be lifted now but just needs a verbal order to do so. Ok to lift covid precautions? Thank you!    Celsa Coyle RN

## 2023-05-02 ENCOUNTER — MEDICAL CORRESPONDENCE (OUTPATIENT)
Dept: HEALTH INFORMATION MANAGEMENT | Facility: CLINIC | Age: 88
End: 2023-05-02

## 2023-07-21 DIAGNOSIS — I35.0 AORTIC VALVE STENOSIS, ETIOLOGY OF CARDIAC VALVE DISEASE UNSPECIFIED: Primary | ICD-10-CM

## 2023-07-21 RX ORDER — AMOXICILLIN 500 MG/1
CAPSULE ORAL
Qty: 4 CAPSULE | Refills: 0 | Status: SHIPPED | OUTPATIENT
Start: 2023-07-21 | End: 2023-08-10

## 2023-07-21 NOTE — TELEPHONE ENCOUNTER
Routing to current provider for consideration, last ordered by past provider.     Elaina Do MSN, RN

## 2023-08-10 DIAGNOSIS — I35.0 AORTIC VALVE STENOSIS, ETIOLOGY OF CARDIAC VALVE DISEASE UNSPECIFIED: ICD-10-CM

## 2023-08-10 RX ORDER — AMOXICILLIN 500 MG/1
CAPSULE ORAL
Qty: 4 CAPSULE | Refills: 0 | Status: SHIPPED | OUTPATIENT
Start: 2023-08-10

## 2023-11-17 ENCOUNTER — TELEPHONE (OUTPATIENT)
Dept: FAMILY MEDICINE | Facility: CLINIC | Age: 88
End: 2023-11-17
Payer: COMMERCIAL

## 2023-11-17 NOTE — TELEPHONE ENCOUNTER
Patient Quality Outreach    Patient is due for the following:   Asthma  -  ACT needed and AAP  Physical Annual Wellness Visit    Next Steps:   Patient has upcoming appointment, these items will be addressed at that time.    Type of outreach:    Chart review performed, no outreach needed.      Questions for provider review:    None           Angela Garza, CMA

## 2023-12-06 ENCOUNTER — LAB (OUTPATIENT)
Dept: LAB | Facility: CLINIC | Age: 88
End: 2023-12-06
Payer: COMMERCIAL

## 2023-12-06 ENCOUNTER — OFFICE VISIT (OUTPATIENT)
Dept: FAMILY MEDICINE | Facility: CLINIC | Age: 88
End: 2023-12-06
Payer: COMMERCIAL

## 2023-12-06 VITALS
HEIGHT: 66 IN | RESPIRATION RATE: 16 BRPM | DIASTOLIC BLOOD PRESSURE: 66 MMHG | TEMPERATURE: 98 F | HEART RATE: 77 BPM | BODY MASS INDEX: 25.02 KG/M2 | OXYGEN SATURATION: 94 % | SYSTOLIC BLOOD PRESSURE: 112 MMHG

## 2023-12-06 DIAGNOSIS — D50.8 OTHER IRON DEFICIENCY ANEMIA: ICD-10-CM

## 2023-12-06 DIAGNOSIS — I10 ESSENTIAL HYPERTENSION, BENIGN: ICD-10-CM

## 2023-12-06 DIAGNOSIS — G40.909 SEIZURE DISORDER (H): ICD-10-CM

## 2023-12-06 DIAGNOSIS — Z00.00 ENCOUNTER FOR MEDICARE ANNUAL WELLNESS EXAM: Primary | ICD-10-CM

## 2023-12-06 DIAGNOSIS — J61 ASBESTOSIS (H): ICD-10-CM

## 2023-12-06 DIAGNOSIS — K21.9 GASTROESOPHAGEAL REFLUX DISEASE WITHOUT ESOPHAGITIS: ICD-10-CM

## 2023-12-06 LAB
ANION GAP SERPL CALCULATED.3IONS-SCNC: 9 MMOL/L (ref 7–15)
BASOPHILS # BLD AUTO: 0 10E3/UL (ref 0–0.2)
BASOPHILS NFR BLD AUTO: 0 %
BUN SERPL-MCNC: 31.8 MG/DL (ref 8–23)
CALCIUM SERPL-MCNC: 8.4 MG/DL (ref 8.8–10.2)
CHLORIDE SERPL-SCNC: 107 MMOL/L (ref 98–107)
CREAT SERPL-MCNC: 1.16 MG/DL (ref 0.67–1.17)
DEPRECATED HCO3 PLAS-SCNC: 23 MMOL/L (ref 22–29)
EGFRCR SERPLBLD CKD-EPI 2021: 61 ML/MIN/1.73M2
EOSINOPHIL # BLD AUTO: 0.2 10E3/UL (ref 0–0.7)
EOSINOPHIL NFR BLD AUTO: 3 %
ERYTHROCYTE [DISTWIDTH] IN BLOOD BY AUTOMATED COUNT: 13.2 % (ref 10–15)
FERRITIN SERPL-MCNC: 141 NG/ML (ref 31–409)
GLUCOSE SERPL-MCNC: 116 MG/DL (ref 70–99)
HCT VFR BLD AUTO: 37.8 % (ref 40–53)
HGB BLD-MCNC: 12.6 G/DL (ref 13.3–17.7)
IMM GRANULOCYTES # BLD: 0 10E3/UL
IMM GRANULOCYTES NFR BLD: 0 %
IRON BINDING CAPACITY (ROCHE): 249 UG/DL (ref 240–430)
IRON SATN MFR SERPL: 20 % (ref 15–46)
IRON SERPL-MCNC: 51 UG/DL (ref 61–157)
LYMPHOCYTES # BLD AUTO: 1 10E3/UL (ref 0.8–5.3)
LYMPHOCYTES NFR BLD AUTO: 13 %
MCH RBC QN AUTO: 30 PG (ref 26.5–33)
MCHC RBC AUTO-ENTMCNC: 33.3 G/DL (ref 31.5–36.5)
MCV RBC AUTO: 90 FL (ref 78–100)
MONOCYTES # BLD AUTO: 0.8 10E3/UL (ref 0–1.3)
MONOCYTES NFR BLD AUTO: 10 %
NEUTROPHILS # BLD AUTO: 5.7 10E3/UL (ref 1.6–8.3)
NEUTROPHILS NFR BLD AUTO: 75 %
PLATELET # BLD AUTO: 188 10E3/UL (ref 150–450)
POTASSIUM SERPL-SCNC: 4.3 MMOL/L (ref 3.4–5.3)
RBC # BLD AUTO: 4.2 10E6/UL (ref 4.4–5.9)
SODIUM SERPL-SCNC: 139 MMOL/L (ref 135–145)
WBC # BLD AUTO: 7.6 10E3/UL (ref 4–11)

## 2023-12-06 PROCEDURE — 36415 COLL VENOUS BLD VENIPUNCTURE: CPT

## 2023-12-06 PROCEDURE — 80048 BASIC METABOLIC PNL TOTAL CA: CPT

## 2023-12-06 PROCEDURE — G0439 PPPS, SUBSEQ VISIT: HCPCS | Performed by: FAMILY MEDICINE

## 2023-12-06 PROCEDURE — G0008 ADMIN INFLUENZA VIRUS VAC: HCPCS | Performed by: FAMILY MEDICINE

## 2023-12-06 PROCEDURE — 85025 COMPLETE CBC W/AUTO DIFF WBC: CPT

## 2023-12-06 PROCEDURE — 83550 IRON BINDING TEST: CPT

## 2023-12-06 PROCEDURE — 90480 ADMN SARSCOV2 VAC 1/ONLY CMP: CPT | Performed by: FAMILY MEDICINE

## 2023-12-06 PROCEDURE — 83540 ASSAY OF IRON: CPT

## 2023-12-06 PROCEDURE — 90662 IIV NO PRSV INCREASED AG IM: CPT | Performed by: FAMILY MEDICINE

## 2023-12-06 PROCEDURE — 82728 ASSAY OF FERRITIN: CPT

## 2023-12-06 PROCEDURE — 91320 SARSCV2 VAC 30MCG TRS-SUC IM: CPT | Performed by: FAMILY MEDICINE

## 2023-12-06 PROCEDURE — 99214 OFFICE O/P EST MOD 30 MIN: CPT | Mod: 25 | Performed by: FAMILY MEDICINE

## 2023-12-06 RX ORDER — RESPIRATORY SYNCYTIAL VIRUS VACCINE 120MCG/0.5
0.5 KIT INTRAMUSCULAR ONCE
Qty: 1 EACH | Refills: 0 | Status: CANCELLED | OUTPATIENT
Start: 2023-12-06 | End: 2023-12-06

## 2023-12-06 RX ORDER — GABAPENTIN 600 MG/1
TABLET ORAL
Qty: 225 TABLET | Refills: 4 | Status: SHIPPED | OUTPATIENT
Start: 2023-12-06

## 2023-12-06 RX ORDER — GABAPENTIN 600 MG/1
TABLET ORAL
Qty: 225 TABLET | Refills: 4 | Status: SHIPPED | OUTPATIENT
Start: 2023-12-06 | End: 2023-12-06

## 2023-12-06 RX ORDER — ATORVASTATIN CALCIUM 20 MG/1
TABLET, FILM COATED ORAL
Qty: 90 TABLET | Refills: 4 | Status: SHIPPED | OUTPATIENT
Start: 2023-12-06

## 2023-12-06 RX ORDER — ALBUTEROL SULFATE 90 UG/1
2 AEROSOL, METERED RESPIRATORY (INHALATION) EVERY 4 HOURS PRN
Qty: 18 G | Refills: 3 | Status: SHIPPED | OUTPATIENT
Start: 2023-12-06

## 2023-12-06 RX ORDER — FERROUS GLUCONATE 324(38)MG
324 TABLET ORAL
Qty: 90 TABLET | Refills: 4 | Status: SHIPPED | OUTPATIENT
Start: 2023-12-06

## 2023-12-06 RX ORDER — AMLODIPINE BESYLATE 5 MG/1
5 TABLET ORAL DAILY
Qty: 90 TABLET | Refills: 4 | Status: SHIPPED | OUTPATIENT
Start: 2023-12-06

## 2023-12-06 ASSESSMENT — ENCOUNTER SYMPTOMS
MYALGIAS: 1
DIZZINESS: 0
SORE THROAT: 0
JOINT SWELLING: 0
HEMATOCHEZIA: 0
WEAKNESS: 1
COUGH: 1
CONSTIPATION: 0
FREQUENCY: 0
NERVOUS/ANXIOUS: 0
PALPITATIONS: 0
CHILLS: 0
DIARRHEA: 0
HEADACHES: 0
FEVER: 0
HEARTBURN: 0
ABDOMINAL PAIN: 0
DYSURIA: 0
SHORTNESS OF BREATH: 1
HEMATURIA: 0
ARTHRALGIAS: 1
PARESTHESIAS: 0
EYE PAIN: 0
NAUSEA: 0

## 2023-12-06 ASSESSMENT — ACTIVITIES OF DAILY LIVING (ADL): CURRENT_FUNCTION: TRANSPORTATION REQUIRES ASSISTANCE

## 2023-12-06 ASSESSMENT — PATIENT HEALTH QUESTIONNAIRE - PHQ9
SUM OF ALL RESPONSES TO PHQ QUESTIONS 1-9: 11
SUM OF ALL RESPONSES TO PHQ QUESTIONS 1-9: 11
10. IF YOU CHECKED OFF ANY PROBLEMS, HOW DIFFICULT HAVE THESE PROBLEMS MADE IT FOR YOU TO DO YOUR WORK, TAKE CARE OF THINGS AT HOME, OR GET ALONG WITH OTHER PEOPLE: NOT DIFFICULT AT ALL

## 2023-12-06 ASSESSMENT — ASTHMA QUESTIONNAIRES: ACT_TOTALSCORE: 22

## 2023-12-06 NOTE — PATIENT INSTRUCTIONS
"Cardiology appt   RSV vaccine at pharmacy     Patient Education   Personalized Prevention Plan  You are due for the preventive services outlined below.  Your care team is available to assist you in scheduling these services.  If you have already completed any of these items, please share that information with your care team to update in your medical record.  Health Maintenance Due   Topic Date Due    RSV VACCINE (Pregnancy & 60+) (1 - 1-dose 60+ series) Never done    Zoster (Shingles) Vaccine (2 of 3) 01/29/2013    Diptheria Tetanus Pertussis (DTAP/TDAP/TD) Vaccine (3 - Td or Tdap) 12/04/2022    ANNUAL REVIEW OF HM ORDERS  11/08/2023    Annual Wellness Visit  11/08/2023     Learning About Being Physically Active  What is physical activity?     Being physically active means doing any kind of activity that gets your body moving.  The types of physical activity that can help you get fit and stay healthy include:  Aerobic or \"cardio\" activities. These make your heart beat faster and make you breathe harder, such as brisk walking, riding a bike, or running. They strengthen your heart and lungs and build up your endurance.  Strength training activities. These make your muscles work against, or \"resist,\" something. Examples include lifting weights or doing push-ups. These activities help tone and strengthen your muscles and bones.  Stretches. These let you move your joints and muscles through their full range of motion. Stretching helps you be more flexible.  Reaching a balance between these three types of physical activity is important because each one contributes to your overall fitness.  What are the benefits of being active?  Being active is one of the best things you can do for your health. It helps you to:  Feel stronger and have more energy to do all the things you like to do.  Focus better at school or work.  Feel, think, and sleep better.  Reach and stay at a healthy weight.  Lose fat and build lean muscle.  Lower " "your risk for serious health problems, including diabetes, heart attack, high blood pressure, and some cancers.  Keep your heart, lungs, bones, muscles, and joints strong and healthy.  How can you make being active part of your life?  Start slowly. Make it your long-term goal to get at least 30 minutes of exercise on most days of the week. Walking is a good choice. You also may want to do other activities, such as running, swimming, cycling, or playing tennis or team sports.  Pick activities that you like--ones that make your heart beat faster, your muscles stronger, and your muscles and joints more flexible. If you find more than one thing you like doing, do them all. You don't have to do the same thing every day.  Get your heart pumping every day. Any activity that makes your heart beat faster and keeps it at that rate for a while counts.  Here are some great ways to get your heart beating faster:  Go for a brisk walk, run, or hike.  Go for a swim or bike ride.  Take an online exercise class or dance.  Play a game of touch football, basketball, or soccer.  Play tennis, pickleball, or racquetball.  Climb stairs.  Even some household chores can be aerobic. Just do them at a faster pace. Raking or mowing the lawn, sweeping the garage, and vacuuming and cleaning your home all can help get your heart rate up.  Strengthen your muscles during the week. You don't have to lift heavy weights or grow big, bulky muscles to get stronger. Doing a few simple activities that make your muscles work against, or \"resist,\" something can help you get stronger. Aim for at least twice a week.  For example, you can:  Do push-ups or sit-ups, which use your own body weight as resistance.  Lift weights or dumbbells or use stretch bands at home or in a gym or community center.  Stretch your muscles often. Stretching will help you as you become more active. It can help you stay flexible and loosen tight muscles. It can also help improve your " "balance and posture and can be a great way to relax.  Be sure to stretch the muscles you'll be using when you work out. It's best to warm your muscles slightly before you stretch them. Walk or do some other light aerobic activity for a few minutes. Then start stretching.  When you stretch your muscles:  Do it slowly. Stretching is not about going fast or making sudden movements.  Don't push or bounce during a stretch.  Hold each stretch for at least 15 to 30 seconds, if you can. You should feel a stretch in the muscle, but not pain.  Breathe out as you do the stretch. Then breathe in as you hold the stretch. Don't hold your breath.  If you're worried about how more activity might affect your health, have a checkup before you start. Follow any special advice your doctor gives you for getting a smart start.  Where can you learn more?  Go to https://www.[employer]/patiented  Enter W332 in the search box to learn more about \"Learning About Being Physically Active.\"  Current as of: June 6, 2023               Content Version: 13.8    1889-6731 [employer].   Care instructions adapted under license by your healthcare professional. If you have questions about a medical condition or this instruction, always ask your healthcare professional. [employer] disclaims any warranty or liability for your use of this information.      Activities of Daily Living    Your Health Risk Assessment indicates you have difficulties with activities of daily living such as housework, bathing, preparing meals, taking medication, etc. Please make a follow up appointment for us to address this issue in more detail.  Hearing Loss: Care Instructions  Overview     Hearing loss is a sudden or slow decrease in how well you hear. It can range from slight to profound. Permanent hearing loss can occur with aging. It also can happen when you are exposed long-term to loud noise. Examples include listening to loud music, riding " motorcycles, or being around other loud machines.  Hearing loss can affect your work and home life. It can make you feel lonely or depressed. You may feel that you have lost your independence. But hearing aids and other devices can help you hear better and feel connected to others.  Follow-up care is a key part of your treatment and safety. Be sure to make and go to all appointments, and call your doctor if you are having problems. It's also a good idea to know your test results and keep a list of the medicines you take.  How can you care for yourself at home?  Avoid loud noises whenever possible. This helps keep your hearing from getting worse.  Always wear hearing protection around loud noises.  Wear a hearing aid as directed.  A professional can help you pick a hearing aid that will work best for you.  You can also get hearing aids over the counter for mild to moderate hearing loss.  Have hearing tests as your doctor suggests. They can show whether your hearing has changed. Your hearing aid may need to be adjusted.  Use other devices as needed. These may include:  Telephone amplifiers and hearing aids that can connect to a television, stereo, radio, or microphone.  Devices that use lights or vibrations. These alert you to the doorbell, a ringing telephone, or a baby monitor.  Television closed-captioning. This shows the words at the bottom of the screen. Most new TVs can do this.  TTY (text telephone). This lets you type messages back and forth on the telephone instead of talking or listening. These devices are also called TDD. When messages are typed on the keyboard, they are sent over the phone line to a receiving TTY. The message is shown on a monitor.  Use text messaging, social media, and email if it is hard for you to communicate by telephone.  Try to learn a listening technique called speechreading. It is not lipreading. You pay attention to people's gestures, expressions, posture, and tone of voice. These  "clues can help you understand what a person is saying. Face the person you are talking to, and have them face you. Make sure the lighting is good. You need to see the other person's face clearly.  Think about counseling if you need help to adjust to your hearing loss.  When should you call for help?  Watch closely for changes in your health, and be sure to contact your doctor if:    You think your hearing is getting worse.     You have new symptoms, such as dizziness or nausea.   Where can you learn more?  Go to https://www.[employer].net/patiented  Enter R798 in the search box to learn more about \"Hearing Loss: Care Instructions.\"  Current as of: February 28, 2023               Content Version: 13.8    3885-1095 "[employer].   Care instructions adapted under license by your healthcare professional. If you have questions about a medical condition or this instruction, always ask your healthcare professional. "[employer] disclaims any warranty or liability for your use of this information.      Learning About Depression Screening  What is depression screening?  Depression screening is a way to see if you have depression symptoms. It may be done by a doctor or counselor. It's often part of a routine checkup. That's because your mental health is just as important as your physical health.  Depression is a mental health condition that affects how you feel, think, and act. You may:  Have less energy.  Lose interest in your daily activities.  Feel sad and grouchy for a long time.  Depression is very common. It affects people of all ages.  Many things can lead to depression. Some people become depressed after they have a stroke or find out they have a major illness like cancer or heart disease. The death of a loved one or a breakup may lead to depression. It can run in families. Most experts believe that a combination of inherited genes and stressful life events can cause it.  What happens during " "screening?  You may be asked to fill out a form about your depression symptoms. You and the doctor will discuss your answers. The doctor may ask you more questions to learn more about how you think, act, and feel.  What happens after screening?  If you have symptoms of depression, your doctor will talk to you about your options.  Doctors usually treat depression with medicines or counseling. Often, combining the two works best. Many people don't get help because they think that they'll get over the depression on their own. But people with depression may not get better unless they get treatment.  The cause of depression is not well understood. There may be many factors involved. But if you have depression, it's not your fault.  A serious symptom of depression is thinking about death or suicide. If you or someone you care about talks about this or about feeling hopeless, get help right away.  It's important to know that depression can be treated. Medicine, counseling, and self-care may help.  Where can you learn more?  Go to https://www.[employer].net/patiented  Enter T185 in the search box to learn more about \"Learning About Depression Screening.\"  Current as of: June 25, 2023               Content Version: 13.8    3578-1360 [employer].   Care instructions adapted under license by your healthcare professional. If you have questions about a medical condition or this instruction, always ask your healthcare professional. [employer] disclaims any warranty or liability for your use of this information.         "

## 2023-12-06 NOTE — LETTER
My Asthma Action Plan    Name: Henrry Fonseca   YOB: 1935  Date: 12/6/2023   My doctor: FARRUKH MENA, DO   My clinic: [facility]        My Rescue Medicine:   Albuterol inhaler (Proair/Ventolin/Proventil HFA)  2-4 puffs EVERY 4 HOURS as needed. Use a spacer if recommended by your provider.   My Asthma Severity:   Intermittent / Exercise Induced  Know your asthma triggers:   None          GREEN ZONE   Good Control  I feel good  No cough or wheeze  Can work, sleep and play without asthma symptoms       Take your asthma control medicine every day.     If exercise triggers your asthma, take your rescue medication  15 minutes before exercise or sports, and  During exercise if you have asthma symptoms  Spacer to use with inhaler: If you have a spacer, make sure to use it with your inhaler             YELLOW ZONE Getting Worse  I have ANY of these:  I do not feel good  Cough or wheeze  Chest feels tight  Wake up at night   Keep taking your Green Zone medications  Start taking your rescue medicine:  every 20 minutes for up to 1 hour. Then every 4 hours for 24-48 hours.  If you stay in the Yellow Zone for more than 12-24 hours, contact your doctor.  If you do not return to the Green Zone in 12-24 hours or you get worse, start taking your oral steroid medicine if prescribed by your provider.           RED ZONE Medical Alert - Get Help  I have ANY of these:  I feel awful  Medicine is not helping  Breathing getting harder  Trouble walking or talking  Nose opens wide to breathe       Take your rescue medicine NOW  If your provider has prescribed an oral steroid medicine, start taking it NOW  Call your doctor NOW  If you are still in the Red Zone after 20 minutes and you have not reached your doctor:  Take your rescue medicine again and  Call 911 or go to the emergency room right away    See your regular doctor within 2 weeks of an Emergency Room or Urgent Care visit for follow-up  treatment.          Annual Reminders:  Meet with Asthma Educator,  Flu Shot in the Fall, consider Pneumonia Vaccination for patients with asthma (aged 19 and older).    Pharmacy:    [id number] - [place], [place] - [address] [id number] [place] [facility] [address] [place] [address] [place]    Electronically signed by FARRUKH MENA, DO   Date: 12/06/23                    Asthma Triggers  How To Control Things That Make Your Asthma Worse    Triggers are things that make your asthma worse.  Look at the list below to help you find your triggers and   what you can do about them. You can help prevent asthma flare-ups by staying away from your triggers.      Trigger                                                          What you can do   Cigarette Smoke  Tobacco smoke can make asthma worse. Do not allow smoking in your home, car or around you.  Be sure no one smokes at a child s day care or school.  If you smoke, ask your health care provider for ways to help you quit.  Ask family members to quit too.  Ask your health care provider for a referral to Quit Plan to help you quit smoking, or call 3-411-568-PLAN.     Colds, Flu, Bronchitis  These are common triggers of asthma. Wash your hands often.  Don t touch your eyes, nose or mouth.  Get a flu shot every year.     Dust Mites  These are tiny bugs that live in cloth or carpet. They are too small to see. Wash sheets and blankets in hot water every week.   Encase pillows and mattress in dust mite proof covers.  Avoid having carpet if you can. If you have carpet, vacuum weekly.   Use a dust mask and HEPA vacuum.   Pollen and Outdoor Mold  Some people are allergic to trees, grass, or weed pollen, or molds. Try to keep your windows closed.  Limit time out doors when pollen count is high.   Ask you health care provider about taking medicine during allergy season.     Animal Dander  Some people are allergic to skin flakes, urine or saliva from pets with fur or feathers. Keep pets  with fur or feathers out of your home.    If you can t keep the pet outdoors, then keep the pet out of your bedroom.  Keep the bedroom door closed.  Keep pets off cloth furniture and away from stuffed toys.     Mice, Rats, and Cockroaches  Some people are allergic to the waste from these pests.   Cover food and garbage.  Clean up spills and food crumbs.  Store grease in the refrigerator.   Keep food out of the bedroom.   Indoor Mold  This can be a trigger if your home has high moisture. Fix leaking faucets, pipes, or other sources of water.   Clean moldy surfaces.  Dehumidify basement if it is damp and smelly.   Smoke, Strong Odors, and Sprays  These can reduce air quality. Stay away from strong odors and sprays, such as perfume, powder, hair spray, paints, smoke incense, paint, cleaning products, candles and new carpet.   Exercise or Sports  Some people with asthma have this trigger. Be active!  Ask your doctor about taking medicine before sports or exercise to prevent symptoms.    Warm up for 5-10 minutes before and after sports or exercise.     Other Triggers of Asthma  Cold air:  Cover your nose and mouth with a scarf.  Sometimes laughing or crying can be a trigger.  Some medicines and food can trigger asthma.

## 2023-12-06 NOTE — PROGRESS NOTES
"SUBJECTIVE:   Henrry is a 88 year old, presenting for the following:  Wellness Visit        12/6/2023    11:19 AM   Additional Questions   Roomed by Angela SÁNCHEZ MA       Are you in the first 12 months of your Medicare coverage?  No    Healthy Habits:     In general, how would you rate your overall health?  Good    Frequency of exercise:  None    Do you usually eat at least 4 servings of fruit and vegetables a day, include whole grains    & fiber and avoid regularly eating high fat or \"junk\" foods?  Yes    Taking medications regularly:  Yes    Medication side effects:  None    Ability to successfully perform activities of daily living:  Transportation requires assistance    Home Safety:  No safety concerns identified    Hearing Impairment:  Need to ask people to speak up or repeat themselves and difficulty understanding speech on the telephone    In the past 6 months, have you been bothered by leaking of urine?  No    In general, how would you rate your overall mental or emotional health?  Good    Additional concerns today:  No      Today's PHQ-9 Score:       12/6/2023    11:07 AM   PHQ-9 SCORE   PHQ-9 Total Score Xaviharандрей 11 (Moderate depression)   PHQ-9 Total Score 11       Would like his ears checked, feels plugged    Has been having difficulties with his breathing lately.  When he uses his walker through his house, he feels short of breath.  Shortness of breath also affects his speech.  No wheezing.    Derm Prob-  Bumps that look like pimples on his left shoulder.  Itches.  Has put hydrocortisone cream on, which has helped.    Flu/Covid shot today    Have you ever done Advance Care Planning? (For example, a Health Directive, POLST, or a discussion with a medical provider or your loved ones about your wishes): Yes, advance care planning is on file.    Fall risk  Fallen 2 or more times in the past year?: No  Any fall with injury in the past year?: No    Cognitive Screening   1) Repeat 3 items (Leader, Season, Table)  "   2) Clock draw: NORMAL  3) 3 item recall: Recalls 2 objects   Results: NORMAL clock, 1-2 items recalled: COGNITIVE IMPAIRMENT LESS LIKELY    Mini-CogTM Copyright STANISLAV Mccullough. Licensed by the author for use in [facility]; reprinted with permission (paolo@[facility]). All rights reserved.      Do you have sleep apnea, excessive snoring or daytime drowsiness? : no    Reviewed and updated as needed this visit by clinical staff   Tobacco  Allergies  Meds              Reviewed and updated as needed this visit by Provider                 Social History     Tobacco Use     Smoking status: Never     Passive exposure: Never     Smokeless tobacco: Never   Substance Use Topics     Alcohol use: Yes     Comment: very rare         12/6/2023    11:03 AM   Alcohol Use   Prescreen: >3 drinks/day or >7 drinks/week? No     Do you have a current opioid prescription? No  Do you use any other controlled substances or medications that are not prescribed by a provider? None    Current providers sharing in care for this patient include:   Patient Care Team:  Reny Street DO as PCP - General (Family Medicine)  Peggy Colbert MD as MD (Cardiovascular Disease)  Reny Street DO as Assigned PCP    The following health maintenance items are reviewed in Epic and correct as of today:  Health Maintenance   Topic Date Due     RSV VACCINE (Pregnancy & 60+) (1 - 1-dose 60+ series) Never done     ZOSTER IMMUNIZATION (2 of 3) 01/29/2013     DTAP/TDAP/TD IMMUNIZATION (3 - Td or Tdap) 12/04/2022     MEDICARE ANNUAL WELLNESS VISIT  11/08/2023     ASTHMA CONTROL TEST  06/06/2024     ANNUAL REVIEW OF HM ORDERS  12/06/2024     ASTHMA ACTION PLAN  12/06/2024     FALL RISK ASSESSMENT  12/06/2024     ADVANCE CARE PLANNING  12/06/2028     PHQ-2 (once per calendar year)  Completed     INFLUENZA VACCINE  Completed     Pneumococcal Vaccine: 65+ Years  Completed     COVID-19 Vaccine  Completed     IPV IMMUNIZATION  Aged Out     HPV  "IMMUNIZATION  Aged Out     MENINGITIS IMMUNIZATION  Aged Out     RSV MONOCLONAL ANTIBODY  Aged Out       Review of Systems   Constitutional:  Negative for chills and fever.   HENT:  Positive for congestion and hearing loss. Negative for ear pain and sore throat.    Eyes:  Negative for pain and visual disturbance.   Respiratory:  Positive for cough and shortness of breath.    Cardiovascular:  Negative for chest pain, palpitations and peripheral edema.   Gastrointestinal:  Negative for abdominal pain, constipation, diarrhea, heartburn, hematochezia and nausea.   Genitourinary:  Positive for urgency. Negative for dysuria, frequency, genital sores, hematuria, impotence and penile discharge.   Musculoskeletal:  Positive for arthralgias and myalgias. Negative for joint swelling.   Skin:  Positive for rash.   Neurological:  Positive for weakness. Negative for dizziness, headaches and paresthesias.   Psychiatric/Behavioral:  Negative for mood changes. The patient is not nervous/anxious.      OBJECTIVE:   /66 (BP Location: Right arm, Patient Position: Chair, Cuff Size: Adult Regular)   Pulse 77   Temp 98  F (36.7  C) (Tympanic)   Resp 16   Ht 1.676 m (5' 6\")   SpO2 94%   BMI 25.02 kg/m   Estimated body mass index is 25.02 kg/m  as calculated from the following:    Height as of this encounter: 1.676 m (5' 6\").    Weight as of 4/6/23: 70.3 kg (155 lb).  Physical Exam  Constitutional:       Appearance: Normal appearance.   HENT:      Head: Normocephalic.   Cardiovascular:      Rate and Rhythm: Normal rate and regular rhythm.   Pulmonary:      Effort: Pulmonary effort is normal.      Breath sounds: Normal breath sounds.   Abdominal:      General: Bowel sounds are normal.   Skin:     General: Skin is warm and dry.   Neurological:      Mental Status: He is alert.   Psychiatric:         Thought Content: Thought content normal.         Judgment: Judgment normal.         ASSESSMENT / PLAN:   Henrry was seen today for " wellness visit.    Diagnoses and all orders for this visit:    Encounter for Medicare annual wellness exam    Essential hypertension, benign  Stable, well controlled  -     amLODIPine (NORVASC) 5 MG tablet; Take 1 tablet (5 mg) by mouth daily  -     atorvastatin (LIPITOR) 20 MG tablet; TAKE ONE TABLET BY MOUTH AT BEDTIME  -     Basic metabolic panel  (Ca, Cl, CO2, Creat, Gluc, K, Na, BUN); Future    Other iron deficiency anemia  Due for recheck  -     ferrous gluconate (FERGON) 324 (38 Fe) MG tablet; Take 1 tablet (324 mg) by mouth daily (with breakfast)  -     CBC with platelets and differential; Future  -     Ferritin; Future  -     Iron and iron binding capacity; Future    Seizure disorder (H)  Refill provided  -     Discontinue: gabapentin (NEURONTIN) 600 MG tablet; TAKE 1 TABLET BY MOUTH IN THE MORNING AND 1 TABLET IN THE EVENING  -     gabapentin (NEURONTIN) 600 MG tablet; TAKE 1 TABLET BY MOUTH IN THE MORNING AND 1 TABLET IN THE EVENING    Gastroesophageal reflux disease without esophagitis  Symptoms controlled  -     omeprazole (PRILOSEC) 20 MG DR capsule; Take 1 capsule (20 mg) by mouth daily    Asbestosis (H)  Previously well controlled with just as needed alnuterol, has been feeling more short of breath and some wheezing. Recommend visiting with pulmonology.  -     albuterol (PROAIR HFA/PROVENTIL HFA/VENTOLIN HFA) 108 (90 Base) MCG/ACT inhaler; Inhale 2 puffs into the lungs every 4 hours as needed for shortness of breath  -     Adult Pulmonary Medicine  Referral; Future    Other orders  -     INFLUENZA VACCINE 65+ (FLUZONE HD)  -     COVID-19 12+ (2023-24) (PFIZER)  -     REVIEW OF HEALTH MAINTENANCE PROTOCOL ORDERS  -     PRIMARY CARE FOLLOW-UP SCHEDULING; Future        Patient has been advised of split billing requirements and indicates understanding: Yes    Depression Screening Follow Up        12/6/2023    11:07 AM   PHQ   PHQ-9 Total Score 11   Q9: Thoughts of better off dead/self-harm past  "2 weeks Not at all         Follow Up Actions Taken  Crisis resource information provided in After Visit Summary       COUNSELING:  Reviewed preventive health counseling, as reflected in patient instructions  Special attention given to:       Regular exercise       Healthy diet/nutrition       Dental care      BMI:   Estimated body mass index is 25.02 kg/m  as calculated from the following:    Height as of this encounter: 1.676 m (5' 6\").    Weight as of 4/6/23: 70.3 kg (155 lb).         He reports that he has never smoked. He has never been exposed to tobacco smoke. He has never used smokeless tobacco.      Appropriate preventive services were discussed with this patient, including applicable screening as appropriate for fall prevention, nutrition, physical activity, Tobacco-use cessation, weight loss and cognition.  Checklist reviewing preventive services available has been given to the patient.    Reviewed patients plan of care and provided an AVS. The Basic Care Plan (routine screening as documented in Health Maintenance) for Henrry meets the Care Plan requirement. This Care Plan has been established and reviewed with the Patient.        FARRUKH MENA [facility]    Identified Health Risks:  I have reviewed Opioid Use Disorder and Substance Use Disorder risk factors and made any needed referrals.   He is at risk for lack of exercise and has been provided with information to increase physical activity for the benefit of his well-being.  The patient reports that he has difficulty with activities of daily living. I have asked that the patient make a follow up appointment in 12 weeks where this issue will be further evaluated and addressed.  The patient was provided with written information regarding signs of hearing loss.  The patient s PHQ-9 score is consistent with moderate depression. He was provided with information regarding depression and was advised to schedule a follow up " appointment in 12 weeks to further address this issue.

## 2023-12-13 ENCOUNTER — HOSPITAL ENCOUNTER (OUTPATIENT)
Dept: GENERAL RADIOLOGY | Facility: CLINIC | Age: 88
Discharge: HOME | End: 2023-12-13
Attending: STUDENT IN AN ORGANIZED HEALTH CARE EDUCATION/TRAINING PROGRAM | Admitting: STUDENT IN AN ORGANIZED HEALTH CARE EDUCATION/TRAINING PROGRAM
Payer: COMMERCIAL

## 2023-12-13 ENCOUNTER — TELEPHONE (OUTPATIENT)
Dept: PULMONOLOGY | Facility: CLINIC | Age: 88
End: 2023-12-13
Payer: COMMERCIAL

## 2023-12-13 DIAGNOSIS — J61 ASBESTOSIS (H): ICD-10-CM

## 2023-12-13 DIAGNOSIS — J61 ASBESTOSIS (H): Primary | ICD-10-CM

## 2023-12-13 PROCEDURE — 71046 X-RAY EXAM CHEST 2 VIEWS: CPT

## 2023-12-13 NOTE — TELEPHONE ENCOUNTER
[facility] Call Center    Phone Message    May a detailed message be left on voicemail: yes     Reason for Call: Other: Jayce from [facility] is calling about no signature on chest x-ray orders from Dr Bolton. Pt is scheduled today at 11am.  Please fax orders with signature to fax: [phone].     Action Taken: Message routed to:  Other: [facility] [facility]    Travel Screening: Not Applicable

## 2023-12-13 NOTE — TELEPHONE ENCOUNTER
XR Chest 2 Views [[id number]]      Electronically signed by: Christina Bolton MD on 12/13/23 0956     Dr. Bolton signed orders.     Note complete.    Stephanie Lott RN on 12/13/2023 at 10:03 AM

## 2024-01-11 ENCOUNTER — TRANSFERRED RECORDS (OUTPATIENT)
Dept: HEALTH INFORMATION MANAGEMENT | Facility: CLINIC | Age: 89
End: 2024-01-11
Payer: COMMERCIAL

## 2024-04-21 NOTE — PROGRESS NOTES
[facility]    Pulmonary, Allergy, Critical Care and Sleep Medicine    Initial Clinic Visit  4/25/24    Henrry Fonseca MRN# [id number]    YOB: 1935     Primary care provider: Reny Street     Assessment and Recommendations:    Henrry Fonseca is a 89 year old male with a history of asbestos exposure, asthma, allergic rhinitis, Aortic Stenosis s/p TAVR, Diastolic Heart Failure, hemiplegia and hemiparesis following CVA, Adjustment Disorder who presents for evaluation of dyspnea.     Dyspnea  History of Asbestos Exposure  Shortness of breath when walking around house with walker. History of significant occupational asbestos exposure and prior imaging with pleural plaques. Did have Pulmonary evaluation in 2006 including chest imaging that did not show any radiographic signs of asbestosis. Recent CXR without overt signs of fibrosis. Today denies any cough or dyspnea at rest. Do think slowly progressive deconditioning since stroke is playing a role as quite limited in physical ability at baseline. Discussed difference between pleural plaques and asbestosis, and that previously did not see signs of lung disease from the asbestos. Discussed that if had significant asbestosis at this time might expect more symptoms such as cough but that a CT could be repeated if interested. Reviewed that there would be limited management options and no reversal treatments if did have asbestosis.   - Was unable to get PFTs prior to this visit and will get PFTs as first step to further assess respiratory function, do expect that may be abnormal even in absence of parenchymal lung disease  - Will review PFTs at next visit, if interested can discuss Chest CT  - Provided with incentive spirometer and review rational and use    Follow up in 4 months    Christina Bolton MD PhD  Pulmonary and Critical Care     45 minutes spent on the date of the encounter doing chart review, history and exam, documentation  "and further activities per the note.    History of Present Illness:    HPI:   Previously seen by Pulmonary in 2006. At that time no chronic cough or dyspnea at rest or with exertion. Noted to have pleural plaques on CTs in 2004 and 2006 that were stable and no evidence of interstitial lung disease. PFTs in May 2006 were normal. Was referred to Sleep due to concern for JAMIE.     Underwent TAVR 1/2021 that complicated by bradycardia that improved once stopped AV annamaria blocking agents. Last seen in clinic by Cardiology in 2022 who noted patient had chronic shortness of breath thought due to underlying aortic stenosis and restrictive lung disease. History of anemia with discontinuation of anti-platelet agents.    Primary Care visit Dec 2023 where reported shortness of breath when using walker and sometimes with speech. Denied wheezing. Referred to Pulmonary given prior concerns about asbestos.    Today was initially confused and thought was at a Cardiology appointment. Reports that can get short of breath if doing work. Does walk with walker. Will get winded going from living room to bathroom and back but not when sitting. No change in breathing with sitting vs lying flat. Much less physically active since stroke. No cough, sometimes tickle in thorat with very little mucus. Does not think symptoms were affected by his TAVR. Sometimes thinks heart \"vibrating.\" No more tired than usual, no black stools, takes iron. Sleeps 10-12 hrs, hard time getting to sleep. Not sure if snore, used to snore, no waking up gasping. Swallow is ok, has to be careful.    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 Only used masks and ventilation for some of that time. Never smoker  Denies exposure to mold or water damage. Use to do a lot of gardening pre stroke. Has one dog. Family history significant for father with lung cancer, was a smoker.    Additional data from chart " review  Primary Care, Pulmonary, Cardiology notes reviewed and summarized as above.    Procedures  PFTs 2009: Personally reviewed, scooped flow volume     Echo 2020: Normal LVEF, indeterminate diastolic, normal RV size and function, mild to moderate aortic stenosis.     Imaging  CXR 12/2023: Personally reviewed, no acute pathology    Labs   Allergy Skin Testing 2009: Multiple positive allergens    Review of Systems:  Complete 12 point ROS negative unless mentioned in HPI    Histories, Medications, Allergies:    Past Medical History:  Past Medical History:   Diagnosis Date    Asbestosis(501)     ct chest8/04    Cardiomegaly     echo8/04  stress echo 2000 normal    Cerebral infarction (H)     june 2020    Closed fracture of unspecified part of humerus     Diverticulosis of colon (without mention of hemorrhage)     Esophageal reflux     Other specified anemias     Other specified iron deficiency anemias     Uncomplicated asthma      Past Surgical History:  Past Surgical History:   Procedure Laterality Date    COLONOSCOPY  8/18/04     Repeat in 10 years.    CV HEART CATHETERIZATION WITH POSSIBLE INTERVENTION N/A 1/13/2021    Procedure: Heart Catheterization with Possible Intervention;  Surgeon: Dmitriy Frost MD;  Location:  HEART CARDIAC CATH LAB    CV RIGHT HEART CATH MEASUREMENTS RECORDED N/A 1/13/2021    Procedure: Right Heart Cath;  Surgeon: Dmitriy Frost MD;  Location: [facility] CARDIAC CATH LAB    CV TRANSCATHETER AORTIC VALVE REPLACEMENT N/A 1/26/2021    Procedure: Transcatheter Aortic Valve Replacement;  Surgeon: Peggy Colbert MD;  Location:  HEART CARDIAC CATH LAB    ORTHOPEDIC SURGERY      right Fibula repair    PHACOEMULSIFICATION WITH STANDARD INTRAOCULAR LENS IMPLANT  4/7/2011    Procedure:PHACOEMULSIFICATION WITH STANDARD INTRAOCULAR LENS IMPLANT; Surgeon:MJ CHOI; Location:[facility]    PHACOEMULSIFICATION WITH STANDARD INTRAOCULAR LENS IMPLANT  5/9/2011     Procedure:PHACOEMULSIFICATION WITH STANDARD INTRAOCULAR LENS IMPLANT; Surgeon:MJ CHOI; Location:[facility]    SURGICAL HISTORY OF -       vasectomy    SURGICAL HISTORY OF -       appy     Past Social History:  Social History     Socioeconomic History    Marital status:      Spouse name: Not on file    Number of children: Not on file    Years of education: Not on file    Highest education level: Not on file   Occupational History    Not on file   Tobacco Use    Smoking status: Never     Passive exposure: Never    Smokeless tobacco: Never   Vaping Use    Vaping status: Never Used   Substance and Sexual Activity    Alcohol use: Yes     Comment: very rare    Drug use: No    Sexual activity: Yes     Partners: Female   Other Topics Concern    Parent/sibling w/ CABG, MI or angioplasty before 65F 55M? No   Social History Narrative    Not on file     Social Determinants of Health     Financial Resource Strain: Low Risk  (12/6/2023)    Financial Resource Strain     Within the past 12 months, have you or your family members you live with been unable to get utilities (heat, electricity) when it was really needed?: No   Food Insecurity: Low Risk  (12/6/2023)    Food Insecurity     Within the past 12 months, did you worry that your food would run out before you got money to buy more?: No     Within the past 12 months, did the food you bought just not last and you didn t have money to get more?: No   Transportation Needs: Low Risk  (12/6/2023)    Transportation Needs     Within the past 12 months, has lack of transportation kept you from medical appointments, getting your medicines, non-medical meetings or appointments, work, or from getting things that you need?: No   Physical Activity: Not on file   Stress: Not on file   Social Connections: Not on file   Interpersonal Safety: Low Risk  (12/6/2023)    Interpersonal Safety     Do you feel physically and emotionally safe where you currently live?: Yes     Within the  past 12 months, have you been hit, slapped, kicked or otherwise physically hurt by someone?: No     Within the past 12 months, have you been humiliated or emotionally abused in other ways by your partner or ex-partner?: No   Housing Stability: Low Risk  (12/6/2023)    Housing Stability     Do you have housing? : Yes     Are you worried about losing your housing?: No     Family History:  Family History   Problem Relation Age of Onset    Cancer Father         lung    Cancer Mother         pancreatic    Alcohol/Drug Son     Alcohol/Drug Son     Cancer Sister         1/2 sister lung cancer     Medications:  Current Outpatient Medications   Medication Sig Dispense Refill    acetaminophen (TYLENOL) 500 MG tablet Take 500 mg by mouth every 6 hours as needed for mild pain      albuterol (PROAIR HFA/PROVENTIL HFA/VENTOLIN HFA) 108 (90 Base) MCG/ACT inhaler Inhale 2 puffs into the lungs every 4 hours as needed for shortness of breath 18 g 3    amLODIPine (NORVASC) 5 MG tablet Take 1 tablet (5 mg) by mouth daily 90 tablet 4    amoxicillin (AMOXIL) 500 MG capsule TAKE 4 CAPSULES BY MOUTH ONCE FOR ONE DOSE 30 MINUTES BEFORE DENTAL APPOINTMENT (Patient not taking: Reported on 12/6/2023) 4 capsule 0    atorvastatin (LIPITOR) 20 MG tablet TAKE ONE TABLET BY MOUTH AT BEDTIME 90 tablet 4    ferrous gluconate (FERGON) 324 (38 Fe) MG tablet Take 1 tablet (324 mg) by mouth daily (with breakfast) 90 tablet 4    gabapentin (NEURONTIN) 600 MG tablet TAKE 1 TABLET BY MOUTH IN THE MORNING AND 1 TABLET IN THE EVENING 225 tablet 4    melatonin 3 MG tablet Take 1 tablet (3 mg) by mouth nightly as needed for sleep (Patient not taking: Reported on 12/6/2023) 90 tablet 0    omeprazole (PRILOSEC) 20 MG DR capsule Take 1 capsule (20 mg) by mouth daily 90 capsule 4     No current facility-administered medications for this visit.     No current facility-administered medications for this visit.     No current facility-administered medications for this  "visit.     Allergies:     Allergies   Allergen Reactions    Keppra Fatigue     Crying very depressed     Physical Exam:    BP (!) 168/66   Pulse 55   Resp 20   Ht 1.676 m (5' 6\")   Wt 77.1 kg (170 lb)   SpO2 97%   BMI 27.44 kg/m      General: Sitting up in wheelchair in NAD  HEENT: anicteric, moist mucosa  Neck: no palpable lymphadenopathy  Chest: CTAB, no wheezing or crackles  Cardiac: RRR no murmurs, radial pulses intact  Abdomen: Soft, non tender, active BS  Extremities: No LE Edema  Neuro: A&Ox3, no focal deficits   Skin: no rash noted on limited exam  Psych: Appropriate  Laboratory, imaging, and microbiologic data:    All laboratory and imaging data reviewed, pertinent results discussed above.    "

## 2024-04-25 ENCOUNTER — OFFICE VISIT (OUTPATIENT)
Dept: PULMONOLOGY | Facility: CLINIC | Age: 89
End: 2024-04-25
Payer: COMMERCIAL

## 2024-04-25 VITALS
BODY MASS INDEX: 27.32 KG/M2 | HEART RATE: 55 BPM | WEIGHT: 170 LBS | OXYGEN SATURATION: 97 % | RESPIRATION RATE: 20 BRPM | DIASTOLIC BLOOD PRESSURE: 66 MMHG | SYSTOLIC BLOOD PRESSURE: 168 MMHG | HEIGHT: 66 IN

## 2024-04-25 DIAGNOSIS — J61 ASBESTOSIS (H): ICD-10-CM

## 2024-04-25 PROCEDURE — 99204 OFFICE O/P NEW MOD 45 MIN: CPT | Performed by: STUDENT IN AN ORGANIZED HEALTH CARE EDUCATION/TRAINING PROGRAM

## 2024-04-25 NOTE — NURSING NOTE
Chief Complaint   Patient presents with    Consult     Diagnosis:Asbestosis (H)// per pt// referred   FARRUKH MENA L// records unknown          Vitals:    04/25/24 1534   BP: (!) 163/77   BP Location: Right arm   Patient Position: Sitting   Cuff Size: Adult Regular   Pulse: 55   Resp: 20   SpO2: 97%   Weight: 77.1 kg (170 lb)     Wt Readings from Last 1 Encounters:   04/25/24 77.1 kg (170 lb)     Mary Miller MA

## 2024-04-25 NOTE — PATIENT INSTRUCTIONS
We will reschedule your breathing tests.     Come back to clinic in about 4 months and will review breathing tests and decide if should get CT of your lungs.     Incentive spirometry every day.

## 2024-05-31 ENCOUNTER — HOSPITAL ENCOUNTER (OUTPATIENT)
Dept: RESPIRATORY THERAPY | Facility: CLINIC | Age: 89
Discharge: HOME | End: 2024-05-31
Attending: STUDENT IN AN ORGANIZED HEALTH CARE EDUCATION/TRAINING PROGRAM | Admitting: STUDENT IN AN ORGANIZED HEALTH CARE EDUCATION/TRAINING PROGRAM
Payer: COMMERCIAL

## 2024-05-31 PROCEDURE — 94726 PLETHYSMOGRAPHY LUNG VOLUMES: CPT

## 2024-05-31 PROCEDURE — 94729 DIFFUSING CAPACITY: CPT

## 2024-05-31 PROCEDURE — 94060 EVALUATION OF WHEEZING: CPT

## 2024-05-31 RX ORDER — INHALER, ASSIST DEVICES
1 SPACER (EA) MISCELLANEOUS ONCE
Status: DISCONTINUED | OUTPATIENT
Start: 2024-05-31 | End: 2024-06-01 | Stop reason: HOSPADM

## 2024-10-10 ENCOUNTER — OFFICE VISIT (OUTPATIENT)
Dept: PULMONOLOGY | Facility: CLINIC | Age: 89
End: 2024-10-10
Payer: COMMERCIAL

## 2024-10-10 ENCOUNTER — APPOINTMENT (OUTPATIENT)
Dept: GENERAL RADIOLOGY | Facility: CLINIC | Age: 89
End: 2024-10-10
Payer: COMMERCIAL

## 2024-10-10 ENCOUNTER — HOSPITAL ENCOUNTER (EMERGENCY)
Facility: CLINIC | Age: 89
Discharge: HOME | End: 2024-10-10
Payer: COMMERCIAL

## 2024-10-10 VITALS
OXYGEN SATURATION: 95 % | HEART RATE: 50 BPM | TEMPERATURE: 98.3 F | WEIGHT: 162 LBS | RESPIRATION RATE: 23 BRPM | SYSTOLIC BLOOD PRESSURE: 179 MMHG | DIASTOLIC BLOOD PRESSURE: 79 MMHG | BODY MASS INDEX: 26.16 KG/M2

## 2024-10-10 VITALS
HEART RATE: 52 BPM | TEMPERATURE: 97.2 F | WEIGHT: 162.4 LBS | OXYGEN SATURATION: 95 % | DIASTOLIC BLOOD PRESSURE: 72 MMHG | SYSTOLIC BLOOD PRESSURE: 152 MMHG | HEIGHT: 66 IN | BODY MASS INDEX: 26.1 KG/M2

## 2024-10-10 DIAGNOSIS — R00.1 BRADYCARDIA: Primary | ICD-10-CM

## 2024-10-10 DIAGNOSIS — J61 ASBESTOSIS (H): ICD-10-CM

## 2024-10-10 LAB
ANION GAP SERPL CALCULATED.3IONS-SCNC: 10 MMOL/L (ref 7–15)
BASOPHILS # BLD AUTO: 0 10E3/UL (ref 0–0.2)
BASOPHILS NFR BLD AUTO: 0 %
BUN SERPL-MCNC: 20.2 MG/DL (ref 8–23)
CALCIUM SERPL-MCNC: 8.4 MG/DL (ref 8.8–10.4)
CHLORIDE SERPL-SCNC: 106 MMOL/L (ref 98–107)
CREAT SERPL-MCNC: 1.16 MG/DL (ref 0.67–1.17)
EGFRCR SERPLBLD CKD-EPI 2021: 60 ML/MIN/1.73M2
EOSINOPHIL # BLD AUTO: 0.2 10E3/UL (ref 0–0.7)
EOSINOPHIL NFR BLD AUTO: 3 %
ERYTHROCYTE [DISTWIDTH] IN BLOOD BY AUTOMATED COUNT: 13.3 % (ref 10–15)
GLUCOSE SERPL-MCNC: 105 MG/DL (ref 70–99)
HCO3 SERPL-SCNC: 25 MMOL/L (ref 22–29)
HCT VFR BLD AUTO: 38.3 % (ref 40–53)
HGB BLD-MCNC: 13.2 G/DL (ref 13.3–17.7)
HOLD SPECIMEN: NORMAL
HOLD SPECIMEN: NORMAL
IMM GRANULOCYTES # BLD: 0 10E3/UL
IMM GRANULOCYTES NFR BLD: 0 %
LYMPHOCYTES # BLD AUTO: 1.1 10E3/UL (ref 0.8–5.3)
LYMPHOCYTES NFR BLD AUTO: 17 %
MCH RBC QN AUTO: 30.7 PG (ref 26.5–33)
MCHC RBC AUTO-ENTMCNC: 34.5 G/DL (ref 31.5–36.5)
MCV RBC AUTO: 89 FL (ref 78–100)
MONOCYTES # BLD AUTO: 0.9 10E3/UL (ref 0–1.3)
MONOCYTES NFR BLD AUTO: 14 %
NEUTROPHILS # BLD AUTO: 4.1 10E3/UL (ref 1.6–8.3)
NEUTROPHILS NFR BLD AUTO: 65 %
NRBC # BLD AUTO: 0 10E3/UL
NRBC BLD AUTO-RTO: 0 /100
PLATELET # BLD AUTO: 183 10E3/UL (ref 150–450)
POTASSIUM SERPL-SCNC: 4.3 MMOL/L (ref 3.4–5.3)
RBC # BLD AUTO: 4.3 10E6/UL (ref 4.4–5.9)
SODIUM SERPL-SCNC: 141 MMOL/L (ref 135–145)
TROPONIN T SERPL HS-MCNC: 55 NG/L
TROPONIN T SERPL HS-MCNC: 58 NG/L
WBC # BLD AUTO: 6.3 10E3/UL (ref 4–11)

## 2024-10-10 PROCEDURE — 36415 COLL VENOUS BLD VENIPUNCTURE: CPT

## 2024-10-10 PROCEDURE — 84484 ASSAY OF TROPONIN QUANT: CPT

## 2024-10-10 PROCEDURE — 93005 ELECTROCARDIOGRAM TRACING: CPT

## 2024-10-10 PROCEDURE — 80048 BASIC METABOLIC PNL TOTAL CA: CPT

## 2024-10-10 PROCEDURE — 85025 COMPLETE CBC W/AUTO DIFF WBC: CPT

## 2024-10-10 PROCEDURE — 99215 OFFICE O/P EST HI 40 MIN: CPT | Performed by: STUDENT IN AN ORGANIZED HEALTH CARE EDUCATION/TRAINING PROGRAM

## 2024-10-10 PROCEDURE — 99285 EMERGENCY DEPT VISIT HI MDM: CPT | Mod: 25

## 2024-10-10 PROCEDURE — 71046 X-RAY EXAM CHEST 2 VIEWS: CPT

## 2024-10-10 PROCEDURE — 99284 EMERGENCY DEPT VISIT MOD MDM: CPT

## 2024-10-10 PROCEDURE — 93010 ELECTROCARDIOGRAM REPORT: CPT | Mod: 77

## 2024-10-10 RX ORDER — ALBUTEROL SULFATE 90 UG/1
2 INHALANT RESPIRATORY (INHALATION) EVERY 4 HOURS PRN
Qty: 18 G | Refills: 3 | Status: SHIPPED | OUTPATIENT
Start: 2024-10-10

## 2024-10-10 ASSESSMENT — ACTIVITIES OF DAILY LIVING (ADL)
ADLS_ACUITY_SCORE: 38

## 2024-10-10 ASSESSMENT — COLUMBIA-SUICIDE SEVERITY RATING SCALE - C-SSRS
1. IN THE PAST MONTH, HAVE YOU WISHED YOU WERE DEAD OR WISHED YOU COULD GO TO SLEEP AND NOT WAKE UP?: NO
6. HAVE YOU EVER DONE ANYTHING, STARTED TO DO ANYTHING, OR PREPARED TO DO ANYTHING TO END YOUR LIFE?: NO
2. HAVE YOU ACTUALLY HAD ANY THOUGHTS OF KILLING YOURSELF IN THE PAST MONTH?: NO

## 2024-10-10 NOTE — NURSING NOTE
"Initial BP (!) 152/72 (BP Location: Right arm, Patient Position: Sitting, Cuff Size: Adult Regular)   Pulse 52   Temp 97.2  F (36.2  C) (Tympanic)   Ht 1.676 m (5' 5.98\")   Wt 73.7 kg (162 lb 6.4 oz)   SpO2 95%   BMI 26.22 kg/m   Estimated body mass index is 26.22 kg/m  as calculated from the following:    Height as of this encounter: 1.676 m (5' 5.98\").    Weight as of this encounter: 73.7 kg (162 lb 6.4 oz). .  Soniya Peres MA    "

## 2024-10-10 NOTE — DISCHARGE INSTRUCTIONS
You were seen in the emergency department today for slow heart rate. We did tests including blood test, EKG that showed no clear cause for your symptoms, but was reassuring against a life-threatening cause at this time.     Please follow up with your heart doctor within 1 week for ongoing evaluation and management of your symptoms.  A referral has been provided for you.    Return to the emergency department with new or worsening symptoms that you find concerning.

## 2024-10-10 NOTE — ED TRIAGE NOTES
Pt was in clinic for regular check up today and found to be bradycardic. He is assymptomatic and reports feeling well.      Triage Assessment (Adult)       Row Name 10/10/24 0945          Triage Assessment    Airway WDL WDL        Cognitive/Neuro/Behavioral WDL    Cognitive/Neuro/Behavioral WDL WDL                        Triage Assessment (Adult)       Row Name 10/10/24 0960          Triage Assessment    Airway WDL WDL        Cognitive/Neuro/Behavioral WDL    Cognitive/Neuro/Behavioral WDL WDL

## 2024-10-10 NOTE — PATIENT INSTRUCTIONS
See if there are any physical therapy or rehab for ongoing exercise. Agree with checking into the Wellness programs.      albuterol inhaler to use as needed when having shortness of breath or cough. Use with spacer.     Agree with getting Flu and COVID, ask about RSV    Follow up in 6 months      EKG today to check your slow heart rate

## 2024-10-10 NOTE — PROGRESS NOTES
[facility]    Pulmonary, Allergy, Critical Care and Sleep Medicine    Clinic Visit  10/10/24       Assessment and Recommendations:    Henrry Fonseca is a 89 year old male with a history of asbestos exposure, asthma, allergic rhinitis, Aortic Stenosis s/p TAVR, Diastolic Heart Failure, hemiplegia and hemiparesis following CVA, who presents for follow up of dyspnea.     Dyspnea on Exertion  History of Asbestos Exposure  Referred due to shortness of breath when walking around house with walker. History of significant occupational asbestos exposure and remote prior imaging with pleural plaques. Did have Pulmonary evaluation in 2006 including chest imaging that did not show any radiographic signs of asbestosis. CXR 12/2023 without overt signs of fibrosis and no crackles on exam. Today reports symptoms relatively stable. Primary issue of dyspnea on exertion. PFTs 5/2024 showed moderate restriction and mild diffusing defect. Restriction could be in setting of some degree of neuromuscular weakness following CVA. Likely with deconditioning component. Talked today about trying to do more activity (use of walker, taking advantage of Wellness programs at new Assisted Living). Have discussed that can get CT Chest at some point to more fully evaluate for fibrosis though there would not be a specific treatment for asbestosis. Found to be bradycardic on exam as below, unclear if that contributing at times.   - Goal to increase physical activity  - Can consider Chest CT in future    Bradycardia  Dyspnea and some chest heaviness at times, more so with exertion. Bradycardic on exam. EKG in clinic with irregular heart rate in 40s. Asymptomatic but sent to ED for further evaluation. Called ED to alert.     Follow up in 6 months    Christina Bolton MD PhD   of Medicine  Pulmonary Critical Care   [facility]    40 minutes spent on the date of the encounter doing chart review, history  "and exam, documentation and further activities per the note.    Interval History :    Just moved into [facility]. Finds hard to talk sometimes because of breathing. Can be harder to get out the big words. Thinks breathing about the same as before. If does any work can get winded easily. Can go to bathroom on own. Now in [facility] is using wheelchair more but planning to use walker more again. No significant use of left side but can drag foot a little. Feeling a little pressure in the chest. A little heavy but not bad.     Prior History  Previously seen by Pulmonary in 2006. At that time no chronic cough or dyspnea at rest or with exertion. Noted to have pleural plaques on CTs in 2004 and 2006 that were stable and no evidence of interstitial lung disease. PFTs in May 2006 were normal. Was referred to Sleep due to concern for JAMIE.     Underwent TAVR 1/2021 that complicated by bradycardia that improved once stopped AV annamaria blocking agents. Last seen in clinic by Cardiology in 2022 who noted patient had chronic shortness of breath thought due to underlying aortic stenosis and restrictive lung disease. History of anemia with discontinuation of anti-platelet agents. Primary Care visit Dec 2023 where reported shortness of breath when using walker and sometimes with speech. Denied wheezing. Referred to Pulmonary given prior concerns about asbestos.    First Pulmonary visit April 2024.  Reported can get short of breath if doing work. Does walk with walker. Will get winded going from living room to bathroom and back but not when sitting. No change in breathing with sitting vs lying flat. Much less physically active since stroke. No cough, sometimes tickle in thorat with very little mucus. Does not think symptoms were affected by his TAVR. Sometimes thinks heart \"vibrating.\" No more tired than usual, no black stools, takes iron. Sleeps 10-12 hrs, hard time getting to sleep. Not sure if snore, used to snore, no waking " up gasping. Swallow is ok, has to be careful.    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 Only used masks and ventilation for some of that time. Never smoker  Denies exposure to mold or water damage. Use to do a lot of gardening pre stroke. Has one dog. Family history significant for father with lung cancer, was a smoker.    Additional data from chart review    Procedures  PFTs 2009: Personally reviewed, scooped flow volume     PFT 5/31/24: Personally reviewed, moderate restriction, mild diffusing defect not corrected for Hgb    Echo 2020: Normal LVEF, indeterminate diastolic, normal RV size and function, mild to moderate aortic stenosis.     Imaging  CXR 12/2023: Personally reviewed, no acute pathology    Labs   Allergy Skin Testing 2009: Multiple positive allergens    Review of Systems:  Complete 12 point ROS negative unless mentioned in HPI    Histories, Medications, Allergies:    Past Medical History:  Past Medical History:   Diagnosis Date    Asbestosis(501)     ct chest8/04    Cardiomegaly     echo8/04  stress echo 2000 normal    Cerebral infarction (H)     june 2020    Closed fracture of unspecified part of humerus     Diverticulosis of colon (without mention of hemorrhage)     Esophageal reflux     Other specified anemias     Other specified iron deficiency anemias     Uncomplicated asthma      Past Surgical History:  Past Surgical History:   Procedure Laterality Date    COLONOSCOPY  8/18/04     Repeat in 10 years.    CV HEART CATHETERIZATION WITH POSSIBLE INTERVENTION N/A 1/13/2021    Procedure: Heart Catheterization with Possible Intervention;  Surgeon: Dmitriy Frost MD;  Location:  HEART CARDIAC CATH LAB    CV RIGHT HEART CATH MEASUREMENTS RECORDED N/A 1/13/2021    Procedure: Right Heart Cath;  Surgeon: Dmitriy Frost MD;  Location:  HEART CARDIAC CATH LAB    CV TRANSCATHETER AORTIC VALVE REPLACEMENT N/A  1/26/2021    Procedure: Transcatheter Aortic Valve Replacement;  Surgeon: Peggy Colbert MD;  Location:  HEART CARDIAC CATH LAB    ORTHOPEDIC SURGERY      right Fibula repair    PHACOEMULSIFICATION WITH STANDARD INTRAOCULAR LENS IMPLANT  4/7/2011    Procedure:PHACOEMULSIFICATION WITH STANDARD INTRAOCULAR LENS IMPLANT; Surgeon:MJ CHOI; Location:[facility]    PHACOEMULSIFICATION WITH STANDARD INTRAOCULAR LENS IMPLANT  5/9/2011    Procedure:PHACOEMULSIFICATION WITH STANDARD INTRAOCULAR LENS IMPLANT; Surgeon:MJ CHOI; Location:[facility] OR    SURGICAL HISTORY OF -       vasectomy    SURGICAL HISTORY OF -       appy     Past Social History:  Social History     Socioeconomic History    Marital status:      Spouse name: Not on file    Number of children: Not on file    Years of education: Not on file    Highest education level: Not on file   Occupational History    Not on file   Tobacco Use    Smoking status: Never     Passive exposure: Never    Smokeless tobacco: Never   Vaping Use    Vaping status: Never Used   Substance and Sexual Activity    Alcohol use: Yes     Comment: very rare    Drug use: No    Sexual activity: Yes     Partners: Female   Other Topics Concern    Parent/sibling w/ CABG, MI or angioplasty before 65F 55M? No   Social History Narrative    Not on file     Social Determinants of Health     Financial Resource Strain: Low Risk  (12/6/2023)    Financial Resource Strain     Within the past 12 months, have you or your family members you live with been unable to get utilities (heat, electricity) when it was really needed?: No   Food Insecurity: Low Risk  (12/6/2023)    Food Insecurity     Within the past 12 months, did you worry that your food would run out before you got money to buy more?: No     Within the past 12 months, did the food you bought just not last and you didn t have money to get more?: No   Transportation Needs: Low Risk  (12/6/2023)    Transportation Needs     Within  the past 12 months, has lack of transportation kept you from medical appointments, getting your medicines, non-medical meetings or appointments, work, or from getting things that you need?: No   Physical Activity: Not on file   Stress: Not on file   Social Connections: Not on file   Interpersonal Safety: Low Risk  (12/6/2023)    Interpersonal Safety     Do you feel physically and emotionally safe where you currently live?: Yes     Within the past 12 months, have you been hit, slapped, kicked or otherwise physically hurt by someone?: No     Within the past 12 months, have you been humiliated or emotionally abused in other ways by your partner or ex-partner?: No   Housing Stability: Low Risk  (12/6/2023)    Housing Stability     Do you have housing? : Yes     Are you worried about losing your housing?: No     Family History:  Family History   Problem Relation Age of Onset    Cancer Father         lung    Cancer Mother         pancreatic    Alcohol/Drug Son     Alcohol/Drug Son     Cancer Sister         1/2 sister lung cancer     Medications:  Current Outpatient Medications   Medication Sig Dispense Refill    acetaminophen (TYLENOL) 500 MG tablet Take 500 mg by mouth every 6 hours as needed for mild pain      albuterol (PROAIR HFA/PROVENTIL HFA/VENTOLIN HFA) 108 (90 Base) MCG/ACT inhaler Inhale 2 puffs into the lungs every 4 hours as needed for shortness of breath (Patient not taking: Reported on 4/25/2024) 18 g 3    amLODIPine (NORVASC) 5 MG tablet Take 1 tablet (5 mg) by mouth daily 90 tablet 4    amoxicillin (AMOXIL) 500 MG capsule TAKE 4 CAPSULES BY MOUTH ONCE FOR ONE DOSE 30 MINUTES BEFORE DENTAL APPOINTMENT (Patient not taking: Reported on 4/25/2024) 4 capsule 0    atorvastatin (LIPITOR) 20 MG tablet TAKE ONE TABLET BY MOUTH AT BEDTIME 90 tablet 4    ferrous gluconate (FERGON) 324 (38 Fe) MG tablet Take 1 tablet (324 mg) by mouth daily (with breakfast) 90 tablet 4    gabapentin (NEURONTIN) 600 MG tablet TAKE 1  "TABLET BY MOUTH IN THE MORNING AND 1 TABLET IN THE EVENING 225 tablet 4    melatonin 3 MG tablet Take 1 tablet (3 mg) by mouth nightly as needed for sleep (Patient not taking: Reported on 4/25/2024) 90 tablet 0    omeprazole (PRILOSEC) 20 MG DR capsule Take 1 capsule (20 mg) by mouth daily 90 capsule 4     No current facility-administered medications for this visit.     No current facility-administered medications for this visit.     No current facility-administered medications for this visit.     Allergies:     Allergies   Allergen Reactions    Keppra Fatigue     Crying very depressed     Physical Exam:        BP (!) 152/72 (BP Location: Right arm, Patient Position: Sitting, Cuff Size: Adult Regular)   Pulse 52   Temp 97.2  F (36.2  C) (Tympanic)   Ht 1.676 m (5' 5.98\")   Wt 73.7 kg (162 lb 6.4 oz)   SpO2 95%   BMI 26.22 kg/m      General: Sitting up in wheelchair in NAD  HEENT: anicteric, moist mucosa  Chest: CTAB, no wheezing or crackles  Cardiac: Bradycardic, irregular, radial pulses intact  Abdomen: Soft, non tender, active BS  Extremities: No LE Edema  Neuro: A&Ox3, no focal deficits   Skin: no rash noted on limited exam  Psych: Appropriate  Laboratory, imaging, and microbiologic data:    All laboratory and imaging data reviewed, pertinent results discussed above.    "

## 2024-10-10 NOTE — ED PROVIDER NOTES
"[facility]  Emergency Department Visit Note    PATIENT:  Henrry LEON White     89 year old     male      [id number]    Chief complaint:  Chief Complaint   Patient presents with    Bradycardia     Coming from pulm clinic          History of present illness:  Patient is a 89 year old male with asbestosis, hemorrhagic stroke with residual left-sided hemiparesis, aortic valve stenosis status post TAVR not on AC, presenting for evaluation of bradycardia.    Patient coming from pulmonary clinic where he was presenting for routine outpatient follow-up of chronic dyspnea.  They noted him to be bradycardic and recommended coming to the emergency department.    Patient reports he has noted that he has had 3 days of feeling \"heavy\" constant in his chest, though otherwise asymptomatic.  He is unsure whether he has been more dyspneic than usual.  No fevers, vomiting, abdominal pain, changes in bowel or bladder patterns.  Otherwise feeling at his baseline.      Review of Systems:  As in HPI above    BP (!) 175/92   Pulse (!) 42   Temp 98.3  F (36.8  C) (Oral)   Resp 18   Wt 73.5 kg (162 lb)   SpO2 97%   BMI 26.16 kg/m        Physical Exam  Constitutional: laying in hospital bed, alert, oriented, and in no apparent distress  HEENT: normocephalic, atraumatic and sclerae anicteric  Neck: no stridor  Cardiovascular: bradycardic and irregular rhythm  Pulmonary: breathing comfortably on room air and lungs clear to auscultation bilaterally  Abdominal: soft, non-tender, non-distended  Extremities/MSK: no peripheral edema  Skin: warm, dry  Neurologic: Moves right-sided extremities spontaneously  Psychiatric: calm, appropriate      MDM:  Patient is a 89 year old male with above history presenting for evaluation of bradycardia.    Vitals notable for hypertension, bradycardic, afebrile, satting well on room air. Exam reassuring, he appears well, not in distress.    Unclear etiology of bradycardia.  Has had this " previously.  Has many risk factors for cardiac conduction abnormalities as listed above.  Is not apparently symptomatic from the bradycardia, no hypotension or syncopal symptoms.  Does look like after his TAVR in 2022 he had issues with bradycardia and so cardiology at that time had recommended stopping all AV annamaria blocking agents, does not look like he is on any of these currently.  Has been previously bradycardic on prior ECGs.  Unclear etiology of the chest discomfort, patient does have chronic issues related to the asbestosis, and is certainly at higher risk for cardiac pathology including ACS.  Doubt dissection or PE.    Plan for labs, ECG, chest x-ray.    Disposition pending above workup. Remainder of ED course below.    ED COURSE:  ED Course as of 10/10/24 1420   Thu Oct 10, 2024   1008 EKG 12-lead, tracing only  ECG:  - Ventricular rate 40 bpm, irregular  - PA prolonged, QRS and QT intervals normal  - Axis left deviated  - no ST segment or T wave changes concerning for acute ischemia  - Comparison to prior ECGs: No significant change from 3/18/2023, perhaps new blocked P waves from more recent comparison  - My independent interpretation: Indeterminate rhythm.  There are areas of what looks like sinus bradycardia with first-degree AV block potentially with Mobitz I (the middle to end of rhythm strip), also areas of apparent sinus node dysfunction/possible atrial fibrillation (notably the second QRS complex), as well as area of apparent AV block (after the fifth QRS complex).  The marked first-degree AV block is seen previously, as well as the intermittent sinus node dysfunction/possible atrial fibrillation.  No acute ischemic changes.       1011 CBC with Platelets & Differential(!)  Mild chronic and stable anemia.  No leukocytosis, platelets normal.   1016 Basic metabolic panel(!)  Kidney function around his baseline, no electrolyte abnormalities   1016 Troponin T, High Sensitivity(!): 58  Chronically mildly  elevated, actually improved from recent checks.  Will still trend.  No acute ischemic changes, no indication for Cath Lab activation.   1157 Chest XR,  PA & LAT  No significant interval change   1157 Initial workup overall reassuring, awaiting repeat troponin.   1343 Troponin T, High Sensitivity(!): 55  Repeat troponin essentially unchanged   1343 Workup overall reassuring.  Has had persistent mild bradycardia which he has had previously on prior ECGs.  Apparently asymptomatic for this.    Recommend close follow-up with cardiology within 1 week.  ED return precaution discussed in event of new or worsening symptoms.   1419 Patient reassessed, resting comfortably.  Still asymptomatic, no lightheadedness/dizziness, chest pain, dyspnea.  Has remained stable he bradycardic though mildly hypertensive.  Recommend follow-up cardiology within 1 week, of which she expressed understanding.       Encounter Diagnoses:  Final diagnoses:   Bradycardia       Final disposition: discharge    Roby Painting MD  10/10/2024  11:04 AM   Emergency Medicine  [facility], [place]      Roby Painting MD  10/10/24 [id number]

## 2024-10-11 ENCOUNTER — PATIENT OUTREACH (OUTPATIENT)
Dept: FAMILY MEDICINE | Facility: CLINIC | Age: 89
End: 2024-10-11
Payer: COMMERCIAL

## 2024-10-11 ENCOUNTER — TELEPHONE (OUTPATIENT)
Dept: CARDIOLOGY | Facility: CLINIC | Age: 89
End: 2024-10-11
Payer: COMMERCIAL

## 2024-10-11 NOTE — TELEPHONE ENCOUNTER
This encounter is being sent to inform the clinic that this patient has a referral from Roby Painting MD  for the diagnoses of     Bradycardia [R00.1]    and has requested that this patient be seen within 2 weeks and/or with Cardiology.  Based on the availability of our provider(s), we are unable to accommodate this request.    Were all sites offered this patient?  Yes- established at [place]    Does scheduling algorithm request to schedule next available?  Patient has been scheduled for the first available opening with Michelle Nicole on 12/18/24.  We have informed the patient that the clinic will review their referral and reach out if a sooner appointment is medically necessary.

## 2024-10-11 NOTE — TELEPHONE ENCOUNTER
Patient declined going to other locations to get in sooner with an EP provider. They are currently scheduled on 1/7/25 with Dr. Loera.   Thank you,  Yolie Rutledge  [facility]  [address] [place] [address]  Employed by [facility]

## 2024-10-11 NOTE — TELEPHONE ENCOUNTER
"ED/Discharge Protocol    \"I see that you were in the (ER/UC/IP) on 10/10.    How are you doing now that you are home?\" Doing pretty good. Going     Is patient experiencing symptoms that may require a hospital visit?  no    Discharge Instructions    \"Let's review your discharge instructions.  What is/are the follow-up recommendations?  Pt. Response: made an appointment    \"Were you instructed to make a follow-up appointment?\"  Pt. Response: Yes.  Has appointment been made?   Yes  Cardiology    \"When you see the provider, I would recommend that you bring your discharge instructions with you.    Medications    \"How many new medications are you on since your hospitalization/ED visit?\"    0-1  \"How many of your current medicines changed (dose, timing, name, etc.) while you were in the hospital/ED visit?\"   0-1  \"Do you have questions about your medications?\"   No  \"Were you newly diagnosed with heart failure, COPD, diabetes or did you have a heart attack?\"   No  For patients on insulin: \"Did you start on insulin in the hospital or did you have your insulin dose changed?\"   No  Post Discharge Medication Reconciliation Status: patient was not discharged from an inpatient facility or TCU.    Was MTM referral placed (*Make sure to put transitions as reason for referral)?   No    Call Summary    \"Do you have any questions or concerns about your condition or care plan at the moment?\"    No  Triage nurse advice given: follow up as needed    Patient was in ER 1 time in the past year (assess appropriateness of ER visits.)      \"If you have questions or things don't continue to improve, we encourage you contact us through the main clinic number,  [phone].  Even if the clinic is not open, triage nurses are available 24/7 to help you.     We would like you to know that our clinic has extended hours (provide information).  We also have urgent care (provide details on closest location and hours/contact info)\"      Khushboo AKINS. " ARI Renae on 10/11/2024 at 2:29 PM

## 2024-10-11 NOTE — TELEPHONE ENCOUNTER
What type of discharge? Emergency Department  Risk of Hospital admission or ED visit: 55%  Is a TCM episode required? No  When should the patient follow up with PCP? 7 days of discharge.    Khushboo Renae RN on 10/11/2024 at 11:04 AM

## 2024-10-11 NOTE — TELEPHONE ENCOUNTER
Routing to Scheduling-    EKG showing bradycardia HR in 40's. Please assist pt in scheduling consult with EP at any location (here, [place], or [place] first available location.     Arti Danielson RN

## 2024-10-31 NOTE — PROGRESS NOTES
"[facility]    I had the pleasure of seeing Henrry when he came for follow up of bradycardia.  This 89 year old sees Dr. 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 Transient AVBlock   H/o hemorrhagic R thalamic CVA 5/2020 ([facility])  -   residual LUE/LLE hemiplegia, hemiparesis  H/o asbestos exposure, restrictive lung disease  -  sees Pulmonology  HTN  Dyslipidemia      Last Visit & Interval History:  Dr. Colbert last saw Henrry 5/2022 and reviewed he was doing well following TAVR. D/t anemia, his antiplatelet agents had been stopped. Echo showed good valve function and normal LVEF. Annual follow-up with echo recommended.    He's not been seen since, but Henrry was in the ER 10/10/2024 after being sent there by Pulmonology Clinic for bradycardia. He had c/o chronic BECKFORD and some chest heaviness and EKG showed HR 40s. ER noted EKG with SB 40s. He wasn't on AVN blocking agents. Discharged home and I'm seeing him today.    Today's Visit:  Henrry feels well overall! Roxane Dillon accompanies him and notes overall, things seem to be stable.  Reviewed ER visit for bradycardia. He thinks that he mentioned to Pulmonologist that he had some increasing SOB but hasn't had this since that visit. There was concern that he could have sx'c bradycardia, which is why he was sent to the ER.    At home ([facility]), he uses a walker for short distances (like to go to bathroom) but typically uses a wheelchair. Thinks breathing is \"stable\" given known restrictive lung disease. No CP/pressure/tightness. No dizziness, or lightheadedness.  Denies syncope, presyncope or falling .    Roxane Dillon agrees that things appear to be going well.    Does not think he's taking amlodipine but reviewed that I don't see he's been taken off of it.    VITALS:  Vitals: BP (!) 158/68 (BP Location: Right arm, Patient Position: Sitting)   Pulse 60   Resp 16   SpO2 97%     Diagnostic Testing:  Echo 3/2023 LVEF " 60%. Mild-mod LVH. Nl RV. 1+TR, nol MV, mean gradient AVR 7mmHg with trace PVL. Asc aorta borderline dilated  Event Monitor 3/2021 SB, SR, Wenckebach  Echocardiogram 12/2020 - EF 60-65%. Mild LVH.  Severe AoS with mean gradient 39 mmHg, HALEIGH 1.1 cm2. Mildly dilated root 4.0 cm     Component      Latest Ref Rng 12/6/2023  12:42 PM 10/10/2024  9:49 AM   WBC      4.0 - 11.0 10e3/uL 7.6  6.3    RBC Count      4.40 - 5.90 10e6/uL 4.20 (L)  4.30 (L)    Hemoglobin      13.3 - 17.7 g/dL 12.6 (L)  13.2 (L)    Hematocrit      40.0 - 53.0 % 37.8 (L)  38.3 (L)    MCV      78 - 100 fL 90  89    MCH      26.5 - 33.0 pg 30.0  30.7    MCHC      31.5 - 36.5 g/dL 33.3  34.5    RDW      10.0 - 15.0 % 13.2  13.3    Platelet Count      150 - 450 10e3/uL 188  183      Component      Latest Ref Rng 12/6/2023  12:42 PM 10/10/2024  9:49 AM   Sodium      135 - 145 mmol/L 139  141    Potassium      3.4 - 5.3 mmol/L 4.3  4.3    Chloride      98 - 107 mmol/L 107  106    Carbon Dioxide (CO2)      22 - 29 mmol/L 23  25    Anion Gap      7 - 15 mmol/L 9  10    Urea Nitrogen      8.0 - 23.0 mg/dL 31.8 (H)  20.2    Creatinine      0.67 - 1.17 mg/dL 1.16  1.16    GFR Estimate      >60 mL/min/1.73m2 61  60 (L)    Calcium      8.8 - 10.4 mg/dL 8.4 (L)  8.4 (L)    Glucose      70 - 99 mg/dL 116 (H)  105 (H)         Plan:  7 day ZioPatch (he prefers to have placed here) - we will call and send letter per his request  TSH/T4 reflex for bradycardia  Updated echo for TAVR valve  Follow-up 1 year, earlier if issues      Assessment/Plan:    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 HR in 40s where previously had been in 50s based on EKGs.  Had transient heart block after TAVR in 2021, and AVN blocking agents reportedly stopped at that time. Confirms no AVN blocking agents on board now. Last Event Monitor 3/2021 without recurrent high degree AVB    Echo 3/2023 with LVEF 60% and normal functioning TAVR valve  Reviewed sxs  of increased BECKFORD when at Pulm have not reoccurred    To ER if any severe presyncope, syncope    PLAN:  TSH/T4 reflex.   Updated echo   7 day ZioPatch.     Aortic Stenosis   S/p AVR with 29 mm S3 in 2021  Last echo 2023 with nl functioning valve    PLAN:  Updated echo    The longitudinal plan of care for the diagnosis(es)/condition(s) as documented were addressed during this visit. Due to the added complexity in care, I will continue to support Henrry in the subsequent management and with ongoing continuity of care.        Keli Arenas PA-C, MSPAS      Orders Placed This Encounter   Procedures    ZIO PATCH 3-7 DAYS APPLICATION    TSH with free T4 reflex    ZIO PATCH 3-7 DAYS (additional cost to patient)    Echocardiogram Complete     Orders Placed This Encounter   Medications    amoxicillin (AMOXIL) 500 MG capsule     Sig: TAKE 4 CAPSULES BY MOUTH ONCE FOR ONE DOSE 30 MINUTES BEFORE DENTAL APPOINTMENT     Dispense:  4 capsule     Refill:  0     Medications Discontinued During This Encounter   Medication Reason    amoxicillin (AMOXIL) 500 MG capsule Reorder (No AVS)    amLODIPine (NORVASC) 5 MG tablet Stopped by Patient (No AVS)         Encounter Diagnoses   Name Primary?    Bradycardia     Aortic valve stenosis, etiology of cardiac valve disease unspecified        CURRENT MEDICATIONS:  Current Outpatient Medications   Medication Sig Dispense Refill    acetaminophen (TYLENOL) 500 MG tablet Take 500 mg by mouth every 6 hours as needed for mild pain      albuterol (PROAIR HFA/PROVENTIL HFA/VENTOLIN HFA) 108 (90 Base) MCG/ACT inhaler Inhale 2 puffs into the lungs every 4 hours as needed for shortness of breath. 18 g 3    amoxicillin (AMOXIL) 500 MG capsule TAKE 4 CAPSULES BY MOUTH ONCE FOR ONE DOSE 30 MINUTES BEFORE DENTAL APPOINTMENT 4 capsule 0    atorvastatin (LIPITOR) 20 MG tablet TAKE ONE TABLET BY MOUTH AT BEDTIME 90 tablet 4    ferrous gluconate (FERGON) 324 (38 Fe) MG tablet Take 1 tablet (324 mg) by mouth daily  (with breakfast) 90 tablet 4    gabapentin (NEURONTIN) 600 MG tablet TAKE 1 TABLET BY MOUTH IN THE MORNING AND 1 TABLET IN THE EVENING 225 tablet 4    melatonin 3 MG tablet Take 1 tablet (3 mg) by mouth nightly as needed for sleep 90 tablet 0    omeprazole (PRILOSEC) 20 MG DR capsule Take 1 capsule (20 mg) by mouth daily 90 capsule 4       ALLERGIES     Allergies   Allergen Reactions    Keppra Fatigue     Crying very depressed         Review of Systems:  Skin:        Eyes:       ENT:       Respiratory:  Negative shortness of breath;dyspnea on exertion;dyspnea at rest  Cardiovascular:  Negative;chest pain;palpitations;edema;dizziness;lightheadedness;syncope or near-syncope;fatigue    Gastroenterology:      Genitourinary:       Musculoskeletal:       Neurologic:       Psychiatric:       Heme/Lymph/Imm:       Endocrine:         Physical Exam:  Vitals: BP (!) 158/68 (BP Location: Right arm, Patient Position: Sitting)   Pulse 60   Resp 16   SpO2 97%     Constitutional:  cooperative, alert and oriented, well developed, well nourished, in no acute distress        Skin:  warm and dry to the touch, no apparent skin lesions or masses noted        Head:  normocephalic, no masses or lesions        Eyes:  pupils equal and round;sclera white;conjunctivae and lids unremarkable        ENT:  no pallor or cyanosis, dentition good        Neck:  not assessed this visit        Chest:  normal breath sounds, clear to auscultation, normal A-P diameter, normal symmetry, normal respiratory excursion, no use of accessory muscles        Cardiac: regular rhythm bradycardic                Abdomen:  abdomen soft        Vascular: not assessed this visit                                      Extremities and Back:  no edema;no deformities, clubbing, cyanosis, erythema observed        Neurological:    left sided weakness L hemiplegia        PAST MEDICAL HISTORY:  Past Medical History:   Diagnosis Date    Asbestosis(501)     ct chest8/04     Cardiomegaly     echo8/04  stress echo 2000 normal    Cerebral infarction (H)     june 2020    Closed fracture of unspecified part of humerus     Diverticulosis of colon (without mention of hemorrhage)     Esophageal reflux     Other specified anemias     Other specified iron deficiency anemias     Uncomplicated asthma        PAST SURGICAL HISTORY:  Past Surgical History:   Procedure Laterality Date    COLONOSCOPY  8/18/04     Repeat in 10 years.    CV HEART CATHETERIZATION WITH POSSIBLE INTERVENTION N/A 1/13/2021    Procedure: Heart Catheterization with Possible Intervention;  Surgeon: Dmitriy Frost MD;  Location:  HEART CARDIAC CATH LAB    CV RIGHT HEART CATH MEASUREMENTS RECORDED N/A 1/13/2021    Procedure: Right Heart Cath;  Surgeon: Dmitriy Frost MD;  Location:  HEART CARDIAC CATH LAB    CV TRANSCATHETER AORTIC VALVE REPLACEMENT N/A 1/26/2021    Procedure: Transcatheter Aortic Valve Replacement;  Surgeon: Peggy Colbert MD;  Location:  HEART CARDIAC CATH LAB    ORTHOPEDIC SURGERY      right Fibula repair    PHACOEMULSIFICATION WITH STANDARD INTRAOCULAR LENS IMPLANT  4/7/2011    Procedure:PHACOEMULSIFICATION WITH STANDARD INTRAOCULAR LENS IMPLANT; Surgeon:MJ CHOI; Location:[place] OR    PHACOEMULSIFICATION WITH STANDARD INTRAOCULAR LENS IMPLANT  5/9/2011    Procedure:PHACOEMULSIFICATION WITH STANDARD INTRAOCULAR LENS IMPLANT; Surgeon:MJ CHOI; Location:[place] OR    SURGICAL HISTORY OF -       vasectomy    SURGICAL HISTORY OF -       appy       FAMILY HISTORY:  Family History   Problem Relation Age of Onset    Cancer Father         lung    Cancer Mother         pancreatic    Alcohol/Drug Son     Alcohol/Drug Son     Cancer Sister         1/2 sister lung cancer       SOCIAL HISTORY:  Social History     Socioeconomic History    Marital status:      Spouse name: None    Number of children: None    Years of education: None    Highest education level: None   Tobacco Use     Smoking status: Never     Passive exposure: Never    Smokeless tobacco: Never   Vaping Use    Vaping status: Never Used   Substance and Sexual Activity    Alcohol use: Yes     Comment: very rare    Drug use: No    Sexual activity: Yes     Partners: Female   Other Topics Concern    Parent/sibling w/ CABG, MI or angioplasty before 65F 55M? No     Social Drivers of Health     Financial Resource Strain: Low Risk  (12/6/2023)    Financial Resource Strain     Within the past 12 months, have you or your family members you live with been unable to get utilities (heat, electricity) when it was really needed?: No   Food Insecurity: Low Risk  (12/6/2023)    Food Insecurity     Within the past 12 months, did you worry that your food would run out before you got money to buy more?: No     Within the past 12 months, did the food you bought just not last and you didn t have money to get more?: No   Transportation Needs: Low Risk  (12/6/2023)    Transportation Needs     Within the past 12 months, has lack of transportation kept you from medical appointments, getting your medicines, non-medical meetings or appointments, work, or from getting things that you need?: No   Interpersonal Safety: Low Risk  (12/6/2023)    Interpersonal Safety     Do you feel physically and emotionally safe where you currently live?: Yes     Within the past 12 months, have you been hit, slapped, kicked or otherwise physically hurt by someone?: No     Within the past 12 months, have you been humiliated or emotionally abused in other ways by your partner or ex-partner?: No   Housing Stability: Low Risk  (12/6/2023)    Housing Stability     Do you have housing? : Yes     Are you worried about losing your housing?: No

## 2024-11-05 ENCOUNTER — OFFICE VISIT (OUTPATIENT)
Dept: CARDIOLOGY | Facility: CLINIC | Age: 89
End: 2024-11-05
Payer: COMMERCIAL

## 2024-11-05 VITALS
DIASTOLIC BLOOD PRESSURE: 68 MMHG | OXYGEN SATURATION: 97 % | SYSTOLIC BLOOD PRESSURE: 158 MMHG | HEART RATE: 60 BPM | RESPIRATION RATE: 16 BRPM

## 2024-11-05 DIAGNOSIS — R00.1 BRADYCARDIA: ICD-10-CM

## 2024-11-05 DIAGNOSIS — I35.0 AORTIC VALVE STENOSIS, ETIOLOGY OF CARDIAC VALVE DISEASE UNSPECIFIED: ICD-10-CM

## 2024-11-05 LAB — TSH SERPL DL<=0.005 MIU/L-ACNC: 1.92 UIU/ML (ref 0.3–4.2)

## 2024-11-05 PROCEDURE — G2211 COMPLEX E/M VISIT ADD ON: HCPCS | Performed by: PHYSICIAN ASSISTANT

## 2024-11-05 PROCEDURE — 84443 ASSAY THYROID STIM HORMONE: CPT | Performed by: PHYSICIAN ASSISTANT

## 2024-11-05 PROCEDURE — 99214 OFFICE O/P EST MOD 30 MIN: CPT | Performed by: PHYSICIAN ASSISTANT

## 2024-11-05 PROCEDURE — 36415 COLL VENOUS BLD VENIPUNCTURE: CPT | Performed by: PHYSICIAN ASSISTANT

## 2024-11-05 RX ORDER — AMOXICILLIN 500 MG/1
CAPSULE ORAL
Qty: 4 CAPSULE | Refills: 0 | Status: SHIPPED | OUTPATIENT
Start: 2024-11-05

## 2024-11-05 NOTE — PATIENT INSTRUCTIONS
Henrry - it was nice to see you and Santana today!     At your visit today we reviewed:    You're doing well overall!   Reviewed EKG at Pulmonary office and ER showing very slow heart rate (sinus bradycardia 40 bpm)  You've had low heart beats in past, just usually in 50s! This was a lot lower  Concern that low heart rate could cause passing out, more problems with lightheadedness, dizziness      Medication Changes:    NONE    Recommendations:    Go home and check your medications. Call us ([phone]) and let us know if you're taking amlodipine or now. If you're taking it, what dose? We'll update medication    Wear 7 day monitor to assess heart rate   Get blood work today (thyroid) to see if this is a cause of low HR    Follow-up:    See me for cardiology follow up in 1 year but CALL Cardiology nurses [facility] @ [phone] for any issues, questions or concerns in the interim.      To schedule a future appointment, we kindly ask that you call cardiology scheduling at [phone] three months prior to requested visit date.    Important Phone Numbers for [facility]):    [place] [facility]: [phone]  Cardiology [phone]  [place] [facility] [phone]  [facility] [phone]  [facility]: [phone]

## 2024-11-05 NOTE — LETTER
"11/5/2024    [place]  [address] [facility] [address]    RE: Henrry CAROLYN Raymundo       Dear Colleague,     I had the pleasure of seeing Henrry Fonseca in the [facility].  [facility]    I had the pleasure of seeing Henrry when he came for follow up of bradycardia.  This 89 year old sees Dr. 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 Colbert last saw Henrry 5/2022 and reviewed he was doing well following TAVR. D/t anemia, his antiplatelet agents had been stopped. Echo showed good valve function and normal LVEF. Annual follow-up with echo recommended.    He's not been seen since, but Henrry was in the ER 10/10/2024 after being sent there by Pulmonology Clinic for bradycardia. He had c/o chronic BECKFORD and some chest heaviness and EKG showed HR 40s. ER noted EKG with SB 40s. He wasn't on AVN blocking agents. Discharged home and I'm seeing him today.    Today's Visit:  Henrry feels well overall! Roxane Dillon accompanies him and notes overall, things seem to be stable.  Reviewed ER visit for bradycardia. He thinks that he mentioned to Pulmonologist that he had some increasing SOB but hasn't had this since that visit. There was concern that he could have sx'c bradycardia, which is why he was sent to the ER.    At home ([facility]), he uses a walker for short distances (like to go to bathroom) but typically uses a wheelchair. Thinks breathing is \"stable\" given known restrictive lung disease. No CP/pressure/tightness. No dizziness, or lightheadedness.  Denies syncope, presyncope or falling .    Roxane Dillon agrees that things appear to be going well.    Does not think he's taking amlodipine but reviewed " that I don't see he's been taken off of it.    VITALS:  Vitals: BP (!) 158/68 (BP Location: Right arm, Patient Position: Sitting)   Pulse 60   Resp 16   SpO2 97%     Diagnostic Testing:  Echo 3/2023 LVEF 60%. Mild-mod LVH. Nl RV. 1+TR, nol MV, mean gradient AVR 7mmHg with trace PVL. Asc aorta borderline dilated  Event Monitor 3/2021 SB, SR, Wenckebach  Echocardiogram 12/2020 - EF 60-65%. Mild LVH.  Severe AoS with mean gradient 39 mmHg, HALEIGH 1.1 cm2. Mildly dilated root 4.0 cm     Component      Latest Ref Rng 12/6/2023  12:42 PM 10/10/2024  9:49 AM   WBC      4.0 - 11.0 10e3/uL 7.6  6.3    RBC Count      4.40 - 5.90 10e6/uL 4.20 (L)  4.30 (L)    Hemoglobin      13.3 - 17.7 g/dL 12.6 (L)  13.2 (L)    Hematocrit      40.0 - 53.0 % 37.8 (L)  38.3 (L)    MCV      78 - 100 fL 90  89    MCH      26.5 - 33.0 pg 30.0  30.7    MCHC      31.5 - 36.5 g/dL 33.3  34.5    RDW      10.0 - 15.0 % 13.2  13.3    Platelet Count      150 - 450 10e3/uL 188  183      Component      Latest Ref Rng 12/6/2023  12:42 PM 10/10/2024  9:49 AM   Sodium      135 - 145 mmol/L 139  141    Potassium      3.4 - 5.3 mmol/L 4.3  4.3    Chloride      98 - 107 mmol/L 107  106    Carbon Dioxide (CO2)      22 - 29 mmol/L 23  25    Anion Gap      7 - 15 mmol/L 9  10    Urea Nitrogen      8.0 - 23.0 mg/dL 31.8 (H)  20.2    Creatinine      0.67 - 1.17 mg/dL 1.16  1.16    GFR Estimate      >60 mL/min/1.73m2 61  60 (L)    Calcium      8.8 - 10.4 mg/dL 8.4 (L)  8.4 (L)    Glucose      70 - 99 mg/dL 116 (H)  105 (H)         Plan:  7 day ZioPatch (he prefers to have placed here) - we will call and send letter per his request  TSH/T4 reflex for bradycardia  Updated echo for TAVR valve  Follow-up 1 year, earlier if issues      Assessment/Plan:    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 HR in 40s where previously had been in 50s based on EKGs.  Had transient heart block after TAVR in 2021, and AVN blocking agents  reportedly stopped at that time. Confirms no AVN blocking agents on board now. Last Event Monitor 3/2021 without recurrent high degree AVB    Echo 3/2023 with LVEF 60% and normal functioning TAVR valve  Reviewed sxs of increased BECKFORD when at Pulm have not reoccurred    To ER if any severe presyncope, syncope    PLAN:  TSH/T4 reflex.   Updated echo   7 day ZioPatch.     Aortic Stenosis   S/p AVR with 29 mm S3 in 2021  Last echo 2023 with nl functioning valve    PLAN:  Updated echo    The longitudinal plan of care for the diagnosis(es)/condition(s) as documented were addressed during this visit. Due to the added complexity in care, I will continue to support Henrry in the subsequent management and with ongoing continuity of care.        Keli Arenas PA-C, MSPAS      Orders Placed This Encounter   Procedures     ZIO PATCH 3-7 DAYS APPLICATION     TSH with free T4 reflex     ZIO PATCH 3-7 DAYS (additional cost to patient)     Echocardiogram Complete     Orders Placed This Encounter   Medications     amoxicillin (AMOXIL) 500 MG capsule     Sig: TAKE 4 CAPSULES BY MOUTH ONCE FOR ONE DOSE 30 MINUTES BEFORE DENTAL APPOINTMENT     Dispense:  4 capsule     Refill:  0     Medications Discontinued During This Encounter   Medication Reason     amoxicillin (AMOXIL) 500 MG capsule Reorder (No AVS)     amLODIPine (NORVASC) 5 MG tablet Stopped by Patient (No AVS)         Encounter Diagnoses   Name Primary?     Bradycardia      Aortic valve stenosis, etiology of cardiac valve disease unspecified        CURRENT MEDICATIONS:  Current Outpatient Medications   Medication Sig Dispense Refill     acetaminophen (TYLENOL) 500 MG tablet Take 500 mg by mouth every 6 hours as needed for mild pain       albuterol (PROAIR HFA/PROVENTIL HFA/VENTOLIN HFA) 108 (90 Base) MCG/ACT inhaler Inhale 2 puffs into the lungs every 4 hours as needed for shortness of breath. 18 g 3     amoxicillin (AMOXIL) 500 MG capsule TAKE 4 CAPSULES BY MOUTH ONCE FOR ONE DOSE  30 MINUTES BEFORE DENTAL APPOINTMENT 4 capsule 0     atorvastatin (LIPITOR) 20 MG tablet TAKE ONE TABLET BY MOUTH AT BEDTIME 90 tablet 4     ferrous gluconate (FERGON) 324 (38 Fe) MG tablet Take 1 tablet (324 mg) by mouth daily (with breakfast) 90 tablet 4     gabapentin (NEURONTIN) 600 MG tablet TAKE 1 TABLET BY MOUTH IN THE MORNING AND 1 TABLET IN THE EVENING 225 tablet 4     melatonin 3 MG tablet Take 1 tablet (3 mg) by mouth nightly as needed for sleep 90 tablet 0     omeprazole (PRILOSEC) 20 MG DR capsule Take 1 capsule (20 mg) by mouth daily 90 capsule 4       ALLERGIES     Allergies   Allergen Reactions     Keppra Fatigue     Crying very depressed         Review of Systems:  Skin:        Eyes:       ENT:       Respiratory:  Negative shortness of breath;dyspnea on exertion;dyspnea at rest  Cardiovascular:  Negative;chest pain;palpitations;edema;dizziness;lightheadedness;syncope or near-syncope;fatigue    Gastroenterology:      Genitourinary:       Musculoskeletal:       Neurologic:       Psychiatric:       Heme/Lymph/Imm:       Endocrine:         Physical Exam:  Vitals: BP (!) 158/68 (BP Location: Right arm, Patient Position: Sitting)   Pulse 60   Resp 16   SpO2 97%     Constitutional:  cooperative, alert and oriented, well developed, well nourished, in no acute distress        Skin:  warm and dry to the touch, no apparent skin lesions or masses noted        Head:  normocephalic, no masses or lesions        Eyes:  pupils equal and round;sclera white;conjunctivae and lids unremarkable        ENT:  no pallor or cyanosis, dentition good        Neck:  not assessed this visit        Chest:  normal breath sounds, clear to auscultation, normal A-P diameter, normal symmetry, normal respiratory excursion, no use of accessory muscles        Cardiac: regular rhythm bradycardic                Abdomen:  abdomen soft        Vascular: not assessed this visit                                      Extremities and Back:  no  edema;no deformities, clubbing, cyanosis, erythema observed        Neurological:    left sided weakness L hemiplegia        PAST MEDICAL HISTORY:  Past Medical History:   Diagnosis Date     Asbestosis(501)     ct chest8/04     Cardiomegaly     echo8/04  stress echo 2000 normal     Cerebral infarction (H)     june 2020     Closed fracture of unspecified part of humerus      Diverticulosis of colon (without mention of hemorrhage)      Esophageal reflux      Other specified anemias      Other specified iron deficiency anemias      Uncomplicated asthma        PAST SURGICAL HISTORY:  Past Surgical History:   Procedure Laterality Date     COLONOSCOPY  8/18/04     Repeat in 10 years.     CV HEART CATHETERIZATION WITH POSSIBLE INTERVENTION N/A 1/13/2021    Procedure: Heart Catheterization with Possible Intervention;  Surgeon: Dmitriy Frost MD;  Location:  HEART CARDIAC CATH LAB     CV RIGHT HEART CATH MEASUREMENTS RECORDED N/A 1/13/2021    Procedure: Right Heart Cath;  Surgeon: Dmitriy Frost MD;  Location: [facility] CARDIAC CATH LAB     CV TRANSCATHETER AORTIC VALVE REPLACEMENT N/A 1/26/2021    Procedure: Transcatheter Aortic Valve Replacement;  Surgeon: Peggy Colbert MD;  Location:  HEART CARDIAC CATH LAB     ORTHOPEDIC SURGERY      right Fibula repair     PHACOEMULSIFICATION WITH STANDARD INTRAOCULAR LENS IMPLANT  4/7/2011    Procedure:PHACOEMULSIFICATION WITH STANDARD INTRAOCULAR LENS IMPLANT; Surgeon:MJ CHOI; Location:[place] OR     PHACOEMULSIFICATION WITH STANDARD INTRAOCULAR LENS IMPLANT  5/9/2011    Procedure:PHACOEMULSIFICATION WITH STANDARD INTRAOCULAR LENS IMPLANT; Surgeon:MJ CHOI; Location:[facility] OR     SURGICAL HISTORY OF -       vasectomy     SURGICAL HISTORY OF -       appy       FAMILY HISTORY:  Family History   Problem Relation Age of Onset     Cancer Father         lung     Cancer Mother         pancreatic     Alcohol/Drug Son      Alcohol/Drug Son      Cancer Sister          1/2 sister lung cancer       SOCIAL HISTORY:  Social History     Socioeconomic History     Marital status:      Spouse name: None     Number of children: None     Years of education: None     Highest education level: None   Tobacco Use     Smoking status: Never     Passive exposure: Never     Smokeless tobacco: Never   Vaping Use     Vaping status: Never Used   Substance and Sexual Activity     Alcohol use: Yes     Comment: very rare     Drug use: No     Sexual activity: Yes     Partners: Female   Other Topics Concern     Parent/sibling w/ CABG, MI or angioplasty before 65F 55M? No     Social Drivers of Health     Financial Resource Strain: Low Risk  (12/6/2023)    Financial Resource Strain      Within the past 12 months, have you or your family members you live with been unable to get utilities (heat, electricity) when it was really needed?: No   Food Insecurity: Low Risk  (12/6/2023)    Food Insecurity      Within the past 12 months, did you worry that your food would run out before you got money to buy more?: No      Within the past 12 months, did the food you bought just not last and you didn t have money to get more?: No   Transportation Needs: Low Risk  (12/6/2023)    Transportation Needs      Within the past 12 months, has lack of transportation kept you from medical appointments, getting your medicines, non-medical meetings or appointments, work, or from getting things that you need?: No   Interpersonal Safety: Low Risk  (12/6/2023)    Interpersonal Safety      Do you feel physically and emotionally safe where you currently live?: Yes      Within the past 12 months, have you been hit, slapped, kicked or otherwise physically hurt by someone?: No      Within the past 12 months, have you been humiliated or emotionally abused in other ways by your partner or ex-partner?: No   Housing Stability: Low Risk  (12/6/2023)    Housing Stability      Do you have housing? : Yes      Are you worried about losing  your housing?: No       Thank you for allowing me to participate in the care of your patient.      Sincerely,     Amaya Arenas PA-C     [facility]  cc:   Roby Painting MD  [address] [place] [address]

## 2024-11-15 ENCOUNTER — DOCUMENTATION ONLY (OUTPATIENT)
Dept: CARDIOLOGY | Facility: CLINIC | Age: 89
End: 2024-11-15

## 2024-11-15 ENCOUNTER — HOSPITAL ENCOUNTER (OUTPATIENT)
Dept: CARDIOLOGY | Facility: CLINIC | Age: 89
Discharge: HOME | End: 2024-11-15
Attending: PHYSICIAN ASSISTANT | Admitting: PHYSICIAN ASSISTANT
Payer: COMMERCIAL

## 2024-11-15 ENCOUNTER — ALLIED HEALTH/NURSE VISIT (OUTPATIENT)
Dept: CARDIOLOGY | Facility: CLINIC | Age: 89
End: 2024-11-15
Attending: PHYSICIAN ASSISTANT
Payer: COMMERCIAL

## 2024-11-15 DIAGNOSIS — R00.1 BRADYCARDIA: ICD-10-CM

## 2024-11-15 DIAGNOSIS — I35.0 AORTIC VALVE STENOSIS, ETIOLOGY OF CARDIAC VALVE DISEASE UNSPECIFIED: ICD-10-CM

## 2024-11-15 LAB — LVEF ECHO: NORMAL

## 2024-11-15 PROCEDURE — 99207 PR NO CHARGE LOS: CPT

## 2024-11-15 PROCEDURE — 93306 TTE W/DOPPLER COMPLETE: CPT

## 2024-11-15 NOTE — PROGRESS NOTES
Henrry Fonseca arrived here on 11/15/2024 1:22 PM for 3-7 Days  Zio monitor placement per ordering provider Dagoberto for the diagnosis bradycardia.  Patient s skin was prepped per protocol. Dr. Colbert is the supervising MD.  Zio monitor was placed.  Instructions were reviewed with and given to the patient.  Patient verbalized understanding of wear, troubleshooting and monitor return instructions.

## 2024-11-15 NOTE — PROGRESS NOTES
Pls let Henrry know echo looked pretty good!     Normal pumping strength of heart - 60-65%. Still with very thickened heart muscle (LVH), likely d/t h/o AoS  His TAVR valve looks good!    No changes needed before he sees Dr. Loera 1/2025    Adrian Dickey  November 15, 2024 at 3:37 PM

## 2024-11-18 NOTE — PROGRESS NOTES
Called pt to discuss. He was happy to hear about results. Also stated he checked to see if he was taking amlodipine and he isn't. States he doesn't remember why he stopped. Will forward to Keli WILSON for any recommendations. Ani Cope RN Cardiology November 18, 2024, 8:27 AM'

## 2024-11-18 NOTE — PROGRESS NOTES
Thx for update - based on my AVS, was a med rec issue.  Thx for updating!  Keli  November 18, 2024 at 8:48 AM

## 2024-12-02 ENCOUNTER — TELEPHONE (OUTPATIENT)
Dept: CARDIOLOGY | Facility: CLINIC | Age: 89
End: 2024-12-02
Payer: COMMERCIAL

## 2024-12-02 NOTE — TELEPHONE ENCOUNTER
VM received from [employer]:   Critical Ziopatch result  Pt had episode of CHB 33-34 bpm Nov 22 @ 9:11pm (pg16/strip 10)    Preliminary results posted.    Called pt: Pt states he felt fine the entire time he wore the monitor. Denies syncope or lightheadedness.  Will forward to Keli Arenas for review and recommendations.  Rafa CHAPMAN

## 2024-12-03 ENCOUNTER — DOCUMENTATION ONLY (OUTPATIENT)
Dept: CARDIOLOGY | Facility: CLINIC | Age: 89
End: 2024-12-03
Payer: COMMERCIAL

## 2024-12-03 LAB — CV ZIO PRELIM RESULTS: NORMAL

## 2024-12-03 NOTE — PROGRESS NOTES
"Pls let Henrry know that monitor showed episodes of significantly slow heart rate as well as AV Block. It doesn't appear he was sx'c but this could lead to severe lightheadedness and even passing out spells.    Overall HR was SR with avg HR 50 bpm (21--133 bpm). 34 pauses up to 3.7s, mostly at night, ~1 & ~5 in the morning, likely while sleeping.    He also had evidence of high-grade AV Block, 2 episodes @ ~ 9 AM (11/18 and 11/22) and then again ~ 7:45 PM on 11/19.  ** Is it possible that he was asleep at these times? **    PLAN:  Was he likely sleeping/napping at the times of the high-grade AV Block listed above?   If awake, any sxs (none marked) - dizziness, lightheadedness, near-fainting, falling, SOB??  If he was awake during these episodes, I would recommend he be seen to discuss PPM implantation before Dr. Loera's OV 1/2025.  I can see him (\"drive time\" on my schedule 12/17 @ 8:15) ... I can even do a virtual visit if it's easier as I know I just saw he and Santana.  If sleeping during the times of high-degree AV BLock and NO fainting, falling, dizziness, etc, OK to wait to see Dr. Loera 1/2025 as planned.      Adrian Dickey      "

## 2024-12-04 NOTE — PROGRESS NOTES
Routing call to Keli Arenas, PAC-    Called and reviewed results with pt. He states he is completley asymptomatic and has actually been feeling better.   He does wake up early and takes a nap after breakfast around 8;30-9am so was most likely sleeping at the 9am one. He does not believe he was sleeping at the 7:45pm time but is usually sitting in his chair watching the news and then goes to bed around 8:30-9:00pm. But again denies any symptoms.   Reviewed that it looks like he will be needing a PPM and we could see him on 12/17 or wait till 1/2025 to discuss.   Pt states he is feeling great and would like to wait till 1/2025 visit.   Notified pt that if at anytime he becomes symptomatic he needs to call us. Pt verbalized an understanding.     Arti Danielson RN

## 2024-12-04 NOTE — PROGRESS NOTES
Thanks for update. I'm glad he's feeling well.  Agree with recommendation for urgent evaluation for any sxs.    Adrian Dickey  December 4, 2024 at 8:36 AM

## 2024-12-11 ENCOUNTER — OFFICE VISIT (OUTPATIENT)
Dept: FAMILY MEDICINE | Facility: CLINIC | Age: 89
End: 2024-12-11
Payer: COMMERCIAL

## 2024-12-11 VITALS
SYSTOLIC BLOOD PRESSURE: 128 MMHG | OXYGEN SATURATION: 94 % | HEART RATE: 51 BPM | RESPIRATION RATE: 14 BRPM | DIASTOLIC BLOOD PRESSURE: 68 MMHG

## 2024-12-11 DIAGNOSIS — D50.8 OTHER IRON DEFICIENCY ANEMIA: ICD-10-CM

## 2024-12-11 DIAGNOSIS — K21.9 GASTROESOPHAGEAL REFLUX DISEASE WITHOUT ESOPHAGITIS: ICD-10-CM

## 2024-12-11 DIAGNOSIS — R20.9 SENSATION OF COLD IN LEG: ICD-10-CM

## 2024-12-11 DIAGNOSIS — Z00.00 ENCOUNTER FOR MEDICARE ANNUAL WELLNESS EXAM: ICD-10-CM

## 2024-12-11 DIAGNOSIS — R09.89 OTHER SPECIFIED SYMPTOMS AND SIGNS INVOLVING THE CIRCULATORY AND RESPIRATORY SYSTEMS: ICD-10-CM

## 2024-12-11 DIAGNOSIS — G40.909 SEIZURE DISORDER (H): ICD-10-CM

## 2024-12-11 DIAGNOSIS — I10 ESSENTIAL HYPERTENSION, BENIGN: ICD-10-CM

## 2024-12-11 DIAGNOSIS — Z13.220 LIPID SCREENING: Primary | ICD-10-CM

## 2024-12-11 DIAGNOSIS — E83.51 HYPOCALCEMIA: ICD-10-CM

## 2024-12-11 LAB
ANION GAP SERPL CALCULATED.3IONS-SCNC: 11 MMOL/L (ref 7–15)
BASOPHILS # BLD AUTO: 0 10E3/UL (ref 0–0.2)
BASOPHILS NFR BLD AUTO: 0 %
BUN SERPL-MCNC: 32.7 MG/DL (ref 8–23)
CA-I BLD-MCNC: 4.7 MG/DL (ref 4.4–5.2)
CALCIUM SERPL-MCNC: 8.9 MG/DL (ref 8.8–10.4)
CHLORIDE SERPL-SCNC: 108 MMOL/L (ref 98–107)
CHOLEST SERPL-MCNC: 136 MG/DL
CREAT SERPL-MCNC: 1.2 MG/DL (ref 0.67–1.17)
EGFRCR SERPLBLD CKD-EPI 2021: 58 ML/MIN/1.73M2
EOSINOPHIL # BLD AUTO: 0.2 10E3/UL (ref 0–0.7)
EOSINOPHIL NFR BLD AUTO: 2 %
ERYTHROCYTE [DISTWIDTH] IN BLOOD BY AUTOMATED COUNT: 13.2 % (ref 10–15)
FASTING STATUS PATIENT QL REPORTED: NO
FASTING STATUS PATIENT QL REPORTED: NO
GLUCOSE SERPL-MCNC: 72 MG/DL (ref 70–99)
HCO3 SERPL-SCNC: 23 MMOL/L (ref 22–29)
HCT VFR BLD AUTO: 38.9 % (ref 40–53)
HDLC SERPL-MCNC: 64 MG/DL
HGB BLD-MCNC: 12.7 G/DL (ref 13.3–17.7)
IMM GRANULOCYTES # BLD: 0 10E3/UL
IMM GRANULOCYTES NFR BLD: 0 %
IRON BINDING CAPACITY (ROCHE): 263 UG/DL (ref 240–430)
IRON SATN MFR SERPL: 30 % (ref 15–46)
IRON SERPL-MCNC: 80 UG/DL (ref 61–157)
LDLC SERPL CALC-MCNC: 54 MG/DL
LYMPHOCYTES # BLD AUTO: 1.2 10E3/UL (ref 0.8–5.3)
LYMPHOCYTES NFR BLD AUTO: 18 %
MCH RBC QN AUTO: 29.2 PG (ref 26.5–33)
MCHC RBC AUTO-ENTMCNC: 32.6 G/DL (ref 31.5–36.5)
MCV RBC AUTO: 89 FL (ref 78–100)
MONOCYTES # BLD AUTO: 1 10E3/UL (ref 0–1.3)
MONOCYTES NFR BLD AUTO: 16 %
NEUTROPHILS # BLD AUTO: 4.1 10E3/UL (ref 1.6–8.3)
NEUTROPHILS NFR BLD AUTO: 64 %
NONHDLC SERPL-MCNC: 72 MG/DL
PLATELET # BLD AUTO: 197 10E3/UL (ref 150–450)
POTASSIUM SERPL-SCNC: 4.4 MMOL/L (ref 3.4–5.3)
RBC # BLD AUTO: 4.35 10E6/UL (ref 4.4–5.9)
SODIUM SERPL-SCNC: 142 MMOL/L (ref 135–145)
TRIGL SERPL-MCNC: 91 MG/DL
WBC # BLD AUTO: 6.4 10E3/UL (ref 4–11)

## 2024-12-11 PROCEDURE — 80061 LIPID PANEL: CPT | Performed by: FAMILY MEDICINE

## 2024-12-11 PROCEDURE — 85025 COMPLETE CBC W/AUTO DIFF WBC: CPT | Performed by: FAMILY MEDICINE

## 2024-12-11 PROCEDURE — 82330 ASSAY OF CALCIUM: CPT | Performed by: FAMILY MEDICINE

## 2024-12-11 PROCEDURE — 80048 BASIC METABOLIC PNL TOTAL CA: CPT | Performed by: FAMILY MEDICINE

## 2024-12-11 PROCEDURE — 36415 COLL VENOUS BLD VENIPUNCTURE: CPT | Performed by: FAMILY MEDICINE

## 2024-12-11 PROCEDURE — 83540 ASSAY OF IRON: CPT | Performed by: FAMILY MEDICINE

## 2024-12-11 PROCEDURE — 83550 IRON BINDING TEST: CPT | Performed by: FAMILY MEDICINE

## 2024-12-11 RX ORDER — GABAPENTIN 600 MG/1
TABLET ORAL
Qty: 225 TABLET | Refills: 3 | Status: SHIPPED | OUTPATIENT
Start: 2024-12-11

## 2024-12-11 RX ORDER — ATORVASTATIN CALCIUM 20 MG/1
TABLET, FILM COATED ORAL
Qty: 90 TABLET | Refills: 3 | Status: SHIPPED | OUTPATIENT
Start: 2024-12-11

## 2024-12-11 RX ORDER — FERROUS GLUCONATE 324(38)MG
324 TABLET ORAL
Qty: 90 TABLET | Refills: 3 | Status: SHIPPED | OUTPATIENT
Start: 2024-12-11

## 2024-12-11 SDOH — HEALTH STABILITY: PHYSICAL HEALTH: ON AVERAGE, HOW MANY DAYS PER WEEK DO YOU ENGAGE IN MODERATE TO STRENUOUS EXERCISE (LIKE A BRISK WALK)?: 0 DAYS

## 2024-12-11 ASSESSMENT — ASTHMA QUESTIONNAIRES
QUESTION_4 LAST FOUR WEEKS HOW OFTEN HAVE YOU USED YOUR RESCUE INHALER OR NEBULIZER MEDICATION (SUCH AS ALBUTEROL): NOT AT ALL
QUESTION_3 LAST FOUR WEEKS HOW OFTEN DID YOUR ASTHMA SYMPTOMS (WHEEZING, COUGHING, SHORTNESS OF BREATH, CHEST TIGHTNESS OR PAIN) WAKE YOU UP AT NIGHT OR EARLIER THAN USUAL IN THE MORNING: NOT AT ALL
QUESTION_5 LAST FOUR WEEKS HOW WOULD YOU RATE YOUR ASTHMA CONTROL: COMPLETELY CONTROLLED
QUESTION_2 LAST FOUR WEEKS HOW OFTEN HAVE YOU HAD SHORTNESS OF BREATH: ONCE OR TWICE A WEEK
ACT_TOTALSCORE: 24
ACT_TOTALSCORE: 24
QUESTION_1 LAST FOUR WEEKS HOW MUCH OF THE TIME DID YOUR ASTHMA KEEP YOU FROM GETTING AS MUCH DONE AT WORK, SCHOOL OR AT HOME: NONE OF THE TIME

## 2024-12-11 ASSESSMENT — SOCIAL DETERMINANTS OF HEALTH (SDOH): HOW OFTEN DO YOU GET TOGETHER WITH FRIENDS OR RELATIVES?: ONCE A WEEK

## 2024-12-11 NOTE — LETTER
December 11, 2024      Henrry Fonseca  [address] [place] [id number]        Dear Henrry,    We are writing to inform you of your test results.    Blood counts are overall stable-still mild anemia, please continue on your iron supplement.     Resulted Orders   CBC with platelets and differential   Result Value Ref Range    WBC Count 6.4 4.0 - 11.0 10e3/uL    RBC Count 4.35 (L) 4.40 - 5.90 10e6/uL    Hemoglobin 12.7 (L) 13.3 - 17.7 g/dL    Hematocrit 38.9 (L) 40.0 - 53.0 %    MCV 89 78 - 100 fL    MCH 29.2 26.5 - 33.0 pg    MCHC 32.6 31.5 - 36.5 g/dL    RDW 13.2 10.0 - 15.0 %    Platelet Count 197 150 - 450 10e3/uL    % Neutrophils 64 %    % Lymphocytes 18 %    % Monocytes 16 %    % Eosinophils 2 %    % Basophils 0 %    % Immature Granulocytes 0 %    Absolute Neutrophils 4.1 1.6 - 8.3 10e3/uL    Absolute Lymphocytes 1.2 0.8 - 5.3 10e3/uL    Absolute Monocytes 1.0 0.0 - 1.3 10e3/uL    Absolute Eosinophils 0.2 0.0 - 0.7 10e3/uL    Absolute Basophils 0.0 0.0 - 0.2 10e3/uL    Absolute Immature Granulocytes 0.0 <=0.4 10e3/uL       If you have any questions or concerns, please call the clinic at the number listed above.       Sincerely,      Reny Street, DO

## 2024-12-11 NOTE — PATIENT INSTRUCTIONS
Patient Education   Preventive Care Advice   This is general advice given by our system to help you stay healthy. However, your care team may have specific advice just for you. Please talk to your care team about your preventive care needs.  Nutrition  Eat 5 or more servings of fruits and vegetables each day.  Try wheat bread, brown rice and whole grain pasta (instead of white bread, rice, and pasta).  Get enough calcium and vitamin D. Check the label on foods and aim for 100% of the RDA (recommended daily allowance).  Lifestyle  Exercise at least 150 minutes each week  (30 minutes a day, 5 days a week).  Do muscle strengthening activities 2 days a week. These help control your weight and prevent disease.  No smoking.  Wear sunscreen to prevent skin cancer.  Have a dental exam and cleaning every 6 months.  Yearly exams  See your health care team every year to talk about:  Any changes in your health.  Any medicines your care team has prescribed.  Preventive care, family planning, and ways to prevent chronic diseases.  Shots (vaccines)   HPV shots (up to age 26), if you've never had them before.  Hepatitis B shots (up to age 59), if you've never had them before.  COVID-19 shot: Get this shot when it's due.  Flu shot: Get a flu shot every year.  Tetanus shot: Get a tetanus shot every 10 years.  Pneumococcal, hepatitis A, and RSV shots: Ask your care team if you need these based on your risk.  Shingles shot (for age 50 and up)  General health tests  Diabetes screening:  Starting at age 35, Get screened for diabetes at least every 3 years.  If you are younger than age 35, ask your care team if you should be screened for diabetes.  Cholesterol test: At age 39, start having a cholesterol test every 5 years, or more often if advised.  Bone density scan (DEXA): At age 50, ask your care team if you should have this scan for osteoporosis (brittle bones).  Hepatitis C: Get tested at least once in your life.  STIs (sexually  transmitted infections)  Before age 24: Ask your care team if you should be screened for STIs.  After age 24: Get screened for STIs if you're at risk. You are at risk for STIs (including HIV) if:  You are sexually active with more than one person.  You don't use condoms every time.  You or a partner was diagnosed with a sexually transmitted infection.  If you are at risk for HIV, ask about PrEP medicine to prevent HIV.  Get tested for HIV at least once in your life, whether you are at risk for HIV or not.  Cancer screening tests  Cervical cancer screening: If you have a cervix, begin getting regular cervical cancer screening tests starting at age 21.  Breast cancer scan (mammogram): If you've ever had breasts, begin having regular mammograms starting at age 40. This is a scan to check for breast cancer.  Colon cancer screening: It is important to start screening for colon cancer at age 45.  Have a colonoscopy test every 10 years (or more often if you're at risk) Or, ask your provider about stool tests like a FIT test every year or Cologuard test every 3 years.  To learn more about your testing options, visit:   .  For help making a decision, visit:   https://bit.ly/uw82289.  Prostate cancer screening test: If you have a prostate, ask your care team if a prostate cancer screening test (PSA) at age 55 is right for you.  Lung cancer screening: If you are a current or former smoker ages 50 to 80, ask your care team if ongoing lung cancer screenings are right for you.  For informational purposes only. Not to replace the advice of your health care provider. Copyright   2023 [facility]. All rights reserved. Clinically reviewed by the [facility] Transitions [facility]. NORCAT 191573 - REV 01/24.  Preventing Falls: Care Instructions  Injuries and health problems such as trouble walking or poor eyesight can increase your risk of falling. So can some medicines. But there are things you can do to help  "prevent falls. You can exercise to get stronger. You can also arrange your home to make it safer.    Talk to your doctor about the medicines you take. Ask if any of them increase the risk of falls and whether they can be changed or stopped.   Try to exercise regularly. It can help improve your strength and balance. This can help lower your risk of falling.         Practice fall safety and prevention.   Wear low-heeled shoes that fit well and give your feet good support. Talk to your doctor if you have foot problems that make this hard.  Carry a cellphone or wear a medical alert device that you can use to call for help.  Use stepladders instead of chairs to reach high objects. Don't climb if you're at risk for falls. Ask for help, if needed.  Wear the correct eyeglasses, if you need them.        Make your home safer.   Remove rugs, cords, clutter, and furniture from walkways.  Keep your house well lit. Use night-lights in hallways and bathrooms.  Install and use sturdy handrails on stairways.  Wear nonskid footwear, even inside. Don't walk barefoot or in socks without shoes.        Be safe outside.   Use handrails, curb cuts, and ramps whenever possible.  Keep your hands free by using a shoulder bag or backpack.  Try to walk in well-lit areas. Watch out for uneven ground, changes in pavement, and debris.  Be careful in the winter. Walk on the grass or gravel when sidewalks are slippery. Use de-icer on steps and walkways. Add non-slip devices to shoes.    Put grab bars and nonskid mats in your shower or tub and near the toilet. Try to use a shower chair or bath bench when bathing.   Get into a tub or shower by putting in your weaker leg first. Get out with your strong side first. Have a phone or medical alert device in the bathroom with you.   Where can you learn more?  Go to https://www.[employer].net/patiented  Enter G117 in the search box to learn more about \"Preventing Falls: Care Instructions.\"  Current as of: " July 17, 2023  Content Version: 14.2 2024 [facility] [employer].   Care instructions adapted under license by your healthcare professional. If you have questions about a medical condition or this instruction, always ask your healthcare professional. [employer] disclaims any warranty or liability for your use of this information.    Hearing Loss: Care Instructions  Overview     Hearing loss is a sudden or slow decrease in how well you hear. It can range from slight to profound. Permanent hearing loss can occur with aging. It also can happen when you are exposed long-term to loud noise. Examples include listening to loud music, riding motorcycles, or being around other loud machines.  Hearing loss can affect your work and home life. It can make you feel lonely or depressed. You may feel that you have lost your independence. But hearing aids and other devices can help you hear better and feel connected to others.  Follow-up care is a key part of your treatment and safety. Be sure to make and go to all appointments, and call your doctor if you are having problems. It's also a good idea to know your test results and keep a list of the medicines you take.  How can you care for yourself at home?  Avoid loud noises whenever possible. This helps keep your hearing from getting worse.  Always wear hearing protection around loud noises.  Wear a hearing aid as directed.  A professional can help you pick a hearing aid that will work best for you.  You can also get hearing aids over the counter for mild to moderate hearing loss.  Have hearing tests as your doctor suggests. They can show whether your hearing has changed. Your hearing aid may need to be adjusted.  Use other devices as needed. These may include:  Telephone amplifiers and hearing aids that can connect to a television, stereo, radio, or microphone.  Devices that use lights or vibrations. These alert you to the doorbell, a ringing telephone, or a baby  "monitor.  Television closed-captioning. This shows the words at the bottom of the screen. Most new TVs can do this.  TTY (text telephone). This lets you type messages back and forth on the telephone instead of talking or listening. These devices are also called TDD. When messages are typed on the keyboard, they are sent over the phone line to a receiving TTY. The message is shown on a monitor.  Use text messaging, social media, and email if it is hard for you to communicate by telephone.  Try to learn a listening technique called speechreading. It is not lipreading. You pay attention to people's gestures, expressions, posture, and tone of voice. These clues can help you understand what a person is saying. Face the person you are talking to, and have them face you. Make sure the lighting is good. You need to see the other person's face clearly.  Think about counseling if you need help to adjust to your hearing loss.  When should you call for help?  Watch closely for changes in your health, and be sure to contact your doctor if:    You think your hearing is getting worse.     You have new symptoms, such as dizziness or nausea.   Where can you learn more?  Go to https://www.[employer].net/patiented  Enter R798 in the search box to learn more about \"Hearing Loss: Care Instructions.\"  Current as of: September 27, 2023  Content Version: 14.2 2024 [facility] [employer].   Care instructions adapted under license by your healthcare professional. If you have questions about a medical condition or this instruction, always ask your healthcare professional. Healthwise[employer] Incorporated disclaims any warranty or liability for your use of this information.       "

## 2024-12-11 NOTE — PROGRESS NOTES
Preventive Care Visit  [facility]  FARRUKH MENA DO, Family Medicine  Dec 11, 2024  {Provider  Link to SmartSet :902117}    {PROVIDER CHARTING PREFERENCE:658082}    Tarun Sales is a 89 year old, presenting for the following:  Wellness Visit        12/11/2024    10:08 AM   Additional Questions   Roomed by Angela SÁNCHEZ MA   Accompanied by      {ROOMER if patient is in their first year of Medicare a vision screen is required click here to document the Vison screen and then refresh the note to pull in results  :912739}      HPI    Question on Zio patch that was ordered by cardiology.  Didn't understand the result. Does have an appointment on 1/7/25 with cardiology    Amlodopine- Was asked by cardiology if he was taking it or not.  He couldn't remember at the time of his visit, but he is not taking it.            Health Care Directive  Patient has a Health Care Directive on file  {(AWV REQUIRED) Advance Care Planning Reviewed:435242}      12/11/2024   General Health   How would you rate your overall physical health? Good   Feel stress (tense, anxious, or unable to sleep) Not at all            12/11/2024   Nutrition   Diet: Regular (no restrictions)            12/11/2024   Exercise   Days per week of moderate/strenous exercise 0 days      (!) EXERCISE CONCERN      12/11/2024   Social Factors   Frequency of gathering with friends or relatives Once a week   Worry food won't last until get money to buy more No   Food not last or not have enough money for food? No   Do you have housing? (Housing is defined as stable permanent housing and does not include staying ouside in a car, in a tent, in an abandoned building, in an overnight shelter, or couch-surfing.) Yes   Are you worried about losing your housing? No   Lack of transportation? No   Unable to get utilities (heat,electricity)? No            12/11/2024   Fall Risk   Fallen 2 or more times in the past year? No    Trouble with walking or  balance? Yes    Reason Gait Speed Test Not Completed Patient does not tolerate an upright or standing position (e.g. wheelchair)       Patient-reported          12/11/2024   Activities of Daily Living- Home Safety   Needs help with the following daily activites None of the above   Safety concerns in the home None of the above            12/11/2024   Dental   Dentist two times every year? Yes            12/11/2024   Hearing Screening   Hearing concerns? (!) I NEED TO ASK PEOPLE TO SPEAK UP OR REPEAT THEMSELVES.    (!) TROUBLE UNDERSTANDING SOFT OR WHISPERED SPEECH.       Multiple values from one day are sorted in reverse-chronological order         12/11/2024   Driving Risk Screening   Patient/family members have concerns about driving No            12/11/2024   General Alertness/Fatigue Screening   Have you been more tired than usual lately? No            12/11/2024   Urinary Incontinence Screening   Bothered by leaking urine in past 6 months No            12/11/2024   TB Screening   Were you born outside of the [place]? No            Today's PHQ-2 Score:       12/11/2024     9:58 AM   PHQ-2 ( 1999 Pf[employer])   Q1: Little interest or pleasure in doing things 0    Q2: Feeling down, depressed or hopeless 0    PHQ-2 Score 0    Q1: Little interest or pleasure in doing things Not at all   Q2: Feeling down, depressed or hopeless Not at all   PHQ-2 Score 0       Patient-reported           12/11/2024   Substance Use   Alcohol more than 3/day or more than 7/wk No   Do you have a current opioid prescription? No   How severe/bad is pain from 1 to 10? 0/10 (No Pain)   Do you use any other substances recreationally? No        Social History     Tobacco Use    Smoking status: Never     Passive exposure: Never    Smokeless tobacco: Never   Vaping Use    Vaping status: Never Used   Substance Use Topics    Alcohol use: Yes     Comment: very rare    Drug use: No     {Provider  If there are gaps in the social history shown above, please  follow the link to update and then refresh the note Link to Social and Substance History :054159}    {Provider  REQUIRED FOR AWV Use the storyboard to review patient history, after sections have been marked as reviewed, refresh note to capture documentation:545630}  {Provider   REQUIRED AWV use this link to review and update sexual activity history  after section has been marked as reviewed, refresh note to capture documentation:243667}  Reviewed and updated as needed this visit by Provider                    {HISTORY OPTIONS (Optional):578914}  Current providers sharing in care for this patient include:  Patient Care Team:  Reny Street DO as PCP - General (Family Medicine)  Peggy Colbert MD as MD (Cardiovascular Disease)  Reny Street DO as Assigned PCP  Christina Bolton MD as Assigned Pulmonology Provider  Michelle Nicole APRN CNP as Nurse Practitioner (Cardiovascular Disease)  Amaya Arenas PA-C as Assigned Heart and Vascular Provider    The following health maintenance items are reviewed in Epic and correct as of today:  Health Maintenance   Topic Date Due    RSV VACCINE (1 - 1-dose 75+ series) Never done    ZOSTER IMMUNIZATION (2 of 3) 01/29/2013    DTAP/TDAP/TD IMMUNIZATION (3 - Td or Tdap) 12/04/2022    LIPID  03/17/2024    INFLUENZA VACCINE (1) 09/01/2024    COVID-19 Vaccine (6 - 2024-25 season) 09/01/2024    ANNUAL REVIEW OF HM ORDERS  12/06/2024    MEDICARE ANNUAL WELLNESS VISIT  12/06/2024    ASTHMA ACTION PLAN  12/06/2024    ASTHMA CONTROL TEST  06/11/2025    BMP  10/10/2025    FALL RISK ASSESSMENT  12/11/2025    ADVANCE CARE PLANNING  12/17/2028    PHQ-2 (once per calendar year)  Completed    Pneumococcal Vaccine: 65+ Years  Completed    HPV IMMUNIZATION  Aged Out    MENINGITIS IMMUNIZATION  Aged Out    RSV MONOCLONAL ANTIBODY  Aged Out       {ROS Picklists (Optional):201762}     Objective    Exam  /68 (BP Location: Left arm, Patient  "Position: Chair, Cuff Size: Adult Regular)   Pulse 51   Resp 14   SpO2 94%    Estimated body mass index is 26.16 kg/m  as calculated from the following:    Height as of 10/10/24: 1.676 m (5' 5.98\").    Weight as of 10/10/24: 73.5 kg (162 lb).    Physical Exam  {Exam Choices (Optional):677270}  {Provider  The Mini-Cog is incomplete, use link to complete and refresh note Link to Mini-Cog :880631}       No data to display              {A Mini-Cog total score of 0-2 suggests the possibility of dementia, score of 3-5 suggests no dementia:550253}         Signed Electronically by: FARRUKH MENA DO  {Email feedback regarding this note to [email or web address]   :030084}  "

## 2024-12-11 NOTE — LETTER
December 11, 2024      Henrry Fonseca  [address] [place] [id number]        Dear ,    We are writing to inform you of your test results.    Electrolytes are stable.  Cholesterol levels look good iron levels have improved. Calcium level is normal.     Resulted Orders   Lipid panel reflex to direct LDL Non-fasting   Result Value Ref Range    Cholesterol 136 <200 mg/dL    Triglycerides 91 <150 mg/dL    Direct Measure HDL 64 >=40 mg/dL    LDL Cholesterol Calculated 54 <100 mg/dL    Non HDL Cholesterol 72 <130 mg/dL    Patient Fasting > 8hrs? No     Narrative    Cholesterol  Desirable: < 200 mg/dL  Borderline High: 200 - 239 mg/dL  High: >= 240 mg/dL    Triglycerides  Normal: < 150 mg/dL  Borderline High: 150 - 199 mg/dL  High: 200-499 mg/dL  Very High: >= 500 mg/dL    Direct Measure HDL  Female: >= 50 mg/dL   Male: >= 40 mg/dL    LDL Cholesterol  Desirable: < 100 mg/dL  Above Desirable: 100 - 129 mg/dL   Borderline High: 130 - 159 mg/dL   High:  160 - 189 mg/dL   Very High: >= 190 mg/dL    Non HDL Cholesterol  Desirable: < 130 mg/dL  Above Desirable: 130 - 159 mg/dL  Borderline High: 160 - 189 mg/dL  High: 190 - 219 mg/dL  Very High: >= 220 mg/dL   Ionized Calcium   Result Value Ref Range    Calcium Ionized Whole Blood 4.7 4.4 - 5.2 mg/dL   Basic metabolic panel  (Ca, Cl, CO2, Creat, Gluc, K, Na, BUN)   Result Value Ref Range    Sodium 142 135 - 145 mmol/L    Potassium 4.4 3.4 - 5.3 mmol/L    Chloride 108 (H) 98 - 107 mmol/L    Carbon Dioxide (CO2) 23 22 - 29 mmol/L    Anion Gap 11 7 - 15 mmol/L    Urea Nitrogen 32.7 (H) 8.0 - 23.0 mg/dL    Creatinine 1.20 (H) 0.67 - 1.17 mg/dL    GFR Estimate 58 (L) >60 mL/min/1.73m2      Comment:      eGFR calculated using 2021 CKD-EPI equation.    Calcium 8.9 8.8 - 10.4 mg/dL      Comment:      Reference intervals for this test were updated on 7/16/2024 to reflect our healthy population more accurately. There may be differences in the flagging of prior results with  similar values performed with this method. Those prior results can be interpreted in the context of the updated reference intervals.    Glucose 72 70 - 99 mg/dL    Patient Fasting > 8hrs? No    Iron and iron binding capacity   Result Value Ref Range    Iron 80 61 - 157 ug/dL    Iron Binding Capacity 263 240 - 430 ug/dL    Iron Sat Index 30 15 - 46 %   CBC with platelets and differential   Result Value Ref Range    WBC Count 6.4 4.0 - 11.0 10e3/uL    RBC Count 4.35 (L) 4.40 - 5.90 10e6/uL    Hemoglobin 12.7 (L) 13.3 - 17.7 g/dL    Hematocrit 38.9 (L) 40.0 - 53.0 %    MCV 89 78 - 100 fL    MCH 29.2 26.5 - 33.0 pg    MCHC 32.6 31.5 - 36.5 g/dL    RDW 13.2 10.0 - 15.0 %    Platelet Count 197 150 - 450 10e3/uL    % Neutrophils 64 %    % Lymphocytes 18 %    % Monocytes 16 %    % Eosinophils 2 %    % Basophils 0 %    % Immature Granulocytes 0 %    Absolute Neutrophils 4.1 1.6 - 8.3 10e3/uL    Absolute Lymphocytes 1.2 0.8 - 5.3 10e3/uL    Absolute Monocytes 1.0 0.0 - 1.3 10e3/uL    Absolute Eosinophils 0.2 0.0 - 0.7 10e3/uL    Absolute Basophils 0.0 0.0 - 0.2 10e3/uL    Absolute Immature Granulocytes 0.0 <=0.4 10e3/uL       If you have any questions or concerns, please call the clinic at the number listed above.       Sincerely,      Reny Street, DO

## 2024-12-31 ENCOUNTER — HOSPITAL ENCOUNTER (OUTPATIENT)
Dept: ULTRASOUND IMAGING | Facility: CLINIC | Age: 89
Discharge: HOME | End: 2024-12-31
Attending: FAMILY MEDICINE
Payer: COMMERCIAL

## 2024-12-31 DIAGNOSIS — R20.9 SENSATION OF COLD IN LEG: ICD-10-CM

## 2024-12-31 DIAGNOSIS — R09.89 OTHER SPECIFIED SYMPTOMS AND SIGNS INVOLVING THE CIRCULATORY AND RESPIRATORY SYSTEMS: ICD-10-CM

## 2024-12-31 PROCEDURE — 93922 UPR/L XTREMITY ART 2 LEVELS: CPT

## 2025-01-07 ENCOUNTER — OFFICE VISIT (OUTPATIENT)
Dept: CARDIOLOGY | Facility: CLINIC | Age: OVER 89
End: 2025-01-07
Attending: FAMILY MEDICINE
Payer: COMMERCIAL

## 2025-01-07 VITALS
OXYGEN SATURATION: 94 % | WEIGHT: 160 LBS | SYSTOLIC BLOOD PRESSURE: 163 MMHG | BODY MASS INDEX: 25.84 KG/M2 | DIASTOLIC BLOOD PRESSURE: 62 MMHG | HEART RATE: 46 BPM

## 2025-01-07 DIAGNOSIS — I44.2 INTERMITTENT COMPLETE HEART BLOCK (H): Primary | ICD-10-CM

## 2025-01-07 DIAGNOSIS — I44.1 2ND DEGREE AV BLOCK: ICD-10-CM

## 2025-01-07 NOTE — PATIENT INSTRUCTIONS
Today's Recommendations    Please contact us if you would like to move forward with pacemaker implant       Please send Vimty message or call [phone] for the pacemaker nurses with questions or concerns.     Scheduling and after hours number [phone]

## 2025-01-07 NOTE — PROGRESS NOTES
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  He follows with Dr. Colbert in cardiology clinic.  He most recently saw Keli Arenas in clinic on 11/5/2024.  He had been seen in the ED in 10/2024 after being sent there from pulmonology clinic for bradycardia.  His EKG at that time showed sinus bradycardia in the 40s.  He had a 7-day Zio patch monitor which showed nocturnal pauses up to 3.7 seconds due to transient complete heart block.  He did also have episodes of 2-1 AV block during the daytime.  He is not on any AV annamaria agents.  He lives at [facility] in [place].       Today's Visit:  He is accompanied by his wife's son.  He has not noticed any significant symptoms from the slow heart rates.  He does feel a little bit more tired and has some dyspnea on exertion when he is walking, but notes that he does not walk very much.  He does walk with a walker or cane at home, but otherwise uses a wheelchair.  No dizziness, syncope or recent falls.  No chest pain.  No recent illnesses or medication changes.    He is reluctant to have a procedure done at this time because he feels good in general and states he is almost 89 years old.        Diagnostic Testing:  EKG 10/10/2024-sinus rhythm with second-degree Mobitz 1 AV block, rate 47 bpm.  , QTc 42 ms.  EKG 3/30/2023-sinus bradycardia with first-degree AV block, rate 54 bpm.  , , QTc 444 ms  EKG 1/27/2022-sinus rhythm with first-degree AV block, right 57 bpm.  LAFB.  , , QTc 417 ms.      Zio patch monitor 11/2024-  personally reviewed  7-day monitor  Baseline sinus rhythm with heart rates ranging 46-93, average 53 bpm  1 run of NSVT for 8 beats.  PVC burden 3.3%  34 pauses lasting up to 3.7 seconds due to transient complete heart block, which were all at night  Occasional second-degree Mobitz 1 and 2-1 AV block which did occur during the daytime    Echocardiogram 11/15/2024  Left ventricular systolic function is normal.The visual ejection fraction is  60-65%.There is moderate to severe concentric left ventricular hypertrophy.  The right ventricular systolic function is normal.  s/p TAVR with 29mm Goldstein Vamshi 3.There is mild (1+) aortic  regurgitation.Mean gradients across prosthetic aortic valve 6 mmHgThe gradient  is normal for this prosthetic aortic valve.  The inferior vena cava was normal in size with preserved respiratory variability.  Echo dated 03/20/2023 mean gradients across prosthetic aortic valve 7 mm hg.      Cardiac Catheterization 1/13/2021   1. Mild coronary artery disease  2. Normal filling pressures      Last Comprehensive Metabolic Panel:  Lab Results   Component Value Date     12/11/2024    POTASSIUM 4.4 12/11/2024    CHLORIDE 108 (H) 12/11/2024    CO2 23 12/11/2024    ANIONGAP 11 12/11/2024    GLC 72 12/11/2024    BUN 32.7 (H) 12/11/2024    CR 1.20 (H) 12/11/2024    GFRESTIMATED 58 (L) 12/11/2024    SUNDAY 8.9 12/11/2024        Magnesium   Date Value Ref Range Status   01/27/2021 2.3 1.6 - 2.3 mg/dL Final        TSH   Date Value Ref Range Status   11/05/2024 1.92 0.30 - 4.20 uIU/mL Final   10/07/2010 1.85 0.4 - 5.0 mU/L Final          Assessment/Plan:    89 year old  male with history of hypertension, hyperlipidemia, restrictive lung disease 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  His echocardiogram showed preserved LV function and appropriate valve function.  His EKGs over  the past few years have all shown a long first-degree AV block or second-degree Mobitz 1 AV block.  His recent Zio patch monitor did show nocturnal pauses due to transient complete heart block as well as intermittent 2-1 AV block.  We discussed the findings on his recent EKGs and monitor.  I explained that some of his fatigue and shortness of breath may be related to the slow heart rhythm.  I also explained our concern that he could have worsening AV block that could lead to dizziness or syncope and risk of falling.  We discussed treatment options, specifically pacemaker implant.  I explained the purpose of the device and the implant procedure in detail including risks and benefits.  He stated understanding of the risks but remains reluctant to have pacemaker implant.  He will think about it and discuss it further with his family.  We discussed symptoms to watch out for, and I encouraged him to contact us if he has any worsening symptoms or other concerns.    Okay to schedule pacemaker implant if patient decides to proceed      Romario Loera MD        Orders this Visit:  No orders of the defined types were placed in this encounter.    No orders of the defined types were placed in this encounter.    Medications Discontinued During This Encounter   Medication Reason    amoxicillin (AMOXIL) 500 MG capsule     albuterol (PROAIR HFA/PROVENTIL HFA/VENTOLIN HFA) 108 (90 Base) MCG/ACT inhaler     melatonin 3 MG tablet        Encounter Diagnoses   Name Primary?    Intermittent complete heart block (H) Yes    2nd degree AV block      Today's clinic visit entailed:  Review of the result(s) of each unique test - Echo  The following tests were independently interpreted by me as noted in my documentation: EKG, Holter  40 minutes spent by me on the date of the encounter doing chart review, history and exam, documentation and further activities per the note      CURRENT MEDICATIONS:  Current Outpatient Medications   Medication Sig  Dispense Refill    acetaminophen (TYLENOL) 500 MG tablet Take 500 mg by mouth every 6 hours as needed for mild pain      atorvastatin (LIPITOR) 20 MG tablet TAKE ONE TABLET BY MOUTH AT BEDTIME 90 tablet 3    ferrous gluconate (FERGON) 324 (38 Fe) MG tablet Take 1 tablet (324 mg) by mouth daily (with breakfast). 90 tablet 3    gabapentin (NEURONTIN) 600 MG tablet TAKE 1 TABLET BY MOUTH IN THE MORNING AND 1 TABLET IN THE EVENING 225 tablet 3    omeprazole (PRILOSEC) 20 MG DR capsule Take 1 capsule (20 mg) by mouth daily. 90 capsule 3       Review of Systems:  Pertinent review of system as stated above in HPI    Skin:        Eyes:       ENT:       Respiratory:  Negative shortness of breath, dyspnea on exertion, cough, wheezing, CPAP, sleep apnea, dyspnea at rest  Cardiovascular:  Negative, palpitations, chest pain, edema, syncope or near-syncope, lightheadedness, dizziness fatigue  Gastroenterology:      Genitourinary:       Musculoskeletal:       Neurologic:       Psychiatric:       Heme/Lymph/Imm:       Endocrine:         Physical Exam:  Vitals: BP (!) 163/62   Pulse (!) 46   Wt 72.6 kg (160 lb)   SpO2 94%   BMI 25.84 kg/m      Constitutional: Pleasant, no apparent distress.  Respiratory: Breathing non-labored.  Cardiovascular: bradycardic  Neurologic: Alert, awake, and oriented   Psychiatric: Affect appropriate.        ALLERGIES  Allergies   Allergen Reactions    Keppra Fatigue     Crying very depressed       PAST MEDICAL HISTORY:  Past Medical History:   Diagnosis Date    Asbestosis(501)     ct chest8/04    Cardiomegaly     echo8/04  stress echo 2000 normal    Cerebral infarction (H)     june 2020    Closed fracture of unspecified part of humerus     Diverticulosis of colon (without mention of hemorrhage)     Esophageal reflux     Other specified anemias     Other specified iron deficiency anemias     Uncomplicated asthma        PAST SURGICAL HISTORY:  Past Surgical History:   Procedure Laterality Date     COLONOSCOPY  8/18/04     Repeat in 10 years.    CV HEART CATHETERIZATION WITH POSSIBLE INTERVENTION N/A 1/13/2021    Procedure: Heart Catheterization with Possible Intervention;  Surgeon: Dmitriy Frost MD;  Location:  HEART CARDIAC CATH LAB    CV RIGHT HEART CATH MEASUREMENTS RECORDED N/A 1/13/2021    Procedure: Right Heart Cath;  Surgeon: Dmitriy Frost MD;  Location:  HEART CARDIAC CATH LAB    CV TRANSCATHETER AORTIC VALVE REPLACEMENT N/A 1/26/2021    Procedure: Transcatheter Aortic Valve Replacement;  Surgeon: Peggy Colbert MD;  Location:  HEART CARDIAC CATH LAB    ORTHOPEDIC SURGERY      right Fibula repair    PHACOEMULSIFICATION WITH STANDARD INTRAOCULAR LENS IMPLANT  4/7/2011    Procedure:PHACOEMULSIFICATION WITH STANDARD INTRAOCULAR LENS IMPLANT; Surgeon:MJ CHOI; [place]    PHACOEMULSIFICATION WITH STANDARD INTRAOCULAR LENS IMPLANT  5/9/2011    Procedure:PHACOEMULSIFICATION WITH STANDARD INTRAOCULAR LENS IMPLANT; Surgeon:MJ CHOI; [place]    SURGICAL HISTORY OF -       vasectomy    SURGICAL HISTORY OF -       appy       FAMILY HISTORY:  Family History   Problem Relation Age of Onset    Cancer Father         lung    Cancer Mother         pancreatic    Alcohol/Drug Son     Alcohol/Drug Son     Cancer Sister         1/2 sister lung cancer       SOCIAL HISTORY:  Social History     Socioeconomic History    Marital status:      Spouse name: None    Number of children: None    Years of education: None    Highest education level: None   Tobacco Use    Smoking status: Never     Passive exposure: Never    Smokeless tobacco: Never   Vaping Use    Vaping status: Never Used   Substance and Sexual Activity    Alcohol use: Yes     Comment: very rare    Drug use: No    Sexual activity: Yes     Partners: Female   Other Topics Concern    Parent/sibling w/ CABG, MI or angioplasty before 65F 55M? No     Social Drivers of Health     Financial Resource Strain: Low  Risk  (12/11/2024)    Financial Resource Strain     Within the past 12 months, have you or your family members you live with been unable to get utilities (heat, electricity) when it was really needed?: No   Food Insecurity: Low Risk  (12/11/2024)    Food Insecurity     Within the past 12 months, did you worry that your food would run out before you got money to buy more?: No     Within the past 12 months, did the food you bought just not last and you didn t have money to get more?: No   Transportation Needs: Low Risk  (12/11/2024)    Transportation Needs     Within the past 12 months, has lack of transportation kept you from medical appointments, getting your medicines, non-medical meetings or appointments, work, or from getting things that you need?: No   Physical Activity: Unknown (12/11/2024)    Exercise Vital Sign     Days of Exercise per Week: 0 days   Stress: No Stress Concern Present (12/11/2024)    [place] [employer] - Occupational Stress Questionnaire     Feeling of Stress : Not at all   Social Connections: Unknown (12/11/2024)    Social Connection and Isolation Panel [NHANES]     Frequency of Social Gatherings with Friends and Family: Once a week   Interpersonal Safety: Low Risk  (12/6/2023)    Interpersonal Safety     Do you feel physically and emotionally safe where you currently live?: Yes     Within the past 12 months, have you been hit, slapped, kicked or otherwise physically hurt by someone?: No     Within the past 12 months, have you been humiliated or emotionally abused in other ways by your partner or ex-partner?: No   Housing Stability: Low Risk  (12/11/2024)    Housing Stability     Do you have housing? : Yes     Are you worried about losing your housing?: No             [place]  [address] [place] [address]

## 2025-01-07 NOTE — LETTER
1/7/2025    FARRUKH MENA [place]  [id number] [facility] [id number]    RE: Henrry Fonseca       Dear Colleague,     I had the pleasure of seeing Henrry Fonseca in the [facility].  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  He follows with Dr. Colbert in cardiology clinic.  He most recently saw Keli Arenas in clinic on 11/5/2024.  He had been seen in the ED in 10/2024 after being sent there from pulmonology clinic for bradycardia.  His EKG at that time showed sinus bradycardia in the 40s.  He had a 7-day Zio patch monitor which showed nocturnal pauses up to 3.7 seconds due to transient complete heart block.  He did also have episodes of 2-1 AV block during the daytime.  He is not on any AV annamaria agents.  He lives at [facility] in [place].       Today's Visit:  He is accompanied by his wife's son.  He has not noticed any significant symptoms from the slow heart rates.  He does feel a little bit more tired and has some dyspnea on exertion when he is walking, but notes that he does not walk very much.  He does walk with a walker or cane at home, but otherwise uses a wheelchair.  No dizziness, syncope or recent falls.  No chest pain.  No recent illnesses or medication changes.    He is reluctant to have a procedure done at this time because he feels good in general and states he is almost 89 years old.        Diagnostic Testing:  EKG 10/10/2024-sinus rhythm with second-degree Mobitz 1 AV block, rate 47 bpm.  , QTc 42 ms.  EKG 3/30/2023-sinus bradycardia with  first-degree AV block, rate 54 bpm.  , , QTc 444 ms  EKG 1/27/2022-sinus rhythm with first-degree AV block, right 57 bpm.  LAFB.  , , QTc 417 ms.      Zio patch monitor 11/2024- personally reviewed  7-day monitor  Baseline sinus rhythm with heart rates ranging 46-93, average 53 bpm  1 run of NSVT for 8 beats.  PVC burden 3.3%  34 pauses lasting up to 3.7 seconds due to transient complete heart block, which were all at night  Occasional second-degree Mobitz 1 and 2-1 AV block which did occur during the daytime    Echocardiogram 11/15/2024  Left ventricular systolic function is normal.The visual ejection fraction is  60-65%.There is moderate to severe concentric left ventricular hypertrophy.  The right ventricular systolic function is normal.  s/p TAVR with 29mm Goldstein Vamshi 3.There is mild (1+) aortic  regurgitation.Mean gradients across prosthetic aortic valve 6 mmHgThe gradient  is normal for this prosthetic aortic valve.  The inferior vena cava was normal in size with preserved respiratory variability.  Echo dated 03/20/2023 mean gradients across prosthetic aortic valve 7 mm hg.      Cardiac Catheterization 1/13/2021   1. Mild coronary artery disease  2. Normal filling pressures      Last Comprehensive Metabolic Panel:  Lab Results   Component Value Date     12/11/2024    POTASSIUM 4.4 12/11/2024    CHLORIDE 108 (H) 12/11/2024    CO2 23 12/11/2024    ANIONGAP 11 12/11/2024    GLC 72 12/11/2024    BUN 32.7 (H) 12/11/2024    CR 1.20 (H) 12/11/2024    GFRESTIMATED 58 (L) 12/11/2024    SUNDAY 8.9 12/11/2024        Magnesium   Date Value Ref Range Status   01/27/2021 2.3 1.6 - 2.3 mg/dL Final        TSH   Date Value Ref Range Status   11/05/2024 1.92 0.30 - 4.20 uIU/mL Final   10/07/2010 1.85 0.4 - 5.0 mU/L Final          Assessment/Plan:    89 year old  male with history of hypertension, hyperlipidemia, restrictive lung disease with asbestos exposure, hemorrhagic CVA with residual  left-sided hemiplegia/hemiparesis (5/2020), aortic stenosis s/p TAVR (2021) with transient complete heart block.  His echocardiogram showed preserved LV function and appropriate valve function.  His EKGs over the past few years have all shown a long first-degree AV block or second-degree Mobitz 1 AV block.  His recent Zio patch monitor did show nocturnal pauses due to transient complete heart block as well as intermittent 2-1 AV block.  We discussed the findings on his recent EKGs and monitor.  I explained that some of his fatigue and shortness of breath may be related to the slow heart rhythm.  I also explained our concern that he could have worsening AV block that could lead to dizziness or syncope and risk of falling.  We discussed treatment options, specifically pacemaker implant.  I explained the purpose of the device and the implant procedure in detail including risks and benefits.  He stated understanding of the risks but remains reluctant to have pacemaker implant.  He will think about it and discuss it further with his family.  We discussed symptoms to watch out for, and I encouraged him to contact us if he has any worsening symptoms or other concerns.    Okay to schedule pacemaker implant if patient decides to proceed      Romario Loera MD        Orders this Visit:  No orders of the defined types were placed in this encounter.    No orders of the defined types were placed in this encounter.    Medications Discontinued During This Encounter   Medication Reason     amoxicillin (AMOXIL) 500 MG capsule      albuterol (PROAIR HFA/PROVENTIL HFA/VENTOLIN HFA) 108 (90 Base) MCG/ACT inhaler      melatonin 3 MG tablet        Encounter Diagnoses   Name Primary?     Intermittent complete heart block (H) Yes     2nd degree AV block      Today's clinic visit entailed:  Review of the result(s) of each unique test - Echo  The following tests were independently interpreted by me as noted in my documentation: EKG, Holter  40  minutes spent by me on the date of the encounter doing chart review, history and exam, documentation and further activities per the note      CURRENT MEDICATIONS:  Current Outpatient Medications   Medication Sig Dispense Refill     acetaminophen (TYLENOL) 500 MG tablet Take 500 mg by mouth every 6 hours as needed for mild pain       atorvastatin (LIPITOR) 20 MG tablet TAKE ONE TABLET BY MOUTH AT BEDTIME 90 tablet 3     ferrous gluconate (FERGON) 324 (38 Fe) MG tablet Take 1 tablet (324 mg) by mouth daily (with breakfast). 90 tablet 3     gabapentin (NEURONTIN) 600 MG tablet TAKE 1 TABLET BY MOUTH IN THE MORNING AND 1 TABLET IN THE EVENING 225 tablet 3     omeprazole (PRILOSEC) 20 MG DR capsule Take 1 capsule (20 mg) by mouth daily. 90 capsule 3       Review of Systems:  Pertinent review of system as stated above in HPI    Skin:        Eyes:       ENT:       Respiratory:  Negative shortness of breath, dyspnea on exertion, cough, wheezing, CPAP, sleep apnea, dyspnea at rest  Cardiovascular:  Negative, palpitations, chest pain, edema, syncope or near-syncope, lightheadedness, dizziness fatigue  Gastroenterology:      Genitourinary:       Musculoskeletal:       Neurologic:       Psychiatric:       Heme/Lymph/Imm:       Endocrine:         Physical Exam:  Vitals: BP (!) 163/62   Pulse (!) 46   Wt 72.6 kg (160 lb)   SpO2 94%   BMI 25.84 kg/m      Constitutional: Pleasant, no apparent distress.  Respiratory: Breathing non-labored.  Cardiovascular: bradycardic  Neurologic: Alert, awake, and oriented   Psychiatric: Affect appropriate.        ALLERGIES  Allergies   Allergen Reactions     Keppra Fatigue     Crying very depressed       PAST MEDICAL HISTORY:  Past Medical History:   Diagnosis Date     Asbestosis(501)     ct chest8/04     Cardiomegaly     echo8/04  stress echo 2000 normal     Cerebral infarction (H)     june 2020     Closed fracture of unspecified part of humerus      Diverticulosis of colon (without mention  of hemorrhage)      Esophageal reflux      Other specified anemias      Other specified iron deficiency anemias      Uncomplicated asthma        PAST SURGICAL HISTORY:  Past Surgical History:   Procedure Laterality Date     COLONOSCOPY  8/18/04     Repeat in 10 years.     CV HEART CATHETERIZATION WITH POSSIBLE INTERVENTION N/A 1/13/2021    Procedure: Heart Catheterization with Possible Intervention;  Surgeon: Dmitriy Frost MD;  Location:  HEART CARDIAC CATH LAB     CV RIGHT HEART CATH MEASUREMENTS RECORDED N/A 1/13/2021    Procedure: Right Heart Cath;  Surgeon: Dmitriy Frost MD;  Location:  HEART CARDIAC CATH LAB     CV TRANSCATHETER AORTIC VALVE REPLACEMENT N/A 1/26/2021    Procedure: Transcatheter Aortic Valve Replacement;  Surgeon: Peggy Colbert MD;  Location:  HEART CARDIAC CATH LAB     ORTHOPEDIC SURGERY      right Fibula repair     PHACOEMULSIFICATION WITH STANDARD INTRAOCULAR LENS IMPLANT  4/7/2011    Procedure:PHACOEMULSIFICATION WITH STANDARD INTRAOCULAR LENS IMPLANT; Surgeon:MJ CHOI; [place]     PHACOEMULSIFICATION WITH STANDARD INTRAOCULAR LENS IMPLANT  5/9/2011    Procedure:PHACOEMULSIFICATION WITH STANDARD INTRAOCULAR LENS IMPLANT; Surgeon:MJ CHOI; [place]     SURGICAL HISTORY OF -       vasectomy     SURGICAL HISTORY OF -       appy       FAMILY HISTORY:  Family History   Problem Relation Age of Onset     Cancer Father         lung     Cancer Mother         pancreatic     Alcohol/Drug Son      Alcohol/Drug Son      Cancer Sister         1/2 sister lung cancer       SOCIAL HISTORY:  Social History     Socioeconomic History     Marital status:      Spouse name: None     Number of children: None     Years of education: None     Highest education level: None   Tobacco Use     Smoking status: Never     Passive exposure: Never     Smokeless tobacco: Never   Vaping Use     Vaping status: Never Used   Substance and Sexual Activity     Alcohol  use: Yes     Comment: very rare     Drug use: No     Sexual activity: Yes     Partners: Female   Other Topics Concern     Parent/sibling w/ CABG, MI or angioplasty before 65F 55M? No     Social Drivers of Health     Financial Resource Strain: Low Risk  (12/11/2024)    Financial Resource Strain      Within the past 12 months, have you or your family members you live with been unable to get utilities (heat, electricity) when it was really needed?: No   Food Insecurity: Low Risk  (12/11/2024)    Food Insecurity      Within the past 12 months, did you worry that your food would run out before you got money to buy more?: No      Within the past 12 months, did the food you bought just not last and you didn t have money to get more?: No   Transportation Needs: Low Risk  (12/11/2024)    Transportation Needs      Within the past 12 months, has lack of transportation kept you from medical appointments, getting your medicines, non-medical meetings or appointments, work, or from getting things that you need?: No   Physical Activity: Unknown (12/11/2024)    Exercise Vital Sign      Days of Exercise per Week: 0 days   Stress: No Stress Concern Present (12/11/2024)    [employer] [employer] - Occupational Stress Questionnaire      Feeling of Stress : Not at all   Social Connections: Unknown (12/11/2024)    Social Connection and Isolation Panel [NHANES]      Frequency of Social Gatherings with Friends and Family: Once a week   Interpersonal Safety: Low Risk  (12/6/2023)    Interpersonal Safety      Do you feel physically and emotionally safe where you currently live?: Yes      Within the past 12 months, have you been hit, slapped, kicked or otherwise physically hurt by someone?: No      Within the past 12 months, have you been humiliated or emotionally abused in other ways by your partner or ex-partner?: No   Housing Stability: Low Risk  (12/11/2024)    Housing Stability      Do you have housing? : Yes      Are  you worried about losing your housing?: No             CC  Reny Street [place]  [address] [place] [address]      Thank you for allowing me to participate in the care of your patient.      Sincerely,     Romario Loera MD     [facility]  cc:   Reny [address] [place]  [address] [place] [address]

## 2025-03-17 DIAGNOSIS — I35.0 AORTIC VALVE STENOSIS, ETIOLOGY OF CARDIAC VALVE DISEASE UNSPECIFIED: ICD-10-CM

## 2025-03-17 RX ORDER — AMOXICILLIN 500 MG/1
CAPSULE ORAL
Qty: 4 CAPSULE | Refills: 3 | Status: SHIPPED | OUTPATIENT
Start: 2025-03-17

## 2025-03-17 NOTE — TELEPHONE ENCOUNTER
[facility] Cardiology Refill Guideline reviewed.  Medication meets criteria for refill. Ani Cope RN Cardiology March 17, 2025, 7:49 AM

## 2025-03-17 NOTE — TELEPHONE ENCOUNTER
Last Office Visit: 1/7/25 (Evaristo)  Next Office Visit: TBD  Last Fill Date: 11/5/24  Carolee Mas LPN Cardiology   3/17/2025 7:37 AM

## 2025-04-10 ENCOUNTER — OFFICE VISIT (OUTPATIENT)
Dept: PULMONOLOGY | Facility: CLINIC | Age: OVER 89
End: 2025-04-10
Attending: STUDENT IN AN ORGANIZED HEALTH CARE EDUCATION/TRAINING PROGRAM
Payer: COMMERCIAL

## 2025-04-10 VITALS
HEART RATE: 50 BPM | OXYGEN SATURATION: 94 % | SYSTOLIC BLOOD PRESSURE: 106 MMHG | TEMPERATURE: 97.4 F | DIASTOLIC BLOOD PRESSURE: 52 MMHG | BODY MASS INDEX: 25.84 KG/M2 | WEIGHT: 160 LBS

## 2025-04-10 DIAGNOSIS — R06.09 DYSPNEA ON EXERTION: Primary | ICD-10-CM

## 2025-04-10 NOTE — PROGRESS NOTES
[facility]    Pulmonary, Allergy, Critical Care and Sleep Medicine    Clinic Visit  4/10/25       Assessment and Recommendations:    Henrry Fonseca is a 89 year old male with a history of asbestos exposure, asthma, allergic rhinitis, Aortic Stenosis s/p TAVR, Diastolic Heart Failure, hemiplegia and hemiparesis following CVA, who presents for follow up of dyspnea.     Dyspnea on Exertion  History of Asbestos Exposure  Referred due to shortness of breath when walking around house with walker. History of significant occupational asbestos exposure and remote prior imaging with pleural plaques. Did have Pulmonary evaluation in 2006 including chest imaging that did not show any radiographic signs of asbestosis. CXR 12/2023 without overt signs of fibrosis and no crackles on exam. PFTs 5/2024 showed moderate restriction and mild diffusing defect. Restriction could be in setting of some degree of neuromuscular weakness following CVA. Likely with deconditioning component.    Since last visit has had workup for bradycardia and there is consideration of a pacemaker. Today breathing symptoms stable, only really feels short of breath with exertion which could be deconditioning and possibly his heart. Hasn't been using walker for the past couple weeks and discussed that is important to try to maintain his current level of strength and function. Encouraged to go back to walker.     Discussed that with stable symptoms would not do any medicines right now. Discussed that could do a Chest CT to rule out fibrosis but age and degree of symptoms would still make it less likely that would be good candidate for something like an anti-fibrotic that would also potentially cause side effects (GI distress, weight loss). Will continue conservative approach of monitoring symptoms for now.     - Goal to increase physical activity, go back to using walker when moving around his apartment to bathroom rather than his  "wheelchair  - Can consider Chest CT in future if respiratory symptoms get worse, will message PCP to be on lookout for any changes      Will have follow up in Pulmonary as needed      Christina Bolton MD PhD   of Medicine  Pulmonary Critical Care   [facility]    35 minutes spent on the date of the encounter doing chart review, history and exam, documentation and further activities per the note.    Interval History :    After last visit seen in the ED for bradycardia. Had Cardiology follow up in November.   Echo with normal EF. Zio Patch with episode of CHB, was asymptomatic, had nocturnal pauses. Also had intermittent 2-1 AV block. EP visit Jan 2025. Discussed option of pacemaker.     Today reports that breathing has been stable. Not using walker to go to the bathroom last couple weeks because has been \"lazy.\" At rest is ok but when has to move self around then short of breath. Had trouble getting blood pressure with machine in clinic, says has always had low blood pressure.     Prior History  Previously seen by Pulmonary in 2006. At that time no chronic cough or dyspnea at rest or with exertion. Noted to have pleural plaques on CTs in 2004 and 2006 that were stable and no evidence of interstitial lung disease. PFTs in May 2006 were normal. Was referred to Sleep due to concern for JAMIE.     Underwent TAVR 1/2021 that complicated by bradycardia that improved once stopped AV annamaria blocking agents. Last seen in clinic by Cardiology in 2022 who noted patient had chronic shortness of breath thought due to underlying aortic stenosis and restrictive lung disease. History of anemia with discontinuation of anti-platelet agents. Primary Care visit Dec 2023 where reported shortness of breath when using walker and sometimes with speech. Denied wheezing. Referred to Pulmonary given prior concerns about asbestos.    First Pulmonary visit April 2024.  Reported can get short of breath if doing work. " "Does walk with walker. Will get winded going from living room to bathroom and back but not when sitting. No change in breathing with sitting vs lying flat. Much less physically active since stroke. No cough, sometimes tickle in thorat with very little mucus. Does not think symptoms were affected by his TAVR. Sometimes thinks heart \"vibrating.\" No more tired than usual, no black stools, takes iron. Sleeps 10-12 hrs, hard time getting to sleep. Not sure if snore, used to snore, no waking up gasping. Swallow is ok, has to be careful.    10/2024: Just moved into [facility]. Finds hard to talk sometimes because of breathing. Can be harder to get out the big words. Thinks breathing about the same as before. If does any work can get winded easily. Can go to bathroom on own. Now in [facility] is using wheelchair more but planning to use walker more again. No significant use of left side but can drag foot a little. Feeling a little pressure in the chest. A little heavy but not bad.     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 Only used masks and ventilation for some of that time. Never smoker  Denies exposure to mold or water damage. Use to do a lot of gardening pre stroke. Has one dog. Family history significant for father with lung cancer, was a smoker.    Additional data from chart review    Procedures  PFTs 2009: Personally reviewed, scooped flow volume     PFT 5/31/24: Personally reviewed, moderate restriction, mild diffusing defect not corrected for Hgb    Echo 2020: Normal LVEF, indeterminate diastolic, normal RV size and function, mild to moderate aortic stenosis.     Imaging  CXR 12/2023: Personally reviewed, no acute pathology    Labs   Allergy Skin Testing 2009: Multiple positive allergens    Review of Systems:  Complete 12 point ROS negative unless mentioned in HPI    Medications, Allergies:      Medications:  Current " Outpatient Medications   Medication Sig Dispense Refill    acetaminophen (TYLENOL) 500 MG tablet Take 500 mg by mouth every 6 hours as needed for mild pain      atorvastatin (LIPITOR) 20 MG tablet TAKE ONE TABLET BY MOUTH AT BEDTIME 90 tablet 3    ferrous gluconate (FERGON) 324 (38 Fe) MG tablet Take 1 tablet (324 mg) by mouth daily (with breakfast). 90 tablet 3    gabapentin (NEURONTIN) 600 MG tablet TAKE 1 TABLET BY MOUTH IN THE MORNING AND 1 TABLET IN THE EVENING 225 tablet 3    omeprazole (PRILOSEC) 20 MG DR capsule Take 1 capsule (20 mg) by mouth daily. 90 capsule 3    amoxicillin (AMOXIL) 500 MG capsule TAKE 4 CAPSULES BY MOUTH ONCE FOR ONE DOSE 30 MINUTES BEFORE DENTAL APPOINTMENT (Patient not taking: Reported on 4/10/2025) 4 capsule 3     No current facility-administered medications for this visit.     No current facility-administered medications for this visit.     No current facility-administered medications for this visit.     Allergies:     Allergies   Allergen Reactions    Keppra Fatigue     Crying very depressed     Physical Exam:        /52   Pulse 50   Temp 97.4  F (36.3  C) (Tympanic)   Wt 72.6 kg (160 lb)   SpO2 94%   BMI 25.84 kg/m       General: Sitting up in wheelchair in NAD  HEENT: anicteric, moist mucosa  Chest: CTAB, no wheezing or crackles  Cardiac: Slow irregular pulse, no murmur  Abdomen: Soft, non tender, active BS  Extremities: No LE Edema  Neuro: A&Ox3, no focal deficits   Skin: no rash noted on limited exam  Psych: Appropriate  Laboratory, imaging, and microbiologic data:    All laboratory and imaging data reviewed, pertinent results discussed above.

## 2025-04-10 NOTE — NURSING NOTE
"Initial Pulse 50   Temp 97.4  F (36.3  C) (Tympanic)   Wt 72.6 kg (160 lb)   SpO2 94%   BMI 25.84 kg/m   Estimated body mass index is 25.84 kg/m  as calculated from the following:    Height as of 10/10/24: 1.676 m (5' 5.98\").    Weight as of this encounter: 72.6 kg (160 lb). .  Chantel Barnard MA    "

## 2025-04-20 ENCOUNTER — MEDICAL CORRESPONDENCE (OUTPATIENT)
Dept: HEALTH INFORMATION MANAGEMENT | Facility: CLINIC | Age: OVER 89
End: 2025-04-20
Payer: COMMERCIAL

## 2025-04-27 NOTE — LETTER
1/22/2021    Keenan Orourke MD  [id number]  [facility] [id number]    RE: Henrry Fonseca       Dear Colleague,    I had the pleasure of seeing Henrry Fonseca in the [facility].    Henrry is a 85 year old who is being evaluated via a billable video visit.      How would you like to obtain your AVS? MyChart  If the video visit is dropped, the invitation should be resent by: Text to cell phone: [phone]  Will anyone else be joining your video visit? No      Video Start Time: 8:29 AM    Cardiology Clinic (TAVR)      Assessment & Plan      1.  Severe aortic stenosis, as noted below  2.  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  Seizure disorder  5.  Hyperlipidemia  6.  Hypertension    Recommendations    1.  Severe aortic stenosis: Patient was in the process of getting this worked up however he unfortunately had a CVA as noted below.  He has significant deficit as a result and has symptoms of shortness of breath which are also confounded by his lung disease.  We reviewed his TAVR directed CT as well as recommendation from neurology that a follow-up head CT should be performed.  This will be arranged accordingly.  Recommendation is that provided there is no significant change above his baseline regarding his prior hemorrhagic bleed that he can safely proceed with heparin for the transfemoral TAVR.  We will follow-up on this study which will be ordered today.    2.  Discussed risk benefits and potential complications of TAVR procedure and the expected benefit and recovery.  After our discussion patient is willing to proceed.  We will tentatively schedule him for next week with Goldstein herb valve implantation given his aortic valve complex and anatomy.    Thank you kindly for consult    Peggy Colbert MD        HPI:    Patient is an 85-year-old male with a  notable history of severe aortic stenosis, hypertension, asbestosis with reactive airway disease, seizure disorder who presented to [facility] in [place] via EMS in June 2020 for sudden onset left-sided weakness and dysarthria using the bathroom.  He was significantly hypertensive on presentation and due to his neurologic exam code stroke was subsequently called.  Blood pressure was significantly elevated on exam.  CT imaging demonstrated an acute intraparenchymal hemorrhage in the right thalamus consistent with hypertensive hemorrhage with small vessel volume loss and extension.  Midline shift was also noted.  He was directed to the ICU for further care.  Due to his presentation additional comorbidities conservative therapy was followed.  Patient had an extensive hospital stay and ultimately underwent transfer and physical therapy at a local facility near his home in the [facility].  Patient per review of chart was in the process of getting evaluated for his severe aortic stenosis however he had a stroke in the interval timeframe.  Symptom wise patient does not report of any chest pain or syncope however he has had chronic shortness of breath which appears to be worsened on patient's assessment.  He states that minimal activities seem to provoke his symptoms.  However in the background he does have as best dose exposure and restrictive lung disease as a result.  Given his findings an updated echocardiogram was performed which is as noted below.      From a functional standpoint patient unfortunately has been left with significant left-sided weakness and mainly is nonambulatory.  He does sit in a wheelchair or with assistance can use a walker to ambulate.  Social support is provided by his wife who is also a caregiver for him as well.      Patient underwent diagnostic testing in preparation for TAVR.  His CT is as noted below and he underwent coronary angiography.  It appears that the iliofemoral arteries  are suitable for transfemoral approach.  There is some angulation in the aorta at 72 degrees.  Given these findings and the size we will upload to [employer] for confirmation.  We discussed with patient and he is willing to proceed.  Given his prior hemorrhagic CVA we talked with neurology and they recommended getting a follow-up CT scan of his head for resolution and stability of his previously described bleed.  Provided this is the case that it is stable and no significant changes above his baseline they are okay with him getting heparin for case.      Coronary Angiogram  1. Mild coronary artery disease  2. Normal filling pressures    TAVR CT    IMPRESSION:  1.  No acute aortic pathology like dissection, intramural hematoma or  contained rupture noted.  2.  Please review detailed radiology report, to follow separately, for  incidental non-cardiovascular findings.     FINDINGS:     1.  Tricuspid aortic valve. Aortic valve calcium score is 3629. The  ascending aorta is minimal calcified, aortic arch is  minimal  calcified, the descending thoracic aorta is mild calcified.   2.  The arch vessel branching pattern is normal.   3.  The suprarenal abdominal aorta is minimal calcified; infrarenal  abdominal aorta is mild calcified.   4.  There is no acute aortic pathology, such as dissection, intramural  hematoma, or contained rupture.      MEASUREMENTS:   Representative dimensions of the thoracoabdominal aorta are as  follows:     1. AORTIC ANNULUS MEASUREMENTS:     *  The average aortic annulus diameter is 28.1 mm,   *  Aortic annulus area is 6.21 cm2  *  Aortic annulus perimeter is 90.1 mm  *  The 3 cusp coaxial angle for aortic valve is (Thai 16 , CAU 32) and  the AP coaxial angle is (Thai 0 , CAU 49 ), these  angles will vary  depending upon the patient's body position  *  Left main - Annulus distance: 17.5 mm, RCA - Annulus distance: 15.6  mm     2. LVOT MEASUREMENTS:     *  The average LVOT diameter is 28.1 mm  *  LVOT  area is 6.21 cm2  *  LVOT perimeter is 90 mm  *  LVOT calcification trivial mm     3. AORTA MEASUREMENTS     1.  The aortic root at the sinuses of Valsalva (L/R/N) 36.5 mm x  37.5  mm 36.9 mm  2.  The sinotubular junction:  33 mm x 29 mm  3.  Sinotubular junction height: 22.2 mm x 28.8 mm  4.  Proximal ascending aorta: 35.4 mm x 34.5 mm  5.  Distal abdominal aorta proximal to the bifurcation: 18.9 mm x 16.2  mm     4. ILIOFEMORAL MEASUREMENTS:     RIGHT:  1.  Right common iliac artery: 10.5 mm x 11.2 mm   2.  Right external iliac artery: 6.21 mm x 7.99 mm   3.  Right common femoral artery: 8.12 mm x 8.57 mm   4.  Tortuosity: mild   5.  Calcification: mild      LEFT:  1.  Left common iliac artery: 10.7 mm x  11.3 mm  2.  Left external iliac artery: 7.68 mm x 8.37 mm  3.  Left common femoral artery: 7.50 mm x 8.54 mm  4.  Tortuosity: mild   5.  Calcification: mild      7. Aortic Root Angle 72.1 degrees     8. Innominate Artery :  15.3 mm X 13.6mm     9. Left Carotid Artery:  7.83mm X 7.39 mm       Head CT 2020  INDICATION: Left leg weakness.  COMPARISON: CT brain 06/22/2020 at 2237 hours.  TECHNIQUE: Routine without IV contrast. Multiplanar reformats. Dose reduction  techniques were used.    FINDINGS:  INTRACRANIAL CONTENTS: There is redemonstration of a right thalamic hemorrhage  with medial rupture into the right lateral ventricle. This measures 3.1 x 2.2 cm  transverse x 3.2 cm cephalocaudad. Minimal surrounding vasogenic edema. No  significant change in volume. Increased amounts of layering hemorrhage noted in  the occipital horns. No CT evidence of acute infarct. Mild presumed chronic  small vessel ischemic changes. Mild generalized volume loss. No hydrocephalus.  Skull base vascular calcifications. Basal cisterns intact. Brainstem and  cerebellum unremarkable. 2 mm right to left midline shift. No change.    VISUALIZED ORBITS/SINUSES/MASTOIDS: Prior bilateral cataract surgery. Visualized  portions of the orbits  are otherwise unremarkable. Moderate mucosal thickening  scattered about the paranasal sinuses. No middle ear or mastoid effusion.    BONES/SOFT TISSUES: No acute abnormality.    IMPRESSION:  1.  Stable size of right thalamic parenchymal hematoma with intraventricular  rupture. Likely hypertensive etiology. Negative for hydrocephalus. Stable,  minimal leftward subfalcial shift.        Echo:    1. Severe bicuspid aortic valve stenosis with trace regurgitation. Peak  transaortic velocity 4.1 m/s, mean gradient 39 mmHg, valve area 1.1 cmÂ .  Normal stroke-volume.  2. Mildly dilated aortic root (4.0 cm). Normal ascending aorta dimension.  3. Mild concentric left ventricular hypertrophy with normal systolic function.  Estimated LVEF 60-65%.  4. Normal right ventricular size and systolic function.     Compared to the study dated 10/21/2019, no significant changes.      Primary Care Physician   Keenan Orourke      Patient Active Problem List   Diagnosis     Generalized osteoarthrosis, unspecified site     Mild intermittent asthma     Asbestosis (H)     Essential hypertension, benign     Syncope and collapse     Back pain     Heel pain     Seizure disorder (H)     Rotator cuff syndrome     Elevated PSA     CARDIOVASCULAR SCREENING; LDL GOAL LESS THAN 160     Advanced directives, counseling/discussion     Senile nuclear sclerosis     Health Care Home     Esophageal reflux     Eczema     Hemorrhagic stroke (H)     Aortic valve stenosis, etiology of cardiac valve disease unspecified       Past Medical History   I have reviewed this patient's medical history and updated it with pertinent information if needed.   Past Medical History:   Diagnosis Date     Asbestosis(501)     ct chest8/04     Cardiomegaly     echo8/04  stress echo 2000 normal     Closed fracture of unspecified part of humerus      Diverticulosis of colon (without mention of hemorrhage)      Esophageal reflux      Other specified anemias      Other specified iron  deficiency anemias        Past Surgical History   I have reviewed this patient's surgical history and updated it with pertinent information if needed.  Past Surgical History:   Procedure Laterality Date     COLONOSCOPY  8/18/04     Repeat in 10 years.     CV HEART CATHETERIZATION WITH POSSIBLE INTERVENTION N/A 1/13/2021    Procedure: Heart Catheterization with Possible Intervention;  Surgeon: Dmitriy Frost MD;  Location:  HEART CARDIAC CATH LAB     CV RIGHT HEART CATH MEASUREMENTS RECORDED N/A 1/13/2021    Procedure: Right Heart Cath;  Surgeon: Dmitriy Frost MD;  Location:  HEART CARDIAC CATH LAB     PHACOEMULSIFICATION WITH STANDARD INTRAOCULAR LENS IMPLANT  4/7/2011    Procedure:PHACOEMULSIFICATION WITH STANDARD INTRAOCULAR LENS IMPLANT; Surgeon:MJ CHOI; Location:[facility]     PHACOEMULSIFICATION WITH STANDARD INTRAOCULAR LENS IMPLANT  5/9/2011    Procedure:PHACOEMULSIFICATION WITH STANDARD INTRAOCULAR LENS IMPLANT; Surgeon:MJ CHOI; Location:[facility] OR     SURGICAL HISTORY OF -       vasectomy     SURGICAL HISTORY OF -       appy     Current Outpatient Medications   Medication Instructions     acetaminophen (TYLENOL) 650 mg, Oral, EVERY 8 HOURS PRN     albuterol (PROAIR HFA/PROVENTIL HFA/VENTOLIN HFA) 108 (90 Base) MCG/ACT inhaler 2 puffs, Inhalation, EVERY 4 HOURS PRN     amLODIPine (NORVASC) 5 mg, Oral, DAILY     ASPIRIN 325 MG OR TBEC 1 TABLET EVERY Day as needed     atorvastatin (LIPITOR) 20 mg, Oral, AT BEDTIME     calcium carbonate (TUMS) 500 MG chewable tablet 1 chew tab, Oral, DAILY PRN     fluticasone (FLOVENT HFA) 110 MCG/ACT Inhaler 2 puffs, Inhalation, 2 TIMES DAILY, As needed     gabapentin (NEURONTIN) 600 mg, Oral, 2 TIMES DAILY, Take  by mouth. 1 tablet in the AM and PM and 1/2 tablet at noon.     metoprolol tartrate (LOPRESSOR) 25 MG tablet TAKE ONE-HALF TABLET BY MOUTH TWICE A DAY     omeprazole (PRILOSEC) 20 MG DR capsule TAKE ONE CAPSULE BY MOUTH ONCE DAILY          Social History    reports that he has never smoked. He has never used smokeless tobacco. He reports current alcohol use. He reports that he does not use drugs.    Family History   Family History   Problem Relation Age of Onset     Cancer Father         lung     Cancer Mother         pancreatic     Alcohol/Drug Son      Alcohol/Drug Son      Cancer Sister         1/2 sister lung cancer       Review of Systems   The comprehensive 10 point Review of Systems is negative other than noted in the HPI or here.     Physical Exam   Vital Signs with Ranges     Wt Readings from Last 4 Encounters:   01/13/21 77.1 kg (170 lb)   10/15/19 76.7 kg (169 lb)   11/06/18 75.8 kg (167 lb)   10/01/18 75.8 kg (167 lb)       GENERAL: Healthy, alert and no distress  EYES: Eyes grossly normal to inspection.  No discharge or erythema, or obvious scleral/conjunctival abnormalities.  RESP: No audible wheeze, cough, or visible cyanosis.  No visible retractions or increased work of breathing.    SKIN: Visible skin clear. No significant rash, abnormal pigmentation or lesions.  NEURO: Cranial nerves grossly intact.  Mentation and speech appropriate for age.  PSYCH: Mentation appears normal, affect normal/bright, judgement and insight intact, normal speech and appearance well-groomed.       Video-Visit Details    Type of service:  Video Visit    Video End Time:8:46 AM    Originating Location (pt. Location): Home    Distant Location (provider location):  [facility]     Platform used for Video Visit: [place]        Thank you for allowing me to participate in the care of your patient.    Sincerely,     Peggy Coblert MD     [facility]   [facility]

## 2025-06-11 ENCOUNTER — OFFICE VISIT (OUTPATIENT)
Dept: FAMILY MEDICINE | Facility: CLINIC | Age: OVER 89
End: 2025-06-11
Attending: FAMILY MEDICINE
Payer: COMMERCIAL

## 2025-06-11 ENCOUNTER — RESULTS FOLLOW-UP (OUTPATIENT)
Dept: FAMILY MEDICINE | Facility: CLINIC | Age: OVER 89
End: 2025-06-11

## 2025-06-11 VITALS
DIASTOLIC BLOOD PRESSURE: 60 MMHG | BODY MASS INDEX: 25.71 KG/M2 | HEIGHT: 66 IN | HEART RATE: 38 BPM | OXYGEN SATURATION: 95 % | TEMPERATURE: 98.1 F | RESPIRATION RATE: 16 BRPM | WEIGHT: 160 LBS | SYSTOLIC BLOOD PRESSURE: 136 MMHG

## 2025-06-11 DIAGNOSIS — R00.1 BRADYCARDIA: Primary | ICD-10-CM

## 2025-06-11 DIAGNOSIS — G40.909 SEIZURE DISORDER (H): ICD-10-CM

## 2025-06-11 DIAGNOSIS — J61 ASBESTOSIS (H): ICD-10-CM

## 2025-06-11 DIAGNOSIS — I45.9 HEART BLOCK: ICD-10-CM

## 2025-06-11 DIAGNOSIS — G81.94 LEFT HEMIPARESIS (H): ICD-10-CM

## 2025-06-11 DIAGNOSIS — N18.31 CHRONIC KIDNEY DISEASE, STAGE 3A (H): ICD-10-CM

## 2025-06-11 DIAGNOSIS — R53.83 OTHER FATIGUE: ICD-10-CM

## 2025-06-11 PROBLEM — F33.1 MODERATE RECURRENT MAJOR DEPRESSION (H): Status: ACTIVE | Noted: 2025-06-11

## 2025-06-11 LAB
ALBUMIN SERPL BCG-MCNC: 3.9 G/DL (ref 3.5–5.2)
ALP SERPL-CCNC: 72 U/L (ref 40–150)
ALT SERPL W P-5'-P-CCNC: 11 U/L (ref 0–70)
ANION GAP SERPL CALCULATED.3IONS-SCNC: 7 MMOL/L (ref 7–15)
AST SERPL W P-5'-P-CCNC: 17 U/L (ref 0–45)
BILIRUB SERPL-MCNC: 0.3 MG/DL
BUN SERPL-MCNC: 21.2 MG/DL (ref 8–23)
CALCIUM SERPL-MCNC: 8.8 MG/DL (ref 8.8–10.4)
CHLORIDE SERPL-SCNC: 110 MMOL/L (ref 98–107)
CREAT SERPL-MCNC: 1.14 MG/DL (ref 0.67–1.17)
EGFRCR SERPLBLD CKD-EPI 2021: 61 ML/MIN/1.73M2
ERYTHROCYTE [DISTWIDTH] IN BLOOD BY AUTOMATED COUNT: 13.7 % (ref 10–15)
FERRITIN SERPL-MCNC: 167 NG/ML (ref 31–409)
GLUCOSE SERPL-MCNC: 83 MG/DL (ref 70–99)
HCO3 SERPL-SCNC: 24 MMOL/L (ref 22–29)
HCT VFR BLD AUTO: 37.9 % (ref 40–53)
HGB BLD-MCNC: 12.7 G/DL (ref 13.3–17.7)
IRON BINDING CAPACITY (ROCHE): 260 UG/DL (ref 240–430)
IRON SATN MFR SERPL: 33 % (ref 15–46)
IRON SERPL-MCNC: 86 UG/DL (ref 61–157)
MCH RBC QN AUTO: 29.7 PG (ref 26.5–33)
MCHC RBC AUTO-ENTMCNC: 33.5 G/DL (ref 31.5–36.5)
MCV RBC AUTO: 89 FL (ref 78–100)
PLATELET # BLD AUTO: 190 10E3/UL (ref 150–450)
POTASSIUM SERPL-SCNC: 4.6 MMOL/L (ref 3.4–5.3)
PROT SERPL-MCNC: 6.4 G/DL (ref 6.4–8.3)
RBC # BLD AUTO: 4.28 10E6/UL (ref 4.4–5.9)
SODIUM SERPL-SCNC: 141 MMOL/L (ref 135–145)
TSH SERPL DL<=0.005 MIU/L-ACNC: 2.17 UIU/ML (ref 0.3–4.2)
WBC # BLD AUTO: 5.6 10E3/UL (ref 4–11)

## 2025-06-11 PROCEDURE — 1126F AMNT PAIN NOTED NONE PRSNT: CPT | Performed by: FAMILY MEDICINE

## 2025-06-11 PROCEDURE — 83540 ASSAY OF IRON: CPT | Performed by: FAMILY MEDICINE

## 2025-06-11 PROCEDURE — G2211 COMPLEX E/M VISIT ADD ON: HCPCS | Performed by: FAMILY MEDICINE

## 2025-06-11 PROCEDURE — 3075F SYST BP GE 130 - 139MM HG: CPT | Performed by: FAMILY MEDICINE

## 2025-06-11 PROCEDURE — 36415 COLL VENOUS BLD VENIPUNCTURE: CPT | Performed by: FAMILY MEDICINE

## 2025-06-11 PROCEDURE — 82728 ASSAY OF FERRITIN: CPT | Performed by: FAMILY MEDICINE

## 2025-06-11 PROCEDURE — 83550 IRON BINDING TEST: CPT | Performed by: FAMILY MEDICINE

## 2025-06-11 PROCEDURE — 99214 OFFICE O/P EST MOD 30 MIN: CPT | Performed by: FAMILY MEDICINE

## 2025-06-11 PROCEDURE — 93000 ELECTROCARDIOGRAM COMPLETE: CPT | Performed by: FAMILY MEDICINE

## 2025-06-11 PROCEDURE — 3078F DIAST BP <80 MM HG: CPT | Performed by: FAMILY MEDICINE

## 2025-06-11 PROCEDURE — 85027 COMPLETE CBC AUTOMATED: CPT | Performed by: FAMILY MEDICINE

## 2025-06-11 PROCEDURE — 84443 ASSAY THYROID STIM HORMONE: CPT | Performed by: FAMILY MEDICINE

## 2025-06-11 PROCEDURE — 80053 COMPREHEN METABOLIC PANEL: CPT | Performed by: FAMILY MEDICINE

## 2025-06-11 ASSESSMENT — ASTHMA QUESTIONNAIRES
QUESTION_3 LAST FOUR WEEKS HOW OFTEN DID YOUR ASTHMA SYMPTOMS (WHEEZING, COUGHING, SHORTNESS OF BREATH, CHEST TIGHTNESS OR PAIN) WAKE YOU UP AT NIGHT OR EARLIER THAN USUAL IN THE MORNING: NOT AT ALL
ACT_TOTALSCORE: 25
QUESTION_4 LAST FOUR WEEKS HOW OFTEN HAVE YOU USED YOUR RESCUE INHALER OR NEBULIZER MEDICATION (SUCH AS ALBUTEROL): NOT AT ALL
QUESTION_1 LAST FOUR WEEKS HOW MUCH OF THE TIME DID YOUR ASTHMA KEEP YOU FROM GETTING AS MUCH DONE AT WORK, SCHOOL OR AT HOME: NONE OF THE TIME
QUESTION_2 LAST FOUR WEEKS HOW OFTEN HAVE YOU HAD SHORTNESS OF BREATH: NOT AT ALL
QUESTION_5 LAST FOUR WEEKS HOW WOULD YOU RATE YOUR ASTHMA CONTROL: COMPLETELY CONTROLLED

## 2025-06-11 ASSESSMENT — PAIN SCALES - GENERAL: PAINLEVEL_OUTOF10: NO PAIN (0)

## 2025-06-11 NOTE — PROGRESS NOTES
Assessment & Plan     Bradycardia  Heart rate in 38 today, he is asymptomatic-chronic fatigue and feeling tired. EKG appears to be complete heart block, spoke with cardiology on call Dr. Wilson who agrees. Reviewed case with him and as long as not symptomatic does not need to be seen in ER. Referral for pacemaker with cardiology after discussion with son in law and patient. If worsening symptoms he should be seen in ER.   - EKG 12-lead complete w/read - Clinics  - Adult Cardiology Eval  Referral    Chronic kidney disease, stage 3a (H)  - Comprehensive metabolic panel (BMP + Alb, Alk Phos, ALT, AST, Total. Bili, TP)  - CBC with platelets  - Ferritin  - Iron and iron binding capacity  - TSH with free T4 reflex  - Comprehensive metabolic panel (BMP + Alb, Alk Phos, ALT, AST, Total. Bili, TP)  - CBC with platelets  - Ferritin  - Iron and iron binding capacity  - TSH with free T4 reflex    Asbestosis (H)  Following with pulmonology    Seizure disorder (H)  Following with neurology    Other fatigue  - TSH with free T4 reflex  - Adult Cardiology Eval  Referral  - TSH with free T4 reflex    Heart block  See above  - Adult Cardiology Eval  Referral    Left hemiparesis (H)  Needing new wheelchair-current one areas of break down on seat and arm rest that would increase risks for falls and injuries   - Wheelchair Order    The longitudinal plan of care for the diagnosis(es)/condition(s) as documented were addressed during this visit. Due to the added complexity in care, I will continue to support Henrry in the subsequent management and with ongoing continuity of care.    Tarun Sales is a 89 year old, presenting for the following health issues:  RECHECK        6/11/2025    11:01 AM   Additional Questions   Roomed by Angela SÁNCHEZ MA   Accompanied by Self     History of Present Illness       Reason for visit:  Checkup    He eats 0-1 servings of fruits and vegetables daily.He consumes 2 sweetened  "beverage(s) daily.He exercises with enough effort to increase his heart rate 9 or less minutes per day.  He exercises with enough effort to increase his heart rate 3 or less days per week.   He is taking medications regularly.      Would like to get a different wheel chair.  Needs new order.       Review of Systems  Constitutional, HEENT, cardiovascular, pulmonary, gi and gu systems are negative, except as otherwise noted.      Objective    /60 (BP Location: Right arm, Patient Position: Chair, Cuff Size: Adult Regular)   Pulse (!) 38   Temp 98.1  F (36.7  C)   Resp 16   Ht 1.676 m (5' 5.98\")   Wt 72.6 kg (160 lb)   SpO2 95%   BMI 25.84 kg/m    Body mass index is 25.84 kg/m .  Physical Exam   GENERAL: alert and no distress  EYES: Eyes grossly normal to inspection, PERRL and conjunctivae and sclerae normal  HENT: ear canals and TM's normal, nose and mouth without ulcers or lesions  NECK: no adenopathy, no asymmetry, masses, or scars  RESP: lungs clear to auscultation - no rales, rhonchi or wheezes  CV: bradycardia, normal S1 S2, no S3 or S4, no murmur, click or rub, peripheral pulses strong, and no peripheral edema  ABDOMEN: soft, nontender, no hepatosplenomegaly, no masses and bowel sounds normal  MS: no gross musculoskeletal defects noted, no edema  SKIN: no suspicious lesions or rashes  NEURO: Normal strength and tone, mentation intact and speech normal  PSYCH: mentation appears normal, affect normal/bright        Signed Electronically by: Reny Street DO  DME (Durable Medical Equipment) Orders and Documentation  Orders Placed This Encounter   Procedures    Wheelchair Order      The patient was assessed and it was determined the patient is in need of the following listed DME Supplies/Equipment. Please complete supporting documentation below to demonstrate medical necessity.         "

## 2025-06-12 ENCOUNTER — TELEPHONE (OUTPATIENT)
Dept: CARDIOLOGY | Facility: CLINIC | Age: OVER 89
End: 2025-06-12
Payer: COMMERCIAL

## 2025-06-12 ENCOUNTER — HOSPITAL ENCOUNTER (OUTPATIENT)
Facility: CLINIC | Age: OVER 89
End: 2025-06-12
Attending: INTERNAL MEDICINE | Admitting: INTERNAL MEDICINE
Payer: COMMERCIAL

## 2025-06-12 DIAGNOSIS — Z95.0 CARDIAC PACEMAKER IN SITU: ICD-10-CM

## 2025-06-12 DIAGNOSIS — I44.1 2ND DEGREE AV BLOCK: ICD-10-CM

## 2025-06-12 DIAGNOSIS — R00.1 BRADYCARDIA: ICD-10-CM

## 2025-06-12 DIAGNOSIS — R55 SYNCOPE AND COLLAPSE: ICD-10-CM

## 2025-06-12 DIAGNOSIS — I44.2 COMPLETE HEART BLOCK (H): ICD-10-CM

## 2025-06-12 DIAGNOSIS — Z95.0 CARDIAC PACEMAKER IN SITU: Primary | ICD-10-CM

## 2025-06-12 DIAGNOSIS — I44.2 COMPLETE HEART BLOCK (H): Primary | ICD-10-CM

## 2025-06-12 RX ORDER — SODIUM CHLORIDE 450 MG/100ML
INJECTION, SOLUTION INTRAVENOUS CONTINUOUS
OUTPATIENT
Start: 2025-06-12

## 2025-06-12 RX ORDER — CEFAZOLIN SODIUM 2 G/100ML
2 INJECTION, SOLUTION INTRAVENOUS
OUTPATIENT
Start: 2025-06-12

## 2025-06-12 RX ORDER — LIDOCAINE 40 MG/G
CREAM TOPICAL
OUTPATIENT
Start: 2025-06-12

## 2025-06-12 NOTE — PROGRESS NOTES
Pre-procedure instructions for Pacemaker device implant on 3/13/2025 at 3:30 pm:     -Review arrival time of 1:30 pm and location.     Nothing to eat starting 8 hours prior to arrival time (starting at 0530)  May have clear liquids up until 2 hours prior to arrival time (up until 1130)    Anticoagulation: no   DAPT (Plavix, Effient, Brilinta): no  Oral diabetes meds: no  Insulin: no  SGLT2 Inhibitors: no  - Invokana (canagliflozin), Farxiga (dapagliflozin), Jardiance (empagliflozin), Steglatro (ertugliflozin), Synjardy (empagliflozin/metformin)  - hold 3-4 days prior to procedure  GLP-1 Agonists: no  - Byetta (exenitide), Victoza (liraglutide), Ozempic, Wegovy, Rybelsus (semaglutide), Trulicity (dulaglutide), Mounjaro (tirzepatide), Bydureon (Exenatide ER), Adlyxin (Lixisendatide)   - (Weekly dosing, hold GLP-1 agonists 7 days before procedure)  - (Daily or BID dosing, hold GLP-1 agonists day before and day of procedure)  - (Oral semaglutide, hold 7 days before procedure due to long half-life)  Diuretic: no    -Shower the morning of the procedure, and then put on a clean shirt in order to help prevent infection.     -Pt to call [facility] at [phone] if pt has any new flu or cold-like symptoms or if feeling unwell the evening prior or AM of procedure.      -Post-procedure transportation and 24 hours monitoring set up. Please call before coming in if plans change.  With limited bed availability due to COVID, overnight hospital stays will be allowed for clinical reasons only.    -No driving for 24 hours post procedure due to sedation.     -1 week device check scheduled for: 6/24/2025 @ 11:00 [place]    Reviewed instructions with Santana ADLER [phone]    Pt verbalized understanding of instructions. Device clinic phone number provided.  [facility] RN callback number: [phone]      ARI Craig      Info for pre-procedure orders:    MRSA history?: no  Allergy to PCN or ancef?: no. Reaction:  no  Contrast OR Shellfish/Iodine allergy?: no  Labs needed?: no  EKG needed?: yes    -INR check scheduled: no  (INR not needed)       Staff Message from :

## 2025-06-12 NOTE — TELEPHONE ENCOUNTER
Received below messages regarding getting patient set up for an urgent PPM implant, ideally within the next 1-2 days.  Per CONCEPCION Pearson RN, our Device , Cristina is aware and has a spot on hold for tomorrow (there is a spot today, but patient would have to be NPO and be here at 1100).    Called and spoke with patient and reviewed recommendation for an urgent PPM.  Patient in agreement with plan.  He has eaten today, so procedure will be tomorrow.  Patient stated he has a history of a stroke and he would prefer to have someone who works with him, Santana Tai, called with instructions.  He does not currently have his phone number, but will call to get it and pass this on to Cristina when she calls.  He would like us to call Santana with pre-procedure instructions, as well.     Message routed to Cristina to call patient/Santana to set up procedure.  Prior Auth Department included to urgently process this request.      ARI Cary           ----- Message from Lili CALDWELL sent at 6/12/2025  8:55 AM CDT -----  Regarding: FW: bradycardia with complete heart block  See Dr Loera note below. I will also put a note in that I spoke pt, but trying to get the ball rolling right away if possible.     Lili  ----- Message -----  From: Romario Loera MD  Sent: 6/12/2025   7:44 AM CDT  To: Lili Mitchell RN  Subject: RE: bradycardia with complete heart block        His EKG shows complete heart block and he should get an pacemaker implanted if he is agreeable.  I had recommended it when I saw him in clinic previously when the heart block was only intermittent.  I think we should just go ahead and schedule the pacemaker implant.  If we are not able to get him scheduled for pacemaker implant within the next 1-2 days, I would recommend that he come into the ED.  ----- Message -----  From: Lili Mitchell RN  Sent: 6/11/2025   1:22 PM CDT  To: Romario Loera MD  Subject: FW: bradycardia with complete heart block        You saw pt in 1/7/25 for intermittent  HB.  EKG done at PCP office today-in [facility].  Wanting ASAP visit to discuss PPM implant.  Your recommendation please.     Lili  ----- Message -----  From: Alyssa Pappas  Sent: 6/11/2025  12:48 PM CDT  To: Reny Street DO; Tin Garcia MD;#  Subject: RE: bradycardia with complete heart block        Hi Dr. Garcia,  I am forwarding this over to the EP RNs, as schedules are really tight and seeing what they can offer.    Thanks!    Alyssa  ----- Message -----  From: Tin Garcia MD  Sent: 6/11/2025  12:18 PM CDT  To: Reny Street DO; [facility]  Subject: bradycardia with complete heart block            Pls schedule ASAP appt with EP to discuss pacer implant.  Thanks.

## 2025-06-12 NOTE — TELEPHONE ENCOUNTER
Per Dr Loera:   His EKG shows complete heart block and he should get an pacemaker implanted if he is agreeable.  I had recommended it when I saw him in clinic previously when the heart block was only intermittent.  I think we should just go ahead and schedule the pacemaker implant.  If we are not able to get him scheduled for pacemaker implant within the next 1-2 days, I would recommend that he come into the ED.       Spoke to pt about the EKG and the recommendations of having a PPM placed. Pt remembers when he saw Dr Loera in the clinic and the PPM was discussed. Discussed that at this time pt needs to have as soon as we can. Pt aware that this writer will have scheduling look to see if PPM can be placed today or tomorrow and if not pt aware that he as been asked to go the ER.  Pt states understanding and will await our call as to what his next step is.  HAIMelsonARI     Addendum: Cristina aware and has a hold tomorrow an Device nurse Chel aware of patient. TAMIKA

## 2025-06-13 ENCOUNTER — APPOINTMENT (OUTPATIENT)
Dept: GENERAL RADIOLOGY | Facility: CLINIC | Age: OVER 89
End: 2025-06-13
Attending: INTERNAL MEDICINE
Payer: COMMERCIAL

## 2025-06-13 ENCOUNTER — HOSPITAL ENCOUNTER (OUTPATIENT)
Facility: CLINIC | Age: OVER 89
Discharge: HOME | End: 2025-06-13
Admitting: INTERNAL MEDICINE
Payer: COMMERCIAL

## 2025-06-13 VITALS
RESPIRATION RATE: 16 BRPM | HEART RATE: 63 BPM | OXYGEN SATURATION: 94 % | HEIGHT: 66 IN | DIASTOLIC BLOOD PRESSURE: 96 MMHG | WEIGHT: 165 LBS | TEMPERATURE: 98.1 F | BODY MASS INDEX: 26.52 KG/M2 | SYSTOLIC BLOOD PRESSURE: 177 MMHG

## 2025-06-13 DIAGNOSIS — R55 SYNCOPE AND COLLAPSE: ICD-10-CM

## 2025-06-13 DIAGNOSIS — I44.1 2ND DEGREE AV BLOCK: ICD-10-CM

## 2025-06-13 DIAGNOSIS — Z95.0 CARDIAC PACEMAKER IN SITU: ICD-10-CM

## 2025-06-13 DIAGNOSIS — R00.1 BRADYCARDIA: ICD-10-CM

## 2025-06-13 DIAGNOSIS — I44.2 COMPLETE HEART BLOCK (H): ICD-10-CM

## 2025-06-13 PROCEDURE — 99153 MOD SED SAME PHYS/QHP EA: CPT | Performed by: INTERNAL MEDICINE

## 2025-06-13 PROCEDURE — 33208 INSRT HEART PM ATRIAL & VENT: CPT | Mod: KX | Performed by: INTERNAL MEDICINE

## 2025-06-13 PROCEDURE — 99152 MOD SED SAME PHYS/QHP 5/>YRS: CPT | Performed by: INTERNAL MEDICINE

## 2025-06-13 PROCEDURE — 250N000011 HC RX IP 250 OP 636: Performed by: INTERNAL MEDICINE

## 2025-06-13 PROCEDURE — 999N000065 XR CHEST 2 VIEWS

## 2025-06-13 PROCEDURE — C1785 PMKR, DUAL, RATE-RESP: HCPCS | Performed by: INTERNAL MEDICINE

## 2025-06-13 PROCEDURE — C1887 CATHETER, GUIDING: HCPCS | Performed by: INTERNAL MEDICINE

## 2025-06-13 PROCEDURE — 999N000071 HC STATISTIC HEART CATH LAB OR EP LAB

## 2025-06-13 PROCEDURE — C1894 INTRO/SHEATH, NON-LASER: HCPCS | Performed by: INTERNAL MEDICINE

## 2025-06-13 PROCEDURE — 250N000011 HC RX IP 250 OP 636: Mod: JZ | Performed by: INTERNAL MEDICINE

## 2025-06-13 PROCEDURE — 999N000184 HC STATISTIC TELEMETRY

## 2025-06-13 PROCEDURE — 272N000001 HC OR GENERAL SUPPLY STERILE: Performed by: INTERNAL MEDICINE

## 2025-06-13 PROCEDURE — 250N000009 HC RX 250: Mod: JW | Performed by: INTERNAL MEDICINE

## 2025-06-13 PROCEDURE — 258N000002 HC RX IP 258 OP 250: Performed by: INTERNAL MEDICINE

## 2025-06-13 PROCEDURE — C1898 LEAD, PMKR, OTHER THAN TRANS: HCPCS | Performed by: INTERNAL MEDICINE

## 2025-06-13 DEVICE — IMP LEAD PACING BIPOLAR CAPSUREFIX NOVUS 52CM 5076-52: Type: IMPLANTABLE DEVICE | Status: FUNCTIONAL

## 2025-06-13 DEVICE — PACEMAKER AZURE MRI XT DR: Type: IMPLANTABLE DEVICE | Status: FUNCTIONAL

## 2025-06-13 DEVICE — LEAD PACEMAKER SELECTSECURE 69CM MD  383069: Type: IMPLANTABLE DEVICE | Status: FUNCTIONAL

## 2025-06-13 RX ORDER — CEFAZOLIN SODIUM 2 G/50ML
2 SOLUTION INTRAVENOUS
Status: COMPLETED | OUTPATIENT
Start: 2025-06-13 | End: 2025-06-13

## 2025-06-13 RX ORDER — NALOXONE HYDROCHLORIDE 0.4 MG/ML
0.4 INJECTION, SOLUTION INTRAMUSCULAR; INTRAVENOUS; SUBCUTANEOUS
Status: DISCONTINUED | OUTPATIENT
Start: 2025-06-13 | End: 2025-06-13 | Stop reason: HOSPADM

## 2025-06-13 RX ORDER — LIDOCAINE 40 MG/G
CREAM TOPICAL
Status: DISCONTINUED | OUTPATIENT
Start: 2025-06-13 | End: 2025-06-13 | Stop reason: HOSPADM

## 2025-06-13 RX ORDER — FENTANYL CITRATE 50 UG/ML
INJECTION, SOLUTION INTRAMUSCULAR; INTRAVENOUS
Status: DISCONTINUED | OUTPATIENT
Start: 2025-06-13 | End: 2025-06-13 | Stop reason: HOSPADM

## 2025-06-13 RX ORDER — SODIUM CHLORIDE 450 MG/100ML
INJECTION, SOLUTION INTRAVENOUS CONTINUOUS
Status: DISCONTINUED | OUTPATIENT
Start: 2025-06-13 | End: 2025-06-13 | Stop reason: HOSPADM

## 2025-06-13 RX ORDER — NALOXONE HYDROCHLORIDE 0.4 MG/ML
0.2 INJECTION, SOLUTION INTRAMUSCULAR; INTRAVENOUS; SUBCUTANEOUS
Status: DISCONTINUED | OUTPATIENT
Start: 2025-06-13 | End: 2025-06-13 | Stop reason: HOSPADM

## 2025-06-13 RX ORDER — OXYCODONE AND ACETAMINOPHEN 5; 325 MG/1; MG/1
1 TABLET ORAL EVERY 4 HOURS PRN
Status: DISCONTINUED | OUTPATIENT
Start: 2025-06-13 | End: 2025-06-13 | Stop reason: HOSPADM

## 2025-06-13 RX ORDER — BUPIVACAINE HYDROCHLORIDE 2.5 MG/ML
INJECTION, SOLUTION EPIDURAL; INFILTRATION; INTRACAUDAL; PERINEURAL
Status: DISCONTINUED | OUTPATIENT
Start: 2025-06-13 | End: 2025-06-13 | Stop reason: HOSPADM

## 2025-06-13 RX ADMIN — CEFAZOLIN SODIUM 2 G: 2 SOLUTION INTRAVENOUS at 14:40

## 2025-06-13 RX ADMIN — SODIUM CHLORIDE: 0.45 INJECTION, SOLUTION INTRAVENOUS at 14:03

## 2025-06-13 ASSESSMENT — ACTIVITIES OF DAILY LIVING (ADL)
ADLS_ACUITY_SCORE: 53

## 2025-06-13 NOTE — PROGRESS NOTES
[facility] Admission Nursing Note    Patient Information  Name: Henrry LEON White  Age: 89 year old  Reason for admission: [facility]  [facility] arrival time: 1330    Visitor Information  Name: Santana     Patient Admission/Assessment   Pre-procedure assessment complete: Yes  If abnormal assessment/labs, provider notified: N/A  NPO: Yes  Medications held per instructions/orders: Yes  Consent: deferred  If applicable, pregnancy test status: deferred  Patient oriented to room: Yes  Education/questions answered: Yes  Plan/other:   Proceed with plan  Juma from [employer] talking with patient     Discharge Planning  Discharge name/phone number: Santana [phone]  Overnight post sedation caregiver: Santana  Discharge location: home      Anshu Neville RN

## 2025-06-13 NOTE — PROGRESS NOTES
[facility] Discharge Nursing Note    Patient Information  Name: Henrry LEON White  Age: 89 year old    Discharge Education:  Discharge instructions reviewed: Yes  Additional education/resources provided: pacemaker home kit  Patient/patient representative verbalizes understanding: Yes  Patient discharging on new medications: No  Medication education completed: Yes    Discharge Plans:   Discharge location: home  Discharge ride contacted: Yes  Approximate discharge time: 1810    Discharge Criteria:  Discharge criteria met and vital signs stable: Yes  Pt stood and pivoted to WC. Pt voided and pt stood and pivoted back to wheelchair.     Patient Belongs:  Patient belongings returned to patient: Yes    Harika Garcia RN

## 2025-06-13 NOTE — Clinical Note
suture utilized for closure of site.  ; hemostasis achieved. Xolair Counseling:  Patient informed of potential adverse effects including but not limited to fever, muscle aches, rash and allergic reactions.  The patient verbalized understanding of the proper use and possible adverse effects of Xolair.  All of the patient's questions and concerns were addressed.

## 2025-06-13 NOTE — DISCHARGE INSTRUCTIONS
Pacemaker Implant Discharge Instructions     After you go home:    Have an adult stay with you until tomorrow.  You may resume your normal diet.       For 24 hours - due to the sedation you received:  Relax and take it easy.  Do NOT make any important or legal decisions.  Do NOT drive or operate machines at home or at work.  Do NOT drink alcohol.    Care of Chest Incision:    Keep the Aquacel (tan rectangular) dressing on until your return appointment. The dressing will be removed by the device nurse.   If there is a pressure dressing (gauze & tape) - 24 hours after your procedure you may remove ONLY the top dressing. Leave the bottom Aquacel dressing on.  Leave the strips of tape on. They will fall off on their own.  Check your wound daily for signs of infection, such as increased redness, severe swelling or draining. Fever may also be a sign of infection. Call us if you see any of these signs.  You may shower with the Aquacel (waterproof) dressing in place the morning after your procedure.  Do not scrub on top of the dressing & avoid peeling the dressing off. If you take a tub bath, keep the dressing dry. If the edges of the dressing are peeling off, do NOT shower & contact the clinic for further instruction.  No soaking the incision (swimming pool, bathtub, hot tub) until you are cleared at your 6-8 week check.  Never put any creams, lotions, powders or peroxide on your incision area unless your doctor tells you to.  You may have mild to medium pain for 3 to 5 days. Take Acetaminophen (Tylenol) or Ibuprofen (Advil) for the pain. If the pain persists or is severe, call us.    Activity:    For at least 2 weeks: Do not raise your elbow above your shoulder. You can begin to use your arm as it feels comfortable to you.  Do not use arm on implant side to lift more than 10 pounds for the first week.  For 4 weeks: Refrain from strenuous sports - tennis, pickle ball, basketball, golf or weight lifting.  Ask your doctor  when you can expect to return to work.    Bleeding:    If you start bleeding from the incision site, sit down and press firmly on the site for 10 minutes.   Once bleeding stops, call [facility] as soon as you can.       Call 911 right away if you have heavy bleeding or bleeding that does not stop.      Medicines:    Take your medications, including blood thinners, unless your provider tells you not to.  If you have stopped any medicines, check with your provider about when to restart them.    Follow Up Appointments:    Follow up with Device Clinic at [facility] of patient preference as scheduled.    Call the clinic if:    You have a large or growing hard lump around the site.  The site is red, swollen, hot or tender.  Blood or fluid is draining from the site.  You have chills or a fever greater than 100.4 F (38 C).  You feel dizzy or light-headed.  Chest pain  Lack of energy  Rapid pulse or pounding heartbeat  Shortness of breath  Increased pain around your pacemaker  Questions or concerns    Telling others about your device:    Before you leave the hospital, you will receive a temporary ID card. A permanent card will be mailed to you about 6 to 8 weeks later. Always carry the ID card with you. It has important details about your device.  You may also get a Medical Alert bracelet or tag that says you have a pacemaker.  Go to [email or web address].   Always tell doctors, dentists and other care providers that you have a device implanted in you.  Let us know before you plan any surgeries. Your care team must take special steps to keep you safe during certain procedures. These steps will depend on the type of device you have. Your provider will need to see your ID card. They may need to call us for instructions.    Device Safety:    Please refer to device  s booklet for further information.  Keep your cell phone away from your pacemaker. Don't carry the phone in your shirt pocket, even when it is  turned off.  Avoid strong magnets - MRIs & hand-held security wands.  Avoid strong electrical fields - radio transmitting towers, ham radios, & heavy duty electrical equipment.  Avoid leaning over the open martin of a running car.      [facility] in [place]: [phone]   Device Clinic (Mon-Fri 8am-4pm) [phone]  Or you may contact your provider via My Chart    The device clinic is closed on weekends & holidays.  Any calls received during this time will be answered on the next business day.  For any urgent questions after hours, please call the main clinic number and you will be put in contact with the cardiologist on call.

## 2025-06-13 NOTE — PROGRESS NOTES
[facility] Post Procedure Note    Patient Information  Name: Henrry LEON White  Age: 89 year old    Post Procedure  Time patient returned to [facility]: 545  Concerns/abnormal assessment: none noted   If abnormal assessment, provider notified: N/A  Plan/Other: monitor, post PPM cxr and discharge.  Dressing on lt upper chest CDI. Aquacel covered by pressure dressing. No swelling    Harika Garcia RN

## 2025-06-16 ENCOUNTER — TELEPHONE (OUTPATIENT)
Dept: CARDIOLOGY | Facility: CLINIC | Age: OVER 89
End: 2025-06-16
Payer: COMMERCIAL

## 2025-06-16 NOTE — TELEPHONE ENCOUNTER
Post [employer] PPM Implant on 6/13/25 with Dr. 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    Reviewed the following:  - No raising arm above shoulder on the side of implant for 2 weeks (until Saturday, 6/28/25).  - For new implants, no lifting >10 pounds for the first week (does not apply to generator changes or loop implants)  - For strenuous sports such as tennis, pickle ball, basketball, golf, or weight lifting wait 4 weeks to ensure stable lead healing (until Saturday, 7/12/25).   - If there is a pressure dressing in place, this can be removed after 24 hours.   - Leave Aquacel dressing in place.  Okay to shower the day after the procedure.  If this begins to peel up, contact the device clinic.    - Aquacel dressing and steri-strips will be removed at 1 week device check, as appropriate  - Limit driving for: 1 week (PPM)  - Watch for redness, drainage, warmth, or fever. Call device clinic if any signs of infection.   - No soaking in bath tub, swimming pool or hot tub until you are cleared at your 6-8 week check    1 week device check scheduled: 6/24/25 at 1100 in [place]    Pt states understanding of all instructions and will call with any questions.  Device clinic phone number provided ([phone]).     ARI Cary

## 2025-06-24 ENCOUNTER — TELEPHONE (OUTPATIENT)
Dept: CARDIOLOGY | Facility: CLINIC | Age: OVER 89
End: 2025-06-24
Payer: COMMERCIAL

## 2025-06-24 ENCOUNTER — HOSPITAL ENCOUNTER (OUTPATIENT)
Dept: CARDIOLOGY | Facility: CLINIC | Age: OVER 89
Discharge: HOME | End: 2025-06-24
Attending: INTERNAL MEDICINE
Payer: COMMERCIAL

## 2025-06-24 DIAGNOSIS — Z95.0 CARDIAC PACEMAKER IN SITU: ICD-10-CM

## 2025-06-24 DIAGNOSIS — I44.2 COMPLETE HEART BLOCK (H): ICD-10-CM

## 2025-06-24 LAB
MDC_IDC_LEAD_CONNECTION_STATUS: NORMAL
MDC_IDC_LEAD_CONNECTION_STATUS: NORMAL
MDC_IDC_LEAD_IMPLANT_DT: NORMAL
MDC_IDC_LEAD_IMPLANT_DT: NORMAL
MDC_IDC_LEAD_LOCATION: NORMAL
MDC_IDC_LEAD_LOCATION: NORMAL
MDC_IDC_LEAD_LOCATION_DETAIL_1: NORMAL
MDC_IDC_LEAD_LOCATION_DETAIL_1: NORMAL
MDC_IDC_LEAD_MFG: NORMAL
MDC_IDC_LEAD_MFG: NORMAL
MDC_IDC_LEAD_MODEL: NORMAL
MDC_IDC_LEAD_MODEL: NORMAL
MDC_IDC_LEAD_POLARITY_TYPE: NORMAL
MDC_IDC_LEAD_POLARITY_TYPE: NORMAL
MDC_IDC_LEAD_SERIAL: NORMAL
MDC_IDC_LEAD_SERIAL: NORMAL
MDC_IDC_LEAD_SPECIAL_FUNCTION: NORMAL
MDC_IDC_MSMT_BATTERY_DTM: NORMAL
MDC_IDC_MSMT_BATTERY_REMAINING_LONGEVITY: 146 MO
MDC_IDC_MSMT_BATTERY_RRT_TRIGGER: 2.62
MDC_IDC_MSMT_BATTERY_STATUS: NORMAL
MDC_IDC_MSMT_BATTERY_VOLTAGE: 3.23 V
MDC_IDC_MSMT_LEADCHNL_RA_IMPEDANCE_VALUE: 304 OHM
MDC_IDC_MSMT_LEADCHNL_RA_IMPEDANCE_VALUE: 456 OHM
MDC_IDC_MSMT_LEADCHNL_RA_PACING_THRESHOLD_AMPLITUDE: 0.5 V
MDC_IDC_MSMT_LEADCHNL_RA_PACING_THRESHOLD_PULSEWIDTH: 0.4 MS
MDC_IDC_MSMT_LEADCHNL_RA_SENSING_INTR_AMPL: 3.5 MV
MDC_IDC_MSMT_LEADCHNL_RA_SENSING_INTR_AMPL: 3.5 MV
MDC_IDC_MSMT_LEADCHNL_RV_IMPEDANCE_VALUE: 437 OHM
MDC_IDC_MSMT_LEADCHNL_RV_IMPEDANCE_VALUE: 589 OHM
MDC_IDC_MSMT_LEADCHNL_RV_PACING_THRESHOLD_AMPLITUDE: 0.5 V
MDC_IDC_MSMT_LEADCHNL_RV_PACING_THRESHOLD_PULSEWIDTH: 0.4 MS
MDC_IDC_MSMT_LEADCHNL_RV_SENSING_INTR_AMPL: 11.5 MV
MDC_IDC_PG_IMPLANT_DTM: NORMAL
MDC_IDC_PG_MFG: NORMAL
MDC_IDC_PG_MODEL: NORMAL
MDC_IDC_PG_SERIAL: NORMAL
MDC_IDC_PG_TYPE: NORMAL
MDC_IDC_SESS_CLINIC_NAME: NORMAL
MDC_IDC_SESS_DTM: NORMAL
MDC_IDC_SESS_TYPE: NORMAL
MDC_IDC_SET_BRADY_AT_MODE_SWITCH_RATE: 171 {BEATS}/MIN
MDC_IDC_SET_BRADY_HYSTRATE: NORMAL
MDC_IDC_SET_BRADY_LOWRATE: 60 {BEATS}/MIN
MDC_IDC_SET_BRADY_MAX_SENSOR_RATE: 130 {BEATS}/MIN
MDC_IDC_SET_BRADY_MAX_TRACKING_RATE: 130 {BEATS}/MIN
MDC_IDC_SET_BRADY_MODE: NORMAL
MDC_IDC_SET_BRADY_PAV_DELAY_LOW: 180 MS
MDC_IDC_SET_BRADY_SAV_DELAY_LOW: 150 MS
MDC_IDC_SET_LEADCHNL_RA_PACING_AMPLITUDE: 2 V
MDC_IDC_SET_LEADCHNL_RA_PACING_ANODE_ELECTRODE_1: NORMAL
MDC_IDC_SET_LEADCHNL_RA_PACING_ANODE_LOCATION_1: NORMAL
MDC_IDC_SET_LEADCHNL_RA_PACING_CAPTURE_MODE: NORMAL
MDC_IDC_SET_LEADCHNL_RA_PACING_CATHODE_ELECTRODE_1: NORMAL
MDC_IDC_SET_LEADCHNL_RA_PACING_CATHODE_LOCATION_1: NORMAL
MDC_IDC_SET_LEADCHNL_RA_PACING_POLARITY: NORMAL
MDC_IDC_SET_LEADCHNL_RA_PACING_PULSEWIDTH: 0.4 MS
MDC_IDC_SET_LEADCHNL_RA_SENSING_ANODE_ELECTRODE_1: NORMAL
MDC_IDC_SET_LEADCHNL_RA_SENSING_ANODE_LOCATION_1: NORMAL
MDC_IDC_SET_LEADCHNL_RA_SENSING_CATHODE_ELECTRODE_1: NORMAL
MDC_IDC_SET_LEADCHNL_RA_SENSING_CATHODE_LOCATION_1: NORMAL
MDC_IDC_SET_LEADCHNL_RA_SENSING_POLARITY: NORMAL
MDC_IDC_SET_LEADCHNL_RA_SENSING_SENSITIVITY: 0.3 MV
MDC_IDC_SET_LEADCHNL_RV_PACING_AMPLITUDE: 2 V
MDC_IDC_SET_LEADCHNL_RV_PACING_ANODE_ELECTRODE_1: NORMAL
MDC_IDC_SET_LEADCHNL_RV_PACING_ANODE_LOCATION_1: NORMAL
MDC_IDC_SET_LEADCHNL_RV_PACING_CAPTURE_MODE: NORMAL
MDC_IDC_SET_LEADCHNL_RV_PACING_CATHODE_ELECTRODE_1: NORMAL
MDC_IDC_SET_LEADCHNL_RV_PACING_CATHODE_LOCATION_1: NORMAL
MDC_IDC_SET_LEADCHNL_RV_PACING_POLARITY: NORMAL
MDC_IDC_SET_LEADCHNL_RV_PACING_PULSEWIDTH: 0.4 MS
MDC_IDC_SET_LEADCHNL_RV_SENSING_ANODE_ELECTRODE_1: NORMAL
MDC_IDC_SET_LEADCHNL_RV_SENSING_ANODE_LOCATION_1: NORMAL
MDC_IDC_SET_LEADCHNL_RV_SENSING_CATHODE_ELECTRODE_1: NORMAL
MDC_IDC_SET_LEADCHNL_RV_SENSING_CATHODE_LOCATION_1: NORMAL
MDC_IDC_SET_LEADCHNL_RV_SENSING_POLARITY: NORMAL
MDC_IDC_SET_LEADCHNL_RV_SENSING_SENSITIVITY: 0.9 MV
MDC_IDC_SET_ZONE_DETECTION_INTERVAL: 350 MS
MDC_IDC_SET_ZONE_DETECTION_INTERVAL: 400 MS
MDC_IDC_SET_ZONE_STATUS: NORMAL
MDC_IDC_SET_ZONE_STATUS: NORMAL
MDC_IDC_SET_ZONE_TYPE: NORMAL
MDC_IDC_SET_ZONE_VENDOR_TYPE: NORMAL
MDC_IDC_STAT_AT_BURDEN_PERCENT: 0 %
MDC_IDC_STAT_AT_DTM_END: NORMAL
MDC_IDC_STAT_AT_DTM_START: NORMAL
MDC_IDC_STAT_BRADY_AP_VP_PERCENT: 64.9 %
MDC_IDC_STAT_BRADY_AP_VS_PERCENT: 0.01 %
MDC_IDC_STAT_BRADY_AS_VP_PERCENT: 32.17 %
MDC_IDC_STAT_BRADY_AS_VS_PERCENT: 2.93 %
MDC_IDC_STAT_BRADY_DTM_END: NORMAL
MDC_IDC_STAT_BRADY_DTM_START: NORMAL
MDC_IDC_STAT_BRADY_RA_PERCENT_PACED: 65.96 %
MDC_IDC_STAT_BRADY_RV_PERCENT_PACED: 97.07 %
MDC_IDC_STAT_EPISODE_RECENT_COUNT: 0
MDC_IDC_STAT_EPISODE_RECENT_COUNT_DTM_END: NORMAL
MDC_IDC_STAT_EPISODE_RECENT_COUNT_DTM_START: NORMAL
MDC_IDC_STAT_EPISODE_TOTAL_COUNT: 0
MDC_IDC_STAT_EPISODE_TOTAL_COUNT_DTM_END: NORMAL
MDC_IDC_STAT_EPISODE_TOTAL_COUNT_DTM_START: NORMAL
MDC_IDC_STAT_EPISODE_TYPE: NORMAL
MDC_IDC_STAT_TACHYTHERAPY_RECENT_DTM_END: NORMAL
MDC_IDC_STAT_TACHYTHERAPY_RECENT_DTM_START: NORMAL
MDC_IDC_STAT_TACHYTHERAPY_TOTAL_DTM_END: NORMAL
MDC_IDC_STAT_TACHYTHERAPY_TOTAL_DTM_START: NORMAL

## 2025-06-24 PROCEDURE — 93280 PM DEVICE PROGR EVAL DUAL: CPT

## 2025-06-24 NOTE — TELEPHONE ENCOUNTER
Saw patient in clinic today for his 1 week PPM check.  6-9 week appointment originally scheduled for 9/4/25.  After patient's departure, noted that this was scheduled in [place].  This appointment was moved to [place] on 8/5/25 at 11:00.  Called and updated patient on this change and he stated this would work for them.  Requested a letter be mailed with the date.  Letter mailed.     ARI Cary

## 2025-08-05 ENCOUNTER — HOSPITAL ENCOUNTER (OUTPATIENT)
Dept: CARDIOLOGY | Facility: CLINIC | Age: OVER 89
Discharge: HOME | End: 2025-08-05
Attending: INTERNAL MEDICINE
Payer: COMMERCIAL

## 2025-08-05 ENCOUNTER — TELEPHONE (OUTPATIENT)
Dept: CARDIOLOGY | Facility: CLINIC | Age: OVER 89
End: 2025-08-05

## 2025-08-05 DIAGNOSIS — I44.2 COMPLETE HEART BLOCK (H): ICD-10-CM

## 2025-08-05 DIAGNOSIS — Z95.0 CARDIAC PACEMAKER IN SITU: ICD-10-CM

## 2025-08-05 LAB
MDC_IDC_EPISODE_DTM: NORMAL
MDC_IDC_EPISODE_DURATION: 1 S
MDC_IDC_EPISODE_ID: 1
MDC_IDC_EPISODE_TYPE: NORMAL
MDC_IDC_EPISODE_TYPE_INDUCED: NO
MDC_IDC_LEAD_CONNECTION_STATUS: NORMAL
MDC_IDC_LEAD_CONNECTION_STATUS: NORMAL
MDC_IDC_LEAD_IMPLANT_DT: NORMAL
MDC_IDC_LEAD_IMPLANT_DT: NORMAL
MDC_IDC_LEAD_LOCATION: NORMAL
MDC_IDC_LEAD_LOCATION: NORMAL
MDC_IDC_LEAD_LOCATION_DETAIL_1: NORMAL
MDC_IDC_LEAD_LOCATION_DETAIL_1: NORMAL
MDC_IDC_LEAD_MFG: NORMAL
MDC_IDC_LEAD_MFG: NORMAL
MDC_IDC_LEAD_MODEL: NORMAL
MDC_IDC_LEAD_MODEL: NORMAL
MDC_IDC_LEAD_POLARITY_TYPE: NORMAL
MDC_IDC_LEAD_POLARITY_TYPE: NORMAL
MDC_IDC_LEAD_SERIAL: NORMAL
MDC_IDC_LEAD_SERIAL: NORMAL
MDC_IDC_LEAD_SPECIAL_FUNCTION: NORMAL
MDC_IDC_MSMT_BATTERY_DTM: NORMAL
MDC_IDC_MSMT_BATTERY_REMAINING_LONGEVITY: 149 MO
MDC_IDC_MSMT_BATTERY_RRT_TRIGGER: 2.62
MDC_IDC_MSMT_BATTERY_STATUS: NORMAL
MDC_IDC_MSMT_BATTERY_VOLTAGE: 3.21 V
MDC_IDC_MSMT_LEADCHNL_RA_IMPEDANCE_VALUE: 285 OHM
MDC_IDC_MSMT_LEADCHNL_RA_IMPEDANCE_VALUE: 456 OHM
MDC_IDC_MSMT_LEADCHNL_RA_PACING_THRESHOLD_AMPLITUDE: 0.75 V
MDC_IDC_MSMT_LEADCHNL_RA_PACING_THRESHOLD_PULSEWIDTH: 0.4 MS
MDC_IDC_MSMT_LEADCHNL_RA_SENSING_INTR_AMPL: 2.88 MV
MDC_IDC_MSMT_LEADCHNL_RA_SENSING_INTR_AMPL: 3.62 MV
MDC_IDC_MSMT_LEADCHNL_RV_IMPEDANCE_VALUE: 437 OHM
MDC_IDC_MSMT_LEADCHNL_RV_IMPEDANCE_VALUE: 608 OHM
MDC_IDC_MSMT_LEADCHNL_RV_PACING_THRESHOLD_AMPLITUDE: 0.5 V
MDC_IDC_MSMT_LEADCHNL_RV_PACING_THRESHOLD_PULSEWIDTH: 0.4 MS
MDC_IDC_MSMT_LEADCHNL_RV_SENSING_INTR_AMPL: 11.75 MV
MDC_IDC_PG_IMPLANT_DTM: NORMAL
MDC_IDC_PG_MFG: NORMAL
MDC_IDC_PG_MODEL: NORMAL
MDC_IDC_PG_SERIAL: NORMAL
MDC_IDC_PG_TYPE: NORMAL
MDC_IDC_SESS_CLINIC_NAME: NORMAL
MDC_IDC_SESS_DTM: NORMAL
MDC_IDC_SESS_TYPE: NORMAL
MDC_IDC_SET_BRADY_AT_MODE_SWITCH_RATE: 171 {BEATS}/MIN
MDC_IDC_SET_BRADY_HYSTRATE: NORMAL
MDC_IDC_SET_BRADY_LOWRATE: 60 {BEATS}/MIN
MDC_IDC_SET_BRADY_MAX_SENSOR_RATE: 130 {BEATS}/MIN
MDC_IDC_SET_BRADY_MAX_TRACKING_RATE: 130 {BEATS}/MIN
MDC_IDC_SET_BRADY_MODE: NORMAL
MDC_IDC_SET_BRADY_PAV_DELAY_LOW: 180 MS
MDC_IDC_SET_BRADY_SAV_DELAY_LOW: 150 MS
MDC_IDC_SET_LEADCHNL_RA_PACING_AMPLITUDE: 1.5 V
MDC_IDC_SET_LEADCHNL_RA_PACING_ANODE_ELECTRODE_1: NORMAL
MDC_IDC_SET_LEADCHNL_RA_PACING_ANODE_LOCATION_1: NORMAL
MDC_IDC_SET_LEADCHNL_RA_PACING_CAPTURE_MODE: NORMAL
MDC_IDC_SET_LEADCHNL_RA_PACING_CATHODE_ELECTRODE_1: NORMAL
MDC_IDC_SET_LEADCHNL_RA_PACING_CATHODE_LOCATION_1: NORMAL
MDC_IDC_SET_LEADCHNL_RA_PACING_POLARITY: NORMAL
MDC_IDC_SET_LEADCHNL_RA_PACING_PULSEWIDTH: 0.4 MS
MDC_IDC_SET_LEADCHNL_RA_SENSING_ANODE_ELECTRODE_1: NORMAL
MDC_IDC_SET_LEADCHNL_RA_SENSING_ANODE_LOCATION_1: NORMAL
MDC_IDC_SET_LEADCHNL_RA_SENSING_CATHODE_ELECTRODE_1: NORMAL
MDC_IDC_SET_LEADCHNL_RA_SENSING_CATHODE_LOCATION_1: NORMAL
MDC_IDC_SET_LEADCHNL_RA_SENSING_POLARITY: NORMAL
MDC_IDC_SET_LEADCHNL_RA_SENSING_SENSITIVITY: 0.3 MV
MDC_IDC_SET_LEADCHNL_RV_PACING_AMPLITUDE: 2 V
MDC_IDC_SET_LEADCHNL_RV_PACING_ANODE_ELECTRODE_1: NORMAL
MDC_IDC_SET_LEADCHNL_RV_PACING_ANODE_LOCATION_1: NORMAL
MDC_IDC_SET_LEADCHNL_RV_PACING_CAPTURE_MODE: NORMAL
MDC_IDC_SET_LEADCHNL_RV_PACING_CATHODE_ELECTRODE_1: NORMAL
MDC_IDC_SET_LEADCHNL_RV_PACING_CATHODE_LOCATION_1: NORMAL
MDC_IDC_SET_LEADCHNL_RV_PACING_POLARITY: NORMAL
MDC_IDC_SET_LEADCHNL_RV_PACING_PULSEWIDTH: 0.4 MS
MDC_IDC_SET_LEADCHNL_RV_SENSING_ANODE_ELECTRODE_1: NORMAL
MDC_IDC_SET_LEADCHNL_RV_SENSING_ANODE_LOCATION_1: NORMAL
MDC_IDC_SET_LEADCHNL_RV_SENSING_CATHODE_ELECTRODE_1: NORMAL
MDC_IDC_SET_LEADCHNL_RV_SENSING_CATHODE_LOCATION_1: NORMAL
MDC_IDC_SET_LEADCHNL_RV_SENSING_POLARITY: NORMAL
MDC_IDC_SET_LEADCHNL_RV_SENSING_SENSITIVITY: 0.9 MV
MDC_IDC_SET_ZONE_DETECTION_INTERVAL: 350 MS
MDC_IDC_SET_ZONE_DETECTION_INTERVAL: 400 MS
MDC_IDC_SET_ZONE_STATUS: NORMAL
MDC_IDC_SET_ZONE_STATUS: NORMAL
MDC_IDC_SET_ZONE_TYPE: NORMAL
MDC_IDC_SET_ZONE_VENDOR_TYPE: NORMAL
MDC_IDC_STAT_AT_BURDEN_PERCENT: 0 %
MDC_IDC_STAT_AT_DTM_END: NORMAL
MDC_IDC_STAT_AT_DTM_START: NORMAL
MDC_IDC_STAT_BRADY_AP_VP_PERCENT: 71.84 %
MDC_IDC_STAT_BRADY_AP_VS_PERCENT: 0.2 %
MDC_IDC_STAT_BRADY_AS_VP_PERCENT: 20.48 %
MDC_IDC_STAT_BRADY_AS_VS_PERCENT: 7.48 %
MDC_IDC_STAT_BRADY_DTM_END: NORMAL
MDC_IDC_STAT_BRADY_DTM_START: NORMAL
MDC_IDC_STAT_BRADY_RA_PERCENT_PACED: 75.43 %
MDC_IDC_STAT_BRADY_RV_PERCENT_PACED: 92.32 %
MDC_IDC_STAT_EPISODE_RECENT_COUNT: 0
MDC_IDC_STAT_EPISODE_RECENT_COUNT: 1
MDC_IDC_STAT_EPISODE_RECENT_COUNT_DTM_END: NORMAL
MDC_IDC_STAT_EPISODE_RECENT_COUNT_DTM_START: NORMAL
MDC_IDC_STAT_EPISODE_TOTAL_COUNT: 0
MDC_IDC_STAT_EPISODE_TOTAL_COUNT: 1
MDC_IDC_STAT_EPISODE_TOTAL_COUNT_DTM_END: NORMAL
MDC_IDC_STAT_EPISODE_TOTAL_COUNT_DTM_START: NORMAL
MDC_IDC_STAT_EPISODE_TYPE: NORMAL
MDC_IDC_STAT_TACHYTHERAPY_RECENT_DTM_END: NORMAL
MDC_IDC_STAT_TACHYTHERAPY_RECENT_DTM_START: NORMAL
MDC_IDC_STAT_TACHYTHERAPY_TOTAL_DTM_END: NORMAL
MDC_IDC_STAT_TACHYTHERAPY_TOTAL_DTM_START: NORMAL

## 2025-08-05 PROCEDURE — 93280 PM DEVICE PROGR EVAL DUAL: CPT

## 2025-08-22 ENCOUNTER — TELEPHONE (OUTPATIENT)
Dept: FAMILY MEDICINE | Facility: CLINIC | Age: OVER 89
End: 2025-08-22
Payer: COMMERCIAL

## 2025-08-22 DIAGNOSIS — G81.94 LEFT HEMIPARESIS (H): ICD-10-CM

## 2025-08-22 DIAGNOSIS — R26.2 AMBULATORY DYSFUNCTION: Primary | ICD-10-CM

## 2025-08-26 ENCOUNTER — TELEPHONE (OUTPATIENT)
Dept: FAMILY MEDICINE | Facility: CLINIC | Age: OVER 89
End: 2025-08-26
Payer: COMMERCIAL

## (undated) DEVICE — SLEEVE TR BAND RADIAL COMPRESSION DEVICE 24CM TRB24-REG

## (undated) DEVICE — WIRE GUIDE LUNDERQUIST 0.035"X260CM DBL CVD

## (undated) DEVICE — Device

## (undated) DEVICE — GUIDEWIRE VASC SAFARI2 0.035X275CM H74939406XS1

## (undated) DEVICE — KIT HAND CONTROL ANGIOTOUCH ACIST 65CM AT-P65

## (undated) DEVICE — CATH EP PACEL 5FRX110CM 1MM RIGHT HEART CURVE 401763

## (undated) DEVICE — MANIFOLD KIT ANGIO AUTOMATED 014613

## (undated) DEVICE — 12FT REMINGTON PACING CABLE CONNECTOR WITH PACE-LOC SAFETY DEVICE, DISPOSABLE

## (undated) DEVICE — INTRO TERUMO 7FRX25CM W/MARKER RSB703

## (undated) DEVICE — INTRO GLIDESHEATH SLENDER 6FR 10X45CM 60-1060

## (undated) DEVICE — TOTE ANGIO CORP PC15AT SAN32CC83O

## (undated) DEVICE — INTRO SHEATH 7FRX10CM PINNACLE RSS702

## (undated) DEVICE — SYSTEM DELIVERY MECHANISM COMMANDER 29MM

## (undated) DEVICE — INTRO SHEATH 4FRX10CM PINNACLE RSS402

## (undated) DEVICE — PACK PCMKR PERM SRG PROC LF SAN32PC573

## (undated) DEVICE — GUIDEWIRE VASC 300CM .014IN CHC PT I H7491215501J2

## (undated) DEVICE — CATH SYSTEM SENTINEL CEREBRAL PROTECTION 6FR CMS15-10C-US

## (undated) DEVICE — CATH ANGIO INFINITI PIGTAIL 145 6 SH 6FRX110CM  534-652S

## (undated) DEVICE — INTRODUCER CATH VASC 5FRX10CM  MPIS-501-NT-U-SST

## (undated) DEVICE — DEFIB PRO-PADZ LVP LQD GEL ADULT 8900-2105-01

## (undated) DEVICE — WIRE GUIDE 0.035"X260CM SAFE-T-J EXCHANGE G00517

## (undated) DEVICE — CATH ANGIO SUPERTORQUE AL1 6FRX100CM 532-645

## (undated) DEVICE — SYR INJ LOWER VISCOSITY ACIST KIT 016605

## (undated) DEVICE — RAD CLOSURE ANGIOSEAL 8FR  610131

## (undated) DEVICE — CATH DIAG 4FR JL 4.5 538417

## (undated) DEVICE — INTRODUCER SAFESHEATH II LONG 7FR 23CM SSL7

## (undated) DEVICE — CATH GD 5.4FR ID / 7FR OD X 43

## (undated) DEVICE — CABLE PACING ALLIGATOR CLIP 12FT 5833SL

## (undated) DEVICE — CLOSURE DEVICE 6FR VASC PROGLIDE MEDICATED SUTURE 12673-03

## (undated) DEVICE — CATH ANGIO INFINITI 3DRC 4FRX100CM 538476

## (undated) DEVICE — RAD INTRODUCER KIT MICRO 5FRX10CM .018 NITINOL G/W

## (undated) DEVICE — MEDITRACE MULTIFUNTION ADULT RADIOTRANSPARENT ELECTRODE FOR ZOLL

## (undated) DEVICE — SHEATH PRELUDE SNAP 7FRX13CM PLS-1007

## (undated) DEVICE — WIRE GUIDE 0.035"X150CM EMERALD STR 502542

## (undated) DEVICE — SLITTER ADJSTBL 6232ADJ

## (undated) DEVICE — GUIDEWIRE VASC 0.035INX150CM INQWIRE J TIP IQ35F150J3F/A

## (undated) DEVICE — SYR LOCKING ATRION QL38

## (undated) DEVICE — INTRO TERUMO 6FRX25CM W/MARKER RSB603

## (undated) RX ORDER — ASPIRIN 325 MG
TABLET ORAL
Status: DISPENSED
Start: 2021-01-13

## (undated) RX ORDER — CEFAZOLIN SODIUM 2 G/50ML
SOLUTION INTRAVENOUS
Status: DISPENSED
Start: 2025-06-13

## (undated) RX ORDER — LIDOCAINE HYDROCHLORIDE 10 MG/ML
INJECTION, SOLUTION EPIDURAL; INFILTRATION; INTRACAUDAL; PERINEURAL
Status: DISPENSED
Start: 2021-01-13

## (undated) RX ORDER — FENTANYL CITRATE 50 UG/ML
INJECTION, SOLUTION INTRAMUSCULAR; INTRAVENOUS
Status: DISPENSED
Start: 2025-06-13

## (undated) RX ORDER — LIDOCAINE HYDROCHLORIDE 10 MG/ML
INJECTION, SOLUTION EPIDURAL; INFILTRATION; INTRACAUDAL; PERINEURAL
Status: DISPENSED
Start: 2025-06-13

## (undated) RX ORDER — HEPARIN SODIUM 200 [USP'U]/100ML
INJECTION, SOLUTION INTRAVENOUS
Status: DISPENSED
Start: 2021-01-13

## (undated) RX ORDER — FENTANYL CITRATE 50 UG/ML
INJECTION, SOLUTION INTRAMUSCULAR; INTRAVENOUS
Status: DISPENSED
Start: 2021-01-13

## (undated) RX ORDER — HEPARIN SODIUM 1000 [USP'U]/ML
INJECTION, SOLUTION INTRAVENOUS; SUBCUTANEOUS
Status: DISPENSED
Start: 2021-01-26

## (undated) RX ORDER — POTASSIUM CHLORIDE 1500 MG/1
TABLET, EXTENDED RELEASE ORAL
Status: DISPENSED
Start: 2021-01-13

## (undated) RX ORDER — LIDOCAINE HYDROCHLORIDE 10 MG/ML
INJECTION, SOLUTION EPIDURAL; INFILTRATION; INTRACAUDAL; PERINEURAL
Status: DISPENSED
Start: 2021-01-26

## (undated) RX ORDER — BUPIVACAINE HYDROCHLORIDE 2.5 MG/ML
INJECTION, SOLUTION EPIDURAL; INFILTRATION; INTRACAUDAL; PERINEURAL
Status: DISPENSED
Start: 2025-06-13

## (undated) RX ORDER — HEPARIN SODIUM 1000 [USP'U]/ML
INJECTION, SOLUTION INTRAVENOUS; SUBCUTANEOUS
Status: DISPENSED
Start: 2021-01-13

## (undated) RX ORDER — CEFAZOLIN SODIUM 2 G/100ML
INJECTION, SOLUTION INTRAVENOUS
Status: DISPENSED
Start: 2021-01-26

## (undated) RX ORDER — ASPIRIN 81 MG/1
TABLET ORAL
Status: DISPENSED
Start: 2021-01-26